# Patient Record
Sex: FEMALE | Race: WHITE | NOT HISPANIC OR LATINO
[De-identification: names, ages, dates, MRNs, and addresses within clinical notes are randomized per-mention and may not be internally consistent; named-entity substitution may affect disease eponyms.]

---

## 2017-05-22 ENCOUNTER — MEDICATION RENEWAL (OUTPATIENT)
Age: 72
End: 2017-05-22

## 2017-05-22 RX ORDER — DENOSUMAB 60 MG/ML
60 INJECTION SUBCUTANEOUS
Qty: 1 | Refills: 1 | Status: DISCONTINUED | COMMUNITY
Start: 2017-05-22 | End: 2017-05-22

## 2017-05-30 ENCOUNTER — MEDICATION RENEWAL (OUTPATIENT)
Age: 72
End: 2017-05-30

## 2017-05-30 RX ORDER — DENOSUMAB 60 MG/ML
60 INJECTION SUBCUTANEOUS
Qty: 1 | Refills: 1 | Status: ACTIVE | COMMUNITY
Start: 2017-05-30

## 2017-06-09 ENCOUNTER — APPOINTMENT (OUTPATIENT)
Dept: ENDOCRINOLOGY | Facility: CLINIC | Age: 72
End: 2017-06-09

## 2017-09-14 ENCOUNTER — OUTPATIENT (OUTPATIENT)
Dept: OUTPATIENT SERVICES | Facility: HOSPITAL | Age: 72
LOS: 1 days | Discharge: HOME | End: 2017-09-14

## 2017-09-14 ENCOUNTER — APPOINTMENT (OUTPATIENT)
Dept: HEMATOLOGY ONCOLOGY | Facility: CLINIC | Age: 72
End: 2017-09-14

## 2017-09-14 ENCOUNTER — RESULT REVIEW (OUTPATIENT)
Age: 72
End: 2017-09-14

## 2017-09-14 DIAGNOSIS — E83.119 HEMOCHROMATOSIS, UNSPECIFIED: ICD-10-CM

## 2017-09-14 DIAGNOSIS — Z96.652 PRESENCE OF LEFT ARTIFICIAL KNEE JOINT: Chronic | ICD-10-CM

## 2017-09-14 LAB
HCT VFR BLD CALC: 41.7 % — SIGNIFICANT CHANGE UP (ref 34.5–45)
HGB BLD-MCNC: 14.6 G/DL — SIGNIFICANT CHANGE UP (ref 11.5–15.5)
MCHC RBC-ENTMCNC: 33.8 PG — SIGNIFICANT CHANGE UP (ref 27–34)
MCHC RBC-ENTMCNC: 35.1 G/DL — SIGNIFICANT CHANGE UP (ref 32–36)
MCV RBC AUTO: 96.3 FL — SIGNIFICANT CHANGE UP (ref 80–100)
PLATELET # BLD AUTO: 267 K/UL — SIGNIFICANT CHANGE UP (ref 150–400)
RBC # BLD: 4.33 M/UL — SIGNIFICANT CHANGE UP (ref 3.8–5.2)
RBC # FLD: 11.7 % — SIGNIFICANT CHANGE UP (ref 10.3–14.5)
WBC # BLD: 7.4 K/UL — SIGNIFICANT CHANGE UP (ref 3.8–10.5)
WBC # FLD AUTO: 7.4 K/UL — SIGNIFICANT CHANGE UP (ref 3.8–10.5)

## 2017-09-21 LAB
ALBUMIN SERPL ELPH-MCNC: 4.5 G/DL
ALP BLD-CCNC: 69 U/L
ALT SERPL-CCNC: 12 U/L
ANION GAP SERPL CALC-SCNC: 16 MMOL/L
AST SERPL-CCNC: 19 U/L
BILIRUB SERPL-MCNC: 0.3 MG/DL
BUN SERPL-MCNC: 16 MG/DL
CALCIUM SERPL-MCNC: 10.2 MG/DL
CHLORIDE SERPL-SCNC: 100 MMOL/L
CO2 SERPL-SCNC: 27 MMOL/L
CREAT SERPL-MCNC: 0.72 MG/DL
FERRITIN SERPL-MCNC: 74 NG/ML
GLUCOSE SERPL-MCNC: 103 MG/DL
IRON SATN MFR SERPL: 55 %
IRON SERPL-MCNC: 150 UG/DL
POTASSIUM SERPL-SCNC: 4.4 MMOL/L
PROT SERPL-MCNC: 7.1 G/DL
SODIUM SERPL-SCNC: 143 MMOL/L
TIBC SERPL-MCNC: 271 UG/DL
UIBC SERPL-MCNC: 121 UG/DL

## 2018-01-11 ENCOUNTER — APPOINTMENT (OUTPATIENT)
Dept: WOUND CARE | Facility: CLINIC | Age: 73
End: 2018-01-11
Payer: MEDICARE

## 2018-01-11 VITALS
WEIGHT: 158 LBS | TEMPERATURE: 98.4 F | DIASTOLIC BLOOD PRESSURE: 82 MMHG | BODY MASS INDEX: 34.56 KG/M2 | HEART RATE: 86 BPM | RESPIRATION RATE: 16 BRPM | HEIGHT: 56.5 IN | SYSTOLIC BLOOD PRESSURE: 187 MMHG

## 2018-01-11 PROCEDURE — 99204 OFFICE O/P NEW MOD 45 MIN: CPT

## 2018-01-11 RX ORDER — CEPHALEXIN 500 MG/1
500 CAPSULE ORAL
Qty: 30 | Refills: 0 | Status: COMPLETED | COMMUNITY
Start: 2017-12-18

## 2018-01-11 RX ORDER — OXYCODONE AND ACETAMINOPHEN 10; 325 MG/1; MG/1
10-325 TABLET ORAL
Qty: 20 | Refills: 0 | Status: COMPLETED | COMMUNITY
Start: 2017-03-04

## 2018-01-11 RX ORDER — METHYLPREDNISOLONE 4 MG/1
4 TABLET ORAL
Qty: 21 | Refills: 0 | Status: COMPLETED | COMMUNITY
Start: 2016-12-28

## 2018-01-11 RX ORDER — OXYCODONE AND ACETAMINOPHEN 5; 325 MG/1; MG/1
5-325 TABLET ORAL
Qty: 60 | Refills: 0 | Status: COMPLETED | COMMUNITY
Start: 2017-05-19

## 2018-01-19 ENCOUNTER — OTHER (OUTPATIENT)
Age: 73
End: 2018-01-19

## 2018-01-19 LAB — BACTERIA TISS CULT: ABNORMAL

## 2018-01-25 ENCOUNTER — APPOINTMENT (OUTPATIENT)
Dept: WOUND CARE | Facility: HOSPITAL | Age: 73
End: 2018-01-25
Payer: MEDICARE

## 2018-01-25 DIAGNOSIS — M79.1 MYALGIA: ICD-10-CM

## 2018-01-25 PROCEDURE — XXXXX: CPT

## 2018-02-08 ENCOUNTER — APPOINTMENT (OUTPATIENT)
Dept: WOUND CARE | Facility: CLINIC | Age: 73
End: 2018-02-08
Payer: MEDICARE

## 2018-02-08 DIAGNOSIS — M54.5 LOW BACK PAIN: ICD-10-CM

## 2018-02-08 PROCEDURE — 11042 DBRDMT SUBQ TIS 1ST 20SQCM/<: CPT | Mod: 58

## 2018-02-08 PROCEDURE — 99214 OFFICE O/P EST MOD 30 MIN: CPT | Mod: 25

## 2018-02-22 ENCOUNTER — APPOINTMENT (OUTPATIENT)
Dept: WOUND CARE | Facility: CLINIC | Age: 73
End: 2018-02-22
Payer: MEDICARE

## 2018-02-22 VITALS
HEART RATE: 67 BPM | TEMPERATURE: 97.6 F | BODY MASS INDEX: 34.56 KG/M2 | RESPIRATION RATE: 16 BRPM | WEIGHT: 158 LBS | HEIGHT: 56.5 IN | SYSTOLIC BLOOD PRESSURE: 148 MMHG | DIASTOLIC BLOOD PRESSURE: 75 MMHG

## 2018-02-22 PROCEDURE — 99213 OFFICE O/P EST LOW 20 MIN: CPT

## 2018-02-22 RX ORDER — IBUPROFEN 800 MG/1
800 TABLET, FILM COATED ORAL
Qty: 20 | Refills: 0 | Status: ACTIVE | COMMUNITY
Start: 2018-02-13

## 2018-03-15 ENCOUNTER — APPOINTMENT (OUTPATIENT)
Dept: WOUND CARE | Facility: CLINIC | Age: 73
End: 2018-03-15
Payer: MEDICARE

## 2018-03-15 VITALS
SYSTOLIC BLOOD PRESSURE: 159 MMHG | DIASTOLIC BLOOD PRESSURE: 80 MMHG | TEMPERATURE: 98.4 F | WEIGHT: 158 LBS | HEART RATE: 79 BPM | BODY MASS INDEX: 35.54 KG/M2 | RESPIRATION RATE: 16 BRPM | HEIGHT: 56 IN

## 2018-03-15 DIAGNOSIS — M54.12 RADICULOPATHY, CERVICAL REGION: ICD-10-CM

## 2018-03-15 DIAGNOSIS — Z87.09 PERSONAL HISTORY OF OTHER DISEASES OF THE RESPIRATORY SYSTEM: ICD-10-CM

## 2018-03-15 PROCEDURE — 99213 OFFICE O/P EST LOW 20 MIN: CPT

## 2018-04-13 ENCOUNTER — APPOINTMENT (OUTPATIENT)
Dept: RADIOLOGY | Facility: IMAGING CENTER | Age: 73
End: 2018-04-13
Payer: MEDICARE

## 2018-04-13 ENCOUNTER — OUTPATIENT (OUTPATIENT)
Dept: OUTPATIENT SERVICES | Facility: HOSPITAL | Age: 73
LOS: 1 days | End: 2018-04-13
Payer: MEDICARE

## 2018-04-13 DIAGNOSIS — Z00.8 ENCOUNTER FOR OTHER GENERAL EXAMINATION: ICD-10-CM

## 2018-04-13 DIAGNOSIS — Z96.652 PRESENCE OF LEFT ARTIFICIAL KNEE JOINT: Chronic | ICD-10-CM

## 2018-04-13 PROCEDURE — 71046 X-RAY EXAM CHEST 2 VIEWS: CPT

## 2018-04-13 PROCEDURE — 71046 X-RAY EXAM CHEST 2 VIEWS: CPT | Mod: 26

## 2018-07-24 ENCOUNTER — APPOINTMENT (OUTPATIENT)
Dept: MAMMOGRAPHY | Facility: IMAGING CENTER | Age: 73
End: 2018-07-24
Payer: MEDICARE

## 2018-07-24 ENCOUNTER — OUTPATIENT (OUTPATIENT)
Dept: OUTPATIENT SERVICES | Facility: HOSPITAL | Age: 73
LOS: 1 days | End: 2018-07-24
Payer: MEDICARE

## 2018-07-24 DIAGNOSIS — Z12.31 ENCOUNTER FOR SCREENING MAMMOGRAM FOR MALIGNANT NEOPLASM OF BREAST: ICD-10-CM

## 2018-07-24 DIAGNOSIS — Z96.652 PRESENCE OF LEFT ARTIFICIAL KNEE JOINT: Chronic | ICD-10-CM

## 2018-07-24 PROCEDURE — 77067 SCR MAMMO BI INCL CAD: CPT

## 2018-07-24 PROCEDURE — 77063 BREAST TOMOSYNTHESIS BI: CPT

## 2018-07-24 PROCEDURE — 77067 SCR MAMMO BI INCL CAD: CPT | Mod: 26

## 2018-07-24 PROCEDURE — 77063 BREAST TOMOSYNTHESIS BI: CPT | Mod: 26

## 2018-09-28 ENCOUNTER — RESULT REVIEW (OUTPATIENT)
Age: 73
End: 2018-09-28

## 2018-09-28 ENCOUNTER — OTHER (OUTPATIENT)
Age: 73
End: 2018-09-28

## 2018-09-28 ENCOUNTER — APPOINTMENT (OUTPATIENT)
Dept: HEMATOLOGY ONCOLOGY | Facility: CLINIC | Age: 73
End: 2018-09-28

## 2018-09-28 ENCOUNTER — OUTPATIENT (OUTPATIENT)
Dept: OUTPATIENT SERVICES | Facility: HOSPITAL | Age: 73
LOS: 1 days | Discharge: HOME | End: 2018-09-28

## 2018-09-28 DIAGNOSIS — Z96.652 PRESENCE OF LEFT ARTIFICIAL KNEE JOINT: Chronic | ICD-10-CM

## 2018-09-28 DIAGNOSIS — E83.119 HEMOCHROMATOSIS, UNSPECIFIED: ICD-10-CM

## 2018-09-28 LAB
BASOPHILS # BLD AUTO: 0.1 K/UL — SIGNIFICANT CHANGE UP (ref 0–0.2)
BASOPHILS NFR BLD AUTO: 0.9 % — SIGNIFICANT CHANGE UP (ref 0–2)
EOSINOPHIL # BLD AUTO: 0.1 K/UL — SIGNIFICANT CHANGE UP (ref 0–0.5)
EOSINOPHIL NFR BLD AUTO: 1.6 % — SIGNIFICANT CHANGE UP (ref 0–6)
HCT VFR BLD CALC: 42.4 % — SIGNIFICANT CHANGE UP (ref 34.5–45)
HGB BLD-MCNC: 14.6 G/DL — SIGNIFICANT CHANGE UP (ref 11.5–15.5)
LYMPHOCYTES # BLD AUTO: 3 K/UL — SIGNIFICANT CHANGE UP (ref 1–3.3)
LYMPHOCYTES # BLD AUTO: 37.5 % — SIGNIFICANT CHANGE UP (ref 13–44)
MCHC RBC-ENTMCNC: 31.8 PG — SIGNIFICANT CHANGE UP (ref 27–34)
MCHC RBC-ENTMCNC: 34.4 G/DL — SIGNIFICANT CHANGE UP (ref 32–36)
MCV RBC AUTO: 92.3 FL — SIGNIFICANT CHANGE UP (ref 80–100)
MONOCYTES # BLD AUTO: 0.6 K/UL — SIGNIFICANT CHANGE UP (ref 0–0.9)
MONOCYTES NFR BLD AUTO: 6.8 % — SIGNIFICANT CHANGE UP (ref 2–14)
NEUTROPHILS # BLD AUTO: 4.3 K/UL — SIGNIFICANT CHANGE UP (ref 1.8–7.4)
NEUTROPHILS NFR BLD AUTO: 53.2 % — SIGNIFICANT CHANGE UP (ref 43–77)
PLATELET # BLD AUTO: 325 K/UL — SIGNIFICANT CHANGE UP (ref 150–400)
RBC # BLD: 4.59 M/UL — SIGNIFICANT CHANGE UP (ref 3.8–5.2)
RBC # FLD: 11.5 % — SIGNIFICANT CHANGE UP (ref 10.3–14.5)
WBC # BLD: 8.1 K/UL — SIGNIFICANT CHANGE UP (ref 3.8–10.5)
WBC # FLD AUTO: 8.1 K/UL — SIGNIFICANT CHANGE UP (ref 3.8–10.5)

## 2018-10-05 LAB
ALBUMIN SERPL ELPH-MCNC: 4.3 G/DL
ALP BLD-CCNC: 95 U/L
ALT SERPL-CCNC: 15 U/L
ANION GAP SERPL CALC-SCNC: 15 MMOL/L
AST SERPL-CCNC: 21 U/L
BILIRUB SERPL-MCNC: 0.2 MG/DL
BUN SERPL-MCNC: 11 MG/DL
CALCIUM SERPL-MCNC: 9.8 MG/DL
CHLORIDE SERPL-SCNC: 96 MMOL/L
CO2 SERPL-SCNC: 31 MMOL/L
CREAT SERPL-MCNC: 0.52 MG/DL
FERRITIN SERPL-MCNC: 85 NG/ML
GLUCOSE SERPL-MCNC: 107 MG/DL
IRON SATN MFR SERPL: 29 %
IRON SERPL-MCNC: 67 UG/DL
POTASSIUM SERPL-SCNC: 3.8 MMOL/L
PROT SERPL-MCNC: 6.9 G/DL
SODIUM SERPL-SCNC: 142 MMOL/L
TIBC SERPL-MCNC: 235 UG/DL
UIBC SERPL-MCNC: 168 UG/DL

## 2018-10-10 ENCOUNTER — OUTPATIENT (OUTPATIENT)
Dept: OUTPATIENT SERVICES | Facility: HOSPITAL | Age: 73
LOS: 1 days | End: 2018-10-10
Payer: MEDICARE

## 2018-10-10 ENCOUNTER — APPOINTMENT (OUTPATIENT)
Dept: RADIOLOGY | Facility: IMAGING CENTER | Age: 73
End: 2018-10-10
Payer: MEDICARE

## 2018-10-10 DIAGNOSIS — Z96.652 PRESENCE OF LEFT ARTIFICIAL KNEE JOINT: Chronic | ICD-10-CM

## 2018-10-10 DIAGNOSIS — Z00.8 ENCOUNTER FOR OTHER GENERAL EXAMINATION: ICD-10-CM

## 2018-10-10 PROCEDURE — 71046 X-RAY EXAM CHEST 2 VIEWS: CPT | Mod: 26

## 2018-10-10 PROCEDURE — 71046 X-RAY EXAM CHEST 2 VIEWS: CPT

## 2019-04-02 ENCOUNTER — APPOINTMENT (OUTPATIENT)
Dept: CT IMAGING | Facility: IMAGING CENTER | Age: 74
End: 2019-04-02
Payer: MEDICARE

## 2019-04-02 ENCOUNTER — OUTPATIENT (OUTPATIENT)
Dept: OUTPATIENT SERVICES | Facility: HOSPITAL | Age: 74
LOS: 1 days | End: 2019-04-02
Payer: MEDICARE

## 2019-04-02 DIAGNOSIS — Z00.8 ENCOUNTER FOR OTHER GENERAL EXAMINATION: ICD-10-CM

## 2019-04-02 DIAGNOSIS — Z96.652 PRESENCE OF LEFT ARTIFICIAL KNEE JOINT: Chronic | ICD-10-CM

## 2019-04-02 PROCEDURE — 72131 CT LUMBAR SPINE W/O DYE: CPT

## 2019-04-02 PROCEDURE — 72131 CT LUMBAR SPINE W/O DYE: CPT | Mod: 26

## 2019-04-02 PROCEDURE — 72128 CT CHEST SPINE W/O DYE: CPT | Mod: 26

## 2019-04-02 PROCEDURE — 72128 CT CHEST SPINE W/O DYE: CPT

## 2019-05-14 ENCOUNTER — OUTPATIENT (OUTPATIENT)
Dept: OUTPATIENT SERVICES | Facility: HOSPITAL | Age: 74
LOS: 1 days | Discharge: HOME | End: 2019-05-14

## 2019-05-14 DIAGNOSIS — Z96.652 PRESENCE OF LEFT ARTIFICIAL KNEE JOINT: Chronic | ICD-10-CM

## 2019-05-14 DIAGNOSIS — E83.119 HEMOCHROMATOSIS, UNSPECIFIED: ICD-10-CM

## 2019-05-16 ENCOUNTER — APPOINTMENT (OUTPATIENT)
Dept: HEMATOLOGY ONCOLOGY | Facility: CLINIC | Age: 74
End: 2019-05-16

## 2019-05-16 ENCOUNTER — OUTPATIENT (OUTPATIENT)
Dept: OUTPATIENT SERVICES | Facility: HOSPITAL | Age: 74
LOS: 1 days | End: 2019-05-16
Payer: MEDICARE

## 2019-05-16 ENCOUNTER — APPOINTMENT (OUTPATIENT)
Dept: RADIOLOGY | Facility: IMAGING CENTER | Age: 74
End: 2019-05-16
Payer: MEDICARE

## 2019-05-16 ENCOUNTER — RESULT REVIEW (OUTPATIENT)
Age: 74
End: 2019-05-16

## 2019-05-16 DIAGNOSIS — Z00.8 ENCOUNTER FOR OTHER GENERAL EXAMINATION: ICD-10-CM

## 2019-05-16 DIAGNOSIS — Z96.652 PRESENCE OF LEFT ARTIFICIAL KNEE JOINT: Chronic | ICD-10-CM

## 2019-05-16 LAB
HCT VFR BLD CALC: 40.1 % — SIGNIFICANT CHANGE UP (ref 34.5–45)
HGB BLD-MCNC: 13.9 G/DL — SIGNIFICANT CHANGE UP (ref 11.5–15.5)
MCHC RBC-ENTMCNC: 32 PG — SIGNIFICANT CHANGE UP (ref 27–34)
MCHC RBC-ENTMCNC: 34.7 G/DL — SIGNIFICANT CHANGE UP (ref 32–36)
MCV RBC AUTO: 92.1 FL — SIGNIFICANT CHANGE UP (ref 80–100)
PLATELET # BLD AUTO: 284 K/UL — SIGNIFICANT CHANGE UP (ref 150–400)
RBC # BLD: 4.35 M/UL — SIGNIFICANT CHANGE UP (ref 3.8–5.2)
RBC # FLD: 11.7 % — SIGNIFICANT CHANGE UP (ref 10.3–14.5)
WBC # BLD: 7.8 K/UL — SIGNIFICANT CHANGE UP (ref 3.8–10.5)
WBC # FLD AUTO: 7.8 K/UL — SIGNIFICANT CHANGE UP (ref 3.8–10.5)

## 2019-05-16 PROCEDURE — 72200 X-RAY EXAM SI JOINTS: CPT

## 2019-05-16 PROCEDURE — 72200 X-RAY EXAM SI JOINTS: CPT | Mod: 26

## 2019-05-23 LAB
ALBUMIN SERPL ELPH-MCNC: 4.2 G/DL
ALP BLD-CCNC: 79 U/L
ALT SERPL-CCNC: 17 U/L
ANION GAP SERPL CALC-SCNC: 10 MMOL/L
AST SERPL-CCNC: 22 U/L
BILIRUB SERPL-MCNC: 0.2 MG/DL
BUN SERPL-MCNC: 11 MG/DL
CALCIUM SERPL-MCNC: 10 MG/DL
CHLORIDE SERPL-SCNC: 99 MMOL/L
CO2 SERPL-SCNC: 32 MMOL/L
CREAT SERPL-MCNC: 0.54 MG/DL
FERRITIN SERPL-MCNC: 74 NG/ML
GLUCOSE SERPL-MCNC: 98 MG/DL
IRON SATN MFR SERPL: 22 %
IRON SERPL-MCNC: 47 UG/DL
POTASSIUM SERPL-SCNC: 4.4 MMOL/L
PROT SERPL-MCNC: 6.5 G/DL
SODIUM SERPL-SCNC: 141 MMOL/L
TIBC SERPL-MCNC: 217 UG/DL
UIBC SERPL-MCNC: 170 UG/DL

## 2019-11-05 ENCOUNTER — INPATIENT (INPATIENT)
Facility: HOSPITAL | Age: 74
LOS: 7 days | Discharge: HOME | End: 2019-11-13
Attending: HOSPITALIST | Admitting: HOSPITALIST
Payer: MEDICARE

## 2019-11-05 VITALS
DIASTOLIC BLOOD PRESSURE: 105 MMHG | SYSTOLIC BLOOD PRESSURE: 148 MMHG | TEMPERATURE: 98 F | HEART RATE: 96 BPM | OXYGEN SATURATION: 95 % | RESPIRATION RATE: 16 BRPM

## 2019-11-05 DIAGNOSIS — Z96.652 PRESENCE OF LEFT ARTIFICIAL KNEE JOINT: Chronic | ICD-10-CM

## 2019-11-05 LAB
ALBUMIN SERPL ELPH-MCNC: 3.7 G/DL — SIGNIFICANT CHANGE UP (ref 3.3–5)
ALP SERPL-CCNC: 90 U/L — SIGNIFICANT CHANGE UP (ref 40–120)
ALT FLD-CCNC: 14 U/L — SIGNIFICANT CHANGE UP (ref 4–33)
ANION GAP SERPL CALC-SCNC: 12 MMO/L — SIGNIFICANT CHANGE UP (ref 7–14)
AST SERPL-CCNC: 20 U/L — SIGNIFICANT CHANGE UP (ref 4–32)
BASE EXCESS BLDV CALC-SCNC: 5 MMOL/L — SIGNIFICANT CHANGE UP
BASOPHILS # BLD AUTO: 0.09 K/UL — SIGNIFICANT CHANGE UP (ref 0–0.2)
BASOPHILS NFR BLD AUTO: 0.6 % — SIGNIFICANT CHANGE UP (ref 0–2)
BILIRUB SERPL-MCNC: < 0.2 MG/DL — LOW (ref 0.2–1.2)
BUN SERPL-MCNC: 12 MG/DL — SIGNIFICANT CHANGE UP (ref 7–23)
CALCIUM SERPL-MCNC: 9.6 MG/DL — SIGNIFICANT CHANGE UP (ref 8.4–10.5)
CHLORIDE SERPL-SCNC: 97 MMOL/L — LOW (ref 98–107)
CO2 SERPL-SCNC: 26 MMOL/L — SIGNIFICANT CHANGE UP (ref 22–31)
CREAT SERPL-MCNC: 0.52 MG/DL — SIGNIFICANT CHANGE UP (ref 0.5–1.3)
EOSINOPHIL # BLD AUTO: 0.73 K/UL — HIGH (ref 0–0.5)
EOSINOPHIL NFR BLD AUTO: 4.6 % — SIGNIFICANT CHANGE UP (ref 0–6)
GAS PNL BLDV: 136 MMOL/L — SIGNIFICANT CHANGE UP (ref 136–146)
GLUCOSE BLDV-MCNC: 185 MG/DL — HIGH (ref 70–99)
GLUCOSE SERPL-MCNC: 183 MG/DL — HIGH (ref 70–99)
HCO3 BLDV-SCNC: 27 MMOL/L — SIGNIFICANT CHANGE UP (ref 20–27)
HCT VFR BLD CALC: 43.9 % — SIGNIFICANT CHANGE UP (ref 34.5–45)
HCT VFR BLDV CALC: 45.3 % — HIGH (ref 34.5–45)
HGB BLD-MCNC: 13.7 G/DL — SIGNIFICANT CHANGE UP (ref 11.5–15.5)
HGB BLDV-MCNC: 14.8 G/DL — SIGNIFICANT CHANGE UP (ref 11.5–15.5)
IMM GRANULOCYTES NFR BLD AUTO: 0.4 % — SIGNIFICANT CHANGE UP (ref 0–1.5)
LYMPHOCYTES # BLD AUTO: 1.65 K/UL — SIGNIFICANT CHANGE UP (ref 1–3.3)
LYMPHOCYTES # BLD AUTO: 10.3 % — LOW (ref 13–44)
MCHC RBC-ENTMCNC: 28.8 PG — SIGNIFICANT CHANGE UP (ref 27–34)
MCHC RBC-ENTMCNC: 31.2 % — LOW (ref 32–36)
MCV RBC AUTO: 92.2 FL — SIGNIFICANT CHANGE UP (ref 80–100)
MONOCYTES # BLD AUTO: 1.04 K/UL — HIGH (ref 0–0.9)
MONOCYTES NFR BLD AUTO: 6.5 % — SIGNIFICANT CHANGE UP (ref 2–14)
NEUTROPHILS # BLD AUTO: 12.4 K/UL — HIGH (ref 1.8–7.4)
NEUTROPHILS NFR BLD AUTO: 77.6 % — HIGH (ref 43–77)
NRBC # FLD: 0 K/UL — SIGNIFICANT CHANGE UP (ref 0–0)
PCO2 BLDV: 50 MMHG — SIGNIFICANT CHANGE UP (ref 41–51)
PH BLDV: 7.4 PH — SIGNIFICANT CHANGE UP (ref 7.32–7.43)
PLATELET # BLD AUTO: 451 K/UL — HIGH (ref 150–400)
PMV BLD: 9.3 FL — SIGNIFICANT CHANGE UP (ref 7–13)
PO2 BLDV: 30 MMHG — LOW (ref 35–40)
POTASSIUM BLDV-SCNC: 3.6 MMOL/L — SIGNIFICANT CHANGE UP (ref 3.4–4.5)
POTASSIUM SERPL-MCNC: 3.8 MMOL/L — SIGNIFICANT CHANGE UP (ref 3.5–5.3)
POTASSIUM SERPL-SCNC: 3.8 MMOL/L — SIGNIFICANT CHANGE UP (ref 3.5–5.3)
PROT SERPL-MCNC: 7.3 G/DL — SIGNIFICANT CHANGE UP (ref 6–8.3)
RBC # BLD: 4.76 M/UL — SIGNIFICANT CHANGE UP (ref 3.8–5.2)
RBC # FLD: 12.8 % — SIGNIFICANT CHANGE UP (ref 10.3–14.5)
SAO2 % BLDV: 51 % — LOW (ref 60–85)
SODIUM SERPL-SCNC: 135 MMOL/L — SIGNIFICANT CHANGE UP (ref 135–145)
WBC # BLD: 15.97 K/UL — HIGH (ref 3.8–10.5)
WBC # FLD AUTO: 15.97 K/UL — HIGH (ref 3.8–10.5)

## 2019-11-05 PROCEDURE — 71045 X-RAY EXAM CHEST 1 VIEW: CPT | Mod: 26

## 2019-11-05 RX ORDER — IPRATROPIUM/ALBUTEROL SULFATE 18-103MCG
3 AEROSOL WITH ADAPTER (GRAM) INHALATION ONCE
Refills: 0 | Status: COMPLETED | OUTPATIENT
Start: 2019-11-05 | End: 2019-11-05

## 2019-11-05 RX ORDER — SODIUM CHLORIDE 9 MG/ML
1000 INJECTION INTRAMUSCULAR; INTRAVENOUS; SUBCUTANEOUS ONCE
Refills: 0 | Status: COMPLETED | OUTPATIENT
Start: 2019-11-05 | End: 2019-11-05

## 2019-11-05 RX ORDER — AZITHROMYCIN 500 MG/1
500 TABLET, FILM COATED ORAL ONCE
Refills: 0 | Status: COMPLETED | OUTPATIENT
Start: 2019-11-05 | End: 2019-11-05

## 2019-11-05 RX ORDER — ONDANSETRON 8 MG/1
4 TABLET, FILM COATED ORAL ONCE
Refills: 0 | Status: COMPLETED | OUTPATIENT
Start: 2019-11-05 | End: 2019-11-05

## 2019-11-05 RX ORDER — METHADONE HYDROCHLORIDE 40 MG/1
10 TABLET ORAL ONCE
Refills: 0 | Status: DISCONTINUED | OUTPATIENT
Start: 2019-11-05 | End: 2019-11-05

## 2019-11-05 RX ORDER — CEFTRIAXONE 500 MG/1
1000 INJECTION, POWDER, FOR SOLUTION INTRAMUSCULAR; INTRAVENOUS ONCE
Refills: 0 | Status: COMPLETED | OUTPATIENT
Start: 2019-11-05 | End: 2019-11-05

## 2019-11-05 RX ORDER — GABAPENTIN 400 MG/1
300 CAPSULE ORAL ONCE
Refills: 0 | Status: COMPLETED | OUTPATIENT
Start: 2019-11-05 | End: 2019-11-05

## 2019-11-05 RX ADMIN — Medication 40 MILLIGRAM(S): at 23:33

## 2019-11-05 RX ADMIN — ONDANSETRON 4 MILLIGRAM(S): 8 TABLET, FILM COATED ORAL at 23:32

## 2019-11-05 RX ADMIN — Medication 3 MILLILITER(S): at 23:33

## 2019-11-05 RX ADMIN — METHADONE HYDROCHLORIDE 10 MILLIGRAM(S): 40 TABLET ORAL at 23:30

## 2019-11-05 RX ADMIN — GABAPENTIN 300 MILLIGRAM(S): 400 CAPSULE ORAL at 23:32

## 2019-11-05 NOTE — ED PROVIDER NOTE - CARE PLAN
Principal Discharge DX:	Pneumonia Principal Discharge DX:	Pneumonia  Secondary Diagnosis:	NSTEMI (non-ST elevated myocardial infarction)

## 2019-11-05 NOTE — ED PROVIDER NOTE - CLINICAL SUMMARY MEDICAL DECISION MAKING FREE TEXT BOX
74 Y F with hx of asthma and chronic back pain here with chest pain diffuse wheezing and SOB in setting of recent cough being treated with azithromycin without fever or infectious symptoms currently. Will give duonebs, steroids for diffuse wheezing. Will get EKG and troponin for chest pain. Feel that back pain component is chronic, pt agrees so I do not think this is a dissection especially given chest pain with cough and diffuse wheezing on exam. Will get CXR to R/O lobar pna but doubt without fever. Depending on how pt progresses may CDU vs DC.

## 2019-11-05 NOTE — ED PROVIDER NOTE - ATTENDING CONTRIBUTION TO CARE
74F with hx of chronic back pain and asthma c/o sob and cough, worsening over the past 10 days. patient recently on a [place] cruise, returned at the end of october with cough and greenish phlegm. saw her PMD and started on azithromycin which she completed on saturday. cough has been worsening with (+) wheeze.   ***GEN - NAD; well appearing; A+O x3 ***HEAD - NC/AT ***EYES/NOSE - PEERL, EOMI, mucous membranes moist, no discharge ***THROAT: Oral cavity and pharynx normal. No inflammation, swelling, exudate, or lesions.  ***NECK: Neck supple, non-tender without lymphadenopathy, no masses, no thyromegaly. ***PULMONARY - wheezes b/l with ronchi on right. ***CARDIAC -s1s2, tachycardiac, no M,G,R ***ABDOMEN - +BS, ND, NT, soft, no guarding, no rebound, no masses   ***BACK - no CVA tenderness, Normal  spine ***EXTREMITIES - symmetric pulses, 2+ dp, capillary refill < 2 seconds, no clubbing, no cyanosis, no edema ***SKIN - no rash or bruising   ***NEUROLOGIC - alert, CN 2-12 intact, gait nl, ***PSYCH - insight and judgment nl, memory nl, affect nl, thought nl  MDM: 74F with recent uri and worsening asthma sx. r/o pna. treat for asthma exacerbation, cxr, labs.

## 2019-11-05 NOTE — ED ADULT TRIAGE NOTE - CHIEF COMPLAINT QUOTE
Pt. presents to the ED with asthmatic attack. Has been taking advair, proair, and albuteral solution. c/o nausea. c/o chest pain radiating to back. Actively vomiting at triage. Brought to ambulance bay.

## 2019-11-05 NOTE — ED ADULT NURSE NOTE - OBJECTIVE STATEMENT
Pt arrives for new onset CP that started at 2045. Pt states She is an asthmatic, she tried to get up her stairs and "totally shut down" in reference to her asthma, Pt did not elaborate. She reports SOB; Pt has bilateral expiratory wheezing. Pt was dry heaving upon arrival w/o emesis.   Pt states she completed amoxacillin on Saturday for a cold she got while on a cruise in the [place].  Reports hx asthma and chronic back and neck pain.   Placed on monitor  Family at bedside. Will continue to monitor

## 2019-11-05 NOTE — ED PROVIDER NOTE - OBJECTIVE STATEMENT
74 Y F with hx of asthma on Advair and albuterol as well as chronic pain on methadone and gabapentin. She presents today stating that she was trying to walk up the stairs and got very short of breath with wheezing. She says that she is also having severe chest pain that started after she got upstairs and took several pumps of her albuterol. She denies hx of cardiac disease. She states that she is feeling nauseated as well. She does admit to back pain but she says that this is no different than her normal back pain that she is on chronic pain medications for. She notes that she recently was on a [place] to the [place] and when she got back was having a cough so her  gave her a Z-iain. She denies every having a fever and she finished the medications on Saturday.

## 2019-11-05 NOTE — ED ADULT NURSE NOTE - INTERVENTIONS DEFINITIONS
Instruct patient to call for assistance/[place] to call system/Non-slip footwear when patient is off stretcher/Call bell, personal items and telephone within reach/Monitor gait and stability/Physically safe environment: no spills, clutter or unnecessary equipment

## 2019-11-06 DIAGNOSIS — Z02.9 ENCOUNTER FOR ADMINISTRATIVE EXAMINATIONS, UNSPECIFIED: ICD-10-CM

## 2019-11-06 DIAGNOSIS — I21.4 NON-ST ELEVATION (NSTEMI) MYOCARDIAL INFARCTION: ICD-10-CM

## 2019-11-06 DIAGNOSIS — Z98.890 OTHER SPECIFIED POSTPROCEDURAL STATES: Chronic | ICD-10-CM

## 2019-11-06 DIAGNOSIS — Z87.81 PERSONAL HISTORY OF (HEALED) TRAUMATIC FRACTURE: Chronic | ICD-10-CM

## 2019-11-06 DIAGNOSIS — E87.79 OTHER FLUID OVERLOAD: ICD-10-CM

## 2019-11-06 DIAGNOSIS — J45.901 UNSPECIFIED ASTHMA WITH (ACUTE) EXACERBATION: ICD-10-CM

## 2019-11-06 DIAGNOSIS — Z96.641 PRESENCE OF RIGHT ARTIFICIAL HIP JOINT: Chronic | ICD-10-CM

## 2019-11-06 DIAGNOSIS — M15.0 PRIMARY GENERALIZED (OSTEO)ARTHRITIS: ICD-10-CM

## 2019-11-06 DIAGNOSIS — Z29.9 ENCOUNTER FOR PROPHYLACTIC MEASURES, UNSPECIFIED: ICD-10-CM

## 2019-11-06 DIAGNOSIS — J90 PLEURAL EFFUSION, NOT ELSEWHERE CLASSIFIED: ICD-10-CM

## 2019-11-06 DIAGNOSIS — J18.9 PNEUMONIA, UNSPECIFIED ORGANISM: ICD-10-CM

## 2019-11-06 LAB
APPEARANCE UR: CLEAR — SIGNIFICANT CHANGE UP
APTT BLD: 27.3 SEC — LOW (ref 27.5–36.3)
APTT BLD: 30 SEC — SIGNIFICANT CHANGE UP (ref 27.5–36.3)
APTT BLD: 30.2 SEC — SIGNIFICANT CHANGE UP (ref 27.5–36.3)
APTT BLD: 30.7 SEC — SIGNIFICANT CHANGE UP (ref 27.5–36.3)
BILIRUB UR-MCNC: NEGATIVE — SIGNIFICANT CHANGE UP
BLOOD UR QL VISUAL: NEGATIVE — SIGNIFICANT CHANGE UP
CHOLEST SERPL-MCNC: 161 MG/DL — SIGNIFICANT CHANGE UP (ref 120–199)
CK MB BLD-MCNC: 16.93 NG/ML — HIGH (ref 1–4.7)
CK MB BLD-MCNC: 9.61 NG/ML — HIGH (ref 1–4.7)
CK MB BLD-MCNC: SIGNIFICANT CHANGE UP (ref 0–2.5)
CK SERPL-CCNC: 125 U/L — SIGNIFICANT CHANGE UP (ref 25–170)
CK SERPL-CCNC: 97 U/L — SIGNIFICANT CHANGE UP (ref 25–170)
COLOR SPEC: SIGNIFICANT CHANGE UP
GLUCOSE UR-MCNC: 300 — HIGH
HBA1C BLD-MCNC: 5.2 % — SIGNIFICANT CHANGE UP (ref 4–5.6)
HDLC SERPL-MCNC: 51 MG/DL — SIGNIFICANT CHANGE UP (ref 45–65)
INR BLD: 1.05 — SIGNIFICANT CHANGE UP (ref 0.88–1.17)
INR BLD: 1.15 — SIGNIFICANT CHANGE UP (ref 0.88–1.17)
KETONES UR-MCNC: NEGATIVE — SIGNIFICANT CHANGE UP
L PNEUMO AG UR QL: NEGATIVE — SIGNIFICANT CHANGE UP
LACTATE SERPL-SCNC: 1.5 MMOL/L — SIGNIFICANT CHANGE UP (ref 0.5–2)
LEUKOCYTE ESTERASE UR-ACNC: NEGATIVE — SIGNIFICANT CHANGE UP
LIPID PNL WITH DIRECT LDL SERPL: 110 MG/DL — SIGNIFICANT CHANGE UP
NITRITE UR-MCNC: NEGATIVE — SIGNIFICANT CHANGE UP
NT-PROBNP SERPL-SCNC: 2545 PG/ML — SIGNIFICANT CHANGE UP
PH UR: 6.5 — SIGNIFICANT CHANGE UP (ref 5–8)
PROT UR-MCNC: NEGATIVE — SIGNIFICANT CHANGE UP
PROTHROM AB SERPL-ACNC: 12 SEC — SIGNIFICANT CHANGE UP (ref 9.8–13.1)
PROTHROM AB SERPL-ACNC: 12.8 SEC — SIGNIFICANT CHANGE UP (ref 9.8–13.1)
SP GR SPEC: 1.02 — SIGNIFICANT CHANGE UP (ref 1–1.04)
TRIGL SERPL-MCNC: 51 MG/DL — SIGNIFICANT CHANGE UP (ref 10–149)
TROPONIN T, HIGH SENSITIVITY: 538 NG/L — CRITICAL HIGH (ref ?–14)
TROPONIN T, HIGH SENSITIVITY: 606 NG/L — CRITICAL HIGH (ref ?–14)
TROPONIN T, HIGH SENSITIVITY: 762 NG/L — CRITICAL HIGH (ref ?–14)
TSH SERPL-MCNC: 1.45 UIU/ML — SIGNIFICANT CHANGE UP (ref 0.27–4.2)
UROBILINOGEN FLD QL: NORMAL — SIGNIFICANT CHANGE UP

## 2019-11-06 PROCEDURE — 71275 CT ANGIOGRAPHY CHEST: CPT | Mod: 26

## 2019-11-06 PROCEDURE — 12345: CPT | Mod: NC

## 2019-11-06 PROCEDURE — 99223 1ST HOSP IP/OBS HIGH 75: CPT

## 2019-11-06 PROCEDURE — 99223 1ST HOSP IP/OBS HIGH 75: CPT | Mod: GC

## 2019-11-06 PROCEDURE — 99221 1ST HOSP IP/OBS SF/LOW 40: CPT

## 2019-11-06 RX ORDER — NITROGLYCERIN 6.5 MG
0.4 CAPSULE, EXTENDED RELEASE ORAL
Refills: 0 | Status: COMPLETED | OUTPATIENT
Start: 2019-11-06 | End: 2019-11-06

## 2019-11-06 RX ORDER — MAGNESIUM SULFATE 500 MG/ML
2 VIAL (ML) INJECTION ONCE
Refills: 0 | Status: COMPLETED | OUTPATIENT
Start: 2019-11-06 | End: 2019-11-06

## 2019-11-06 RX ORDER — GABAPENTIN 400 MG/1
300 CAPSULE ORAL ONCE
Refills: 0 | Status: COMPLETED | OUTPATIENT
Start: 2019-11-06 | End: 2019-11-06

## 2019-11-06 RX ORDER — IPRATROPIUM/ALBUTEROL SULFATE 18-103MCG
3 AEROSOL WITH ADAPTER (GRAM) INHALATION EVERY 4 HOURS
Refills: 0 | Status: DISCONTINUED | OUTPATIENT
Start: 2019-11-06 | End: 2019-11-06

## 2019-11-06 RX ORDER — CLOPIDOGREL BISULFATE 75 MG/1
75 TABLET, FILM COATED ORAL DAILY
Refills: 0 | Status: DISCONTINUED | OUTPATIENT
Start: 2019-11-07 | End: 2019-11-13

## 2019-11-06 RX ORDER — ASPIRIN/CALCIUM CARB/MAGNESIUM 324 MG
81 TABLET ORAL DAILY
Refills: 0 | Status: DISCONTINUED | OUTPATIENT
Start: 2019-11-07 | End: 2019-11-13

## 2019-11-06 RX ORDER — ATORVASTATIN CALCIUM 80 MG/1
80 TABLET, FILM COATED ORAL AT BEDTIME
Refills: 0 | Status: DISCONTINUED | OUTPATIENT
Start: 2019-11-06 | End: 2019-11-13

## 2019-11-06 RX ORDER — MONTELUKAST 4 MG/1
10 TABLET, CHEWABLE ORAL DAILY
Refills: 0 | Status: DISCONTINUED | OUTPATIENT
Start: 2019-11-06 | End: 2019-11-13

## 2019-11-06 RX ORDER — FLUTICASONE PROPIONATE AND SALMETEROL 50; 250 UG/1; UG/1
10 POWDER ORAL; RESPIRATORY (INHALATION)
Qty: 0 | Refills: 0 | DISCHARGE

## 2019-11-06 RX ORDER — CEFTRIAXONE 500 MG/1
1000 INJECTION, POWDER, FOR SOLUTION INTRAMUSCULAR; INTRAVENOUS EVERY 24 HOURS
Refills: 0 | Status: DISCONTINUED | OUTPATIENT
Start: 2019-11-06 | End: 2019-11-07

## 2019-11-06 RX ORDER — HEPARIN SODIUM 5000 [USP'U]/ML
3200 INJECTION INTRAVENOUS; SUBCUTANEOUS EVERY 6 HOURS
Refills: 0 | Status: DISCONTINUED | OUTPATIENT
Start: 2019-11-06 | End: 2019-11-08

## 2019-11-06 RX ORDER — NITROGLYCERIN 6.5 MG
0.4 CAPSULE, EXTENDED RELEASE ORAL
Refills: 0 | Status: DISCONTINUED | OUTPATIENT
Start: 2019-11-06 | End: 2019-11-13

## 2019-11-06 RX ORDER — GABAPENTIN 400 MG/1
300 CAPSULE ORAL THREE TIMES A DAY
Refills: 0 | Status: DISCONTINUED | OUTPATIENT
Start: 2019-11-06 | End: 2019-11-13

## 2019-11-06 RX ORDER — IPRATROPIUM/ALBUTEROL SULFATE 18-103MCG
3 AEROSOL WITH ADAPTER (GRAM) INHALATION EVERY 6 HOURS
Refills: 0 | Status: DISCONTINUED | OUTPATIENT
Start: 2019-11-06 | End: 2019-11-06

## 2019-11-06 RX ORDER — NITROGLYCERIN 6.5 MG
1 CAPSULE, EXTENDED RELEASE ORAL ONCE
Refills: 0 | Status: COMPLETED | OUTPATIENT
Start: 2019-11-06 | End: 2019-11-06

## 2019-11-06 RX ORDER — CLOPIDOGREL BISULFATE 75 MG/1
600 TABLET, FILM COATED ORAL ONCE
Refills: 0 | Status: COMPLETED | OUTPATIENT
Start: 2019-11-06 | End: 2019-11-06

## 2019-11-06 RX ORDER — NITROGLYCERIN 6.5 MG
0.4 CAPSULE, EXTENDED RELEASE ORAL ONCE
Refills: 0 | Status: COMPLETED | OUTPATIENT
Start: 2019-11-06 | End: 2019-11-06

## 2019-11-06 RX ORDER — ASPIRIN/CALCIUM CARB/MAGNESIUM 324 MG
324 TABLET ORAL ONCE
Refills: 0 | Status: COMPLETED | OUTPATIENT
Start: 2019-11-06 | End: 2019-11-06

## 2019-11-06 RX ORDER — METHADONE HYDROCHLORIDE 40 MG/1
10 TABLET ORAL
Refills: 0 | Status: COMPLETED | OUTPATIENT
Start: 2019-11-06 | End: 2019-11-13

## 2019-11-06 RX ORDER — AZITHROMYCIN 500 MG/1
500 TABLET, FILM COATED ORAL EVERY 24 HOURS
Refills: 0 | Status: DISCONTINUED | OUTPATIENT
Start: 2019-11-06 | End: 2019-11-08

## 2019-11-06 RX ORDER — BUDESONIDE AND FORMOTEROL FUMARATE DIHYDRATE 160; 4.5 UG/1; UG/1
2 AEROSOL RESPIRATORY (INHALATION)
Refills: 0 | Status: DISCONTINUED | OUTPATIENT
Start: 2019-11-06 | End: 2019-11-13

## 2019-11-06 RX ORDER — HEPARIN SODIUM 5000 [USP'U]/ML
INJECTION INTRAVENOUS; SUBCUTANEOUS
Qty: 25000 | Refills: 0 | Status: DISCONTINUED | OUTPATIENT
Start: 2019-11-06 | End: 2019-11-08

## 2019-11-06 RX ORDER — METHADONE HYDROCHLORIDE 40 MG/1
10 TABLET ORAL
Refills: 0 | Status: DISCONTINUED | OUTPATIENT
Start: 2019-11-06 | End: 2019-11-06

## 2019-11-06 RX ORDER — LEVALBUTEROL 1.25 MG/.5ML
0.63 SOLUTION, CONCENTRATE RESPIRATORY (INHALATION) EVERY 4 HOURS
Refills: 0 | Status: COMPLETED | OUTPATIENT
Start: 2019-11-06 | End: 2019-11-09

## 2019-11-06 RX ADMIN — Medication 324 MILLIGRAM(S): at 00:42

## 2019-11-06 RX ADMIN — GABAPENTIN 300 MILLIGRAM(S): 400 CAPSULE ORAL at 11:25

## 2019-11-06 RX ADMIN — CLOPIDOGREL BISULFATE 600 MILLIGRAM(S): 75 TABLET, FILM COATED ORAL at 03:14

## 2019-11-06 RX ADMIN — CEFTRIAXONE 100 MILLIGRAM(S): 500 INJECTION, POWDER, FOR SOLUTION INTRAMUSCULAR; INTRAVENOUS at 00:11

## 2019-11-06 RX ADMIN — HEPARIN SODIUM 650 UNIT(S)/HR: 5000 INJECTION INTRAVENOUS; SUBCUTANEOUS at 03:11

## 2019-11-06 RX ADMIN — HEPARIN SODIUM 3200 UNIT(S): 5000 INJECTION INTRAVENOUS; SUBCUTANEOUS at 16:45

## 2019-11-06 RX ADMIN — MONTELUKAST 10 MILLIGRAM(S): 4 TABLET, CHEWABLE ORAL at 11:25

## 2019-11-06 RX ADMIN — BUDESONIDE AND FORMOTEROL FUMARATE DIHYDRATE 2 PUFF(S): 160; 4.5 AEROSOL RESPIRATORY (INHALATION) at 10:47

## 2019-11-06 RX ADMIN — METHADONE HYDROCHLORIDE 10 MILLIGRAM(S): 40 TABLET ORAL at 21:18

## 2019-11-06 RX ADMIN — CEFTRIAXONE 100 MILLIGRAM(S): 500 INJECTION, POWDER, FOR SOLUTION INTRAMUSCULAR; INTRAVENOUS at 23:48

## 2019-11-06 RX ADMIN — AZITHROMYCIN 255 MILLIGRAM(S): 500 TABLET, FILM COATED ORAL at 03:10

## 2019-11-06 RX ADMIN — Medication 3 MILLILITER(S): at 00:11

## 2019-11-06 RX ADMIN — Medication 50 GRAM(S): at 04:08

## 2019-11-06 RX ADMIN — Medication 3 MILLILITER(S): at 23:15

## 2019-11-06 RX ADMIN — Medication 1 INCH(S): at 05:48

## 2019-11-06 RX ADMIN — Medication 0.4 MILLIGRAM(S): at 00:51

## 2019-11-06 RX ADMIN — METHADONE HYDROCHLORIDE 10 MILLIGRAM(S): 40 TABLET ORAL at 11:25

## 2019-11-06 RX ADMIN — AZITHROMYCIN 500 MILLIGRAM(S): 500 TABLET, FILM COATED ORAL at 04:08

## 2019-11-06 RX ADMIN — Medication 3 MILLILITER(S): at 01:03

## 2019-11-06 RX ADMIN — Medication 0.4 MILLIGRAM(S): at 00:42

## 2019-11-06 RX ADMIN — CEFTRIAXONE 1000 MILLIGRAM(S): 500 INJECTION, POWDER, FOR SOLUTION INTRAMUSCULAR; INTRAVENOUS at 00:43

## 2019-11-06 RX ADMIN — GABAPENTIN 300 MILLIGRAM(S): 400 CAPSULE ORAL at 16:54

## 2019-11-06 RX ADMIN — Medication 0.4 MILLIGRAM(S): at 01:03

## 2019-11-06 RX ADMIN — Medication 3 MILLILITER(S): at 12:25

## 2019-11-06 RX ADMIN — HEPARIN SODIUM 3200 UNIT(S): 5000 INJECTION INTRAVENOUS; SUBCUTANEOUS at 23:55

## 2019-11-06 RX ADMIN — Medication 0.4 MILLIGRAM(S): at 02:20

## 2019-11-06 RX ADMIN — GABAPENTIN 300 MILLIGRAM(S): 400 CAPSULE ORAL at 21:31

## 2019-11-06 RX ADMIN — Medication 1 INCH(S): at 19:53

## 2019-11-06 RX ADMIN — SODIUM CHLORIDE 2000 MILLILITER(S): 9 INJECTION INTRAMUSCULAR; INTRAVENOUS; SUBCUTANEOUS at 00:11

## 2019-11-06 RX ADMIN — HEPARIN SODIUM 950 UNIT(S)/HR: 5000 INJECTION INTRAVENOUS; SUBCUTANEOUS at 23:54

## 2019-11-06 RX ADMIN — SODIUM CHLORIDE 1000 MILLILITER(S): 9 INJECTION INTRAMUSCULAR; INTRAVENOUS; SUBCUTANEOUS at 04:09

## 2019-11-06 RX ADMIN — HEPARIN SODIUM 800 UNIT(S)/HR: 5000 INJECTION INTRAVENOUS; SUBCUTANEOUS at 16:38

## 2019-11-06 RX ADMIN — Medication 3 MILLILITER(S): at 16:34

## 2019-11-06 RX ADMIN — BUDESONIDE AND FORMOTEROL FUMARATE DIHYDRATE 2 PUFF(S): 160; 4.5 AEROSOL RESPIRATORY (INHALATION) at 21:22

## 2019-11-06 NOTE — CONSULT NOTE ADULT - ASSESSMENT
73 yo woman with history of asthma and chronic back pain presenting to the emergency department complaining of shortness of breath, productive cough, wheezing for the past week after returning from a cruise as well as retrosternal burning sensation associated with nausea yesterday evening. Found to have an NSTEMI and started on aspirin, plavix and heparin drip with plans for possible cath. CT chest angiogram revealed patchy opacities mainly in the LLB concerning for pneumonia and a small to moderated loculated right pleural effusion. Pulmonary service consulted to evaluate prior to planned cath.     1. Community acquired pneumonia   - CT chest with patchy opacities mainly in LLL  - Check RVP  - Follow blood cultures and urine Legionella antigen  - Obtain sputum culture  - Continue ceftriaxone and azithromycin     2. Asthma exacerbation  - Significant wheezing on exam  - Prednisone 40 mg daily  - Bronchodilators    3. Right sided pleural effusion  - Bedside ultrasound showed small to moderate amount of right pleural effusion which appeared simple with no signs of loculations  - Will observe for now and reassess tomorrow     4. NTSEMI - patient with troponin elevation and plan for possible cath. No contraindication from pulmonary perspective to proceed with cath.     --------------------------------------------------------  Donnie Reece, PGY-4  Pulmonary/Critical Care Fellow  Pager: [phone] (LIZBETH), [phone] (NS) 75 yo woman with history of asthma and chronic back pain presenting to the emergency department complaining of shortness of breath, productive cough, wheezing for the past week after returning from a cruise as well as retrosternal burning sensation associated with nausea yesterday evening. Found to have an NSTEMI and started on aspirin, plavix and heparin drip with plans for possible cath. CT chest angiogram revealed patchy opacities mainly in the LLB concerning for pneumonia and a small to moderated loculated right pleural effusion. Pulmonary service consulted to evaluate prior to planned cath.     1. Community acquired pneumonia   - CT chest with patchy opacities mainly in LLL  - Check RVP  - Follow blood cultures and urine Legionella antigen  - Obtain sputum culture  - Continue ceftriaxone and azithromycin     2. Asthma exacerbation  - Significant wheezing on exam  - Prednisone 40 mg daily  - Bronchodilators    3. Right sided pleural effusion  - Bedside ultrasound showed small to moderate amount of right pleural effusion which appeared simple with no signs of loculations  - Will observe for now as she is on heparin drip for NSTEMI and reassess tomorrow     4. NTSEMI - patient with troponin elevation and plan for possible cath. No contraindication from pulmonary perspective to proceed with cath.     --------------------------------------------------------  Donnie Reece, PGY-4  Pulmonary/Critical Care Fellow  Pager: [phone] (LIZBETH), [phone] (NS)

## 2019-11-06 NOTE — CONSULT NOTE ADULT - ATTENDING COMMENTS
The patient is a 74-year-old woman with a history of chronic asthma and hypertension who presented with progressive dyspnea and an episode of severe chest pain.    The patient was in her usual state of health until recently. She had gone on a cruise and believed she was experiencing exacerbations of her asthma for which she was in contact with her primary provider. She was given antibiotics, but steroids were deferred. Her breathing did not significantly improve, and on the day of presentation, the patient experienced an episode of severe chest pain after climbing a flight of stairs. She has never experienced similar symptoms. No orthopnea or PND are reported. No palpitations or syncope are reported. Her chest pain resolved after the administration of 4 NTG.    The patient is comfortable-appearing an in no acute distress. She is afebrile but hypoxic on room air. She has scattered end-expiratory wheezing on examination throughout. Her heart sounds are normal and her JVP did not appear severely elevated. There is no peripheral edema and her extremities are warm and perfused. Her radial pulses are normal.     The patient was noted to have a hs-troponin of 600. Her pro-BNP is elevated to 2500. ECG was reviewed and did not demonstrated active current of injury or evidence of ongoing infarction. This was reviewed with the interventional team previously and emergency angiography was deferred. The patient's CXR and chest CT demonstrate areas concerning for pneumonia. There is a right-sided pleural effusion and evidence of pulmonary congestion as well.    The patient has a ANN MARIE-Risk Score of at least 2, and significantly elevated troponin. An early invasive strategy for the management of non-CONG ACS is recommended. We will review the case with the interventional team, as there is concern for pneumonia. Blood cultures have been sent to assess for the presence of bacteremia. Medical therapy for ACS has already been initiated as above, including dual antiplatelet therapy, unfractionated heparin, and high-potency statin. Echocardiography should be obtained.    Marv Donnelly MD  Cardiology The patient is a 74-year-old woman with a history of chronic asthma and hypertension who presented with progressive dyspnea and an episode of severe chest pain.    The patient was in her usual state of health until recently. She had gone on a cruise and believed she was experiencing exacerbations of her asthma for which she was in contact with her primary provider. She was given antibiotics, but steroids were deferred. Her breathing did not significantly improve, and on the day of presentation, the patient experienced an episode of severe chest pain after climbing a flight of stairs. She has never experienced similar symptoms. No orthopnea or PND are reported. No palpitations or syncope are reported. Her chest pain resolved after the administration of 4 NTG.    The patient is comfortable-appearing an in no acute distress. She is afebrile but hypoxic on room air. She has scattered end-expiratory wheezing on examination throughout. Her heart sounds are normal and her JVP did not appear severely elevated. There is no peripheral edema and her extremities are warm and perfused. Her radial pulses are normal.     The patient was noted to have a hs-troponin of 600. Her pro-BNP is elevated to 2500. ECG was reviewed and did not demonstrated active current of injury or evidence of ongoing infarction. This was reviewed with the interventional team previously and emergency angiography was deferred. The patient's CXR and chest CT demonstrate areas concerning for pneumonia. There is a right-sided pleural effusion and evidence of pulmonary congestion as well.    The patient has a ANN MARIE-Risk Score of at least 2, and significantly elevated troponin. An early invasive strategy for the management of non-CONG ACS is recommended. We will review the case with the interventional team, as there is concern for pneumonia. Blood cultures have been sent to assess for the presence of bacteremia. Medical therapy for ACS has already been initiated as above, including dual antiplatelet therapy, unfractionated heparin, and high-potency statin. Echocardiography should be obtained.    Marv Donnelly MD  Cardiology      Addendum:  The case was discussed with interventional cardiology team. We will defer cardiac catheterization today and wait for further optimization of her asthma and treatment of pneumonia prior to proceeding in order to maximize patient safety. If the patient has recurrent chest pain, please re-consult cardiology, as more urgent angiography would be indicated.    Marv Donnelly MD  Cardiology

## 2019-11-06 NOTE — H&P ADULT - HISTORY OF PRESENT ILLNESS
This is a 74F with history of Asthma (with a chronic cough productive of green sputum) c/b ?CINDY (pt states she has a bacteria that causes tuberculosis but DOES NOT have tuberculosis), OA with chronic pain (on methadone and gabapentin), and HTN (not on meds) who presents to the hospital for chest pain and SOB. Said that she has a chronic cough which does not interfere with her asthma but she went on a cruise recently and since returning from the cruise has noted that her cough and asthma both worsened. Went to her PCP was evaluation and was prescribe amoxicillin (said they were trying to hold off on prednisone as she had been on chronic prednisone for ~20 years previously) but she had not noticed any improvement in her asthma or cough. Said that last night she was climbing stairs in her home when she started to feel severe SOB and chest pain (burning in quality, radiation to b/l arms) with nausea. Took several doses of her albuterol inhaler without any significant improvement and therefore was brought to the hospital by her family for evaluation.     Vitals in the ED were T 97.8 - 98.4, P 90s - 110s, -155/, RR 16 - 24, O2 sat 88% (on RA) - 98% (on 5L NC). Her lab work was significant for leukocytosis with neutrophilia and elevated hsTrops with positive trend up. Her imaging was significant for LLL patchy opacity concerning for PNA, moderate loculated pleural effusion on R, and mild pulm edema. She was given CTX 1g/AZT 500mg IVPB x1, methadone 10mg/gabapentin 300mg PO x1, solumedrol 40mg IVP x1, Duonebs x3, NS 1L, zofran 4mg IVP x1, nitro state x4, nitro ointment x1 (with resolution of chest pain after this), and was loaded with ASA 325mg/Plavix 600mg PO x1 and started on a heparin gtt. She was evaluated by cardiology who recommended admission for NSTEMI management and potential cardiac catheterization this AM. She was then admitted to medicine on telemetry. This is a 74F with history of Asthma (with a chronic cough productive of green sputum) c/b ?CINDY (pt states she has a bacteria that causes tuberculosis but DOES NOT have tuberculosis), OA with chronic pain (on methadone and gabapentin), and HTN (not on meds) who presents to the hospital for chest pain and SOB. Said that she has a chronic cough which does not interfere with her asthma but she went on a cruise recently and since returning from the cruise has noted that her cough and asthma both worsened. Went to her PCP was evaluation and was prescribe amoxicillin (said they were trying to hold off on prednisone as she had been on chronic prednisone for ~20 years previously) but she had not noticed any improvement in her asthma or cough. Said that last night she was climbing stairs in her home when she started to feel severe SOB and chest pain (burning in quality, radiation to b/l arms) with nausea/vomiting (NBNB). Took several doses of her albuterol inhaler without any significant improvement and therefore was brought to the hospital by her family for evaluation.     Vitals in the ED were T 97.8 - 98.4, P 90s - 110s, -155/, RR 16 - 24, O2 sat 88% (on RA) - 98% (on 5L NC). Her lab work was significant for leukocytosis with neutrophilia and elevated hsTrops with positive trend up. Her imaging was significant for LLL patchy opacity concerning for PNA, moderate loculated pleural effusion on R, and mild pulm edema. She was given CTX 1g/AZT 500mg IVPB x1, methadone 10mg/gabapentin 300mg PO x1, solumedrol 40mg IVP x1, Duonebs x3, NS 1L, zofran 4mg IVP x1, nitro state x4, nitro ointment x1 (with resolution of chest pain after this), and was loaded with ASA 325mg/Plavix 600mg PO x1 and started on a heparin gtt. She was evaluated by cardiology who recommended admission for NSTEMI management and potential cardiac catheterization this AM. She was then admitted to medicine on telemetry.     Currently patient said that her chest pain has resolved, she is still having some SOB and wheezing but that has improved since arrival to the ED. States that she is urinating a lot and feels thirsty. Denies any other complaints. Currently satting in the high 90s on 3L NC.

## 2019-11-06 NOTE — H&P ADULT - NSHPPHYSICALEXAM_GEN_ALL_CORE
Vital Signs Last 24 Hrs  T(C): 36.6 (06 Nov 2019 06:05), Max: 36.9 (05 Nov 2019 21:41)  T(F): 97.8 (06 Nov 2019 06:05), Max: 98.4 (05 Nov 2019 21:41)  HR: 96 (06 Nov 2019 06:05) (96 - 120)  BP: 118/74 (06 Nov 2019 06:05) (117/75 - 155/83)  BP(mean): --  RR: 18 (06 Nov 2019 06:05) (16 - 24)  SpO2: 99% (06 Nov 2019 06:05) (88% - 99%)    GENERAL: No acute distress, well-developed  ENT: EOMI, PERRL, conjunctiva and sclera clear, Neck supple, No JVD, moist mucosa  CHEST/LUNG: Scattered wheezing throughout  BACK: No spinal tenderness, no CVA TTP  HEART: Regular rate and rhythm; No murmurs, rubs, or gallops  ABDOMEN: Soft, Nontender, Nondistended; Bowel sounds present  EXTREMITIES: 2+ DP/PT pulses, No clubbing, cyanosis, or edema  PSYCH: Nl behavior, nl affect  NEUROLOGY: AAOx3, non-focal  SKIN: Normal color, No rashes or lesions

## 2019-11-06 NOTE — CHART NOTE - NSCHARTNOTEFT_GEN_A_CORE
PTT came back at 30, contacted RN to given 32oo unit heparin bolus as ordered prn for PTT <40 and to adjust dose based off nomogram. will monitor PTT 6 hours from now.

## 2019-11-06 NOTE — CONSULT NOTE ADULT - SUBJECTIVE AND OBJECTIVE BOX
HISTORY OF PRESENT ILLNESS:    Outpatient Cardiologist: None     Patient is a 74y old  Female who presents with a chief complaint of chest pain   HPI: 74 Y F with hx of asthma on Advair and albuterol as well as chronic pain on methadone and gabapentin presents with SOB and wheezing while she was trying to walk up the stairs. Patient also c/o having severe chest pain that started after she got upstairs and took several pumps of her albuterol. She denies hx of cardiac disease. She states that she is feeling nauseated as well and vomited x 1 in ER. She does admit to back pain but she says that this is no different than her normal back pain that she is on chronic pain medications for. She notes that she recently was on a cruise to the [facility] and when she got back was having a cough and took Z-iain which she finished on Saturday. Denies fever. In ER, patient found to have leucocytosis to 15.97 and Trop 606. Delta trop pending. Patient was given aspirin 324 mg PO x 1. CXR showed  New right lower lobe opacity likely a combination of atelectasis and/or effusion. Received azithromycin abd ceftriaxone x 1 for possible pneumonia and NS 1L bolus. In ER, patient has wheezing and was given solumedrol 40 mg IVP x 1 and Duoneb x 3. Patient HR went up to the 130s and had a 5 beat run of Vtach. Cardiology consulted for elevated Troponin     Allergies  No Known Allergies  	  MEDICATIONS  azithromycin  IVPB 500 milliGRAM(s) IV Intermittent Once    PAST MEDICAL & SURGICAL HISTORY:  Asthma  S/P knee replacement, left    FAMILY HISTORY:    SOCIAL HISTORY  Marital Status:   Occupation:   Lives with:     SUBSTANCE USE  Tobacco Usage:  ( ) never smoked   ( ) former smoker  ( ) current smoker; Packs per day:   Alcohol Usage: ( ) none  ( ) occasional ( ) 2-3 times a week ( ) daily; Last drink:   Recreational drugs ( ) None    REVIEW OF SYSTEMS:  CONSTITUTIONAL: No fevers, No chills, No fatigue, No weight gain  EYES: No vision changes   ENT: No congestion, No ear pain, No sore throat.  NECK: No pain, No stiffness  RESPIRATORY: + shortness of breath, No cough, + wheezing, No hemoptysis  CARDIOVASCULAR: + chest pain. No palpitations, No TUCKER, No orthopnea, No paroxysmal nocturnal dyspnea, No pleuritic pain  GASTROINTESTINAL: No abdominal pain, + nausea, + vomiting, No hematemesis, No diarrhea No constipation. No melena  GENITOURINARY: No dysuria, No frequency, No incontinence, No hematuria  NEUROLOGICAL: No dizziness, No lightheadedness, No syncope, No LOC, No headache, No numbness or weakness  EXTREMITIES: No Edema, No joint pain, No joint swelling.  PSYCHIATRIC: No anxiety, No depression  SKIN: No diaphoresis. No itching, No rashes, No pressure ulcers  HEME/LYMPH: No easy bruising, or bleeding gums  ALLERY AND IMMUNOLOGIC: No hives or eczema    All other review of systems is negative unless indicated above.  VITAL SIGNS  T(C): 36.7 (11-05-19 @ 22:26), Max: 36.9 (11-05-19 @ 21:41)  HR: 113 (11-06-19 @ 01:25) (96 - 113)  BP: 127/76 (11-06-19 @ 01:25) (117/75 - 155/83)  RR: 22 (11-06-19 @ 01:25) (16 - 24)  SpO2: 89% (11-06-19 @ 01:25) (88% - 97%)  Wt(kg): --    PHYSICAL EXAM:  Appearance: NAD, no distress  HEENT:   Normal oral mucosa, PERRL, EOMI  Cardiovascular: Regular rate and rhythm, Normal S1 S2, No JVD, No murmurs  Respiratory: Lungs clear to auscultation. No rales, No rhonchi, No wheezing. No tenderness to palpation  Gastrointestinal:  Soft, Non-tender, + BS  Neurologic: Non-focal, A&Ox3  Skin: Warm and dry, No rashes, No ecchymosis, No cyanosis  Extremities: No clubbing, No cyanosis, No edema  Vascular: Peripheral pulses palpable 2+ bilaterally  Psychiatry: Mood & affect appropriate    LABORATORY VALUES	 	                13.7   15.97 )-----------( 451      ( 05 Nov 2019 22:50 )             43.9   11-05    135  |  97<L>  |  12  ----------------------------<  183<H>  3.8   |  26  |  0.52    Ca    9.6      05 Nov 2019 22:50  TPro  7.3  /  Alb  3.7  /  TBili  < 0.2<L>  /  DBili  x   /  AST  20  /  ALT  14  /  AlkPhos  90  11-05    LIVER FUNCTIONS - ( 05 Nov 2019 22:50 )  Alb: 3.7 g/dL / Pro: 7.3 g/dL / ALK PHOS: 90 u/L / ALT: 14 u/L / AST: 20 u/L / GGT: x         Prothrombin Time, Plasma: 12.8 SEC (11-06 @ 00:50)    CARDIAC MARKERS:  Troponin T, High Sensitivity: 606 ng/L (11-05-19 @ 22:50)    TELEMETRY: 	    ECG:  	  RADIOLOGY:  OTHER: 	    PREVIOUS DIAGNOSTIC TESTING:    [ ] Echocardiogram:  [ ] Catheterization:  [ ] Stress Test:  	    ASSESSMENT AND PLAN   74 Y F with hx of asthma on Advair and albuterol as well as chronic pain on methadone and gabapentin presents with SOB and wheezing while she was trying to walk up the stairs and chest pain In ER, patient found to have leucocytosis to 15.97 and Trop 606. Delta trop pending. Patient was given aspirin 324 mg PO x 1. CXR showed  New right lower lobe opacity likely a combination of atelectasis and/or effusion. Received azithromycin abd ceftriaxone x 1 for possible pneumonia and NS 1L bolus. In ER, patient has wheezing and was given solumedrol 40 mg IVP x 1 and Duoneb x 3. Patient HR went up to the 130s and had a 5 beat run of Vtach. Cardiology consulted for elevated Troponin     PLAN  	      # [id number] HISTORY OF PRESENT ILLNESS:    Outpatient Cardiologist: None     Patient is a 74y old  Female who presents with a chief complaint of chest pain   HPI: 74 Y F with hx of asthma on Advair and albuterol as well as chronic pain on methadone and gabapentin presents with SOB and wheezing while she was trying to walk up the stairs. Patient also c/o having severe chest pain that started after she got upstairs and took several pumps of her albuterol. Patient had chest tightness during asthma attacks but never this severe. Pain was radiating to right arm and back. Pain resolved after patient came to ER and received nitro SL x 3. She denies hx of cardiac disease. She states that she is feeling nauseated as well and vomited x 1 in ER. She does admit to back pain but she says that this is no different than her normal back pain that she is on chronic pain medications for. She notes that she recently was on a cruise to the [facility] and when she got back was having a cough and took Amoxicillin 250 mg PO TID x 7 days which she finished on Saturday. Denies fever. Symptoms got better after medication. In ER, patient found to have leucocytosis to 15.97 and Trop 606. Delta trop pending. Patient was given aspirin 324 mg PO x 1. CXR showed  New right lower lobe opacity likely a combination of atelectasis and/or effusion. Had wheezing bilaterally and received azithromycin abd ceftriaxone x 1 for possible pneumonia and NS 1L bolus. In ER, patient has wheezing and was given solumedrol 40 mg IVP x 1 and Duoneb x 3. Patient HR went up to the 130s and had a 5 beat run of Vtach. Cardiology consulted for elevated Troponin. Patient was seen and examined. Patient back from CT and started c/o chest pain. Pain relieved with SL nitro SL x 1. Patient completely chest pain free after nitro SL.     Allergies  No Known Allergies  	  MEDICATIONS  azithromycin  IVPB 500 milliGRAM(s) IV Intermittent Once    PAST MEDICAL & SURGICAL HISTORY:  Asthma  S/P knee replacement, left x 2  Rt hip replacement     REVIEW OF SYSTEMS:  CONSTITUTIONAL: No fevers, No chills, No fatigue, No weight gain  EYES: No vision changes   ENT: No congestion, No ear pain, No sore throat.  NECK: No pain, No stiffness  RESPIRATORY: + shortness of breath, No cough, + wheezing, No hemoptysis  CARDIOVASCULAR: + chest pain. No palpitations, No TUCKER, No orthopnea, No paroxysmal nocturnal dyspnea, No pleuritic pain  GASTROINTESTINAL: No abdominal pain, + nausea, + vomiting, No hematemesis, No diarrhea No constipation. No melena  GENITOURINARY: No dysuria, No frequency, No incontinence, No hematuria  NEUROLOGICAL: No dizziness, No lightheadedness, No syncope, No LOC, No headache, No numbness or weakness  EXTREMITIES: No Edema, No joint pain, No joint swelling.  PSYCHIATRIC: No anxiety, No depression  SKIN: No diaphoresis. No itching, No rashes, No pressure ulcers  HEME/LYMPH: No easy bruising, or bleeding gums  ALLERY AND IMMUNOLOGIC: No hives or eczema    All other review of systems is negative unless indicated above.  VITAL SIGNS  T(C): 36.7 (11-05-19 @ 22:26), Max: 36.9 (11-05-19 @ 21:41)  HR: 113 (11-06-19 @ 01:25) (96 - 113)  BP: 127/76 (11-06-19 @ 01:25) (117/75 - 155/83)  RR: 22 (11-06-19 @ 01:25) (16 - 24)  SpO2: 89% (11-06-19 @ 01:25) (88% - 97%)  Wt(kg): --    PHYSICAL EXAM:  Appearance: NAD, no distress  HEENT:   Normal oral mucosa, PERRL, EOMI  Cardiovascular: Regular rate and rhythm, Normal S1 S2, No JVD, No murmurs  Respiratory: Lungs clear to auscultation. No rales, No rhonchi, No wheezing. No tenderness to palpation  Gastrointestinal:  Soft, Non-tender, + BS  Neurologic: Non-focal, A&Ox3  Skin: Warm and dry, No rashes, No ecchymosis, No cyanosis  Extremities: No clubbing, No cyanosis, No edema  Vascular: Peripheral pulses palpable 2+ bilaterally  Psychiatry: Mood & affect appropriate    LABORATORY VALUES	 	                13.7   15.97 )-----------( 451      ( 05 Nov 2019 22:50 )             43.9   11-05    135  |  97<L>  |  12  ----------------------------<  183<H>  3.8   |  26  |  0.52    Ca    9.6      05 Nov 2019 22:50  TPro  7.3  /  Alb  3.7  /  TBili  < 0.2<L>  /  DBili  x   /  AST  20  /  ALT  14  /  AlkPhos  90  11-05    LIVER FUNCTIONS - ( 05 Nov 2019 22:50 )  Alb: 3.7 g/dL / Pro: 7.3 g/dL / ALK PHOS: 90 u/L / ALT: 14 u/L / AST: 20 u/L / GGT: x         Prothrombin Time, Plasma: 12.8 SEC (11-06 @ 00:50)    CARDIAC MARKERS:  Troponin T, High Sensitivity: 606 ng/L (11-05-19 @ 22:50)    TELEMETRY: ST 110s	    ECG:  NSR. No acute ischemic changes  RADIOLOGY:  OTHER: 	    PREVIOUS DIAGNOSTIC TESTING:    [ ] Echocardiogram:  [ ] Catheterization:  [ ] Stress Test:  	    ASSESSMENT AND PLAN  74 Y F with hx of asthma on Advair and albuterol as well as chronic pain on methadone and gabapentin presents with SOB and wheezing while she was trying to walk up the stairs and chest pain In ER, patient found to have leucocytosis to 15.97 and Trop 606. Delta trop pending. Patient was given aspirin 324 mg PO x 1. CXR showed new right lower lobe opacity likely a combination of atelectasis and/or effusion. Received azithromycin abd ceftriaxone x 1 for possible pneumonia and NS 1L bolus. In ER, patient has wheezing and was given solumedrol 40 mg IVP x 1 and Duoneb x 3. Patient HR went up to the 130s and had a 5 beat run of Vtach. Cardiology consulted for elevated Troponin. Patient was seen and examined. Patient back from CT and started c/o chest pain. Pain relieved with SL nitro SL x 1. Patient completely chest pain free after nitro SL. Called interventionalist Dr Roman. No emergent cath at this time.     PLAN  Patient with elevated CE with chest pain. Chest pain free at present. S/p nitro SL x 4  Would initiate nitro paste 1 inch now.   ANN MARIE score: 4  Admit to telemetry for NSTEMI  Loaded with  mg PO x 1, Give Plavix 600 mg PO x 1 and start heparin gtt as per ACS protocol  Assess for resolution of chest pain and recurrence in chest pain.   Serial EKG PRN chest pain to assess for ST changes  Continuous cardiac monitoring to monitor for arrhythmias  CBC, CMP, coags, HbA1C, TSH, lipids for comorbidities, Trend cardiac enzymes  Aspirin 81 PO daily, Clopidogrel 75 PO daily, Lipitor 80 mg PO daily  NPO MN for cardiac cath in am. If persistent chest pain with EKG changes, will plan for emergent cath   [place] cardiology at [phone] if any change in patient condition.   Continue home medications   Low fat DASH diet  ECHO: 2D ECHO to evaluate LV function and wall motion abnormalities	      # [id number] HISTORY OF PRESENT ILLNESS:    Outpatient Cardiologist: None     Patient is a 74y old  Female who presents with a chief complaint of chest pain   HPI: 74 Y F with hx of HTN (no meds) Osteoporosis, asthma on Advair and albuterol as well as chronic pain on methadone and gabapentin presents with SOB and wheezing while she was trying to walk up the stairs. Patient also c/o having severe chest pain that started after she got upstairs and took several pumps of her albuterol. Patient had chest tightness during asthma attacks but never this severe. Pain was radiating to right arm and back. Pain resolved after patient came to ER and received nitro SL x 3. She denies hx of cardiac disease. She states that she is feeling nauseated as well and vomited x 1 in ER. She does admit to back pain but she says that this is no different than her normal back pain that she is on chronic pain medications for. She notes that she recently was on a cruise to the [place] and when she got back was having a cough and took Amoxicillin 250 mg PO TID x 7 days which she finished on Saturday. Denies fever. Symptoms got better after medication. In ER, patient found to have leucocytosis to 15.97 and Trop 606. Delta trop pending. Patient was given aspirin 324 mg PO x 1. CXR showed  New right lower lobe opacity likely a combination of atelectasis and/or effusion. Had wheezing bilaterally and received azithromycin abd ceftriaxone x 1 for possible pneumonia and NS 1L bolus. In ER, patient has wheezing and was given solumedrol 40 mg IVP x 1 and Duoneb x 3. Patient HR went up to the 130s and had a 5 beat run of Vtach. Cardiology consulted for elevated Troponin. Patient was seen and examined. Patient back from CT and started c/o chest pain. Pain relieved with SL nitro SL x 1. Patient completely chest pain free after nitro SL.     Allergies  No Known Allergies  	  MEDICATIONS  azithromycin  IVPB 500 milliGRAM(s) IV Intermittent Once    HOME MEDICATIONS  Albuterol 90 mcg Q6H prn SOB/Wheezing  Methadone 10 mg PO BID  Gabapentin 300 mg PO TID  Singulair 10 mg PO daily  Advair Diskus 250/50 1 puff daily    PAST MEDICAL & SURGICAL HISTORY:  Asthma  S/P knee replacement, left x 2  Rt hip replacement   Femur fracture    REVIEW OF SYSTEMS:  CONSTITUTIONAL: No fevers, No chills, No fatigue, No weight gain  EYES: No vision changes   ENT: No congestion, No ear pain, No sore throat.  NECK: No pain, No stiffness  RESPIRATORY: + shortness of breath, No cough, + wheezing, No hemoptysis  CARDIOVASCULAR: + chest pain. No palpitations, No TUCKER, No orthopnea, No paroxysmal nocturnal dyspnea, No pleuritic pain  GASTROINTESTINAL: No abdominal pain, + nausea, + vomiting, No hematemesis, No diarrhea No constipation. No melena  GENITOURINARY: No dysuria, No frequency, No incontinence, No hematuria  NEUROLOGICAL: No dizziness, No lightheadedness, No syncope, No LOC, No headache, No numbness or weakness  EXTREMITIES: No Edema, No joint pain, No joint swelling.  PSYCHIATRIC: No anxiety, No depression  SKIN: No diaphoresis. No itching, No rashes, No pressure ulcers  HEME/LYMPH: No easy bruising, or bleeding gums  ALLERY AND IMMUNOLOGIC: No hives or eczema    All other review of systems is negative unless indicated above.  VITAL SIGNS  T(C): 36.7 (11-05-19 @ 22:26), Max: 36.9 (11-05-19 @ 21:41)  HR: 113 (11-06-19 @ 01:25) (96 - 113)  BP: 127/76 (11-06-19 @ 01:25) (117/75 - 155/83)  RR: 22 (11-06-19 @ 01:25) (16 - 24)  SpO2: 89% (11-06-19 @ 01:25) (88% - 97%)  Wt(kg): --    PHYSICAL EXAM:  Appearance: NAD, no distress  HEENT:   Normal oral mucosa, PERRL, EOMI  Cardiovascular: Regular rate and rhythm, Normal S1 S2, No JVD, No murmurs  Respiratory: Lungs clear to auscultation. No rales, No rhonchi, No wheezing. No tenderness to palpation  Gastrointestinal:  Soft, Non-tender, + BS  Neurologic: Non-focal, A&Ox3  Skin: Warm and dry, No rashes, No ecchymosis, No cyanosis  Extremities: No clubbing, No cyanosis, No edema  Vascular: Peripheral pulses palpable 2+ bilaterally  Psychiatry: Mood & affect appropriate    LABORATORY VALUES	 	                13.7   15.97 )-----------( 451      ( 05 Nov 2019 22:50 )             43.9   11-05    135  |  97<L>  |  12  ----------------------------<  183<H>  3.8   |  26  |  0.52    Ca    9.6      05 Nov 2019 22:50  TPro  7.3  /  Alb  3.7  /  TBili  < 0.2<L>  /  DBili  x   /  AST  20  /  ALT  14  /  AlkPhos  90  11-05    LIVER FUNCTIONS - ( 05 Nov 2019 22:50 )  Alb: 3.7 g/dL / Pro: 7.3 g/dL / ALK PHOS: 90 u/L / ALT: 14 u/L / AST: 20 u/L / GGT: x         Prothrombin Time, Plasma: 12.8 SEC (11-06 @ 00:50)    CARDIAC MARKERS:  Troponin T, High Sensitivity: 606 ng/L (11-05-19 @ 22:50)    TELEMETRY: ST 110s	    ECG:  NSR. No acute ischemic changes  RADIOLOGY:  OTHER: 	    PREVIOUS DIAGNOSTIC TESTING:    [ ] Echocardiogram:  [ ] Catheterization:  [ ] Stress Test:  	    ASSESSMENT AND PLAN  74 Y F with hx of asthma on Advair and albuterol as well as chronic pain on methadone and gabapentin presents with SOB and wheezing while she was trying to walk up the stairs and chest pain In ER, patient found to have leucocytosis to 15.97 and Trop 606. Delta trop pending. Patient was given aspirin 324 mg PO x 1. CXR showed new right lower lobe opacity likely a combination of atelectasis and/or effusion. Received azithromycin abd ceftriaxone x 1 for possible pneumonia and NS 1L bolus. In ER, patient has wheezing and was given solumedrol 40 mg IVP x 1 and Duoneb x 3. Patient HR went up to the 130s and had a 5 beat run of Vtach. Cardiology consulted for elevated Troponin. Patient was seen and examined. Patient back from CT and started c/o chest pain. Pain relieved with SL nitro SL x 1. Patient completely chest pain free after nitro SL. Called interventionalist Dr Roman. No emergent cath at this time.     PLAN  Patient with elevated CE with chest pain. Chest pain free at present. S/p nitro SL x 4  Would initiate nitro paste 1 inch now.   ANN MARIE score: 4  Admit to telemetry for NSTEMI  Loaded with  mg PO x 1, Give Plavix 600 mg PO x 1 and start heparin gtt as per ACS protocol  Assess for resolution of chest pain and recurrence in chest pain.   Serial EKG PRN chest pain to assess for ST changes  Continuous cardiac monitoring to monitor for arrhythmias  CBC, CMP, coags, HbA1C, TSH, lipids for comorbidities, Trend cardiac enzymes  Aspirin 81 PO daily, Clopidogrel 75 PO daily, Lipitor 80 mg PO daily  NPO MN for cardiac cath in am. If persistent chest pain with EKG changes, will plan for emergent cath   Please [place] cardiology at [phone] if any change in patient condition.   Continue home medications   Low fat DASH diet  ECHO: 2D ECHO to evaluate LV function and wall motion abnormalities	      # [id number]

## 2019-11-06 NOTE — PROGRESS NOTE ADULT - PROBLEM SELECTOR PLAN 5
- Noted with mild pulm edema, small to moderate loculated pleural effusion on CT.   - Possibly 2/2 ADHF from NSTEMI vs reactive to PNA vs chronic  - Patient's current examination not consistent with HF (no JVD, no crackles on auscultation, no LE edema) and her respiratory status has improved with management of her NSTEMI, Asthma exacerbation, and PNA.   - c/w supplemental O2, will check a pro-BNP, and follow up Echocardiogram.   - Monitor her respiratory status and dose Lasix PRN. Noted with mild pulm edema, small to moderate loculated pleural effusion on CT.   Patient's current examination not consistent with HF (no JVD, no crackles on auscultation, no LE edema) and her respiratory status has improved with management of her NSTEMI, Asthma exacerbation, and PNA.   - Possibly 2/2 ??ADHF from NSTEMI vs reactive to PNA vs chronic  - c/w supplemental O2, will check a pro-BNP, and follow up Echocardiogram.   - Monitor her respiratory status and dose Lasix PRN. Noted with mild pulm edema, small to moderate loculated pleural effusion on CT. Patient's current examination not consistent with HF (no JVD, no crackles on auscultation, no LE edema) and her respiratory status has improved with management of her NSTEMI, Asthma exacerbation, and PNA.   - Possibly 2/2 ??ADHF from NSTEMI vs reactive to PNA vs chronic  - c/w supplemental O2, will check a pro-BNP, and follow up Echocardiogram.   - Monitor her respiratory status and dose Lasix PRN.

## 2019-11-06 NOTE — PROGRESS NOTE ADULT - PROBLEM SELECTOR PLAN 2
CT imaging shows loculated pleural effusion.   - Patient follows with Dr. Byrne office [facility] Pulmonologist.   - Reports, that she was planned for repeat CT Chest in December to evaluate for an underlying lung issues she has. She was unable to provide further information.  - Will reach out to Dr. Byrne's office to obtain collateral information.

## 2019-11-06 NOTE — CHART NOTE - NSCHARTNOTEFT_GEN_A_CORE
CT chest- Mucoid impacted airways in the left greater than right lower lobes. Patchy left lower lobe airspace opacities concerning for pneumonia. Clinical correlation is recommended. Small to moderate loculated right pleural effusion and trace left pleural effusion with mild pulmonary edema.    [facility] pulmonary called for consult- f/u recs

## 2019-11-06 NOTE — H&P ADULT - PROBLEM SELECTOR PLAN 5
- c/w home methadone ([employer] Reference #: [id number], last prescribed on 9/20 but Dr. Pastora Cantu) and gabapentin

## 2019-11-06 NOTE — H&P ADULT - PROBLEM SELECTOR PLAN 1
- Patient with leukocytosis, tachycardia, tachypnea, hypoxia, and CT findings of patchy infiltrates all concerning for sepsis 2/2 PNA  - s/p CTX/AZT in ED, will c/w for now  - Will check BCx x2, UA negative  - Will check lactate

## 2019-11-06 NOTE — PROGRESS NOTE ADULT - PROBLEM SELECTOR PLAN 7
- On Heparin gtt for NSTEMI, will c/w for now for 48 hours.    - NPO for now pending eval for possible cardiac catheterization.   - Follow up with Cardiology to determine when procedure will be.

## 2019-11-06 NOTE — H&P ADULT - PROBLEM SELECTOR PLAN 4
- Noted to have mild pulm edema and a small to moderate loculated pleural effusion on CT  - Possibly 2/2 ADHF from NSTEMI vs reactive to PNA vs chronic  - Patient's current examination not consistent with HF (no JVD, no crackles on auscultation, no LE edema) and her respiratory status has improved with management of her NSTEMI, asthma exacerbation, and PNA  - For now will c/w supplemental O2, will check a pro-BNP, and will check a TTE, monitor her respiratory status and dose lasix as needed but for now will hold off on lasix as her respiratory status has improved without it

## 2019-11-06 NOTE — PROGRESS NOTE ADULT - PROBLEM SELECTOR PLAN 3
- Patient with worsening cough/SOB, with sputum production (sputum chronic).    - Currently, no SOB, but has bilateral wheezing + expiratory, with rhonchi +   - Likely with Asthma exacerbation, s/p IV Solumedrol in ED and Duonebs  - Will c/w Duonebs q4 prn, will place on Prednisone 40mg daily for now x5 days.   - c/w NS 3L for now, downtitrate as patient tolerates to room air.   - Pulmonary consultation PRN.

## 2019-11-06 NOTE — H&P ADULT - NSICDXPASTSURGICALHX_GEN_ALL_CORE_FT
PAST SURGICAL HISTORY:  H/O fracture of femur Left    H/O laminectomy thoracolumbar    S/P hip replacement, right     S/P knee replacement, left     S/P rotator cuff repair R shoulder

## 2019-11-06 NOTE — CONSULT NOTE ADULT - SUBJECTIVE AND OBJECTIVE BOX
OBJECTIVE    Vital Signs Last 24 Hrs  T(C): 36.6 (06 Nov 2019 14:12), Max: 36.9 (05 Nov 2019 21:41)  T(F): 97.9 (06 Nov 2019 14:12), Max: 98.4 (05 Nov 2019 21:41)  HR: 99 (06 Nov 2019 14:12) (89 - 120)  BP: 120/85 (06 Nov 2019 14:12) (110/65 - 155/83)  BP(mean): --  RR: 18 (06 Nov 2019 14:12) (16 - 24)  SpO2: 99% (06 Nov 2019 14:12) (88% - 99%)      LABORATORY                        13.7   15.97 )-----------( 451      ( 05 Nov 2019 22:50 )             43.9     11-05    135  |  97<L>  |  12  ----------------------------<  183<H>  3.8   |  26  |  0.52    Ca    9.6      05 Nov 2019 22:50    TPro  7.3  /  Alb  3.7  /  TBili  < 0.2<L>  /  DBili  x   /  AST  20  /  ALT  14  /  AlkPhos  90  11-05      RADIOLOGY    CT Angio Chest w/ IV Cont (11.06.19 @ 03:01) >  Mildly motion degraded. No gross pulmonary embolism.  Mucoid impacted airways in the left greater than right lower lobes. Patchy left lower lobe airspace opacities concerning for pneumonia.   Clinical correlation is recommended.  Small to moderate loculated right pleural effusion and trace left pleural effusion with mild pulmonary edema.      < end of copied text > 73 yo woman with history of asthma and chronic back pain presenting to the emergency department complaining of shortness of breath and retrosternal burning sensation. Reports she recently came back from a 10 day cruise and since then has been feeling sick. She noticed some runny nose, cough productive of greenish sputum, wheezing and difficulty breathing requiring her to use albuterol inhaler and nebulizer more often. She saw her primary care provider who prescribed her a 7 day course of antibiotic (macrolide) which she completed on Saturday without significant improvement. Yesterday she was climbing the stairs at home and felt more short of breath as well as a burning sensation in her chest associated with nausea. In the ED she was found to have an NSTEMI and started on aspirin, plavix and heparin drip. CT chest angiogram revealed patchy opacities mainly in the LLB and a small to moderated loculated right pleural effusion. Pulmonary service consulted to evaluate prior to planned cath.       PAST MEDICAL & SURGICAL HISTORY:  HTN (hypertension)  Chronic back pain  Osteoarthritis  Asthma  S/P rotator cuff repair: R shoulder  H/O laminectomy: thoracolumbar  H/O fracture of femur: Left  S/P hip replacement, right  S/P knee replacement, left    FAMILY HISTORY:  Family history of asthma: in Father and Mother    SOCIAL HISTORY  Tobacco use: used to smoke about 1 pack per day from age 16 to 30s  Alcohol use: denies  Drug use: denies    MEDICATIONS  Advair  Proair as needed    ALLERGIES  No known medication allergies      OBJECTIVE    Vital Signs Last 24 Hrs  T(C): 36.6 (06 Nov 2019 14:12), Max: 36.9 (05 Nov 2019 21:41)  T(F): 97.9 (06 Nov 2019 14:12), Max: 98.4 (05 Nov 2019 21:41)  HR: 99 (06 Nov 2019 14:12) (89 - 120)  BP: 120/85 (06 Nov 2019 14:12) (110/65 - 155/83)  BP(mean): --  RR: 18 (06 Nov 2019 14:12) (16 - 24)  SpO2: 99% (06 Nov 2019 14:12) (88% - 99%)    PHYSICAL EXAM:  General: no acute distress  HEENT: normocephalic  Eyes: extraocular movements intact, pupils equal and reactive to light  Neck: supple  Respiratory: non labored breathing, diffuse wheezing, decreased breath sound in right base  Cardiovascular: normal rate, regular rhythm, no murmur  Gastrointestinal: abdomen soft, non tender, non distended  Extremities: no lower extremity edema  Neurological: alert, oriented, no focal deficits  Psychiatric: cooperative    LABORATORY                        13.7   15.97 )-----------( 451      ( 05 Nov 2019 22:50 )             43.9     11-05    135  |  97<L>  |  12  ----------------------------<  183<H>  3.8   |  26  |  0.52    Ca    9.6      05 Nov 2019 22:50    TPro  7.3  /  Alb  3.7  /  TBili  < 0.2<L>  /  DBili  x   /  AST  20  /  ALT  14  /  AlkPhos  90  11-05      RADIOLOGY    CT Angio Chest w/ IV Cont (11.06.19 @ 03:01) >  Mildly motion degraded. No gross pulmonary embolism.  Mucoid impacted airways in the left greater than right lower lobes. Patchy left lower lobe airspace opacities concerning for pneumonia.   Clinical correlation is recommended.  Small to moderate loculated right pleural effusion and trace left pleural effusion with mild pulmonary edema.      < end of copied text >

## 2019-11-06 NOTE — H&P ADULT - PROBLEM SELECTOR PLAN 3
- Patient with worsening cough at home with worsening SOB though denies any worsening of her sputum, currently still with wheezing  - Likely with Asthma exacerbation, s/p solumedrol in ED and duonebs  - Will c/w duonebs prn, will place on prednisone 40mg daily for now  - c/w NS 3L for now, downtitrate as patient tolerates

## 2019-11-06 NOTE — H&P ADULT - PROBLEM SELECTOR PLAN 7
1.  Name of PCP: Dr. Kristen Hermosillo [phone]  2.  PCP Contacted on Admission: [ ] Y    [ ] N    3.  PCP contacted at Discharge: [ ] Y    [ ] N    [ ] N/A  4.  Post-Discharge Appointment Date and Location:  5.  Summary of Handoff given to PCP:

## 2019-11-06 NOTE — PROGRESS NOTE ADULT - PROBLEM SELECTOR PLAN 4
- Elevated hsTrops with positive uptrend in setting of c/o chest pain.   - Chest Pain- relieved with nitroglycerin, worse with exertion, Aypical CP.  - EKG with TWI in leads V1-V2  - Cardiology consulted started NSTEMI protocol w/ ASA/Plavix load, Heparin drip.  - Follow up PTT per Heparin Drip protocol - PTT normal 6am, need repeat NOW.   - NPO from midnight for nonurgent Cardiac Cath tomorrow (Thursday).   - Restarted on DASH diet.   - Telemetry monitoring -> had an episode of NSVT 5 beats around 130 AM.  - Repeat CK, Troponin, EKG PRN Acute Chest pain.   - Optimize Cardiac medications: ASA 81, Plavix 75, Lipitor 80.   - Currently not on an ACE, or Beta Blocker- may benefit from starting low dose.    - Nitro prn chest pain, or start the Nitroglycerin 1 inch patch.   - Follow up Echocardiogram.    ** PTT subtherapeutic from this AM- requesting RN to redraw STAT and adjust Heparin drip per protocol, including administering bolus as needed.

## 2019-11-06 NOTE — H&P ADULT - NSHPREVIEWOFSYSTEMS_GEN_ALL_CORE
REVIEW OF SYSTEMS:    CONSTITUTIONAL: No weakness, fevers or chills  EYES: No visual changes or eye discharge  ENT: No rhinorrhea or sore throat  NECK: No pain or stiffness  RESPIRATORY: +cough with green sputum production (worsened from chronic productive cough), +wheezing, +SOB (improving)  CARDIOVASCULAR: +chest pain with radiation to b/l arms (burning quality of pain), no palpitations, no LE edema  GASTROINTESTINAL: No abdominal or epigastric pain. +nausea/emesis (NBNB); No diarrhea or constipation. No melena or hematochezia.  BACK: No back pain, no flank pain  GENITOURINARY: No dysuria, frequency or hematuria  NEUROLOGICAL: No numbness or weakness  SKIN: No itching, burning, rashes, or lesions

## 2019-11-06 NOTE — ED ADULT NURSE REASSESSMENT NOTE - NS ED NURSE REASSESS COMMENT FT1
Pt's HR went up to the 130s and had a 5 beat run of Vtach. MD made aware. Pace pads being placed on the Patient

## 2019-11-06 NOTE — PROGRESS NOTE ADULT - PROBLEM SELECTOR PLAN 8
1.  Name of PCP: Dr. Kristen Hermosillo [phone]  2.  PCP Contacted on Admission: [ ] Y    [ ] N    3.  PCP contacted at Discharge: [ ] Y    [ ] N    [ ] N/A  4.  Post-Discharge Appointment Date and Location:  5.  Summary of Handoff given to PCP:    Pulmonologist Dr. Byrne [facility]. 1.  Name of PCP: Dr. Kristen Hermosillo [phone]  2.  PCP Contacted on Admission: [ ] Y    [ ] N    3.  PCP contacted at Discharge: [ ] Y    [ ] N    [ ] N/A  4.  Post-Discharge Appointment Date and Location:  5.  Summary of Handoff given to PCP: Attempted to contact PCP today, not reached    Pulmonologist Dr. Byrne [facility].

## 2019-11-06 NOTE — PROGRESS NOTE ADULT - SUBJECTIVE AND OBJECTIVE BOX
PROGRESS NOTE:     Patient is a 74y old  Female who presents with a chief complaint of Chest pain, SOB (2019 07:52)    SUBJECTIVE / OVERNIGHT EVENTS:    ADDITIONAL REVIEW OF SYSTEMS:    MEDICATIONS  (STANDING):  atorvastatin 80 milliGRAM(s) Oral at bedtime  azithromycin  IVPB 500 milliGRAM(s) IV Intermittent every 24 hours  budesonide 160 MICROgram(s)/formoterol 4.5 MICROgram(s) Inhaler 2 Puff(s) Inhalation two times a day  cefTRIAXone   IVPB 1000 milliGRAM(s) IV Intermittent every 24 hours  gabapentin 300 milliGRAM(s) Oral three times a day  heparin  Infusion.  Unit(s)/Hr (6.5 mL/Hr) IV Continuous <Continuous>  methadone    Tablet 10 milliGRAM(s) Oral two times a day  montelukast 10 milliGRAM(s) Oral daily    MEDICATIONS  (PRN):  albuterol/ipratropium for Nebulization 3 milliLiter(s) Nebulizer every 6 hours PRN Shortness of Breath and/or Wheezing  heparin  Injectable 3200 Unit(s) IV Push every 6 hours PRN For aPTT less than 40  nitroglycerin     SubLingual 0.4 milliGRAM(s) SubLingual every 5 minutes PRN Chest Pain    CAPILLARY BLOOD GLUCOSE    I&O's Summary    PHYSICAL EXAM:  Vital Signs Last 24 Hrs  T(C): 36.6 (2019 14:12), Max: 36.9 (2019 21:41)  T(F): 97.9 (2019 14:12), Max: 98.4 (2019 21:41)  HR: 99 (2019 14:12) (89 - 120)  BP: 120/85 (2019 14:12) (110/65 - 155/83)  BP(mean): --  RR: 18 (2019 14:12) (16 - 24)  SpO2: 99% (2019 14:12) (88% - 99%)    CONSTITUTIONAL: NAD, well-developed  RESPIRATORY: Normal respiratory effort; lungs are clear to auscultation bilaterally  CARDIOVASCULAR: Regular rate and rhythm, normal S1 and S2, no murmur/rub/gallop; No lower extremity edema; Peripheral pulses are 2+ bilaterally  ABDOMEN: Nontender to palpation, normoactive bowel sounds, no rebound/guarding; No hepatosplenomegaly  MUSCLOSKELETAL: no clubbing or cyanosis of digits; no joint swelling or tenderness to palpation  PSYCH: A+O to person, place, and time; affect appropriate    LABS: reviewed                         13.7   15.97 )-----------( 451      ( 2019 22:50 )             43.9     11    135  |  97<L>  |  12  ----------------------------<  183<H>  3.8   |  26  |  0.52    Ca    9.6      2019 22:50    TPro  7.3  /  Alb  3.7  /  TBili  < 0.2<L>  /  DBili  x   /  AST  20  /  ALT  14  /  AlkPhos  90  11    PT/INR - ( 2019 08:50 )   PT: 12.0 SEC;   INR: 1.05       PTT - ( 2019 08:50 )  PTT:30.2 SEC  CARDIAC MARKERS ( 2019 08:40 )  x     / x     / 125 u/L / 16.93 ng/mL / x      CARDIAC MARKERS ( 2019 22:50 )  x     / x     / 97 u/L / 9.61 ng/mL / x        Urinalysis Basic - ( 2019 06:30 )    Color: LIGHT YELLOW / Appearance: CLEAR / S.023 / pH: 6.5  Gluc: 300 / Ketone: NEGATIVE  / Bili: NEGATIVE / Urobili: NORMAL   Blood: NEGATIVE / Protein: NEGATIVE / Nitrite: NEGATIVE   Leuk Esterase: NEGATIVE / RBC: x / WBC x   Sq Epi: x / Non Sq Epi: x / Bacteria: x    RADIOLOGY & ADDITIONAL TESTS:  Results Reviewed:   Imaging Personally Reviewed:  Electrocardiogram Personally Reviewed:    COORDINATION OF CARE:  Care Discussed with Consultants/Other Providers [Y/N]:  Prior or Outpatient Records Reviewed [Y/N]:

## 2019-11-06 NOTE — ED ADULT NURSE REASSESSMENT NOTE - NS ED NURSE REASSESS COMMENT FT1
Pt reporting urinary frequency, denies burning or discomfort. But pt requiring diaper at this time. Diaper changed and alyson care performed

## 2019-11-06 NOTE — H&P ADULT - PROBLEM SELECTOR PLAN 2
- Elevated hsTrops with positive uptrend and c/o chest pain only relieved with nitro  - EKG with TWI in leads V1-V2  - Seen by cards, started on NSTEMI protocol with ASA/Plavix load and hep gtt  - Will keep NPO for now, f/u cards attending in AM to see if patient will go for cath today or later on in her hospitalization  - Will monitor on telemetry -> had an episode of NSVT 5 beats around 130 AM, will continue to telemetry monitoring   - Will recheck CK/CKMB/hsTrop in AM with PTT blood draw  - Will start ASA 81, plavix 75, lipitor 80  - TTE ordered  - nitro prn chest pain

## 2019-11-06 NOTE — PROGRESS NOTE ADULT - PROBLEM SELECTOR PLAN 1
- Leukocytosis, tachycardia, tachypnea, hypoxia, meets SIRS sepsis criteria.   - CT findings of patchy infiltrates all concerning for sepsis 2/2 PNA.   - Currently- reports clinical improvement, hemodynamically stable.   - s/p IV Ceftriaxone, Azithromycin in ED, will c/w for now for 5 days.   - Follow up blood cultures x2 sets. UA negative. Obtain Viral Panel.   - Lactate 1.5.   - Monitor respiratory and clinical status closely.   - Oxygen supplementation via NC as needed.

## 2019-11-06 NOTE — H&P ADULT - NSHPLABSRESULTS_GEN_ALL_CORE
LABS and ADDITIONAL STUDIES:                        13.7   15.97 )-----------( 451      ( 2019 22:50 )             43.9   Complete Blood Count + Automated Diff (19 @ 22:50)    Auto Neutrophil #: 12.40 K/uL    Auto Neutrophil %: 77.6 %        135  |  97<L>  |  12  ----------------------------<  183<H>  3.8   |  26  |  0.52    Ca    9.6      2019 22:50    TPro  7.3  /  Alb  3.7  /  TBili  < 0.2<L>  /  DBili  x   /  AST  20  /  ALT  14  /  AlkPhos  90      CARDIAC MARKERS ( 2019 22:50 )  x     / x     / 97 u/L / 9.61 ng/mL / x        Troponin T, High Sensitivity Result (19 @ 22:50)    Troponin T, High Sensitivity: 606  Troponin T, High Sensitivity (19 @ 02:59)    Troponin T, High Sensitivity: 762: Delta: 606 on 19-    LIVER FUNCTIONS - ( 2019 22:50 )  Alb: 3.7 g/dL / Pro: 7.3 g/dL / ALK PHOS: 90 u/L / ALT: 14 u/L / AST: 20 u/L / GGT: x           PT/INR - ( 2019 00:50 )   PT: 12.8 SEC;   INR: 1.15     PTT - ( 2019 00:50 )  PTT:30.0 SEC    Urinalysis Basic - ( 2019 06:30 )  Color: LIGHT YELLOW / Appearance: CLEAR / S.023 / pH: 6.5  Gluc: 300 / Ketone: NEGATIVE  / Bili: NEGATIVE / Urobili: NORMAL   Blood: NEGATIVE / Protein: NEGATIVE / Nitrite: NEGATIVE   Leuk Esterase: NEGATIVE / RBC: x / WBC x   Sq Epi: x / Non Sq Epi: x / Bacteria: x    < from: CT Angio Chest w/ IV Cont (19 @ 03:01) >    FINDINGS:   There is good opacification of pulmonary arterial tree, however, there is   respiratory motion which limits evaluation of the peripheral vasculature.   No central, segmental or proximal subsegmental filling defect is present   to suggest acute pulmonary embolus.    The thyroid gland is unremarkable.     There is a small to moderate loculated right pleural effusion with   associated partial compressive atelectasis in the right middle and lower   lobes. Trace left pleural effusion. There are mucoid impacted airways in   the left greater than right lower lobes with patchy opacities in the left   lower lobe. There is mild interlobular septal thickening and scattered   groundglassattenuation suggesting pulmonary edema.    There is no significant axillary, mediastinal or hilar adenopathy.    The heart is mildly enlarged. There is no significant pericardial   effusion.    Images through the upper abdomen demonstrate no acute abnormality.    There are degenerative changes in the thoracic spine. There are no acute   osseous abnormalities.    IMPRESSION:  Mildly motion degraded. No gross pulmonary embolism.    Mucoid impacted airways in the left greater than right lower lobes.  Patchy left lower lobe airspace opacities concerning for pneumonia.   Clinical correlation is recommended.    Small to moderate loculated right pleural effusion and trace left pleural   effusion with mild pulmonary edema.    < end of copied text >    EKG #1 - NSR at 95, nl axis, QRS 82, QTc 442, TWI V1-V2, no other significant ST-T wave changes  EKG #2 - tachycardic but otherwise no other significant changes

## 2019-11-06 NOTE — H&P ADULT - ASSESSMENT
This is a 74F with history as above who presents to the hospital with chest pain and SOB found to have sepsis 2/2 PNA, NSTEMI, Asthma exacerbation, and findings suggestive of fluid overload.

## 2019-11-07 LAB
ANION GAP SERPL CALC-SCNC: 14 MMO/L — SIGNIFICANT CHANGE UP (ref 7–14)
APTT BLD: 34.9 SEC — SIGNIFICANT CHANGE UP (ref 27.5–36.3)
APTT BLD: 50.1 SEC — HIGH (ref 27.5–36.3)
APTT BLD: 63.6 SEC — HIGH (ref 27.5–36.3)
B PERT DNA SPEC QL NAA+PROBE: NOT DETECTED — SIGNIFICANT CHANGE UP
BUN SERPL-MCNC: 9 MG/DL — SIGNIFICANT CHANGE UP (ref 7–23)
C PNEUM DNA SPEC QL NAA+PROBE: NOT DETECTED — SIGNIFICANT CHANGE UP
CALCIUM SERPL-MCNC: 9.1 MG/DL — SIGNIFICANT CHANGE UP (ref 8.4–10.5)
CHLORIDE SERPL-SCNC: 97 MMOL/L — LOW (ref 98–107)
CK SERPL-CCNC: 74 U/L — SIGNIFICANT CHANGE UP (ref 25–170)
CO2 SERPL-SCNC: 26 MMOL/L — SIGNIFICANT CHANGE UP (ref 22–31)
CREAT SERPL-MCNC: 0.52 MG/DL — SIGNIFICANT CHANGE UP (ref 0.5–1.3)
FLUAV H1 2009 PAND RNA SPEC QL NAA+PROBE: NOT DETECTED — SIGNIFICANT CHANGE UP
FLUAV H1 RNA SPEC QL NAA+PROBE: NOT DETECTED — SIGNIFICANT CHANGE UP
FLUAV H3 RNA SPEC QL NAA+PROBE: NOT DETECTED — SIGNIFICANT CHANGE UP
FLUAV SUBTYP SPEC NAA+PROBE: NOT DETECTED — SIGNIFICANT CHANGE UP
FLUBV RNA SPEC QL NAA+PROBE: NOT DETECTED — SIGNIFICANT CHANGE UP
GLUCOSE SERPL-MCNC: 90 MG/DL — SIGNIFICANT CHANGE UP (ref 70–99)
GRAM STN SPT: SIGNIFICANT CHANGE UP
HADV DNA SPEC QL NAA+PROBE: NOT DETECTED — SIGNIFICANT CHANGE UP
HCOV PNL SPEC NAA+PROBE: SIGNIFICANT CHANGE UP
HCT VFR BLD CALC: 37 % — SIGNIFICANT CHANGE UP (ref 34.5–45)
HGB BLD-MCNC: 11.6 G/DL — SIGNIFICANT CHANGE UP (ref 11.5–15.5)
HMPV RNA SPEC QL NAA+PROBE: NOT DETECTED — SIGNIFICANT CHANGE UP
HPIV1 RNA SPEC QL NAA+PROBE: NOT DETECTED — SIGNIFICANT CHANGE UP
HPIV2 RNA SPEC QL NAA+PROBE: NOT DETECTED — SIGNIFICANT CHANGE UP
HPIV3 RNA SPEC QL NAA+PROBE: NOT DETECTED — SIGNIFICANT CHANGE UP
HPIV4 RNA SPEC QL NAA+PROBE: NOT DETECTED — SIGNIFICANT CHANGE UP
MCHC RBC-ENTMCNC: 28.9 PG — SIGNIFICANT CHANGE UP (ref 27–34)
MCHC RBC-ENTMCNC: 31.4 % — LOW (ref 32–36)
MCV RBC AUTO: 92.3 FL — SIGNIFICANT CHANGE UP (ref 80–100)
METHOD TYPE: SIGNIFICANT CHANGE UP
NRBC # FLD: 0 K/UL — SIGNIFICANT CHANGE UP (ref 0–0)
ORGANISM # SPEC MICROSCOPIC CNT: SIGNIFICANT CHANGE UP
ORGANISM # SPEC MICROSCOPIC CNT: SIGNIFICANT CHANGE UP
PLATELET # BLD AUTO: 363 K/UL — SIGNIFICANT CHANGE UP (ref 150–400)
PMV BLD: 9.2 FL — SIGNIFICANT CHANGE UP (ref 7–13)
POTASSIUM SERPL-MCNC: 4.6 MMOL/L — SIGNIFICANT CHANGE UP (ref 3.5–5.3)
POTASSIUM SERPL-SCNC: 4.6 MMOL/L — SIGNIFICANT CHANGE UP (ref 3.5–5.3)
RBC # BLD: 4.01 M/UL — SIGNIFICANT CHANGE UP (ref 3.8–5.2)
RBC # FLD: 13.1 % — SIGNIFICANT CHANGE UP (ref 10.3–14.5)
RSV RNA SPEC QL NAA+PROBE: NOT DETECTED — SIGNIFICANT CHANGE UP
RV+EV RNA SPEC QL NAA+PROBE: NOT DETECTED — SIGNIFICANT CHANGE UP
SODIUM SERPL-SCNC: 137 MMOL/L — SIGNIFICANT CHANGE UP (ref 135–145)
SPECIMEN SOURCE: SIGNIFICANT CHANGE UP
TROPONIN T, HIGH SENSITIVITY: 527 NG/L — CRITICAL HIGH (ref ?–14)
WBC # BLD: 12.8 K/UL — HIGH (ref 3.8–10.5)
WBC # FLD AUTO: 12.8 K/UL — HIGH (ref 3.8–10.5)

## 2019-11-07 PROCEDURE — 93306 TTE W/DOPPLER COMPLETE: CPT | Mod: 26

## 2019-11-07 PROCEDURE — 99233 SBSQ HOSP IP/OBS HIGH 50: CPT

## 2019-11-07 PROCEDURE — 99231 SBSQ HOSP IP/OBS SF/LOW 25: CPT | Mod: GC

## 2019-11-07 PROCEDURE — 99223 1ST HOSP IP/OBS HIGH 75: CPT | Mod: GC

## 2019-11-07 RX ORDER — IPRATROPIUM BROMIDE 0.2 MG/ML
500 SOLUTION, NON-ORAL INHALATION EVERY 4 HOURS
Refills: 0 | Status: DISCONTINUED | OUTPATIENT
Start: 2019-11-07 | End: 2019-11-10

## 2019-11-07 RX ORDER — POLYETHYLENE GLYCOL 3350 17 G/17G
17 POWDER, FOR SOLUTION ORAL DAILY
Refills: 0 | Status: DISCONTINUED | OUTPATIENT
Start: 2019-11-07 | End: 2019-11-13

## 2019-11-07 RX ORDER — TIOTROPIUM BROMIDE 18 UG/1
1 CAPSULE ORAL; RESPIRATORY (INHALATION) DAILY
Refills: 0 | Status: DISCONTINUED | OUTPATIENT
Start: 2019-11-07 | End: 2019-11-07

## 2019-11-07 RX ORDER — PANTOPRAZOLE SODIUM 20 MG/1
40 TABLET, DELAYED RELEASE ORAL
Refills: 0 | Status: DISCONTINUED | OUTPATIENT
Start: 2019-11-07 | End: 2019-11-13

## 2019-11-07 RX ORDER — VANCOMYCIN HCL 1 G
VIAL (EA) INTRAVENOUS
Refills: 0 | Status: DISCONTINUED | OUTPATIENT
Start: 2019-11-07 | End: 2019-11-07

## 2019-11-07 RX ORDER — PIPERACILLIN AND TAZOBACTAM 4; .5 G/20ML; G/20ML
3.38 INJECTION, POWDER, LYOPHILIZED, FOR SOLUTION INTRAVENOUS ONCE
Refills: 0 | Status: DISCONTINUED | OUTPATIENT
Start: 2019-11-07 | End: 2019-11-07

## 2019-11-07 RX ORDER — PIPERACILLIN AND TAZOBACTAM 4; .5 G/20ML; G/20ML
3.38 INJECTION, POWDER, LYOPHILIZED, FOR SOLUTION INTRAVENOUS EVERY 8 HOURS
Refills: 0 | Status: DISCONTINUED | OUTPATIENT
Start: 2019-11-07 | End: 2019-11-07

## 2019-11-07 RX ORDER — SENNA PLUS 8.6 MG/1
2 TABLET ORAL AT BEDTIME
Refills: 0 | Status: DISCONTINUED | OUTPATIENT
Start: 2019-11-07 | End: 2019-11-13

## 2019-11-07 RX ORDER — IPRATROPIUM BROMIDE 0.2 MG/ML
500 SOLUTION, NON-ORAL INHALATION ONCE
Refills: 0 | Status: COMPLETED | OUTPATIENT
Start: 2019-11-07 | End: 2019-11-07

## 2019-11-07 RX ORDER — CEFTRIAXONE 500 MG/1
1000 INJECTION, POWDER, FOR SOLUTION INTRAMUSCULAR; INTRAVENOUS EVERY 24 HOURS
Refills: 0 | Status: DISCONTINUED | OUTPATIENT
Start: 2019-11-07 | End: 2019-11-08

## 2019-11-07 RX ORDER — IPRATROPIUM/ALBUTEROL SULFATE 18-103MCG
3 AEROSOL WITH ADAPTER (GRAM) INHALATION EVERY 6 HOURS
Refills: 0 | Status: DISCONTINUED | OUTPATIENT
Start: 2019-11-07 | End: 2019-11-07

## 2019-11-07 RX ORDER — ALBUTEROL 90 UG/1
1 AEROSOL, METERED ORAL EVERY 4 HOURS
Refills: 0 | Status: DISCONTINUED | OUTPATIENT
Start: 2019-11-07 | End: 2019-11-07

## 2019-11-07 RX ADMIN — CLOPIDOGREL BISULFATE 75 MILLIGRAM(S): 75 TABLET, FILM COATED ORAL at 14:30

## 2019-11-07 RX ADMIN — CEFTRIAXONE 100 MILLIGRAM(S): 500 INJECTION, POWDER, FOR SOLUTION INTRAMUSCULAR; INTRAVENOUS at 23:09

## 2019-11-07 RX ADMIN — HEPARIN SODIUM 3200 UNIT(S): 5000 INJECTION INTRAVENOUS; SUBCUTANEOUS at 06:55

## 2019-11-07 RX ADMIN — ATORVASTATIN CALCIUM 80 MILLIGRAM(S): 80 TABLET, FILM COATED ORAL at 21:10

## 2019-11-07 RX ADMIN — LEVALBUTEROL 0.63 MILLIGRAM(S): 1.25 SOLUTION, CONCENTRATE RESPIRATORY (INHALATION) at 04:31

## 2019-11-07 RX ADMIN — GABAPENTIN 300 MILLIGRAM(S): 400 CAPSULE ORAL at 21:10

## 2019-11-07 RX ADMIN — GABAPENTIN 300 MILLIGRAM(S): 400 CAPSULE ORAL at 14:30

## 2019-11-07 RX ADMIN — LEVALBUTEROL 0.63 MILLIGRAM(S): 1.25 SOLUTION, CONCENTRATE RESPIRATORY (INHALATION) at 01:15

## 2019-11-07 RX ADMIN — POLYETHYLENE GLYCOL 3350 17 GRAM(S): 17 POWDER, FOR SOLUTION ORAL at 14:51

## 2019-11-07 RX ADMIN — LEVALBUTEROL 0.63 MILLIGRAM(S): 1.25 SOLUTION, CONCENTRATE RESPIRATORY (INHALATION) at 17:30

## 2019-11-07 RX ADMIN — GABAPENTIN 300 MILLIGRAM(S): 400 CAPSULE ORAL at 05:43

## 2019-11-07 RX ADMIN — Medication 40 MILLIGRAM(S): at 05:43

## 2019-11-07 RX ADMIN — LEVALBUTEROL 0.63 MILLIGRAM(S): 1.25 SOLUTION, CONCENTRATE RESPIRATORY (INHALATION) at 13:30

## 2019-11-07 RX ADMIN — LEVALBUTEROL 0.63 MILLIGRAM(S): 1.25 SOLUTION, CONCENTRATE RESPIRATORY (INHALATION) at 21:57

## 2019-11-07 RX ADMIN — METHADONE HYDROCHLORIDE 10 MILLIGRAM(S): 40 TABLET ORAL at 05:43

## 2019-11-07 RX ADMIN — LEVALBUTEROL 0.63 MILLIGRAM(S): 1.25 SOLUTION, CONCENTRATE RESPIRATORY (INHALATION) at 09:27

## 2019-11-07 RX ADMIN — PANTOPRAZOLE SODIUM 40 MILLIGRAM(S): 20 TABLET, DELAYED RELEASE ORAL at 14:30

## 2019-11-07 RX ADMIN — HEPARIN SODIUM 1100 UNIT(S)/HR: 5000 INJECTION INTRAVENOUS; SUBCUTANEOUS at 06:55

## 2019-11-07 RX ADMIN — METHADONE HYDROCHLORIDE 10 MILLIGRAM(S): 40 TABLET ORAL at 21:11

## 2019-11-07 RX ADMIN — AZITHROMYCIN 255 MILLIGRAM(S): 500 TABLET, FILM COATED ORAL at 02:40

## 2019-11-07 RX ADMIN — MONTELUKAST 10 MILLIGRAM(S): 4 TABLET, CHEWABLE ORAL at 14:31

## 2019-11-07 RX ADMIN — HEPARIN SODIUM 1200 UNIT(S)/HR: 5000 INJECTION INTRAVENOUS; SUBCUTANEOUS at 20:25

## 2019-11-07 RX ADMIN — BUDESONIDE AND FORMOTEROL FUMARATE DIHYDRATE 2 PUFF(S): 160; 4.5 AEROSOL RESPIRATORY (INHALATION) at 10:26

## 2019-11-07 RX ADMIN — HEPARIN SODIUM 1100 UNIT(S)/HR: 5000 INJECTION INTRAVENOUS; SUBCUTANEOUS at 12:55

## 2019-11-07 RX ADMIN — Medication 81 MILLIGRAM(S): at 14:30

## 2019-11-07 RX ADMIN — Medication 500 MICROGRAM(S): at 21:57

## 2019-11-07 RX ADMIN — BUDESONIDE AND FORMOTEROL FUMARATE DIHYDRATE 2 PUFF(S): 160; 4.5 AEROSOL RESPIRATORY (INHALATION) at 21:11

## 2019-11-07 RX ADMIN — SENNA PLUS 2 TABLET(S): 8.6 TABLET ORAL at 21:10

## 2019-11-07 NOTE — PROGRESS NOTE ADULT - PROBLEM SELECTOR PLAN 6
- Home Methadone ([facility] Reference #: [id number], last prescribed on 9/20 but Dr. Pastora Cantu) and Gabapentin 300mg TID.

## 2019-11-07 NOTE — PROGRESS NOTE ADULT - SUBJECTIVE AND OBJECTIVE BOX
INTERVAL EVENTS  No acute events overnight  No more episodes of chest pain  Reports breathing if better but still wheezing      OBJECTIVE    Vital Signs Last 24 Hrs  T(C): 37.2 (07 Nov 2019 05:37), Max: 37.3 (06 Nov 2019 22:36)  T(F): 99 (07 Nov 2019 05:37), Max: 99.1 (06 Nov 2019 22:36)  HR: 95 (07 Nov 2019 05:37) (91 - 102)  BP: 103/70 (07 Nov 2019 05:37) (103/70 - 120/85)  BP(mean): --  RR: 16 (07 Nov 2019 05:37) (16 - 18)  SpO2: 97% (07 Nov 2019 05:37) (96% - 99%)    PHYSICAL EXAM:  General: no acute distress  HEENT: normocephalic  Eyes: extraocular movements intact, pupils equal and reactive to light  Neck: supple  Respiratory: non labored breathing, diffuse wheezing, decreased breath sound in right base  Cardiovascular: normal rate, regular rhythm, no murmur  Gastrointestinal: abdomen soft, non tender, non distended  Extremities: no lower extremity edema  Neurological: alert, oriented, no focal deficits  Psychiatric: cooperative    MEDICATIONS  (STANDING):  aspirin enteric coated 81 milliGRAM(s) Oral daily  atorvastatin 80 milliGRAM(s) Oral at bedtime  azithromycin  IVPB 500 milliGRAM(s) IV Intermittent every 24 hours  budesonide 160 MICROgram(s)/formoterol 4.5 MICROgram(s) Inhaler 2 Puff(s) Inhalation two times a day  cefTRIAXone   IVPB 1000 milliGRAM(s) IV Intermittent every 24 hours  clopidogrel Tablet 75 milliGRAM(s) Oral daily  gabapentin 300 milliGRAM(s) Oral three times a day  heparin  Infusion.  Unit(s)/Hr (6.5 mL/Hr) IV Continuous <Continuous>  levalbuterol Inhalation 0.63 milliGRAM(s) Inhalation every 4 hours  methadone    Tablet 10 milliGRAM(s) Oral two times a day  montelukast 10 milliGRAM(s) Oral daily  predniSONE   Tablet 40 milliGRAM(s) Oral daily    MEDICATIONS  (PRN):  heparin  Injectable 3200 Unit(s) IV Push every 6 hours PRN For aPTT less than 40  nitroglycerin     SubLingual 0.4 milliGRAM(s) SubLingual every 5 minutes PRN Chest Pain      LABORATORY                          11.6   12.80 )-----------( 363      ( 07 Nov 2019 05:53 )             37.0     11-07    137  |  97<L>  |  9   ----------------------------<  90  4.6   |  26  |  0.52    Ca    9.1      07 Nov 2019 05:53    TPro  7.3  /  Alb  3.7  /  TBili  < 0.2<L>  /  DBili  x   /  AST  20  /  ALT  14  /  AlkPhos  90  11-05      RADIOLOGY    CT Angio Chest w/ IV Cont (11.06.19 @ 03:01) >  Mildly motion degraded. No gross pulmonary embolism.  Mucoid impacted airways in the left greater than right lower lobes. Patchy left lower lobe airspace opacities concerning for pneumonia.   Clinical correlation is recommended.  Small to moderate loculated right pleural effusion and trace left pleural effusion with mild pulmonary edema.      < end of copied text >

## 2019-11-07 NOTE — PROGRESS NOTE ADULT - ASSESSMENT
75 yo woman with history of asthma and chronic back pain presenting to the emergency department complaining of shortness of breath, productive cough, wheezing for the past week after returning from a cruise as well as retrosternal burning sensation associated with nausea yesterday evening. Found to have an NSTEMI and started on aspirin, plavix and heparin drip with plans for possible cath. CT chest angiogram revealed patchy opacities mainly in the LLB concerning for pneumonia and a small to moderated loculated right pleural effusion. Pulmonary service consulted to evaluate prior to planned cath.     1. Community acquired pneumonia   - CT chest with patchy opacities mainly in LLL  - RVP negative  - Legionella urine antigen negative  - Follow blood cultures   - Obtain sputum culture  - May discontinue azithromycin given Legionella is negative  - Continue ceftriaxone    2. Asthma exacerbation  - Still wheezing on exam  - Prednisone 40 mg daily for 5 days  - Continue levalbuterol nebulizations  - Add ipratropium nebulizations Q6h  - Continue Symbicort    3. Right sided pleural effusion  - Bedside ultrasound showed small to moderate amount of right pleural effusion which appeared simple with no signs of loculations  - Will observe for now as she is on heparin drip for NSTEMI   - Will reassess later today    4. NTSEMI - patient with troponin elevation and plan for possible cath.     --------------------------------------------------------  Donnie Reece, PGY-4  Pulmonary/Critical Care Fellow  Pager: [phone] (LIZBETH), [phone] (NS)  Pulmonary spectra: [id number]

## 2019-11-07 NOTE — PROGRESS NOTE ADULT - PROBLEM SELECTOR PLAN 3
- Patient with worsening cough/SOB, with sputum production (sputum chronic).    - Currently, no SOB, but has bilateral wheezing + expiratory, with rhonchi +   - Likely with Asthma exacerbation, s/p IV Solumedrol in ED and Duonebs  - Will c/w Duonebs q4 ATC will place on Prednisone 40mg daily for now x5 days.  - Ipratropium Lealbuterol q4h for next 24 hours.    - c/w NS 3L for now, downtitrate as patient tolerates to room air.   - Pulmonary consulted, recommendations reviewed.

## 2019-11-07 NOTE — PROGRESS NOTE ADULT - PROBLEM SELECTOR PLAN 2
CT imaging shows loculated pleural effusion.   - Patient follows with Dr. Byrne office [facility] Pulmonologist.   - Reports, that she was planned for repeat CT Chest in December to evaluate for an underlying lung issues she has. She was unable to provide further information.  - Will reach out to Dr. Byrne's office to obtain collateral information.

## 2019-11-07 NOTE — PROGRESS NOTE ADULT - PROBLEM SELECTOR PLAN 5
Noted with mild pulm edema, small to moderate loculated pleural effusion on CT. Patient's current examination not consistent with HF (no JVD, no crackles on auscultation, no LE edema) and her respiratory status has improved with management of her NSTEMI, Asthma exacerbation, and PNA.   - Possibly 2/2 ??ADHF from NSTEMI vs reactive to PNA vs chronic  - c/w supplemental O2, will check a pro-BNP, and follow up Echocardiogram.   - Monitor her respiratory status and dose Lasix PRN.

## 2019-11-07 NOTE — PROGRESS NOTE ADULT - PROBLEM SELECTOR PLAN 1
- Leukocytosis, tachycardia, tachypnea, hypoxia, meets SIRS sepsis criteria.   - CT findings of patchy infiltrates all concerning for sepsis 2/2 PNA.   - Currently- reports clinical improvement, hemodynamically stable.   - s/p IV Ceftriaxone, Azithromycin in ED, will c/w for now for 5 days.   - Follow up blood cultures x2 sets. UA negative. Obtain Viral Panel.   - Lactate 1.5.   - Blood cultures initially + gram negative rods x1 set. Repeat blood cultures.   - ID consulted, recommended 1 dose Vancomycin for now, cont Cef/Azithro.   - Monitor respiratory and clinical status closely.   - Oxygen supplementation via NC as needed.

## 2019-11-07 NOTE — PROGRESS NOTE ADULT - SUBJECTIVE AND OBJECTIVE BOX
Carolyn Morales MD  Cardiology Fellow  All Cardiology service information can be found 24/7 on [email or web address], password: [email or web address]    Patient seen and examined at bedside.    Subjective:        Current Meds:  aspirin enteric coated 81 milliGRAM(s) Oral daily  atorvastatin 80 milliGRAM(s) Oral at bedtime  azithromycin  IVPB 500 milliGRAM(s) IV Intermittent every 24 hours  budesonide 160 MICROgram(s)/formoterol 4.5 MICROgram(s) Inhaler 2 Puff(s) Inhalation two times a day  cefTRIAXone   IVPB 1000 milliGRAM(s) IV Intermittent every 24 hours  clopidogrel Tablet 75 milliGRAM(s) Oral daily  gabapentin 300 milliGRAM(s) Oral three times a day  heparin  Infusion.  Unit(s)/Hr IV Continuous <Continuous>  heparin  Injectable 3200 Unit(s) IV Push every 6 hours PRN  levalbuterol Inhalation 0.63 milliGRAM(s) Inhalation every 4 hours  methadone    Tablet 10 milliGRAM(s) Oral two times a day  montelukast 10 milliGRAM(s) Oral daily  nitroglycerin     SubLingual 0.4 milliGRAM(s) SubLingual every 5 minutes PRN  predniSONE   Tablet 40 milliGRAM(s) Oral daily      Vitals:  T(F): 99 (11-07), Max: 99.1 (11-06)  HR: 95 (11-07) (89 - 102)  BP: 103/70 (11-07) (103/70 - 120/85)  RR: 16 (11-07)  SpO2: 97% (11-07)  I&O's Summary      Physical Exam:  Appearance: No acute distress; well appearing  Eyes: PERRL, EOMI, pink conjunctiva  HEENT: Normal oral mucosa  Cardiovascular: RRR, S1, S2, no murmurs, rubs, or gallops; no edema; no JVD  Respiratory: Clear to auscultation bilaterally  Gastrointestinal: soft, non-tender, non-distended with normal bowel sounds  Musculoskeletal: No clubbing; no joint deformity   Neurologic: Non-focal  Lymphatic: No lymphadenopathy  Psychiatry: AAOx3, mood & affect appropriate  Skin: No rashes, ecchymoses, or cyanosis                          11.6   12.80 )-----------( 363      ( 07 Nov 2019 05:53 )             37.0     11-07    137  |  97<L>  |  9   ----------------------------<  90  4.6   |  26  |  0.52    Ca    9.1      07 Nov 2019 05:53    TPro  7.3  /  Alb  3.7  /  TBili  < 0.2<L>  /  DBili  x   /  AST  20  /  ALT  14  /  AlkPhos  90  11-05    PT/INR - ( 06 Nov 2019 08:50 )   PT: 12.0 SEC;   INR: 1.05          PTT - ( 07 Nov 2019 05:53 )  PTT:34.9 SEC  CARDIAC MARKERS ( 07 Nov 2019 05:53 )  x     / x     / x     / 74 u/L / x     / x      CARDIAC MARKERS ( 06 Nov 2019 08:40 )  x     / x     / x     / 125 u/L / 16.93 ng/mL / x      CARDIAC MARKERS ( 05 Nov 2019 22:50 )  x     / x     / x     / 97 u/L / 9.61 ng/mL / x          Serum Pro-Brain Natriuretic Peptide: 2545 pg/mL (11-06 @ 08:40) Carolyn Morales MD  Cardiology Fellow  All Cardiology service information can be found 24/7 on [email or web address], password: [email or web address]    Patient seen and examined at bedside.    Subjective:      -Denies SOB, CP, or palpitations.  -Reports ongoing cough, dry and nonproductive.  -Able to tolerate lying flat without SOB.    Current Meds:  aspirin enteric coated 81 milliGRAM(s) Oral daily  atorvastatin 80 milliGRAM(s) Oral at bedtime  azithromycin  IVPB 500 milliGRAM(s) IV Intermittent every 24 hours  budesonide 160 MICROgram(s)/formoterol 4.5 MICROgram(s) Inhaler 2 Puff(s) Inhalation two times a day  cefTRIAXone   IVPB 1000 milliGRAM(s) IV Intermittent every 24 hours  clopidogrel Tablet 75 milliGRAM(s) Oral daily  gabapentin 300 milliGRAM(s) Oral three times a day  heparin  Infusion.  Unit(s)/Hr IV Continuous <Continuous>  heparin  Injectable 3200 Unit(s) IV Push every 6 hours PRN  levalbuterol Inhalation 0.63 milliGRAM(s) Inhalation every 4 hours  methadone    Tablet 10 milliGRAM(s) Oral two times a day  montelukast 10 milliGRAM(s) Oral daily  nitroglycerin     SubLingual 0.4 milliGRAM(s) SubLingual every 5 minutes PRN  predniSONE   Tablet 40 milliGRAM(s) Oral daily      Vitals:  T(F): 99 (11-07), Max: 99.1 (11-06)  HR: 95 (11-07) (89 - 102)  BP: 103/70 (11-07) (103/70 - 120/85)  RR: 16 (11-07)  SpO2: 97% (11-07)  I&O's Summary    Physical Exam:  Appearance: No acute distress; well appearing  Eyes: PERRL, EOMI, pink conjunctiva  HEENT: Normal oral mucosa  Cardiovascular: RRR, S1, S2, no murmurs, rubs, or gallops; no edema; no JVD  Respiratory: Sharp inspiratory wheezes in all lung fields b/l  Gastrointestinal: soft, non-tender, non-distended with normal bowel sounds  Musculoskeletal: No clubbing; no joint deformity   Neurologic: Non-focal  Lymphatic: No lymphadenopathy  Psychiatry: AAOx3, mood & affect appropriate  Skin: No rashes, ecchymoses, or cyanosis                          11.6   12.80 )-----------( 363      ( 07 Nov 2019 05:53 )             37.0     11-07    137  |  97<L>  |  9   ----------------------------<  90  4.6   |  26  |  0.52    Ca    9.1      07 Nov 2019 05:53    TPro  7.3  /  Alb  3.7  /  TBili  < 0.2<L>  /  DBili  x   /  AST  20  /  ALT  14  /  AlkPhos  90  11-05    PT/INR - ( 06 Nov 2019 08:50 )   PT: 12.0 SEC;   INR: 1.05          PTT - ( 07 Nov 2019 05:53 )  PTT:34.9 SEC  CARDIAC MARKERS ( 07 Nov 2019 05:53 )  x     / x     / x     / 74 u/L / x     / x      CARDIAC MARKERS ( 06 Nov 2019 08:40 )  x     / x     / x     / 125 u/L / 16.93 ng/mL / x      CARDIAC MARKERS ( 05 Nov 2019 22:50 )  x     / x     / x     / 97 u/L / 9.61 ng/mL / x          Serum Pro-Brain Natriuretic Peptide: 2545 pg/mL (11-06 @ 08:40)

## 2019-11-07 NOTE — PROGRESS NOTE ADULT - SUBJECTIVE AND OBJECTIVE BOX
PROGRESS NOTE:     Patient is a 74y old  Female who presents with a chief complaint of Chest pain, SOB (2019 07:52)    SUBJECTIVE / OVERNIGHT EVENTS:  Patient seen and evaluated at bedside.   Remains alert, awake, communicative.   No overnight events occurred. No further episodes of chest pain, no SOB, appears comfortable.   PTT remained subtherapeutic until PM labs obtained. Heparin drip adjusted accordingly per normogram.  Updated patient,  and son at bedside on current condition and treatment plan.     ADDITIONAL REVIEW OF SYSTEMS:    MEDICATIONS  (STANDING):  aspirin enteric coated 81 milliGRAM(s) Oral daily  atorvastatin 80 milliGRAM(s) Oral at bedtime  azithromycin  IVPB 500 milliGRAM(s) IV Intermittent every 24 hours  budesonide 160 MICROgram(s)/formoterol 4.5 MICROgram(s) Inhaler 2 Puff(s) Inhalation two times a day  cefTRIAXone   IVPB 1000 milliGRAM(s) IV Intermittent every 24 hours  clopidogrel Tablet 75 milliGRAM(s) Oral daily  gabapentin 300 milliGRAM(s) Oral three times a day  heparin  Infusion.  Unit(s)/Hr (6.5 mL/Hr) IV Continuous <Continuous>  ipratropium    for Nebulization 500 MICROGram(s) Nebulizer every 4 hours  ipratropium  for Nebulization. 500 MICROGram(s) Nebulizer once  levalbuterol Inhalation 0.63 milliGRAM(s) Inhalation every 4 hours  methadone    Tablet 10 milliGRAM(s) Oral two times a day  montelukast 10 milliGRAM(s) Oral daily  pantoprazole    Tablet 40 milliGRAM(s) Oral before breakfast  polyethylene glycol 3350 17 Gram(s) Oral daily  predniSONE   Tablet 40 milliGRAM(s) Oral daily  senna 2 Tablet(s) Oral at bedtime    MEDICATIONS  (PRN):  heparin  Injectable 3200 Unit(s) IV Push every 6 hours PRN For aPTT less than 40  nitroglycerin     SubLingual 0.4 milliGRAM(s) SubLingual every 5 minutes PRN Chest Pain    CAPILLARY BLOOD GLUCOSE    I&O's Summary    PHYSICAL EXAM:  Vital Signs Last 24 Hrs  T(C): 37.4 (2019 14:06), Max: 37.4 (2019 14:06)  T(F): 99.4 (2019 14:06), Max: 99.4 (2019 14:06)  HR: 102 (2019 17:30) (94 - 104)  BP: 105/68 (2019 14:06) (103/70 - 112/73)  BP(mean): --  RR: 18 (2019 14:06) (16 - 18)  SpO2: 98% (2019 17:30) (96% - 98%)    CONSTITUTIONAL: NAD, well-developed, elderly female lying comfortably in the bed.   RESPIRATORY: Normal respiratory effort; bilateral wheezing evident, with rhonchi, no labored breathing evident.   CARDIOVASCULAR: Regular rate and rhythm, normal S1 and S2, no murmur/rub/gallop; No lower extremity edema; Peripheral pulses are 2+ bilaterally  ABDOMEN: Nontender to palpation, normoactive bowel sounds, no rebound/guarding; No hepatosplenomegaly  MUSCLOSKELETAL: no clubbing or cyanosis of digits; no joint swelling or tenderness to palpation  PSYCH: A+O to person, place, and time; affect appropriate    LABS: reviewed                         11.6   12.80 )-----------( 363      ( 2019 05:53 )             37.0     11-07    137  |  97<L>  |  9   ----------------------------<  90  4.6   |  26  |  0.52    Ca    9.1      2019 05:53    TPro  7.3  /  Alb  3.7  /  TBili  < 0.2<L>  /  DBili  x   /  AST  20  /  ALT  14  /  AlkPhos  90  11-05    PT/INR - ( 2019 08:50 )   PT: 12.0 SEC;   INR: 1.05        PTT - ( 2019 12:10 )  PTT:63.6 SEC  CARDIAC MARKERS ( 2019 05:53 )  x     / x     / 74 u/L / x     / x      CARDIAC MARKERS ( 2019 08:40 )  x     / x     / 125 u/L / 16.93 ng/mL / x      CARDIAC MARKERS ( 2019 22:50 )  x     / x     / 97 u/L / 9.61 ng/mL / x          Urinalysis Basic - ( 2019 06:30 )    Color: LIGHT YELLOW / Appearance: CLEAR / S.023 / pH: 6.5  Gluc: 300 / Ketone: NEGATIVE  / Bili: NEGATIVE / Urobili: NORMAL   Blood: NEGATIVE / Protein: NEGATIVE / Nitrite: NEGATIVE   Leuk Esterase: NEGATIVE / RBC: x / WBC x   Sq Epi: x / Non Sq Epi: x / Bacteria: x    Culture - Respiratory with Gram Stain (collected 2019 10:02)  Source: SPUTUM    Culture - Blood (collected 2019 09:43)  Source: BLOOD VENOUS  Preliminary Report (2019 09:44):    NO ORGANISMS ISOLATED    NO ORGANISMS ISOLATED AT 24 HOURS    Culture - Blood (collected 2019 09:43)  Source: BLOOD PERIPHERAL  Preliminary Report (2019 16:36):    ***Blood Panel PCR results on this specimen are available    approximately 3 hours after the Gram stain result***    Gram stain, PCR, and/or culture results may not always    correspond due to difference in methodologies    ------------------------------------------------------------    This PCR assay was performed using [employer].  The    following targets are tested for:  Enterococcus, vancomycin    resistant enterococci, Listeria monocytogenes,  coagulase    negative staphylococci, S. aureus, methicillin resistant S.    aureus, Streptococcus agalactiae (Group B), S. pneumoniae,    S. pyogenes (Group A), Acinetobacter baumannii, Enterobacter    cloacae, E. coli, Klebsiella oxytoca, K. pneumoniae, Proteus    sp., Serratia marcescens, Haemophilus influenzae, Neisseria    meningitidis, Pseudomonas aeruginosa, Candida albicans, C.    glabrata, C. krusei, C. parapsilosis, C. tropicalis and the    KPC resistance gene.    **NOTE: Due to technical problems, Proteus sp. will NOT be    reported as part of the BCID paneluntil further notice.  Preliminary Report (2019 09:44):    NO ORGANISMS ISOLATED    NO ORGANISMS ISOLATED AT 24 HOURS  Organism: BLOOD CULTURE PCR (2019 16:36)  Organism: BLOOD CULTURE PCR (2019 16:36)     RADIOLOGY & ADDITIONAL TESTS:  Results Reviewed:   Imaging Personally Reviewed:  Electrocardiogram Personally Reviewed:    COORDINATION OF CARE:  Care Discussed with Consultants/Other Providers [Y/N]: Yes Cardio, Pulm, ID, ADS teams   Prior or Outpatient Records Reviewed [Y/N]:

## 2019-11-07 NOTE — PROGRESS NOTE ADULT - PROBLEM SELECTOR PLAN 8
1.  Name of PCP: Dr. Kristen Hermosillo [phone]  2.  PCP Contacted on Admission: [ ] Y    [ ] N    3.  PCP contacted at Discharge: [ ] Y    [ ] N    [ ] N/A  4.  Post-Discharge Appointment Date and Location:  5.  Summary of Handoff given to PCP: Attempted to contact PCP today, not reached    Pulmonologist Dr. Byrne [facility].  Dispo pending Cardiac catheterization, treatment acute sepsis PNA.

## 2019-11-07 NOTE — CHART NOTE - NSCHARTNOTEFT_GEN_A_CORE
11/6 + BC 1 bottle gram positive cocci in clusters will change ceftriaxone to zosyn.  will repeat BC x 2 and call ID consult Dr Xavier aware 11/6 + BC 1 bottle gram positive cocci in clusters ID will complete a full consult tomorrow for tonight keep ceftriaxone and azithromycin add vanco.  repeat cultures   if cultures grow mrsa keep vanco and d/c ceftriaxone  if mssa change ceftriaxone to cefazolin 11/6 + BC 1 bottle gram positive cocci in clusters ID will complete a full consult tomorrow for tonight keep ceftriaxone and azithromycin add vanco.  repeat cultures   if cultures grow mrsa keep vanco and d/c ceftriaxone  if mssa change ceftriaxone to cefazolin    11/7/19 5pm addendum to above pcr coag negative staph d/c vanco per id - did not receive dose

## 2019-11-07 NOTE — PROGRESS NOTE ADULT - PROBLEM SELECTOR PLAN 4
- Elevated hsTrops with positive uptrend in setting of c/o chest pain.   - Chest Pain- relieved with nitroglycerin, worse with exertion, Aypical CP.  - EKG with TWI in leads V1-V2  - Cardiology consulted started NSTEMI protocol w/ ASA/Plavix load, Heparin drip.  - Follow up PTT per Heparin Drip protocol - PTT therapeutic in PM, need x2.   - Planned for Cardiac cath, possible tomorrow? f/u Cardio.   - Restarted on DASH diet.   - Telemetry monitoring -> had an episode of NSVT 5 beats around 130 AM.  - Repeat CK, Troponin, EKG PRN Acute Chest pain.   - Optimize Cardiac medications: ASA 81, Plavix 75, Lipitor 80.   - Currently not on an ACE, or Beta Blocker- may benefit from starting low dose.    - Nitro prn chest pain, or start the Nitroglycerin 1 inch patch.   - Follow up Echocardiogram.    ** PTT subtherapeutic from this AM- requesting RN to redraw STAT and adjust Heparin drip per protocol, including administering bolus as needed.

## 2019-11-07 NOTE — PROGRESS NOTE ADULT - ASSESSMENT
73yo woman with PMHx chronic asthma and hypertension who presented with progressive dyspnea and an episode of severe chest pain. Noted to have hsT of 600,  pro-BNP 2500, ECG did not demonstrated active current of injury or evidence of ongoing infarction. This was reviewed with the interventional team previously and emergency angiography was deferred. The patient's CXR and chest CT demonstrate areas concerning for pneumonia. There is a right-sided pleural effusion and evidence of pulmonary congestion as well.    NSTEMI, type II  -ANN MARIE-Risk Score of at least 2, and significantly elevated troponin  -Continue medical therapy for ACS: dual antiplatelet therapy, unfractionated heparin, and high-potency statin  -Echocardiography should be obtained  -Case was discussed interventional cardiology; will defer cardiac catheterization and wait for further optimization of her asthma and treatment of pneumonia prior to proceeding in order to maximize patient safety  -If the patient has recurrent chest pain, please re-consult cardiology, as more urgent angiography would be indicated    Cardiology will continue to follow.    Case discussed with  ****.    Carolyn Morales MD PGY-4  Cardiology Fellow  Note is preliminary until signed by the attending. 73yo woman with PMHx chronic asthma and hypertension who presented with progressive dyspnea and an episode of severe chest pain. Noted to have hsT of 600,  pro-BNP 2500, ECG did not demonstrated active current of injury or evidence of ongoing infarction. This was reviewed with the interventional team previously and emergency angiography was deferred. The patient's CXR and chest CT demonstrate areas concerning for pneumonia. There is a right-sided pleural effusion and evidence of pulmonary congestion as well.    NSTEMI, type II  -ANN MARIE-Risk Score of at least 2, and significantly elevated troponin  -Has been more than 48 hours with heparin gtt- okay to discontinue at this time  -Continue medical therapy for ACS: dual antiplatelet therapy and high-potency statin  -Echocardiography should be obtained  -Case was discussed interventional cardiology; will defer cardiac catheterization and wait for further optimization of her asthma and treatment of pneumonia prior to proceeding in order to maximize patient safety  -If the patient has recurrent chest pain, please re-consult cardiology, as more urgent angiography would be indicated    Cardiology will continue to follow.    Case discussed with Dr. Andrzej Morales MD PGY-4  Cardiology Fellow  Note is preliminary until signed by the attending.

## 2019-11-07 NOTE — PROVIDER CONTACT NOTE (OTHER) - BACKGROUND
Patient presented with chest pain and sob, found to have PNA, NSTEMI, asthma exacerbation; pt currently on heparin gtt running at 8 ml/hr

## 2019-11-08 LAB
ANION GAP SERPL CALC-SCNC: 14 MMO/L — SIGNIFICANT CHANGE UP (ref 7–14)
APTT BLD: 57.1 SEC — HIGH (ref 27.5–36.3)
BUN SERPL-MCNC: 11 MG/DL — SIGNIFICANT CHANGE UP (ref 7–23)
CALCIUM SERPL-MCNC: 8.8 MG/DL — SIGNIFICANT CHANGE UP (ref 8.4–10.5)
CHLORIDE SERPL-SCNC: 98 MMOL/L — SIGNIFICANT CHANGE UP (ref 98–107)
CO2 SERPL-SCNC: 25 MMOL/L — SIGNIFICANT CHANGE UP (ref 22–31)
CREAT SERPL-MCNC: 0.45 MG/DL — LOW (ref 0.5–1.3)
GLUCOSE SERPL-MCNC: 186 MG/DL — HIGH (ref 70–99)
HCT VFR BLD CALC: 35.2 % — SIGNIFICANT CHANGE UP (ref 34.5–45)
HGB BLD-MCNC: 11.1 G/DL — LOW (ref 11.5–15.5)
MAGNESIUM SERPL-MCNC: 2.1 MG/DL — SIGNIFICANT CHANGE UP (ref 1.6–2.6)
MCHC RBC-ENTMCNC: 29.4 PG — SIGNIFICANT CHANGE UP (ref 27–34)
MCHC RBC-ENTMCNC: 31.5 % — LOW (ref 32–36)
MCV RBC AUTO: 93.4 FL — SIGNIFICANT CHANGE UP (ref 80–100)
NRBC # FLD: 0 K/UL — SIGNIFICANT CHANGE UP (ref 0–0)
ORGANISM # SPEC MICROSCOPIC CNT: SIGNIFICANT CHANGE UP
PHOSPHATE SERPL-MCNC: 2.9 MG/DL — SIGNIFICANT CHANGE UP (ref 2.5–4.5)
PLATELET # BLD AUTO: 320 K/UL — SIGNIFICANT CHANGE UP (ref 150–400)
PMV BLD: 9.3 FL — SIGNIFICANT CHANGE UP (ref 7–13)
POTASSIUM SERPL-MCNC: 3.8 MMOL/L — SIGNIFICANT CHANGE UP (ref 3.5–5.3)
POTASSIUM SERPL-SCNC: 3.8 MMOL/L — SIGNIFICANT CHANGE UP (ref 3.5–5.3)
RBC # BLD: 3.77 M/UL — LOW (ref 3.8–5.2)
RBC # FLD: 13.3 % — SIGNIFICANT CHANGE UP (ref 10.3–14.5)
SODIUM SERPL-SCNC: 137 MMOL/L — SIGNIFICANT CHANGE UP (ref 135–145)
SPECIMEN SOURCE: SIGNIFICANT CHANGE UP
WBC # BLD: 10.54 K/UL — HIGH (ref 3.8–10.5)
WBC # FLD AUTO: 10.54 K/UL — HIGH (ref 3.8–10.5)

## 2019-11-08 PROCEDURE — 99231 SBSQ HOSP IP/OBS SF/LOW 25: CPT | Mod: GC

## 2019-11-08 PROCEDURE — 99233 SBSQ HOSP IP/OBS HIGH 50: CPT

## 2019-11-08 PROCEDURE — 99222 1ST HOSP IP/OBS MODERATE 55: CPT | Mod: GC

## 2019-11-08 PROCEDURE — 99233 SBSQ HOSP IP/OBS HIGH 50: CPT | Mod: GC

## 2019-11-08 RX ORDER — CEFEPIME 1 G/1
2000 INJECTION, POWDER, FOR SOLUTION INTRAMUSCULAR; INTRAVENOUS EVERY 8 HOURS
Refills: 0 | Status: DISCONTINUED | OUTPATIENT
Start: 2019-11-08 | End: 2019-11-11

## 2019-11-08 RX ORDER — ONDANSETRON 8 MG/1
4 TABLET, FILM COATED ORAL ONCE
Refills: 0 | Status: COMPLETED | OUTPATIENT
Start: 2019-11-08 | End: 2019-11-08

## 2019-11-08 RX ORDER — CEFEPIME 1 G/1
INJECTION, POWDER, FOR SOLUTION INTRAMUSCULAR; INTRAVENOUS
Refills: 0 | Status: DISCONTINUED | OUTPATIENT
Start: 2019-11-08 | End: 2019-11-11

## 2019-11-08 RX ORDER — LACTULOSE 10 G/15ML
10 SOLUTION ORAL ONCE
Refills: 0 | Status: COMPLETED | OUTPATIENT
Start: 2019-11-08 | End: 2019-11-08

## 2019-11-08 RX ORDER — CEFEPIME 1 G/1
2000 INJECTION, POWDER, FOR SOLUTION INTRAMUSCULAR; INTRAVENOUS ONCE
Refills: 0 | Status: COMPLETED | OUTPATIENT
Start: 2019-11-08 | End: 2019-11-08

## 2019-11-08 RX ADMIN — METHADONE HYDROCHLORIDE 10 MILLIGRAM(S): 40 TABLET ORAL at 21:14

## 2019-11-08 RX ADMIN — LEVALBUTEROL 0.63 MILLIGRAM(S): 1.25 SOLUTION, CONCENTRATE RESPIRATORY (INHALATION) at 10:26

## 2019-11-08 RX ADMIN — CEFEPIME 100 MILLIGRAM(S): 1 INJECTION, POWDER, FOR SOLUTION INTRAMUSCULAR; INTRAVENOUS at 16:50

## 2019-11-08 RX ADMIN — LACTULOSE 10 GRAM(S): 10 SOLUTION ORAL at 18:41

## 2019-11-08 RX ADMIN — LEVALBUTEROL 0.63 MILLIGRAM(S): 1.25 SOLUTION, CONCENTRATE RESPIRATORY (INHALATION) at 21:20

## 2019-11-08 RX ADMIN — METHADONE HYDROCHLORIDE 10 MILLIGRAM(S): 40 TABLET ORAL at 10:01

## 2019-11-08 RX ADMIN — GABAPENTIN 300 MILLIGRAM(S): 400 CAPSULE ORAL at 21:13

## 2019-11-08 RX ADMIN — Medication 40 MILLIGRAM(S): at 05:51

## 2019-11-08 RX ADMIN — Medication 500 MICROGRAM(S): at 16:19

## 2019-11-08 RX ADMIN — LEVALBUTEROL 0.63 MILLIGRAM(S): 1.25 SOLUTION, CONCENTRATE RESPIRATORY (INHALATION) at 16:20

## 2019-11-08 RX ADMIN — ONDANSETRON 4 MILLIGRAM(S): 8 TABLET, FILM COATED ORAL at 03:36

## 2019-11-08 RX ADMIN — MONTELUKAST 10 MILLIGRAM(S): 4 TABLET, CHEWABLE ORAL at 13:46

## 2019-11-08 RX ADMIN — Medication 500 MICROGRAM(S): at 21:27

## 2019-11-08 RX ADMIN — LEVALBUTEROL 0.63 MILLIGRAM(S): 1.25 SOLUTION, CONCENTRATE RESPIRATORY (INHALATION) at 12:53

## 2019-11-08 RX ADMIN — SENNA PLUS 2 TABLET(S): 8.6 TABLET ORAL at 21:13

## 2019-11-08 RX ADMIN — CLOPIDOGREL BISULFATE 75 MILLIGRAM(S): 75 TABLET, FILM COATED ORAL at 13:46

## 2019-11-08 RX ADMIN — BUDESONIDE AND FORMOTEROL FUMARATE DIHYDRATE 2 PUFF(S): 160; 4.5 AEROSOL RESPIRATORY (INHALATION) at 10:02

## 2019-11-08 RX ADMIN — GABAPENTIN 300 MILLIGRAM(S): 400 CAPSULE ORAL at 05:51

## 2019-11-08 RX ADMIN — HEPARIN SODIUM 1200 UNIT(S)/HR: 5000 INJECTION INTRAVENOUS; SUBCUTANEOUS at 03:27

## 2019-11-08 RX ADMIN — PANTOPRAZOLE SODIUM 40 MILLIGRAM(S): 20 TABLET, DELAYED RELEASE ORAL at 05:51

## 2019-11-08 RX ADMIN — AZITHROMYCIN 255 MILLIGRAM(S): 500 TABLET, FILM COATED ORAL at 02:07

## 2019-11-08 RX ADMIN — GABAPENTIN 300 MILLIGRAM(S): 400 CAPSULE ORAL at 13:46

## 2019-11-08 RX ADMIN — BUDESONIDE AND FORMOTEROL FUMARATE DIHYDRATE 2 PUFF(S): 160; 4.5 AEROSOL RESPIRATORY (INHALATION) at 21:13

## 2019-11-08 RX ADMIN — Medication 500 MICROGRAM(S): at 12:54

## 2019-11-08 RX ADMIN — Medication 81 MILLIGRAM(S): at 13:46

## 2019-11-08 RX ADMIN — Medication 500 MICROGRAM(S): at 10:27

## 2019-11-08 RX ADMIN — CEFEPIME 100 MILLIGRAM(S): 1 INJECTION, POWDER, FOR SOLUTION INTRAMUSCULAR; INTRAVENOUS at 21:14

## 2019-11-08 RX ADMIN — POLYETHYLENE GLYCOL 3350 17 GRAM(S): 17 POWDER, FOR SOLUTION ORAL at 05:51

## 2019-11-08 RX ADMIN — Medication 500 MICROGRAM(S): at 05:00

## 2019-11-08 RX ADMIN — LEVALBUTEROL 0.63 MILLIGRAM(S): 1.25 SOLUTION, CONCENTRATE RESPIRATORY (INHALATION) at 05:00

## 2019-11-08 RX ADMIN — ATORVASTATIN CALCIUM 80 MILLIGRAM(S): 80 TABLET, FILM COATED ORAL at 21:13

## 2019-11-08 NOTE — PROGRESS NOTE ADULT - SUBJECTIVE AND OBJECTIVE BOX
INTERVAL EVENTS  No acute events overnight      OBJECTIVE    Vital Signs Last 24 Hrs  T(C): 36.8 (08 Nov 2019 05:49), Max: 37.4 (07 Nov 2019 14:06)  T(F): 98.3 (08 Nov 2019 05:49), Max: 99.4 (07 Nov 2019 14:06)  HR: 92 (08 Nov 2019 05:49) (82 - 104)  BP: 100/54 (08 Nov 2019 05:49) (100/54 - 117/59)  BP(mean): --  RR: 16 (08 Nov 2019 05:49) (16 - 18)  SpO2: 96% (08 Nov 2019 05:49) (95% - 98%)    PHYSICAL EXAM:  General: no acute distress  HEENT: normocephalic  MEDICATIONS  (STANDING):  aspirin enteric coated 81 milliGRAM(s) Oral daily  atorvastatin 80 milliGRAM(s) Oral at bedtime  azithromycin  IVPB 500 milliGRAM(s) IV Intermittent every 24 hours  budesonide 160 MICROgram(s)/formoterol 4.5 MICROgram(s) Inhaler 2 Puff(s) Inhalation two times a day  cefTRIAXone   IVPB 1000 milliGRAM(s) IV Intermittent every 24 hours  clopidogrel Tablet 75 milliGRAM(s) Oral daily  gabapentin 300 milliGRAM(s) Oral three times a day  heparin  Infusion.  Unit(s)/Hr (6.5 mL/Hr) IV Continuous <Continuous>  ipratropium    for Nebulization 500 MICROGram(s) Nebulizer every 4 hours  ipratropium  for Nebulization. 500 MICROGram(s) Nebulizer once  levalbuterol Inhalation 0.63 milliGRAM(s) Inhalation every 4 hours  methadone    Tablet 10 milliGRAM(s) Oral two times a day  montelukast 10 milliGRAM(s) Oral daily  pantoprazole    Tablet 40 milliGRAM(s) Oral before breakfast  polyethylene glycol 3350 17 Gram(s) Oral daily  predniSONE   Tablet 40 milliGRAM(s) Oral daily  senna 2 Tablet(s) Oral at bedtime    MEDICATIONS  (PRN):  heparin  Injectable 3200 Unit(s) IV Push every 6 hours PRN For aPTT less than 40  nitroglycerin     SubLingual 0.4 milliGRAM(s) SubLingual every 5 minutes PRN Chest Pain  Eyes: extraocular movements intact, pupils equal and reactive to light  Neck: supple  Respiratory: non labored breathing, diffuse wheezing, decreased breath sound in right base  Cardiovascular: normal rate, regular rhythm, no murmur  Gastrointestinal: abdomen soft, non tender, non distended  Extremities: no lower extremity edema  Neurological: alert, oriented, no focal deficits  Psychiatric: cooperative      LABORATORY                                      11.1   10.54 )-----------( 320      ( 08 Nov 2019 02:00 )             35.2     11-08    137  |  98  |  11  ----------------------------<  186<H>  3.8   |  25  |  0.45<L>    Ca    8.8      08 Nov 2019 02:30  Phos  2.9     11-08  Mg     2.1     11-08        RADIOLOGY    CT Angio Chest w/ IV Cont (11.06.19 @ 03:01) >  Mildly motion degraded. No gross pulmonary embolism.  Mucoid impacted airways in the left greater than right lower lobes. Patchy left lower lobe airspace opacities concerning for pneumonia.   Clinical correlation is recommended.  Small to moderate loculated right pleural effusion and trace left pleural effusion with mild pulmonary edema.      < end of copied text > INTERVAL EVENTS  No acute events overnight  Reports improvement of symptoms but still coughing      OBJECTIVE    Vital Signs Last 24 Hrs  T(C): 36.8 (08 Nov 2019 05:49), Max: 37.4 (07 Nov 2019 14:06)  T(F): 98.3 (08 Nov 2019 05:49), Max: 99.4 (07 Nov 2019 14:06)  HR: 92 (08 Nov 2019 05:49) (82 - 104)  BP: 100/54 (08 Nov 2019 05:49) (100/54 - 117/59)  BP(mean): --  RR: 16 (08 Nov 2019 05:49) (16 - 18)  SpO2: 96% (08 Nov 2019 05:49) (95% - 98%)    PHYSICAL EXAM:  General: no acute distress  HEENT: normocephalic  MEDICATIONS  (STANDING):  aspirin enteric coated 81 milliGRAM(s) Oral daily  atorvastatin 80 milliGRAM(s) Oral at bedtime  azithromycin  IVPB 500 milliGRAM(s) IV Intermittent every 24 hours  budesonide 160 MICROgram(s)/formoterol 4.5 MICROgram(s) Inhaler 2 Puff(s) Inhalation two times a day  cefTRIAXone   IVPB 1000 milliGRAM(s) IV Intermittent every 24 hours  clopidogrel Tablet 75 milliGRAM(s) Oral daily  gabapentin 300 milliGRAM(s) Oral three times a day  heparin  Infusion.  Unit(s)/Hr (6.5 mL/Hr) IV Continuous <Continuous>  ipratropium    for Nebulization 500 MICROGram(s) Nebulizer every 4 hours  ipratropium  for Nebulization. 500 MICROGram(s) Nebulizer once  levalbuterol Inhalation 0.63 milliGRAM(s) Inhalation every 4 hours  methadone    Tablet 10 milliGRAM(s) Oral two times a day  montelukast 10 milliGRAM(s) Oral daily  pantoprazole    Tablet 40 milliGRAM(s) Oral before breakfast  polyethylene glycol 3350 17 Gram(s) Oral daily  predniSONE   Tablet 40 milliGRAM(s) Oral daily  senna 2 Tablet(s) Oral at bedtime    MEDICATIONS  (PRN):  heparin  Injectable 3200 Unit(s) IV Push every 6 hours PRN For aPTT less than 40  nitroglycerin     SubLingual 0.4 milliGRAM(s) SubLingual every 5 minutes PRN Chest Pain  Eyes: extraocular movements intact, pupils equal and reactive to light  Neck: supple  Respiratory: non labored breathing, diffuse wheezing, decreased breath sound in right base  Cardiovascular: normal rate, regular rhythm, no murmur  Gastrointestinal: abdomen soft, non tender, non distended  Extremities: no lower extremity edema  Neurological: alert, oriented, no focal deficits  Psychiatric: cooperative      LABORATORY                                      11.1   10.54 )-----------( 320      ( 08 Nov 2019 02:00 )             35.2     11-08    137  |  98  |  11  ----------------------------<  186<H>  3.8   |  25  |  0.45<L>    Ca    8.8      08 Nov 2019 02:30  Phos  2.9     11-08  Mg     2.1     11-08        RADIOLOGY    CT Angio Chest w/ IV Cont (11.06.19 @ 03:01) >  Mildly motion degraded. No gross pulmonary embolism.  Mucoid impacted airways in the left greater than right lower lobes. Patchy left lower lobe airspace opacities concerning for pneumonia.   Clinical correlation is recommended.  Small to moderate loculated right pleural effusion and trace left pleural effusion with mild pulmonary edema.      < end of copied text >

## 2019-11-08 NOTE — PROGRESS NOTE ADULT - PROBLEM SELECTOR PLAN 8
1.  Name of PCP: Dr. Kristen Hermosillo [phone]  2.  PCP Contacted on Admission: [ ] Y    [ ] N    3.  PCP contacted at Discharge: [ ] Y    [ ] N    [ ] N/A  4.  Post-Discharge Appointment Date and Location:  5.  Summary of Handoff given to PCP: Attempted to contact PCP today, not reached will attempt again monday.     Pulmonologist Dr. Byrne [facility].  Dispo pending Cardiac catheterization, treatment acute sepsis PNA.

## 2019-11-08 NOTE — CONSULT NOTE ADULT - SUBJECTIVE AND OBJECTIVE BOX
INFECTIOUS DISEASE SERVICE INITIAL CONSULTATION NOTE    HPI:  This is a 74F with history of Asthma (with a chronic cough productive of green sputum) c/b ?CINDY (pt states she has a bacteria that causes tuberculosis but DOES NOT have tuberculosis), OA with chronic pain (on methadone and gabapentin), and HTN (not on meds) who presents to the hospital for chest pain and SOB. Said that she has a chronic cough which does not interfere with her asthma but she went on a cruise recently and since returning from the cruise has noted that her cough and asthma both worsened. Went to her PCP was evaluation and was prescribe amoxicillin (said they were trying to hold off on prednisone as she had been on chronic prednisone for ~20 years previously) but she had not noticed any improvement in her asthma or cough. Said that last night she was climbing stairs in her home when she started to feel severe SOB and chest pain (burning in quality, radiation to b/l arms) with nausea/vomiting (NBNB). Took several doses of her albuterol inhaler without any significant improvement and therefore was brought to the hospital by her family for evaluation.     Vitals in the ED were T 97.8 - 98.4, P 90s - 110s, -155/, RR 16 - 24, O2 sat 88% (on RA) - 98% (on 5L NC). Her lab work was significant for leukocytosis with neutrophilia and elevated hsTrops with positive trend up. Her imaging was significant for LLL patchy opacity concerning for PNA, moderate loculated pleural effusion on R, and mild pulm edema. She was given CTX 1g/AZT 500mg IVPB x1, methadone 10mg/gabapentin 300mg PO x1, solumedrol 40mg IVP x1, Duonebs x3, NS 1L, zofran 4mg IVP x1, nitro state x4, nitro ointment x1 (with resolution of chest pain after this), and was loaded with ASA 325mg/Plavix 600mg PO x1 and started on a heparin gtt. She was evaluated by cardiology who recommended admission for NSTEMI management and potential cardiac catheterization this AM. She was then admitted to medicine on telemetry.     Currently patient said that her chest pain has resolved, she is still having some SOB and wheezing but that has improved since arrival to the ED. States that she is urinating a lot and feels thirsty. Denies any other complaints. Currently satting in the high 90s on 3L NC. (06 Nov 2019 07:52)      PAST MEDICAL & SURGICAL HISTORY:  HTN (hypertension)  Chronic back pain  Osteoarthritis  Asthma  S/P rotator cuff repair: R shoulder  H/O laminectomy: thoracolumbar  H/O fracture of femur: Left  S/P hip replacement, right  S/P knee replacement, left      REVIEW OF SYSTEMS:  Constitutional: no weakness, no fevers, no chills  Dermatologic: no rash  Respiratory: no SOB, no cough  Cardiovascular: no chest pain, no palpitations  Gastrointestinal: no nausea, no vomiting, no diarrhea  Genitourinary: no dysuria, no urinary frequency, no hematuria, no urinary retention  Musculoskeletal:	no weakness, no joint swelling/pain  Neurological: no focal weakness or numbness  Endocrine: no polyuria, no polydipsia    ACTIVE ANTIMICROBIAL/ANTIBIOTIC MEDICATIONS:  azithromycin  IVPB 500 milliGRAM(s) IV Intermittent every 24 hours  cefTRIAXone   IVPB 1000 milliGRAM(s) IV Intermittent every 24 hours      OTHER MEDICATIONS:  aspirin enteric coated 81 milliGRAM(s) Oral daily  atorvastatin 80 milliGRAM(s) Oral at bedtime  budesonide 160 MICROgram(s)/formoterol 4.5 MICROgram(s) Inhaler 2 Puff(s) Inhalation two times a day  clopidogrel Tablet 75 milliGRAM(s) Oral daily  gabapentin 300 milliGRAM(s) Oral three times a day  ipratropium    for Nebulization 500 MICROGram(s) Nebulizer every 4 hours  ipratropium  for Nebulization. 500 MICROGram(s) Nebulizer once  levalbuterol Inhalation 0.63 milliGRAM(s) Inhalation every 4 hours  methadone    Tablet 10 milliGRAM(s) Oral two times a day  montelukast 10 milliGRAM(s) Oral daily  nitroglycerin     SubLingual 0.4 milliGRAM(s) SubLingual every 5 minutes PRN  pantoprazole    Tablet 40 milliGRAM(s) Oral before breakfast  polyethylene glycol 3350 17 Gram(s) Oral daily  predniSONE   Tablet 40 milliGRAM(s) Oral daily  senna 2 Tablet(s) Oral at bedtime      ALLERGIES:  Allergies    No Known Allergies    Intolerances        SOCIAL HISTORY:      FAMILY HISTORY:  FAMILY HISTORY:  Family history of asthma: in Father and Mother      VITAL SIGNS:  ICU Vital Signs Last 24 Hrs  T(C): 36.8 (08 Nov 2019 05:49), Max: 37.4 (07 Nov 2019 14:06)  T(F): 98.3 (08 Nov 2019 05:49), Max: 99.4 (07 Nov 2019 14:06)  HR: 92 (08 Nov 2019 10:27) (82 - 104)  BP: 100/54 (08 Nov 2019 05:49) (100/54 - 117/59)  BP(mean): --  ABP: --  ABP(mean): --  RR: 16 (08 Nov 2019 05:49) (16 - 18)  SpO2: 96% (08 Nov 2019 05:49) (95% - 98%)      PHYSICAL EXAM:  Constitutional: WDWN  Head: NC/AT  Eyes: PERRLA, EOMI; anicteric slcera  ENMT: no rhinorrhea; no sinus tenderness on palpation; no oropharyngeal lesions, erythema, or exudates	  Neck: supple; no JVD or LAD  Respiratory: CTA B/L  Cardiovascular: +S1/S2, RRR; no appreciable murmurs  Gastrointestinal: soft, NT/ND; +BSx4, no HSM  Extremities: WWP; no clubbing, cyanosis, or edema  Vascular: 2+ radial, DP/PT pulses B/L  Dermatologic: skin warm and dry; no visible rashes or lesions  Neurologic: AAOx3; no focal deficits    LABS:                        11.1   10.54 )-----------( 320      ( 08 Nov 2019 02:00 )             35.2     11-08    137  |  98  |  11  ----------------------------<  186<H>  3.8   |  25  |  0.45<L>    Ca    8.8      08 Nov 2019 02:30  Phos  2.9     11-08  Mg     2.1     11-08      PTT - ( 08 Nov 2019 02:00 )  PTT:57.1 SEC      MICROBIOLOGY:    Culture - Respiratory with Gram Stain (collected 11-07-19 @ 10:02)  Source: SPUTUM  Preliminary Report (11-08-19 @ 08:18):    Normal Respiratory Joan Also Present    PSA^Pseudomonas aeruginosa    QUANTITY OF GROWTH: MODERATE    Culture - Blood (collected 11-06-19 @ 09:43)  Source: BLOOD VENOUS  Preliminary Report (11-08-19 @ 09:43):    NO ORGANISMS ISOLATED    NO ORGANISMS ISOLATED AT 48 HRS.    Culture - Blood (collected 11-06-19 @ 09:43)  Source: BLOOD PERIPHERAL  Preliminary Report (11-07-19 @ 16:36):    ***Blood Panel PCR results on this specimen are available    approximately 3 hours after the Gram stain result***    Gram stain, PCR, and/or culture results may not always    correspond due to difference in methodologies    ------------------------------------------------------------    This PCR assay was performed using [employer].  The    following targets are tested for:  Enterococcus, vancomycin    resistant enterococci, Listeria monocytogenes,  coagulase    negative staphylococci, S. aureus, methicillin resistant S.    aureus, Streptococcus agalactiae (Group B), S. pneumoniae,    S. pyogenes (Group A), Acinetobacter baumannii, Enterobacter    cloacae, E. coli, Klebsiella oxytoca, K. pneumoniae, Proteus    sp., Serratia marcescens, Haemophilus influenzae, Neisseria    meningitidis, Pseudomonas aeruginosa, Candida albicans, C.    glabrata, C. krusei, C. parapsilosis, C. tropicalis and the    KPC resistance gene.    **NOTE: Due to technical problems, Proteus sp. will NOT be    reported as part of the BCID paneluntil further notice.  Preliminary Report (11-07-19 @ 09:44):    NO ORGANISMS ISOLATED    NO ORGANISMS ISOLATED AT 24 HOURS  Organism: BLOOD CULTURE PCR  Staphylococcus sp.,coag neg (11-08-19 @ 08:07)  Organism: Staphylococcus sp.,coag neg (11-08-19 @ 08:07)  Organism: BLOOD CULTURE PCR (11-07-19 @ 16:36)      -  Coagulase negative Staphylococcus: + DETECT ANDREA      Method Type: PCR        RADIOLOGY & ADDITIONAL STUDIES: INFECTIOUS DISEASE SERVICE INITIAL CONSULTATION NOTE    HPI:  This is a 74F with history of Asthma (with a chronic cough productive of green sputum) c/b ?CINDY (pt states she has a bacteria that causes tuberculosis but DOES NOT have tuberculosis), OA with chronic pain (on methadone and gabapentin), and HTN (not on meds) who presents to the hospital for chest pain and SOB. Said that she has a chronic cough which does not interfere with her asthma but she went on a cruise recently and since returning from the cruise has noted that her cough and asthma both worsened. Went to her PCP was evaluation and was prescribe amoxicillin (said they were trying to hold off on prednisone as she had been on chronic prednisone for ~20 years previously) but she had not noticed any improvement in her asthma or cough. Said that last night she was climbing stairs in her home when she started to feel severe SOB and chest pain (burning in quality, radiation to b/l arms) with nausea/vomiting (NBNB). Took several doses of her albuterol inhaler without any significant improvement and therefore was brought to the hospital by her family for evaluation.     Vitals in the ED were T 97.8 - 98.4, P 90s - 110s, -155/, RR 16 - 24, O2 sat 88% (on RA) - 98% (on 5L NC). Her lab work was significant for leukocytosis with neutrophilia and elevated hsTrops with positive trend up. Her imaging was significant for LLL patchy opacity concerning for PNA, moderate loculated pleural effusion on R, and mild pulm edema. She was given CTX 1g/AZT 500mg IVPB x1, methadone 10mg/gabapentin 300mg PO x1, solumedrol 40mg IVP x1, Duonebs x3, NS 1L, zofran 4mg IVP x1, nitro state x4, nitro ointment x1 (with resolution of chest pain after this), and was loaded with ASA 325mg/Plavix 600mg PO x1 and started on a heparin gtt. She was evaluated by cardiology who recommended admission for NSTEMI management and potential cardiac catheterization this AM. She was then admitted to medicine on telemetry.     Currently patient said that her chest pain has resolved, she is still having some SOB and wheezing but that has improved since arrival to the ED. States that she is urinating a lot and feels thirsty. Denies any other complaints. Currently satting in the high 90s on 3L NC. (06 Nov 2019 07:52)    Patient was admitted for CAP, asthma exacerbation, and NSTEMI.     PAST MEDICAL & SURGICAL HISTORY:  HTN (hypertension)  Chronic back pain  Osteoarthritis  Asthma  S/P rotator cuff repair: R shoulder  H/O laminectomy: thoracolumbar  H/O fracture of femur: Left  S/P hip replacement, right  S/P knee replacement, left      REVIEW OF SYSTEMS:  Constitutional: no weakness, no fevers, no chills  Dermatologic: no rash  Respiratory: +SOB, +cough  Cardiovascular: +chest pain, no palpitations  Gastrointestinal: +nausea, no vomiting, no diarrhea  Genitourinary: no dysuria, no urinary frequency, no hematuria, no urinary retention  Musculoskeletal:	no weakness, no joint swelling/pain  Neurological: no focal weakness or numbness  Endocrine: no polyuria, no polydipsia    ACTIVE ANTIMICROBIAL/ANTIBIOTIC MEDICATIONS:  azithromycin  IVPB 500 milliGRAM(s) IV Intermittent every 24 hours  cefTRIAXone   IVPB 1000 milliGRAM(s) IV Intermittent every 24 hours      OTHER MEDICATIONS:  aspirin enteric coated 81 milliGRAM(s) Oral daily  atorvastatin 80 milliGRAM(s) Oral at bedtime  budesonide 160 MICROgram(s)/formoterol 4.5 MICROgram(s) Inhaler 2 Puff(s) Inhalation two times a day  clopidogrel Tablet 75 milliGRAM(s) Oral daily  gabapentin 300 milliGRAM(s) Oral three times a day  ipratropium    for Nebulization 500 MICROGram(s) Nebulizer every 4 hours  ipratropium  for Nebulization. 500 MICROGram(s) Nebulizer once  levalbuterol Inhalation 0.63 milliGRAM(s) Inhalation every 4 hours  methadone    Tablet 10 milliGRAM(s) Oral two times a day  montelukast 10 milliGRAM(s) Oral daily  nitroglycerin     SubLingual 0.4 milliGRAM(s) SubLingual every 5 minutes PRN  pantoprazole    Tablet 40 milliGRAM(s) Oral before breakfast  polyethylene glycol 3350 17 Gram(s) Oral daily  predniSONE   Tablet 40 milliGRAM(s) Oral daily  senna 2 Tablet(s) Oral at bedtime      ALLERGIES:  Allergies    No Known Allergies    Intolerances        SOCIAL HISTORY: Recent travel on [place], returned 10/23. Fomer smoker, social alcohol, no drugs. Lives in [place] with her , recently babysat Guinea Pigs, she changed water and fed them.     FAMILY HISTORY:  Family history of asthma: in Father and Mother      VITAL SIGNS:  ICU Vital Signs Last 24 Hrs  T(C): 36.8 (08 Nov 2019 05:49), Max: 37.4 (07 Nov 2019 14:06)  T(F): 98.3 (08 Nov 2019 05:49), Max: 99.4 (07 Nov 2019 14:06)  HR: 92 (08 Nov 2019 10:27) (82 - 104)  BP: 100/54 (08 Nov 2019 05:49) (100/54 - 117/59)  BP(mean): --  ABP: --  ABP(mean): --  RR: 16 (08 Nov 2019 05:49) (16 - 18)  SpO2: 96% (08 Nov 2019 05:49) (95% - 98%)      PHYSICAL EXAM:  Constitutional: WDWN elderly female sitting in bedside chair, speaking in full sentences   Head: NC/AT  Eyes: anicteric sclera  ENMT: no rhinorrhea; no sinus tenderness on palpation; no oropharyngeal lesions, erythema, or exudates	  Neck: supple; no JVD or LAD  Respiratory: Wheezing throughout, coughing  Cardiovascular: +S1/S2, RRR; no appreciable murmurs  Gastrointestinal: soft, NT/ND; +BSx4, no HSM  Extremities: WWP; no clubbing, cyanosis, or edema  Dermatologic: skin warm and dry; no visible rashes or lesions  Neurologic: AAOx3; no focal deficits    LABS:                        11.1   10.54 )-----------( 320      ( 08 Nov 2019 02:00 )             35.2     11-08    137  |  98  |  11  ----------------------------<  186<H>  3.8   |  25  |  0.45<L>    Ca    8.8      08 Nov 2019 02:30  Phos  2.9     11-08  Mg     2.1     11-08      PTT - ( 08 Nov 2019 02:00 )  PTT:57.1 SEC      MICROBIOLOGY:    Culture - Respiratory with Gram Stain (collected 11-07-19 @ 10:02)  Source: SPUTUM  Preliminary Report (11-08-19 @ 08:18):    Normal Respiratory Joan Also Present    PSA^Pseudomonas aeruginosa    QUANTITY OF GROWTH: MODERATE    Culture - Blood (collected 11-06-19 @ 09:43)  Source: BLOOD VENOUS  Preliminary Report (11-08-19 @ 09:43):    NO ORGANISMS ISOLATED    NO ORGANISMS ISOLATED AT 48 HRS.    Culture - Blood (collected 11-06-19 @ 09:43)  Source: BLOOD PERIPHERAL  Preliminary Report (11-07-19 @ 16:36):    ***Blood Panel PCR results on this specimen are available    approximately 3 hours after the Gram stain result***    Gram stain, PCR, and/or culture results may not always    correspond due to difference in methodologies    ------------------------------------------------------------    This PCR assay was performed using [employer].  The    following targets are tested for:  Enterococcus, vancomycin    resistant enterococci, Listeria monocytogenes,  coagulase    negative staphylococci, S. aureus, methicillin resistant S.    aureus, Streptococcus agalactiae (Group B), S. pneumoniae,    S. pyogenes (Group A), Acinetobacter baumannii, Enterobacter    cloacae, E. coli, Klebsiella oxytoca, K. pneumoniae, Proteus    sp., Serratia marcescens, Haemophilus influenzae, Neisseria    meningitidis, Pseudomonas aeruginosa, Candida albicans, C.    glabrata, C. krusei, C. parapsilosis, C. tropicalis and the    KPC resistance gene.    **NOTE: Due to technical problems, Proteus sp. will NOT be    reported as part of the BCID paneluntil further notice.  Preliminary Report (11-07-19 @ 09:44):    NO ORGANISMS ISOLATED    NO ORGANISMS ISOLATED AT 24 HOURS  Organism: BLOOD CULTURE PCR  Staphylococcus sp.,coag neg (11-08-19 @ 08:07)  Organism: Staphylococcus sp.,coag neg (11-08-19 @ 08:07)  Organism: BLOOD CULTURE PCR (11-07-19 @ 16:36)      -  Coagulase negative Staphylococcus: + DETECT ANDREA      Method Type: PCR        RADIOLOGY & ADDITIONAL STUDIES:    < from: CT Angio Chest w/ IV Cont (11.06.19 @ 03:01) >  EXAM:  CT ANGIO CHEST (W)AW IC        PROCEDURE DATE:  Nov 6 2019         INTERPRETATION:  HISTORY: Chest pain and difficulty breathing. Evaluate   for pulmonary embolism.    TECHNIQUE:  CT pulmonary angiography was performed of the chest according   to standard institutional protocol. Coronal, sagittal and transaxial 3-D   MIP reformatted images are provided from the transaxial source data.     90mL of Omnipaque 350 was administered without complication and 10 mL was   discarded.      COMPARISON: No similar prior study available for comparison.    FINDINGS:   There is good opacification of pulmonary arterial tree, however, there is   respiratory motion which limits evaluation of the peripheral vasculature.   No central, segmental or proximal subsegmental filling defect is present   to suggest acute pulmonary embolus.    The thyroid gland is unremarkable.     There is a small to moderate loculated right pleural effusion with   associated partial compressive atelectasis in the right middle and lower   lobes. Trace left pleural effusion. There are mucoid impacted airways in   the left greater than right lower lobes with patchy opacities in the left   lower lobe. There is mild interlobular septal thickening and scattered   groundglassattenuation suggesting pulmonary edema.    There is no significant axillary, mediastinal or hilar adenopathy.    The heart is mildly enlarged. There is no significant pericardial   effusion.    Images through the upper abdomen demonstrate no acute abnormality.    There are degenerative changes in the thoracic spine. There are no acute   osseous abnormalities.    IMPRESSION:  Mildly motion degraded. No gross pulmonary embolism.    Mucoid impacted airways in the left greater than right lower lobes.  Patchy left lower lobe airspace opacities concerning for pneumonia.   Clinical correlation is recommended.    Small to moderate loculated right pleural effusion and trace left pleural   effusion with mild pulmonary edema.                  ALYCIA CARLSON M.D., RADIOLOGIST  This document has been electronically signed. Nov 6 2019  3:34AM    < end of copied text >

## 2019-11-08 NOTE — CONSULT NOTE ADULT - ATTENDING COMMENTS
Kinjal Luis MD  Pager: [phone]  After 5 PM or weekends please call fellow on call or office [phone]

## 2019-11-08 NOTE — PROGRESS NOTE ADULT - PROBLEM SELECTOR PLAN 2
CT imaging shows loculated pleural effusion.   - Pulmonary following, no indication for thoracentesis at this time.   - Patient follows with Dr. Byrne office [facility] Pulmonologist.   - Reports, that she was planned for repeat CT Chest in December to evaluate for an underlying lung issues she has. She was unable to provide further information.

## 2019-11-08 NOTE — PROGRESS NOTE ADULT - SUBJECTIVE AND OBJECTIVE BOX
PROGRESS NOTE:     Patient is a 74y old  Female who presents with a chief complaint of Chest pain, SOB (06 Nov 2019 07:52)    SUBJECTIVE / OVERNIGHT EVENTS:  Patient seen and evaluated at bedside.   Remains alert, awake, communicative.   No overnight events occurred. No further episodes of chest pain, no SOB, appears comfortable.   Blood cultures came back Positive.     ADDITIONAL REVIEW OF SYSTEMS:    MEDICATIONS  (STANDING):  aspirin enteric coated 81 milliGRAM(s) Oral daily  atorvastatin 80 milliGRAM(s) Oral at bedtime  budesonide 160 MICROgram(s)/formoterol 4.5 MICROgram(s) Inhaler 2 Puff(s) Inhalation two times a day  cefepime   IVPB      cefepime   IVPB 2000 milliGRAM(s) IV Intermittent every 8 hours  clopidogrel Tablet 75 milliGRAM(s) Oral daily  gabapentin 300 milliGRAM(s) Oral three times a day  ipratropium    for Nebulization 500 MICROGram(s) Nebulizer every 4 hours  ipratropium  for Nebulization. 500 MICROGram(s) Nebulizer once  levalbuterol Inhalation 0.63 milliGRAM(s) Inhalation every 4 hours  methadone    Tablet 10 milliGRAM(s) Oral two times a day  montelukast 10 milliGRAM(s) Oral daily  pantoprazole    Tablet 40 milliGRAM(s) Oral before breakfast  polyethylene glycol 3350 17 Gram(s) Oral daily  predniSONE   Tablet 40 milliGRAM(s) Oral daily  senna 2 Tablet(s) Oral at bedtime    MEDICATIONS  (PRN):  nitroglycerin     SubLingual 0.4 milliGRAM(s) SubLingual every 5 minutes PRN Chest Pain    CAPILLARY BLOOD GLUCOSE  I&O's Summary    PHYSICAL EXAM:  Vital Signs Last 24 Hrs  T(C): 36.8 (08 Nov 2019 13:44), Max: 36.8 (07 Nov 2019 21:17)  T(F): 98.3 (08 Nov 2019 13:44), Max: 98.3 (08 Nov 2019 05:49)  HR: 90 (08 Nov 2019 16:20) (82 - 99)  BP: 105/57 (08 Nov 2019 13:44) (100/54 - 117/59)  BP(mean): --  RR: 18 (08 Nov 2019 13:44) (16 - 18)  SpO2: 99% (08 Nov 2019 13:44) (95% - 99%)    CONSTITUTIONAL: NAD, well-developed, elderly female lying comfortably in the bed.   RESPIRATORY: Normal respiratory effort; bilateral wheezing evident, with rhonchi, no labored breathing evident.   CARDIOVASCULAR: Regular rate and rhythm, normal S1 and S2, no murmur/rub/gallop; No lower extremity edema; Peripheral pulses are 2+ bilaterally  ABDOMEN: Nontender to palpation, normoactive bowel sounds, no rebound/guarding; No hepatosplenomegaly  MUSCLOSKELETAL: no clubbing or cyanosis of digits; no joint swelling or tenderness to palpation  PSYCH: A+O to person, place, and time; affect appropriate    LABS: reviewed              11.1   10.54 )-----------( 320      ( 08 Nov 2019 02:00 )             35.2     11-08    137  |  98  |  11  ----------------------------<  186<H>  3.8   |  25  |  0.45<L>    Ca    8.8      08 Nov 2019 02:30  Phos  2.9     11-08  Mg     2.1     11-08    PTT - ( 08 Nov 2019 02:00 )  PTT:57.1 SEC  CARDIAC MARKERS ( 07 Nov 2019 05:53 )  x     / x     / 74 u/L / x     / x        Culture - Respiratory with Gram Stain (collected 07 Nov 2019 10:02)  Source: SPUTUM  Preliminary Report (08 Nov 2019 08:18):    Normal Respiratory Joan Also Present    PSA^Pseudomonas aeruginosa    QUANTITY OF GROWTH: MODERATE    Culture - Blood (collected 06 Nov 2019 09:43)  Source: BLOOD VENOUS  Preliminary Report (08 Nov 2019 09:43):    NO ORGANISMS ISOLATED    NO ORGANISMS ISOLATED AT 48 HRS.    Culture - Blood (collected 06 Nov 2019 09:43)  Source: BLOOD PERIPHERAL  Preliminary Report (07 Nov 2019 16:36):    ***Blood Panel PCR results on this specimen are available    approximately 3 hours after the Gram stain result***    Gram stain, PCR, and/or culture results may not always    correspond due to difference in methodologies    ------------------------------------------------------------    This PCR assay was performed using [employer].  The    following targets are tested for:  Enterococcus, vancomycin    resistant enterococci, Listeria monocytogenes,  coagulase    negative staphylococci, S. aureus, methicillin resistant S.    aureus, Streptococcus agalactiae (Group B), S. pneumoniae,    S. pyogenes (Group A), Acinetobacter baumannii, Enterobacter    cloacae, E. coli, Klebsiella oxytoca, K. pneumoniae, Proteus    sp., Serratia marcescens, Haemophilus influenzae, Neisseria    meningitidis, Pseudomonas aeruginosa, Candida albicans, C.    glabrata, C. krusei, C. parapsilosis, C. tropicalis and the    KPC resistance gene.    **NOTE: Due to technical problems, Proteus sp. will NOT be    reported as part of the BCID paneluntil further notice.  Preliminary Report (07 Nov 2019 09:44):    NO ORGANISMS ISOLATED    NO ORGANISMS ISOLATED AT 24 HOURS  Organism: BLOOD CULTURE PCR  Staphylococcus warneri (08 Nov 2019 12:06)  Organism: Staphylococcus warneri (08 Nov 2019 12:06)  Organism: BLOOD CULTURE PCR (07 Nov 2019 16:36)    RADIOLOGY & ADDITIONAL TESTS:  Results Reviewed:   Imaging Personally Reviewed:  Electrocardiogram Personally Reviewed:    COORDINATION OF CARE:  Care Discussed with Consultants/Other Providers [Y/N]: Yes Cardio, Pulm, ID, ADS teams   Prior or Outpatient Records Reviewed [Y/N]: PROGRESS NOTE:     Patient is a 74y old  Female who presents with a chief complaint of Chest pain, SOB (06 Nov 2019 07:52)    SUBJECTIVE / OVERNIGHT EVENTS:  Patient seen and evaluated at bedside.   Remains alert, awake, communicative. Reports feeling much better today.   No overnight events occurred. No further episodes of chest pain, no SOB, appears comfortable.     ADDITIONAL REVIEW OF SYSTEMS:    MEDICATIONS  (STANDING):  aspirin enteric coated 81 milliGRAM(s) Oral daily  atorvastatin 80 milliGRAM(s) Oral at bedtime  budesonide 160 MICROgram(s)/formoterol 4.5 MICROgram(s) Inhaler 2 Puff(s) Inhalation two times a day  cefepime   IVPB      cefepime   IVPB 2000 milliGRAM(s) IV Intermittent every 8 hours  clopidogrel Tablet 75 milliGRAM(s) Oral daily  gabapentin 300 milliGRAM(s) Oral three times a day  ipratropium    for Nebulization 500 MICROGram(s) Nebulizer every 4 hours  ipratropium  for Nebulization. 500 MICROGram(s) Nebulizer once  levalbuterol Inhalation 0.63 milliGRAM(s) Inhalation every 4 hours  methadone    Tablet 10 milliGRAM(s) Oral two times a day  montelukast 10 milliGRAM(s) Oral daily  pantoprazole    Tablet 40 milliGRAM(s) Oral before breakfast  polyethylene glycol 3350 17 Gram(s) Oral daily  predniSONE   Tablet 40 milliGRAM(s) Oral daily  senna 2 Tablet(s) Oral at bedtime    MEDICATIONS  (PRN):  nitroglycerin     SubLingual 0.4 milliGRAM(s) SubLingual every 5 minutes PRN Chest Pain    CAPILLARY BLOOD GLUCOSE  I&O's Summary    PHYSICAL EXAM:  Vital Signs Last 24 Hrs  T(C): 36.8 (08 Nov 2019 13:44), Max: 36.8 (07 Nov 2019 21:17)  T(F): 98.3 (08 Nov 2019 13:44), Max: 98.3 (08 Nov 2019 05:49)  HR: 90 (08 Nov 2019 16:20) (82 - 99)  BP: 105/57 (08 Nov 2019 13:44) (100/54 - 117/59)  BP(mean): --  RR: 18 (08 Nov 2019 13:44) (16 - 18)  SpO2: 99% (08 Nov 2019 13:44) (95% - 99%)    CONSTITUTIONAL: NAD, well-developed, elderly female lying comfortably in the bed.   RESPIRATORY: Normal respiratory effort; bilateral wheezing evident, with rhonchi, no labored breathing evident.   CARDIOVASCULAR: Regular rate and rhythm, normal S1 and S2, no murmur/rub/gallop; No lower extremity edema; Peripheral pulses are 2+ bilaterally  ABDOMEN: Nontender to palpation, normoactive bowel sounds, no rebound/guarding; No hepatosplenomegaly  MUSCLOSKELETAL: no clubbing or cyanosis of digits; no joint swelling or tenderness to palpation  PSYCH: A+O to person, place, and time; affect appropriate    LABS: reviewed              11.1   10.54 )-----------( 320      ( 08 Nov 2019 02:00 )             35.2     11-08    137  |  98  |  11  ----------------------------<  186<H>  3.8   |  25  |  0.45<L>    Ca    8.8      08 Nov 2019 02:30  Phos  2.9     11-08  Mg     2.1     11-08    PTT - ( 08 Nov 2019 02:00 )  PTT:57.1 SEC  CARDIAC MARKERS ( 07 Nov 2019 05:53 )  x     / x     / 74 u/L / x     / x        Culture - Respiratory with Gram Stain (collected 07 Nov 2019 10:02)  Source: SPUTUM  Preliminary Report (08 Nov 2019 08:18):    Normal Respiratory Joan Also Present    PSA^Pseudomonas aeruginosa    QUANTITY OF GROWTH: MODERATE    Culture - Blood (collected 06 Nov 2019 09:43)  Source: BLOOD VENOUS  Preliminary Report (08 Nov 2019 09:43):    NO ORGANISMS ISOLATED    NO ORGANISMS ISOLATED AT 48 HRS.    Culture - Blood (collected 06 Nov 2019 09:43)  Source: BLOOD PERIPHERAL  Preliminary Report (07 Nov 2019 16:36):    ***Blood Panel PCR results on this specimen are available    approximately 3 hours after the Gram stain result***    Gram stain, PCR, and/or culture results may not always    correspond due to difference in methodologies    ------------------------------------------------------------    This PCR assay was performed using [employer].  The    following targets are tested for:  Enterococcus, vancomycin    resistant enterococci, Listeria monocytogenes,  coagulase    negative staphylococci, S. aureus, methicillin resistant S.    aureus, Streptococcus agalactiae (Group B), S. pneumoniae,    S. pyogenes (Group A), Acinetobacter baumannii, Enterobacter    cloacae, E. coli, Klebsiella oxytoca, K. pneumoniae, Proteus    sp., Serratia marcescens, Haemophilus influenzae, Neisseria    meningitidis, Pseudomonas aeruginosa, Candida albicans, C.    glabrata, C. krusei, C. parapsilosis, C. tropicalis and the    KPC resistance gene.    **NOTE: Due to technical problems, Proteus sp. will NOT be    reported as part of the BCID paneluntil further notice.  Preliminary Report (07 Nov 2019 09:44):    NO ORGANISMS ISOLATED    NO ORGANISMS ISOLATED AT 24 HOURS  Organism: BLOOD CULTURE PCR  Staphylococcus warneri (08 Nov 2019 12:06)  Organism: Staphylococcus warneri (08 Nov 2019 12:06)  Organism: BLOOD CULTURE PCR (07 Nov 2019 16:36)    RADIOLOGY & ADDITIONAL TESTS:  Results Reviewed:   Imaging Personally Reviewed:  Electrocardiogram Personally Reviewed:    COORDINATION OF CARE:  Care Discussed with Consultants/Other Providers [Y/N]: Yes Cardio, Pulm, ID, ADS teams   Prior or Outpatient Records Reviewed [Y/N]:

## 2019-11-08 NOTE — CONSULT NOTE ADULT - ASSESSMENT
74F PMHx of asthma, ?CINDY, and chronic productive cough who is currently admitted for CAP and asthma exacerbation. Her illness began when she was on a [place] last month, and did not improve with amoxicillin prescribed by her PCP. She has been afebrile, and her white blood cell count is downtrending.   ID consulted for GPC in clusters on blood cultures.   Sputum culture now shows pseudomonas, susceptibilities pending.     1. Pseudomonas on sputum culture  - Stop ceftriaxone 74F PMHx of asthma, ?CINDY, and chronic productive cough who is currently admitted for CAP and asthma exacerbation. Her illness began when she was on a [place] last month, and did not improve with amoxicillin prescribed by her PCP. She has been afebrile, and her white blood cell count is downtrending.   ID consulted for GPC in clusters on blood cultures.   Sputum culture now shows pseudomonas, susceptibilities pending.     1. Pseudomonas on sputum culture  - Stop ceftriaxone  - Start cefepime 2g q 8 hours   - Follow up susceptibilities  - Monitor for fevers  - Trend WBCs    2. Coagulase negative staph in 1/4 blood culture bottle, likely a contaminant  - Repeat blood cultures already sent 74F PMHx of asthma, ?CINDY, and chronic productive cough who is currently admitted for CAP and asthma exacerbation. Her illness began when she was on a [place] cruise last month, and did not improve with amoxicillin prescribed by her PCP. She has been afebrile, and her white blood cell count is downtrending.   ID consulted for GPC in clusters on blood cultures.   Sputum culture now shows pseudomonas, susceptibilities pending.     1. Pseudomonas on sputum culture  - Stop ceftriaxone  - Start cefepime 2g q 8 hours   - Follow up susceptibilities  - Monitor for fevers  - Trend WBCs    2. Coagulase negative staph in 1/4 blood culture bottle, likely a contaminant  - Repeat blood cultures already sent    Nina Mckenzie, PGY-4  Infectious Disease Fellow   Pager: [phone]  After 5pm/weekends: [phone]

## 2019-11-08 NOTE — PROGRESS NOTE ADULT - PROBLEM SELECTOR PLAN 6
- Home Methadone ([facility] Reference #: [id number], last prescribed on 9/20 but Dr. Pastora Cantu) and Gabapentin 300mg TID.

## 2019-11-08 NOTE — PROGRESS NOTE ADULT - ASSESSMENT
75 yo woman with history of asthma and chronic back pain presenting to the emergency department complaining of shortness of breath, productive cough, wheezing for the past week after returning from a cruise as well as retrosternal burning sensation associated with nausea yesterday evening. Found to have an NSTEMI and started on aspirin, plavix and heparin drip with plans for possible cath. CT chest angiogram revealed patchy opacities mainly in the LLB concerning for pneumonia and a small to moderated loculated right pleural effusion. Pulmonary service consulted to evaluate prior to planned cath.     1. Community acquired pneumonia   - CT chest with patchy opacities mainly in LLL  - RVP negative  - Legionella urine antigen negative  - Blood cultures: coagulase negative Staph - likely contaminant  - Follow sputum culture  - May discontinue azithromycin given Legionella is negative  - Continue ceftriaxone    2. Asthma exacerbation  - Prednisone 40 mg daily for 5 days  - Continue levalbuterol and ipratropium nebulizations standing  - Continue Symbicort    3. Right sided pleural effusion  - Bedside ultrasound showed small to moderate amount of right pleural effusion which appeared simple with no signs of loculations  - Will observe for now as she is on heparin drip for NSTEMI - will complete 48 h today so will reassess later today    4. NTSEMI - patient with troponin elevation and plan for possible cath.     --------------------------------------------------------  Donnie Reece, PGY-4  Pulmonary/Critical Care Fellow  Pager: [phone] (LIZBETH), [phone] (NS)  Pulmonary spectra: [id number] 75 yo woman with history of asthma and chronic back pain presenting to the emergency department complaining of shortness of breath, productive cough, wheezing for the past week after returning from a cruise as well as retrosternal burning sensation associated with nausea yesterday evening. Found to have an NSTEMI and started on aspirin, plavix and heparin drip with plans for possible cath. CT chest angiogram revealed patchy opacities mainly in the LLB concerning for pneumonia and a small to moderated loculated right pleural effusion. Pulmonary service consulted to evaluate prior to planned cath.     1. Community acquired pneumonia   - CT chest with patchy opacities mainly in LLL  - RVP negative  - Legionella urine antigen negative  - Blood cultures: coagulase negative Staph - likely contaminant  - Sputum culture positive for Pseudomonas aeruginosa  - May discontinue azithromycin given Legionella is negative  - Agree with switching antibiotics to cefepime    2. Asthma exacerbation  - Prednisone 40 mg daily for 5 days  - Continue levalbuterol and ipratropium nebulizations standing  - Continue Symbicort    3. Right sided pleural effusion likely parapneumonic  - Bedside ultrasound showed small to moderate amount of right pleural effusion which appeared simple with no signs of loculations  - No indication for thoracentesis at the time as she is on plavix  - Continue antibiotics    4. NTSEMI - patient with troponin elevation and plan for possible cath.     --------------------------------------------------------  Donnie Reece, PGY-4  Pulmonary/Critical Care Fellow  Pager: [phone] (LIZBETH), [phone] (NS)  Pulmonary spectra: [id number] 75 yo woman with history of asthma and chronic back pain presenting to the emergency department complaining of shortness of breath, productive cough, wheezing for the past week after returning from a cruise as well as retrosternal burning sensation associated with nausea yesterday evening. Found to have an NSTEMI and started on aspirin, plavix and heparin drip with plans for possible cath. CT chest angiogram revealed patchy opacities mainly in the LLB concerning for pneumonia and a small to moderated loculated right pleural effusion. Pulmonary service consulted to evaluate prior to planned cath.     1. Community acquired pneumonia   - CT chest with patchy opacities mainly in LLL  - RVP negative  - Legionella urine antigen negative  - Blood cultures: coagulase negative Staph - likely contaminant  - Sputum culture positive for Pseudomonas aeruginosa  - May discontinue azithromycin given Legionella is negative  - Agree with switching antibiotics to cefepime  - Acapella to facilitate mobilization of secretions  - Repeat CT chest in 6-8 weeks - patient will follow with outside pulmonologist, please assist her with copy of CT done here    2. Asthma exacerbation  - Prednisone 40 mg daily for 5 days  - Continue levalbuterol and ipratropium nebulizations standing  - Continue Symbicort    3. Right sided pleural effusion likely parapneumonic  - Bedside ultrasound showed small to moderate amount of right pleural effusion which appeared simple with no signs of loculations  - No indication for thoracentesis at the time as she is on plavix  - Continue antibiotics    4. NTSEMI - patient with troponin elevation and plan for possible cath.     --------------------------------------------------------  Donnie Reece, PGY-4  Pulmonary/Critical Care Fellow  Pager: [phone] (LIZBETH), [phone] (NS)  Pulmonary spectra: [id number]

## 2019-11-08 NOTE — PROGRESS NOTE ADULT - PROBLEM SELECTOR PLAN 1
- Leukocytosis, tachycardia, tachypnea, hypoxia, meets SIRS sepsis criteria.   - CT findings of patchy infiltrates all concerning for sepsis 2/2 PNA.   - Currently- reports clinical improvement, hemodynamically stable.   - s/p IV Ceftriaxone, Azithromycin in ED, will c/w for now for 5 days.   - UA negative. Viral Panel negative.  Lactate 1.5. Legionella Ag negative.   - Sputum cultures + Pseudomonas.  - Blood cultures initially + gram cocci staph wernari- likely contaminant.  - Follow up Repeat blood cultures.   - ID consulted, recommendations to Start IV Cefepime today 11/8.   - Monitor respiratory and clinical status closely.   - Oxygen supplementation via NC as needed.

## 2019-11-08 NOTE — PROGRESS NOTE ADULT - SUBJECTIVE AND OBJECTIVE BOX
Carolyn Morales MD  Cardiology Fellow  All Cardiology service information can be found 24/7 on [email or web address], password: [email or web address]    Patient seen and examined at bedside.    Subjective:        Current Meds:  aspirin enteric coated 81 milliGRAM(s) Oral daily  atorvastatin 80 milliGRAM(s) Oral at bedtime  azithromycin  IVPB 500 milliGRAM(s) IV Intermittent every 24 hours  budesonide 160 MICROgram(s)/formoterol 4.5 MICROgram(s) Inhaler 2 Puff(s) Inhalation two times a day  cefTRIAXone   IVPB 1000 milliGRAM(s) IV Intermittent every 24 hours  clopidogrel Tablet 75 milliGRAM(s) Oral daily  gabapentin 300 milliGRAM(s) Oral three times a day  ipratropium    for Nebulization 500 MICROGram(s) Nebulizer every 4 hours  ipratropium  for Nebulization. 500 MICROGram(s) Nebulizer once  levalbuterol Inhalation 0.63 milliGRAM(s) Inhalation every 4 hours  methadone    Tablet 10 milliGRAM(s) Oral two times a day  montelukast 10 milliGRAM(s) Oral daily  nitroglycerin     SubLingual 0.4 milliGRAM(s) SubLingual every 5 minutes PRN  pantoprazole    Tablet 40 milliGRAM(s) Oral before breakfast  polyethylene glycol 3350 17 Gram(s) Oral daily  predniSONE   Tablet 40 milliGRAM(s) Oral daily  senna 2 Tablet(s) Oral at bedtime      Vitals:  T(F): 98.3 (11-08), Max: 99.4 (11-07)  HR: 92 (11-08) (82 - 104)  BP: 100/54 (11-08) (100/54 - 117/59)  RR: 16 (11-08)  SpO2: 96% (11-08)  I&O's Summary      Physical Exam:  Appearance: No acute distress; well appearing  Eyes: PERRL, EOMI, pink conjunctiva  HEENT: Normal oral mucosa  Cardiovascular: RRR, S1, S2, no murmurs, rubs, or gallops; no edema; no JVD  Respiratory: Clear to auscultation bilaterally  Gastrointestinal: soft, non-tender, non-distended with normal bowel sounds  Musculoskeletal: No clubbing; no joint deformity   Neurologic: Non-focal  Lymphatic: No lymphadenopathy  Psychiatry: AAOx3, mood & affect appropriate  Skin: No rashes, ecchymoses, or cyanosis                          11.1   10.54 )-----------( 320      ( 08 Nov 2019 02:00 )             35.2     11-08    137  |  98  |  11  ----------------------------<  186<H>  3.8   |  25  |  0.45<L>    Ca    8.8      08 Nov 2019 02:30  Phos  2.9     11-08  Mg     2.1     11-08      PTT - ( 08 Nov 2019 02:00 )  PTT:57.1 SEC  CARDIAC MARKERS ( 07 Nov 2019 05:53 )  x     / x     / x     / 74 u/L / x     / x      CARDIAC MARKERS ( 06 Nov 2019 08:40 )  x     / x     / x     / 125 u/L / 16.93 ng/mL / x      CARDIAC MARKERS ( 05 Nov 2019 22:50 )  x     / x     / x     / 97 u/L / 9.61 ng/mL / x          Serum Pro-Brain Natriuretic Peptide: 2545 pg/mL (11-06 @ 08:40)          New ECG(s): Personally reviewed    Interpretation of Telemetry: Carolyn Morales MD  Cardiology Fellow  All Cardiology service information can be found 24/7 on [email or web address], password: [email or web address]    Patient seen and examined at bedside.    Subjective:      -Feeling better in comparison to before but still slightly having SOB.  -Still with dry cough.    Current Meds:  aspirin enteric coated 81 milliGRAM(s) Oral daily  atorvastatin 80 milliGRAM(s) Oral at bedtime  azithromycin  IVPB 500 milliGRAM(s) IV Intermittent every 24 hours  budesonide 160 MICROgram(s)/formoterol 4.5 MICROgram(s) Inhaler 2 Puff(s) Inhalation two times a day  cefTRIAXone   IVPB 1000 milliGRAM(s) IV Intermittent every 24 hours  clopidogrel Tablet 75 milliGRAM(s) Oral daily  gabapentin 300 milliGRAM(s) Oral three times a day  ipratropium    for Nebulization 500 MICROGram(s) Nebulizer every 4 hours  ipratropium  for Nebulization. 500 MICROGram(s) Nebulizer once  levalbuterol Inhalation 0.63 milliGRAM(s) Inhalation every 4 hours  methadone    Tablet 10 milliGRAM(s) Oral two times a day  montelukast 10 milliGRAM(s) Oral daily  nitroglycerin     SubLingual 0.4 milliGRAM(s) SubLingual every 5 minutes PRN  pantoprazole    Tablet 40 milliGRAM(s) Oral before breakfast  polyethylene glycol 3350 17 Gram(s) Oral daily  predniSONE   Tablet 40 milliGRAM(s) Oral daily  senna 2 Tablet(s) Oral at bedtime      Vitals:  T(F): 98.3 (11-08), Max: 99.4 (11-07)  HR: 92 (11-08) (82 - 104)  BP: 100/54 (11-08) (100/54 - 117/59)  RR: 16 (11-08)  SpO2: 96% (11-08)  I&O's Summary      Physical Exam:  Appearance: No acute distress; ill appearing  HEENT: Normal oral mucosa  Cardiovascular: RRR, S1, S2, no murmurs, rubs, or gallops; no edema; no JVD  Respiratory: Sharp expiratory wheezes in all lung fields b/l  Gastrointestinal: soft, non-tender, non-distended with normal bowel sounds  Musculoskeletal: No clubbing; no joint deformity   Neurologic: Non-focal  Lymphatic: No lymphadenopathy  Psychiatry: AAOx3, mood & affect appropriate  Skin: No rashes, ecchymoses, or cyanosis                          11.1   10.54 )-----------( 320      ( 08 Nov 2019 02:00 )             35.2     11-08    137  |  98  |  11  ----------------------------<  186<H>  3.8   |  25  |  0.45<L>    Ca    8.8      08 Nov 2019 02:30  Phos  2.9     11-08  Mg     2.1     11-08      PTT - ( 08 Nov 2019 02:00 )  PTT:57.1 SEC  CARDIAC MARKERS ( 07 Nov 2019 05:53 )  x     / x     / x     / 74 u/L / x     / x      CARDIAC MARKERS ( 06 Nov 2019 08:40 )  x     / x     / x     / 125 u/L / 16.93 ng/mL / x      CARDIAC MARKERS ( 05 Nov 2019 22:50 )  x     / x     / x     / 97 u/L / 9.61 ng/mL / x          Serum Pro-Brain Natriuretic Peptide: 2545 pg/mL (11-06 @ 08:40)          New ECG(s): Personally reviewed    Interpretation of Telemetry:

## 2019-11-08 NOTE — PROGRESS NOTE ADULT - PROBLEM SELECTOR PLAN 3
- Patient with worsening cough/SOB, with sputum production (sputum chronic).    - Currently, improved, no SOB, but bilateral wheezing + expiratory, with rhonchi +   - Likely with Asthma exacerbation, s/p IV Solumedrol in ED and Duonebs  - Will c/w Duonebs q4 ATC will place on Prednisone 40mg daily for now x5 days.  - Ipratropium Lealbuterol q4h for next 24 hours.    - c/w NS 3L for now, downtitrate as patient tolerates to room air.   - Pulmonary consulted, recommendations reviewed.

## 2019-11-08 NOTE — PROGRESS NOTE ADULT - ASSESSMENT
73yo woman with PMHx chronic asthma and hypertension who presented with progressive dyspnea and an episode of severe chest pain. Noted to have hsT of 600,  pro-BNP 2500, ECG did not demonstrated active current of injury or evidence of ongoing infarction. This was reviewed with the interventional team previously and emergency angiography was deferred. The patient's CXR and chest CT demonstrate areas concerning for pneumonia. There is a right-sided pleural effusion and evidence of pulmonary congestion as well.    NSTEMI, type II  -ANN MARIE-Risk Score of at least 2, and significantly elevated troponin  -TTE 11/7: EF 45-50% with mild segmental LV systolic dysfunction, apical inferior wall, mid anterior wall and mid inferoseptum are hypokinetic. Apical anterior wall is akinetic.  -Continue medical therapy for ACS: dual antiplatelet therapy and high-potency statin  -Case to be revisited with interventional cardiology today re: LHC  -If the patient has recurrent chest pain, please re-consult cardiology, as more urgent angiography would be indicated    Cardiology will continue to follow.    Case discussed with Dr. Andrzej Morales MD PGY-4  Cardiology Fellow  Note is preliminary until signed by the attending. 75yo woman with PMHx chronic asthma and hypertension who presented with progressive dyspnea and an episode of severe chest pain. Noted to have hsT of 600,  pro-BNP 2500, ECG did not demonstrated active current of injury or evidence of ongoing infarction. This was reviewed with the interventional team previously and emergency angiography was deferred. The patient's CXR and chest CT demonstrate areas concerning for pneumonia. There is a right-sided pleural effusion and evidence of pulmonary congestion as well.    NSTEMI, type II  -ANN MARIE-Risk Score of at least 2, and significantly elevated troponin  -TTE 11/7: EF 45-50% with mild segmental LV systolic dysfunction, apical inferior wall, mid anterior wall and mid inferoseptum are hypokinetic. Apical anterior wall is akinetic.  -Continue medical therapy for ACS: dual antiplatelet therapy and high-potency statin  -Case to be revisited with interventional cardiology on Monday re: [facility]  -If the patient has recurrent chest pain, please re-consult cardiology, as more urgent angiography would be indicated    Cardiology will continue to follow.    Case discussed with Dr. Andrzej Morales MD PGY-4  Cardiology Fellow  Note is preliminary until signed by the attending.

## 2019-11-08 NOTE — PROGRESS NOTE ADULT - PROBLEM SELECTOR PLAN 4
- Elevated hsTrops with positive uptrend in setting of c/o chest pain.   - Chest Pain- relieved with nitroglycerin, worse with exertion, Aypical CP.  - EKG with TWI in leads V1-V2  - Cardiology consulted started NSTEMI protocol w/ ASA/Plavix load, Heparin drip.  - Now off heparin drip per Cardiology.   -Follow up for Cardiology regarding plan for ?[facility] Cardiac cath, possible Monday?  - Restarted on DASH diet.   - Telemetry monitoring.  - Repeat CK, Troponin, EKG PRN Acute Chest pain, Cardio stat for urgent cath.   - Optimize Cardiac medications: ASA 81, Plavix 75, Lipitor 80.   - Currently not on an ACE, or Beta Blocker- may benefit from starting low dose BB.    - Nitro prn chest pain, or start the Nitroglycerin 1 inch patch.   - Follow up Echocardiogram.

## 2019-11-09 LAB
-  AMIKACIN: SIGNIFICANT CHANGE UP
-  AZTREONAM: SIGNIFICANT CHANGE UP
-  CEFAZOLIN: SIGNIFICANT CHANGE UP
-  CEFEPIME: SIGNIFICANT CHANGE UP
-  CEFTAZIDIME: SIGNIFICANT CHANGE UP
-  CIPROFLOXACIN: SIGNIFICANT CHANGE UP
-  CLINDAMYCIN: SIGNIFICANT CHANGE UP
-  COAGULASE NEGATIVE STAPHYLOCOCCUS: SIGNIFICANT CHANGE UP
-  ERYTHROMYCIN: SIGNIFICANT CHANGE UP
-  GENTAMICIN: SIGNIFICANT CHANGE UP
-  GENTAMICIN: SIGNIFICANT CHANGE UP
-  IMIPENEM: SIGNIFICANT CHANGE UP
-  LEVOFLOXACIN: SIGNIFICANT CHANGE UP
-  LEVOFLOXACIN: SIGNIFICANT CHANGE UP
-  MEROPENEM: SIGNIFICANT CHANGE UP
-  MOXIFLOXACIN(AEROBIC): SIGNIFICANT CHANGE UP
-  OXACILLIN: SIGNIFICANT CHANGE UP
-  PIPERACILLIN/TAZOBACTAM: SIGNIFICANT CHANGE UP
-  RIFAMPIN.: SIGNIFICANT CHANGE UP
-  TETRACYCLINE: SIGNIFICANT CHANGE UP
-  TOBRAMYCIN: SIGNIFICANT CHANGE UP
-  TRIMETHOPRIM/SULFAMETHOXAZOLE: SIGNIFICANT CHANGE UP
-  VANCOMYCIN: SIGNIFICANT CHANGE UP
ANION GAP SERPL CALC-SCNC: 10 MMO/L — SIGNIFICANT CHANGE UP (ref 7–14)
BACTERIA BLD CULT: SIGNIFICANT CHANGE UP
BACTERIA SPT RESP CULT: SIGNIFICANT CHANGE UP
BUN SERPL-MCNC: 10 MG/DL — SIGNIFICANT CHANGE UP (ref 7–23)
CALCIUM SERPL-MCNC: 9.2 MG/DL — SIGNIFICANT CHANGE UP (ref 8.4–10.5)
CHLORIDE SERPL-SCNC: 102 MMOL/L — SIGNIFICANT CHANGE UP (ref 98–107)
CO2 SERPL-SCNC: 27 MMOL/L — SIGNIFICANT CHANGE UP (ref 22–31)
CREAT SERPL-MCNC: 0.5 MG/DL — SIGNIFICANT CHANGE UP (ref 0.5–1.3)
GLUCOSE SERPL-MCNC: 84 MG/DL — SIGNIFICANT CHANGE UP (ref 70–99)
GRAM STN SPT: SIGNIFICANT CHANGE UP
HCT VFR BLD CALC: 35.6 % — SIGNIFICANT CHANGE UP (ref 34.5–45)
HGB BLD-MCNC: 11.1 G/DL — LOW (ref 11.5–15.5)
MAGNESIUM SERPL-MCNC: 2.3 MG/DL — SIGNIFICANT CHANGE UP (ref 1.6–2.6)
MCHC RBC-ENTMCNC: 28.6 PG — SIGNIFICANT CHANGE UP (ref 27–34)
MCHC RBC-ENTMCNC: 31.2 % — LOW (ref 32–36)
MCV RBC AUTO: 91.8 FL — SIGNIFICANT CHANGE UP (ref 80–100)
METHOD TYPE: SIGNIFICANT CHANGE UP
METHOD TYPE: SIGNIFICANT CHANGE UP
NRBC # FLD: 0 K/UL — SIGNIFICANT CHANGE UP (ref 0–0)
ORGANISM # SPEC MICROSCOPIC CNT: SIGNIFICANT CHANGE UP
ORGANISM # SPEC MICROSCOPIC CNT: SIGNIFICANT CHANGE UP
PHOSPHATE SERPL-MCNC: 3.2 MG/DL — SIGNIFICANT CHANGE UP (ref 2.5–4.5)
PLATELET # BLD AUTO: 373 K/UL — SIGNIFICANT CHANGE UP (ref 150–400)
PMV BLD: 9.2 FL — SIGNIFICANT CHANGE UP (ref 7–13)
POTASSIUM SERPL-MCNC: 4.3 MMOL/L — SIGNIFICANT CHANGE UP (ref 3.5–5.3)
POTASSIUM SERPL-SCNC: 4.3 MMOL/L — SIGNIFICANT CHANGE UP (ref 3.5–5.3)
RBC # BLD: 3.88 M/UL — SIGNIFICANT CHANGE UP (ref 3.8–5.2)
RBC # FLD: 13.3 % — SIGNIFICANT CHANGE UP (ref 10.3–14.5)
SODIUM SERPL-SCNC: 139 MMOL/L — SIGNIFICANT CHANGE UP (ref 135–145)
WBC # BLD: 7.67 K/UL — SIGNIFICANT CHANGE UP (ref 3.8–10.5)
WBC # FLD AUTO: 7.67 K/UL — SIGNIFICANT CHANGE UP (ref 3.8–10.5)

## 2019-11-09 PROCEDURE — 99233 SBSQ HOSP IP/OBS HIGH 50: CPT

## 2019-11-09 RX ORDER — HEPARIN SODIUM 5000 [USP'U]/ML
5000 INJECTION INTRAVENOUS; SUBCUTANEOUS EVERY 8 HOURS
Refills: 0 | Status: DISCONTINUED | OUTPATIENT
Start: 2019-11-09 | End: 2019-11-13

## 2019-11-09 RX ADMIN — MONTELUKAST 10 MILLIGRAM(S): 4 TABLET, CHEWABLE ORAL at 12:20

## 2019-11-09 RX ADMIN — LEVALBUTEROL 0.63 MILLIGRAM(S): 1.25 SOLUTION, CONCENTRATE RESPIRATORY (INHALATION) at 05:28

## 2019-11-09 RX ADMIN — GABAPENTIN 300 MILLIGRAM(S): 400 CAPSULE ORAL at 21:29

## 2019-11-09 RX ADMIN — GABAPENTIN 300 MILLIGRAM(S): 400 CAPSULE ORAL at 12:21

## 2019-11-09 RX ADMIN — Medication 81 MILLIGRAM(S): at 12:20

## 2019-11-09 RX ADMIN — Medication 500 MICROGRAM(S): at 13:02

## 2019-11-09 RX ADMIN — CEFEPIME 100 MILLIGRAM(S): 1 INJECTION, POWDER, FOR SOLUTION INTRAMUSCULAR; INTRAVENOUS at 12:21

## 2019-11-09 RX ADMIN — LEVALBUTEROL 0.63 MILLIGRAM(S): 1.25 SOLUTION, CONCENTRATE RESPIRATORY (INHALATION) at 17:04

## 2019-11-09 RX ADMIN — BUDESONIDE AND FORMOTEROL FUMARATE DIHYDRATE 2 PUFF(S): 160; 4.5 AEROSOL RESPIRATORY (INHALATION) at 09:53

## 2019-11-09 RX ADMIN — Medication 500 MICROGRAM(S): at 17:04

## 2019-11-09 RX ADMIN — BUDESONIDE AND FORMOTEROL FUMARATE DIHYDRATE 2 PUFF(S): 160; 4.5 AEROSOL RESPIRATORY (INHALATION) at 21:29

## 2019-11-09 RX ADMIN — METHADONE HYDROCHLORIDE 10 MILLIGRAM(S): 40 TABLET ORAL at 09:53

## 2019-11-09 RX ADMIN — METHADONE HYDROCHLORIDE 10 MILLIGRAM(S): 40 TABLET ORAL at 21:29

## 2019-11-09 RX ADMIN — Medication 5 MILLIGRAM(S): at 12:20

## 2019-11-09 RX ADMIN — Medication 500 MICROGRAM(S): at 05:33

## 2019-11-09 RX ADMIN — LEVALBUTEROL 0.63 MILLIGRAM(S): 1.25 SOLUTION, CONCENTRATE RESPIRATORY (INHALATION) at 20:36

## 2019-11-09 RX ADMIN — HEPARIN SODIUM 5000 UNIT(S): 5000 INJECTION INTRAVENOUS; SUBCUTANEOUS at 16:51

## 2019-11-09 RX ADMIN — GABAPENTIN 300 MILLIGRAM(S): 400 CAPSULE ORAL at 05:19

## 2019-11-09 RX ADMIN — Medication 10 MILLIGRAM(S): at 16:57

## 2019-11-09 RX ADMIN — CEFEPIME 100 MILLIGRAM(S): 1 INJECTION, POWDER, FOR SOLUTION INTRAMUSCULAR; INTRAVENOUS at 05:19

## 2019-11-09 RX ADMIN — LEVALBUTEROL 0.63 MILLIGRAM(S): 1.25 SOLUTION, CONCENTRATE RESPIRATORY (INHALATION) at 13:02

## 2019-11-09 RX ADMIN — CLOPIDOGREL BISULFATE 75 MILLIGRAM(S): 75 TABLET, FILM COATED ORAL at 12:20

## 2019-11-09 RX ADMIN — Medication 500 MICROGRAM(S): at 20:35

## 2019-11-09 RX ADMIN — Medication 40 MILLIGRAM(S): at 05:19

## 2019-11-09 RX ADMIN — LEVALBUTEROL 0.63 MILLIGRAM(S): 1.25 SOLUTION, CONCENTRATE RESPIRATORY (INHALATION) at 09:18

## 2019-11-09 RX ADMIN — Medication 500 MICROGRAM(S): at 09:19

## 2019-11-09 RX ADMIN — SENNA PLUS 2 TABLET(S): 8.6 TABLET ORAL at 21:29

## 2019-11-09 RX ADMIN — POLYETHYLENE GLYCOL 3350 17 GRAM(S): 17 POWDER, FOR SOLUTION ORAL at 05:20

## 2019-11-09 RX ADMIN — CEFEPIME 100 MILLIGRAM(S): 1 INJECTION, POWDER, FOR SOLUTION INTRAMUSCULAR; INTRAVENOUS at 21:29

## 2019-11-09 RX ADMIN — PANTOPRAZOLE SODIUM 40 MILLIGRAM(S): 20 TABLET, DELAYED RELEASE ORAL at 05:19

## 2019-11-09 NOTE — PROGRESS NOTE ADULT - PROBLEM SELECTOR PLAN 3
- Patient with worsening cough/SOB, with sputum production (sputum chronic).    - Currently, improved, no SOB, but with bilateral wheezing  - Will c/w Duonebs q4 ATC will place on Prednisone 40mg daily x5 days.  - C/w supplemental oxygen as needed  - Pulmonary consulted, recommendations reviewed.

## 2019-11-09 NOTE — PROGRESS NOTE ADULT - ASSESSMENT
74F with history as above who presents to the hospital with chest pain and SOB found to have sepsis 2/2 PNA, NSTEMI, Asthma exacerbation, and findings suggestive of fluid overload.

## 2019-11-09 NOTE — PROGRESS NOTE ADULT - PROBLEM SELECTOR PLAN 8
1.  Name of PCP: Dr. Kristen Hermosillo [phone]  2.  PCP Contacted on Admission: [ ] Y    [X] N    3.  PCP contacted at Discharge: [ ] Y    [ ] N    [ ] N/A  4.  Post-Discharge Appointment Date and Location:  5.  Summary of Handoff given to PCP: Attempted to contact PCP today, not reached will attempt again monday.     Pulmonologist Dr. Byrne [facility] Pulmonary.  Dispo pending Cardiac catheterization, treatment acute sepsis PNA.

## 2019-11-09 NOTE — PROGRESS NOTE ADULT - PROBLEM SELECTOR PLAN 4
- Troponin peaked  - EKG with TWI in leads V1-V2  - Cardiology consulted started NSTEMI protocol w/ ASA/Plavix load, Heparin drip.  - Now off heparin drip per Cardiology.   - Follow up for Cardiology regarding plan for [facility] Cardiac cath, possible Monday  - Telemetry monitoring.  - C/w ASA, Plavix, Lipitor 80  - Currently not on an ACE, or Beta Blocker- may benefit from starting low dose BB once asthma exacerbation improved  - TTE with findings of: Mild segmental left ventricular systolic dysfunction. The apical inferior wall, the mid anterior wall, and the mid inferoseptum are hypokinetic. The apical anterior wall is akinetic.

## 2019-11-09 NOTE — PROGRESS NOTE ADULT - PROBLEM SELECTOR PLAN 6
- Home Methadone (IStop Reference #: [id number], last prescribed on 9/20 by Dr. Pastora Cantu) and Gabapentin 300mg TID.

## 2019-11-09 NOTE — PROGRESS NOTE ADULT - PROBLEM SELECTOR PLAN 2
CT imaging shows loculated pleural effusion.   - Pulmonary following, no indication for thoracentesis at this time.   - Patient follows with Dr. Byrne office [facility] Pulmonologist.   - Reports, that she was planned for repeat CT Chest in December to evaluate for an underlying lung issues she has. She was unable to provide further information.

## 2019-11-09 NOTE — PROGRESS NOTE ADULT - PROBLEM SELECTOR PLAN 1
- Leukocytosis, tachycardia, tachypnea, hypoxia, meets sepsis criteria.   - CT findings of patchy infiltrates all concerning for sepsis 2/2 PNA.   - UA negative. Viral Panel negative.  Lactate 1.5. Legionella Ag negative.   - Sputum cultures + Pseudomonas- C/w Cefepime. Follow up sensitivities  - Blood cultures initially + gram cocci staph warnari- likely contaminant.  - Repeat blood cultures with NGTD  - ID consulted, C/w IV Cefepime (11/8- )  - C/w supplemental oxygen as needed

## 2019-11-09 NOTE — PROGRESS NOTE ADULT - SUBJECTIVE AND OBJECTIVE BOX
Ashley Dunham  [facility] Medicine  Pager #[phone]    Patient is a 74y old  Female who presents with a chief complaint of Chest pain, SOB (08 Nov 2019 16:50)      SUBJECTIVE / OVERNIGHT EVENTS: Patient reports feeling better. Still coughing but without sputum production. Reports having constipation- last bowel movement was on Monday (5 days ago). Abdomen is distended but patient denies nausea, vomiting and abdominal pain.    ADDITIONAL REVIEW OF SYSTEMS:    MEDICATIONS  (STANDING):  aspirin enteric coated 81 milliGRAM(s) Oral daily  atorvastatin 80 milliGRAM(s) Oral at bedtime  budesonide 160 MICROgram(s)/formoterol 4.5 MICROgram(s) Inhaler 2 Puff(s) Inhalation two times a day  cefepime   IVPB      cefepime   IVPB 2000 milliGRAM(s) IV Intermittent every 8 hours  clopidogrel Tablet 75 milliGRAM(s) Oral daily  gabapentin 300 milliGRAM(s) Oral three times a day  ipratropium    for Nebulization 500 MICROGram(s) Nebulizer every 4 hours  ipratropium  for Nebulization. 500 MICROGram(s) Nebulizer once  levalbuterol Inhalation 0.63 milliGRAM(s) Inhalation every 4 hours  methadone    Tablet 10 milliGRAM(s) Oral two times a day  montelukast 10 milliGRAM(s) Oral daily  pantoprazole    Tablet 40 milliGRAM(s) Oral before breakfast  polyethylene glycol 3350 17 Gram(s) Oral daily  predniSONE   Tablet 40 milliGRAM(s) Oral daily  senna 2 Tablet(s) Oral at bedtime    MEDICATIONS  (PRN):  bisacodyl Suppository 10 milliGRAM(s) Rectal daily PRN Constipation  nitroglycerin     SubLingual 0.4 milliGRAM(s) SubLingual every 5 minutes PRN Chest Pain      CAPILLARY BLOOD GLUCOSE        I&O's Summary      PHYSICAL EXAM:    Vital Signs Last 24 Hrs  T(C): 36.9 (09 Nov 2019 12:19), Max: 37.4 (08 Nov 2019 21:12)  T(F): 98.4 (09 Nov 2019 12:19), Max: 99.3 (08 Nov 2019 21:12)  HR: 84 (09 Nov 2019 13:03) (82 - 98)  BP: 129/82 (09 Nov 2019 12:19) (105/57 - 129/82)  BP(mean): --  RR: 18 (09 Nov 2019 12:19) (18 - 18)  SpO2: 98% (09 Nov 2019 12:19) (96% - 99%)    CONSTITUTIONAL: NAD, well-developed, well-groomed  EYES: PERRLA; conjunctiva and sclera clear  ENMT: Moist oral mucosa, no pharyngeal injection or exudates; normal dentition  NECK: Supple, no palpable masses; no thyromegaly  RESPIRATORY: Normal respiratory effort; diffuse wheezing b/l  CARDIOVASCULAR: Regular rate and rhythm, normal S1 and S2, no murmur/rub/gallop; No lower extremity edema; Peripheral pulses are 2+ bilaterally  ABDOMEN: Nontender to palpation, distended; normoactive bowel sounds, no rebound/guarding; No hepatosplenomegaly  MUSCULOSKELETAL:  Normal gait; no clubbing or cyanosis of digits; no joint swelling or tenderness to palpation  PSYCH: A+O to person, place, and time; affect appropriate  NEUROLOGY: CN 2-12 are intact and symmetric; no gross sensory deficits   SKIN: No rashes; no palpable lesions    LABS:                        11.1   7.67  )-----------( 373      ( 09 Nov 2019 04:51 )             35.6     11-09    139  |  102  |  10  ----------------------------<  84  4.3   |  27  |  0.50    Ca    9.2      09 Nov 2019 04:51  Phos  3.2     11-09  Mg     2.3     11-09      PTT - ( 08 Nov 2019 02:00 )  PTT:57.1 SEC      Culture - Blood (11.06.19 @ 09:43)    -  Ciprofloxacin: S <=1 ANRDEA    -  Clindamycin: S <=0.5 ANDREA    -  Erythromycin: S <=0.25 ANDREA    -  Cefazolin: S <=4 ANDREA    -  Moxifloxacin(Aerobic): S <=0.5 ANDREA    -  Oxacillin: S <=0.25 ANDREA    -  Levofloxacin: S <=0.5 ANDREA    -  Gentamicin: S <=1 ANDREA    -  Trimethoprim/Sulfamethoxazole: S <=0.5/9.5 ANDREA    -  Vancomycin: S 2 ANDREA    -  Rifampin: S <=1 ANDREA    -  Tetra/Doxy: S <=1 ANDREA    Culture - Blood:   NO ORGANISMS ISOLATED  NO ORGANISMS ISOLATED AT 24 HOURS    Culture - Blood:   ***Blood Panel PCR results on this specimen are available  approximately 3 hours after the Gram stain result***  Gram stain, PCR, and/or culture results may not always  correspond due to difference in methodologies  ------------------------------------------------------------  This PCR assay was performed using [employer].  The  following targets are tested for:  Enterococcus, vancomycin  resistant enterococci, Listeria monocytogenes,  coagulase  negative staphylococci, S. aureus, methicillin resistant S.  aureus, Streptococcus agalactiae (Group B), S. pneumoniae,  S. pyogenes (Group A), Acinetobacter baumannii, Enterobacter  cloacae, E. coli, Klebsiella oxytoca, K. pneumoniae, Proteus  sp., Serratia marcescens, Haemophilus influenzae, Neisseria  meningitidis, Pseudomonas aeruginosa, Candida albicans, C.  glabrata, C. krusei, C. parapsilosis, C. tropicalis and the  KPC resistance gene.  **NOTE: Due to technical problems, Proteus sp. will NOT be  reported as part of the BCID paneluntil further notice.    -  Coagulase negative Staphylococcus: + DETECT ANDREA    Specimen Source: BLOOD PERIPHERAL    Organism: BLOOD CULTURE PCR    Organism: Staphylococcus warneri    Gram Stain Blood:   ***** CRITICAL RESULT *****  PERSON CALLED / READ-BACK: MINDA ERAZO RN / Y  DATE / TIME CALLED: 11/07/19 1541  CALLED BY: DARLING BUSTILLO  GPCCL^Gram Pos Cocci In Clusters  AFTER: 28 HOURS INCUBATION  BOTTLE: AEROBIC BOTTLE    Organism Identification: BLOOD CULTURE PCR  Staphylococcus warneri    Method Type: PCR    Method Type: POSITIVE ANDREA 29    Culture - Respiratory with Gram Stain (collected 07 Nov 2019 10:02)  Source: SPUTUM  Final Report (09 Nov 2019 11:59):    Normal Respiratory Joan Also Present  Organism: Pseudomonas aeruginosa (09 Nov 2019 11:59)  Organism: Pseudomonas aeruginosa  QUANTITY OF GROWTH: MODERATE (09 Nov 2019 11:59)      RADIOLOGY & ADDITIONAL TESTS: No new imaging  Results Reviewed:   Imaging Personally Reviewed:  Electrocardiogram Personally Reviewed:    COORDINATION OF CARE:  Care Discussed with Consultants/Other Providers [Y/N]:  Prior or Outpatient Records Reviewed [Y/N]:

## 2019-11-10 DIAGNOSIS — K59.00 CONSTIPATION, UNSPECIFIED: ICD-10-CM

## 2019-11-10 LAB
ANION GAP SERPL CALC-SCNC: 12 MMO/L — SIGNIFICANT CHANGE UP (ref 7–14)
BUN SERPL-MCNC: 11 MG/DL — SIGNIFICANT CHANGE UP (ref 7–23)
CALCIUM SERPL-MCNC: 9.1 MG/DL — SIGNIFICANT CHANGE UP (ref 8.4–10.5)
CHLORIDE SERPL-SCNC: 97 MMOL/L — LOW (ref 98–107)
CO2 SERPL-SCNC: 28 MMOL/L — SIGNIFICANT CHANGE UP (ref 22–31)
CREAT SERPL-MCNC: 0.58 MG/DL — SIGNIFICANT CHANGE UP (ref 0.5–1.3)
GLUCOSE SERPL-MCNC: 77 MG/DL — SIGNIFICANT CHANGE UP (ref 70–99)
HCT VFR BLD CALC: 37.2 % — SIGNIFICANT CHANGE UP (ref 34.5–45)
HGB BLD-MCNC: 11.9 G/DL — SIGNIFICANT CHANGE UP (ref 11.5–15.5)
MAGNESIUM SERPL-MCNC: 2.2 MG/DL — SIGNIFICANT CHANGE UP (ref 1.6–2.6)
MCHC RBC-ENTMCNC: 29 PG — SIGNIFICANT CHANGE UP (ref 27–34)
MCHC RBC-ENTMCNC: 32 % — SIGNIFICANT CHANGE UP (ref 32–36)
MCV RBC AUTO: 90.7 FL — SIGNIFICANT CHANGE UP (ref 80–100)
NRBC # FLD: 0 K/UL — SIGNIFICANT CHANGE UP (ref 0–0)
PHOSPHATE SERPL-MCNC: 3.2 MG/DL — SIGNIFICANT CHANGE UP (ref 2.5–4.5)
PLATELET # BLD AUTO: 385 K/UL — SIGNIFICANT CHANGE UP (ref 150–400)
PMV BLD: 8.9 FL — SIGNIFICANT CHANGE UP (ref 7–13)
POTASSIUM SERPL-MCNC: 4.5 MMOL/L — SIGNIFICANT CHANGE UP (ref 3.5–5.3)
POTASSIUM SERPL-SCNC: 4.5 MMOL/L — SIGNIFICANT CHANGE UP (ref 3.5–5.3)
RBC # BLD: 4.1 M/UL — SIGNIFICANT CHANGE UP (ref 3.8–5.2)
RBC # FLD: 13.3 % — SIGNIFICANT CHANGE UP (ref 10.3–14.5)
SODIUM SERPL-SCNC: 137 MMOL/L — SIGNIFICANT CHANGE UP (ref 135–145)
WBC # BLD: 8.48 K/UL — SIGNIFICANT CHANGE UP (ref 3.8–10.5)
WBC # FLD AUTO: 8.48 K/UL — SIGNIFICANT CHANGE UP (ref 3.8–10.5)

## 2019-11-10 PROCEDURE — 99233 SBSQ HOSP IP/OBS HIGH 50: CPT

## 2019-11-10 RX ORDER — IPRATROPIUM/ALBUTEROL SULFATE 18-103MCG
3 AEROSOL WITH ADAPTER (GRAM) INHALATION EVERY 4 HOURS
Refills: 0 | Status: DISCONTINUED | OUTPATIENT
Start: 2019-11-10 | End: 2019-11-12

## 2019-11-10 RX ADMIN — Medication 3 MILLILITER(S): at 21:45

## 2019-11-10 RX ADMIN — GABAPENTIN 300 MILLIGRAM(S): 400 CAPSULE ORAL at 13:27

## 2019-11-10 RX ADMIN — CLOPIDOGREL BISULFATE 75 MILLIGRAM(S): 75 TABLET, FILM COATED ORAL at 09:33

## 2019-11-10 RX ADMIN — MONTELUKAST 10 MILLIGRAM(S): 4 TABLET, CHEWABLE ORAL at 09:33

## 2019-11-10 RX ADMIN — HEPARIN SODIUM 5000 UNIT(S): 5000 INJECTION INTRAVENOUS; SUBCUTANEOUS at 13:27

## 2019-11-10 RX ADMIN — GABAPENTIN 300 MILLIGRAM(S): 400 CAPSULE ORAL at 21:36

## 2019-11-10 RX ADMIN — SENNA PLUS 2 TABLET(S): 8.6 TABLET ORAL at 21:36

## 2019-11-10 RX ADMIN — BUDESONIDE AND FORMOTEROL FUMARATE DIHYDRATE 2 PUFF(S): 160; 4.5 AEROSOL RESPIRATORY (INHALATION) at 09:33

## 2019-11-10 RX ADMIN — CEFEPIME 100 MILLIGRAM(S): 1 INJECTION, POWDER, FOR SOLUTION INTRAMUSCULAR; INTRAVENOUS at 05:11

## 2019-11-10 RX ADMIN — Medication 500 MICROGRAM(S): at 04:12

## 2019-11-10 RX ADMIN — Medication 500 MICROGRAM(S): at 00:53

## 2019-11-10 RX ADMIN — HEPARIN SODIUM 5000 UNIT(S): 5000 INJECTION INTRAVENOUS; SUBCUTANEOUS at 21:36

## 2019-11-10 RX ADMIN — Medication 1 ENEMA: at 10:37

## 2019-11-10 RX ADMIN — CEFEPIME 100 MILLIGRAM(S): 1 INJECTION, POWDER, FOR SOLUTION INTRAMUSCULAR; INTRAVENOUS at 13:30

## 2019-11-10 RX ADMIN — BUDESONIDE AND FORMOTEROL FUMARATE DIHYDRATE 2 PUFF(S): 160; 4.5 AEROSOL RESPIRATORY (INHALATION) at 21:37

## 2019-11-10 RX ADMIN — PANTOPRAZOLE SODIUM 40 MILLIGRAM(S): 20 TABLET, DELAYED RELEASE ORAL at 05:12

## 2019-11-10 RX ADMIN — Medication 3 MILLILITER(S): at 12:45

## 2019-11-10 RX ADMIN — HEPARIN SODIUM 5000 UNIT(S): 5000 INJECTION INTRAVENOUS; SUBCUTANEOUS at 05:12

## 2019-11-10 RX ADMIN — Medication 3 MILLILITER(S): at 16:04

## 2019-11-10 RX ADMIN — GABAPENTIN 300 MILLIGRAM(S): 400 CAPSULE ORAL at 05:12

## 2019-11-10 RX ADMIN — Medication 40 MILLIGRAM(S): at 05:12

## 2019-11-10 RX ADMIN — METHADONE HYDROCHLORIDE 10 MILLIGRAM(S): 40 TABLET ORAL at 21:36

## 2019-11-10 RX ADMIN — METHADONE HYDROCHLORIDE 10 MILLIGRAM(S): 40 TABLET ORAL at 09:33

## 2019-11-10 RX ADMIN — CEFEPIME 100 MILLIGRAM(S): 1 INJECTION, POWDER, FOR SOLUTION INTRAMUSCULAR; INTRAVENOUS at 21:37

## 2019-11-10 RX ADMIN — Medication 3 MILLILITER(S): at 09:37

## 2019-11-10 RX ADMIN — Medication 10 MILLIGRAM(S): at 01:43

## 2019-11-10 RX ADMIN — Medication 81 MILLIGRAM(S): at 09:33

## 2019-11-10 RX ADMIN — POLYETHYLENE GLYCOL 3350 17 GRAM(S): 17 POWDER, FOR SOLUTION ORAL at 13:27

## 2019-11-10 NOTE — PROGRESS NOTE ADULT - PROBLEM SELECTOR PLAN 2
CT imaging shows loculated pleural effusion.   - Pulmonary following, no indication for thoracentesis at this time.   - Patient follows with Dr. Byrne at [facility] Pulmonologist.   - Reports, that she was planned for repeat CT Chest in December to evaluate for an underlying lung issue she has. She was unable to provide further information.

## 2019-11-10 NOTE — PROGRESS NOTE ADULT - SUBJECTIVE AND OBJECTIVE BOX
Ashley Lee  [facility] Medicine  Pager #[phone]    Patient is a 74y old  Female who presents with a chief complaint of Chest pain, SOB (09 Nov 2019 13:09)      SUBJECTIVE / OVERNIGHT EVENTS: Patient received 2 Dulcolax suppositories yesterday with no bowel movement. Feels like her abdomen today is more distended and now causing her discomfort. Feels like her breathing has improved.    ADDITIONAL REVIEW OF SYSTEMS:    MEDICATIONS  (STANDING):  albuterol/ipratropium for Nebulization 3 milliLiter(s) Nebulizer every 4 hours  aspirin enteric coated 81 milliGRAM(s) Oral daily  atorvastatin 80 milliGRAM(s) Oral at bedtime  budesonide 160 MICROgram(s)/formoterol 4.5 MICROgram(s) Inhaler 2 Puff(s) Inhalation two times a day  cefepime   IVPB      cefepime   IVPB 2000 milliGRAM(s) IV Intermittent every 8 hours  clopidogrel Tablet 75 milliGRAM(s) Oral daily  gabapentin 300 milliGRAM(s) Oral three times a day  heparin  Injectable 5000 Unit(s) SubCutaneous every 8 hours  methadone    Tablet 10 milliGRAM(s) Oral two times a day  montelukast 10 milliGRAM(s) Oral daily  pantoprazole    Tablet 40 milliGRAM(s) Oral before breakfast  polyethylene glycol 3350 17 Gram(s) Oral daily  senna 2 Tablet(s) Oral at bedtime    MEDICATIONS  (PRN):  bisacodyl Suppository 10 milliGRAM(s) Rectal daily PRN Constipation  nitroglycerin     SubLingual 0.4 milliGRAM(s) SubLingual every 5 minutes PRN Chest Pain      CAPILLARY BLOOD GLUCOSE        I&O's Summary    10 Nov 2019 07:01  -  10 Nov 2019 13:02  --------------------------------------------------------  IN: 360 mL / OUT: 0 mL / NET: 360 mL        PHYSICAL EXAM:    Vital Signs Last 24 Hrs  T(C): 36.6 (10 Nov 2019 05:11), Max: 36.7 (09 Nov 2019 21:28)  T(F): 97.8 (10 Nov 2019 05:11), Max: 98 (09 Nov 2019 21:28)  HR: 92 (10 Nov 2019 12:45) (74 - 94)  BP: 107/73 (10 Nov 2019 05:11) (107/73 - 108/76)  BP(mean): --  RR: 18 (10 Nov 2019 05:11) (18 - 18)  SpO2: 98% (10 Nov 2019 09:37) (94% - 98%)    CONSTITUTIONAL: NAD, well-developed, well-groomed  EYES: PERRLA; conjunctiva and sclera clear  ENMT: Moist oral mucosa, no pharyngeal injection or exudates  NECK: Supple, no palpable masses  RESPIRATORY: Normal respiratory effort; diffuse wheezing b/l, improved from yesterday  CARDIOVASCULAR: Regular rate and rhythm, normal S1 and S2, no murmur/rub/gallop; No lower extremity edema; Peripheral pulses are 2+ bilaterally  ABDOMEN: Nontender to palpation, distended; normoactive bowel sounds, no rebound/guarding; No hepatosplenomegaly  MUSCULOSKELETAL:  Normal gait; no clubbing or cyanosis of digits; no joint swelling or tenderness to palpation  PSYCH: A+O to person, place, and time; affect appropriate  NEUROLOGY: CN 2-12 are intact and symmetric; no gross sensory deficits   SKIN: No rashes; no palpable lesions    LABS:                        11.9   8.48  )-----------( 385      ( 10 Nov 2019 05:00 )             37.2     11-10    137  |  97<L>  |  11  ----------------------------<  77  4.5   |  28  |  0.58    Ca    9.1      10 Nov 2019 05:05  Phos  3.2     11-10  Mg     2.2     11-10    Culture - Blood (collected 07 Nov 2019 19:56)  Source: BLOOD VENOUS  Preliminary Report (09 Nov 2019 19:57):    NO ORGANISMS ISOLATED    NO ORGANISMS ISOLATED AT 48 HRS.      RADIOLOGY & ADDITIONAL TESTS: No new imaging  Results Reviewed:   Imaging Personally Reviewed:  Electrocardiogram Personally Reviewed:    COORDINATION OF CARE:  Care Discussed with Consultants/Other Providers [Y/N]:  Prior or Outpatient Records Reviewed [Y/N]:

## 2019-11-10 NOTE — PROGRESS NOTE ADULT - PROBLEM SELECTOR PLAN 8
1.  Name of PCP: Dr. Kristen Hermosillo [phone]  2.  PCP Contacted on Admission: [ ] Y    [X] N    3.  PCP contacted at Discharge: [ ] Y    [ ] N    [ ] N/A  4.  Post-Discharge Appointment Date and Location:  5.  Summary of Handoff given to PCP:     Pulmonologist Dr. Byrne [facility] Pulmonary.  Dispo pending Cardiac catheterization, treatment of PNA.

## 2019-11-10 NOTE — PROGRESS NOTE ADULT - PROBLEM SELECTOR PLAN 4
- Troponin peaked  - EKG with TWI in leads V1-V2  - Cardiology consulted started NSTEMI protocol w/ ASA/Plavix load, Heparin drip.  - Now off heparin drip per Cardiology.   - Follow up for Cardiology regarding plan for [facility] Cardiac cath, possibly Monday  - C/w Telemetry monitoring- no events noted overnight  - C/w ASA, Plavix, Lipitor 80  - Currently not on an ACEi, or Beta Blocker- may benefit from starting low dose BB once asthma exacerbation improved  - TTE with findings of: Mild segmental left ventricular systolic dysfunction. The apical inferior wall, the mid anterior wall, and the mid inferoseptum are hypokinetic. The apical anterior wall is akinetic.

## 2019-11-10 NOTE — PROGRESS NOTE ADULT - PROBLEM SELECTOR PLAN 6
- C/w home Methadone (IStop Reference #: [id number], last prescribed on 9/20 by Dr. Pastora Cantu) and Gabapentin 300mg TID.

## 2019-11-10 NOTE — PROGRESS NOTE ADULT - PROBLEM SELECTOR PLAN 1
- Leukocytosis, tachycardia, tachypnea, hypoxia, meets sepsis criteria.   - CT findings of patchy infiltrates concerning for sepsis 2/2 PNA.   - UA negative. Viral Panel negative.  Lactate 1.5. Legionella Ag negative.   - Sputum cultures + Pseudomonas- C/w Cefepime. Sensitivities returned pan-sensitive- plan for 7 day course of abx. Can likely switch to Levaquin once asthma exacerbation improves.  - Blood cultures initially + gram cocci staph warnari- likely contaminant.  - Repeat blood cultures with NGTD  - ID consulted, C/w IV Cefepime (11/8- )  - C/w supplemental oxygen as needed

## 2019-11-10 NOTE — PROGRESS NOTE ADULT - PROBLEM SELECTOR PLAN 3
- Patient with worsening cough/SOB, with sputum production (sputum chronic).    - improved but with bilateral wheezing  - Will c/w Duonebs q4h    - C/w Prednisone 40mg daily x5 days.  - C/w supplemental oxygen as needed  - Pulmonary consulted, recommendations reviewed.

## 2019-11-10 NOTE — PROGRESS NOTE ADULT - PROBLEM SELECTOR PLAN 5
Patient states she has not had a bowel movement for 6 days. Abdomen is distended and causing patient discomfort. Not relieved with suppositories.  - Fleet enema ordered today

## 2019-11-11 LAB
ANION GAP SERPL CALC-SCNC: 12 MMO/L — SIGNIFICANT CHANGE UP (ref 7–14)
BACTERIA BLD CULT: SIGNIFICANT CHANGE UP
BUN SERPL-MCNC: 14 MG/DL — SIGNIFICANT CHANGE UP (ref 7–23)
CALCIUM SERPL-MCNC: 9.4 MG/DL — SIGNIFICANT CHANGE UP (ref 8.4–10.5)
CHLORIDE SERPL-SCNC: 99 MMOL/L — SIGNIFICANT CHANGE UP (ref 98–107)
CO2 SERPL-SCNC: 27 MMOL/L — SIGNIFICANT CHANGE UP (ref 22–31)
CREAT SERPL-MCNC: 0.55 MG/DL — SIGNIFICANT CHANGE UP (ref 0.5–1.3)
GLUCOSE SERPL-MCNC: 93 MG/DL — SIGNIFICANT CHANGE UP (ref 70–99)
HCT VFR BLD CALC: 39.5 % — SIGNIFICANT CHANGE UP (ref 34.5–45)
HGB BLD-MCNC: 12.6 G/DL — SIGNIFICANT CHANGE UP (ref 11.5–15.5)
MAGNESIUM SERPL-MCNC: 2.2 MG/DL — SIGNIFICANT CHANGE UP (ref 1.6–2.6)
MCHC RBC-ENTMCNC: 29.2 PG — SIGNIFICANT CHANGE UP (ref 27–34)
MCHC RBC-ENTMCNC: 31.9 % — LOW (ref 32–36)
MCV RBC AUTO: 91.6 FL — SIGNIFICANT CHANGE UP (ref 80–100)
NRBC # FLD: 0 K/UL — SIGNIFICANT CHANGE UP (ref 0–0)
PHOSPHATE SERPL-MCNC: 3.2 MG/DL — SIGNIFICANT CHANGE UP (ref 2.5–4.5)
PLATELET # BLD AUTO: 421 K/UL — HIGH (ref 150–400)
PMV BLD: 9.3 FL — SIGNIFICANT CHANGE UP (ref 7–13)
POTASSIUM SERPL-MCNC: 4 MMOL/L — SIGNIFICANT CHANGE UP (ref 3.5–5.3)
POTASSIUM SERPL-SCNC: 4 MMOL/L — SIGNIFICANT CHANGE UP (ref 3.5–5.3)
RBC # BLD: 4.31 M/UL — SIGNIFICANT CHANGE UP (ref 3.8–5.2)
RBC # FLD: 13.6 % — SIGNIFICANT CHANGE UP (ref 10.3–14.5)
SODIUM SERPL-SCNC: 138 MMOL/L — SIGNIFICANT CHANGE UP (ref 135–145)
WBC # BLD: 8.66 K/UL — SIGNIFICANT CHANGE UP (ref 3.8–10.5)
WBC # FLD AUTO: 8.66 K/UL — SIGNIFICANT CHANGE UP (ref 3.8–10.5)

## 2019-11-11 PROCEDURE — 99233 SBSQ HOSP IP/OBS HIGH 50: CPT

## 2019-11-11 PROCEDURE — 99233 SBSQ HOSP IP/OBS HIGH 50: CPT | Mod: GC

## 2019-11-11 PROCEDURE — 99232 SBSQ HOSP IP/OBS MODERATE 35: CPT

## 2019-11-11 RX ORDER — METHYLNALTREXONE BROMIDE 12 MG/.6ML
8 INJECTION, SOLUTION SUBCUTANEOUS ONCE
Refills: 0 | Status: COMPLETED | OUTPATIENT
Start: 2019-11-11 | End: 2019-11-11

## 2019-11-11 RX ADMIN — CEFEPIME 100 MILLIGRAM(S): 1 INJECTION, POWDER, FOR SOLUTION INTRAMUSCULAR; INTRAVENOUS at 21:28

## 2019-11-11 RX ADMIN — BUDESONIDE AND FORMOTEROL FUMARATE DIHYDRATE 2 PUFF(S): 160; 4.5 AEROSOL RESPIRATORY (INHALATION) at 21:27

## 2019-11-11 RX ADMIN — GABAPENTIN 300 MILLIGRAM(S): 400 CAPSULE ORAL at 21:27

## 2019-11-11 RX ADMIN — Medication 3 MILLILITER(S): at 13:32

## 2019-11-11 RX ADMIN — HEPARIN SODIUM 5000 UNIT(S): 5000 INJECTION INTRAVENOUS; SUBCUTANEOUS at 21:28

## 2019-11-11 RX ADMIN — HEPARIN SODIUM 5000 UNIT(S): 5000 INJECTION INTRAVENOUS; SUBCUTANEOUS at 05:47

## 2019-11-11 RX ADMIN — GABAPENTIN 300 MILLIGRAM(S): 400 CAPSULE ORAL at 11:14

## 2019-11-11 RX ADMIN — CEFEPIME 100 MILLIGRAM(S): 1 INJECTION, POWDER, FOR SOLUTION INTRAMUSCULAR; INTRAVENOUS at 05:46

## 2019-11-11 RX ADMIN — HEPARIN SODIUM 5000 UNIT(S): 5000 INJECTION INTRAVENOUS; SUBCUTANEOUS at 14:47

## 2019-11-11 RX ADMIN — Medication 3 MILLILITER(S): at 17:03

## 2019-11-11 RX ADMIN — BUDESONIDE AND FORMOTEROL FUMARATE DIHYDRATE 2 PUFF(S): 160; 4.5 AEROSOL RESPIRATORY (INHALATION) at 08:52

## 2019-11-11 RX ADMIN — POLYETHYLENE GLYCOL 3350 17 GRAM(S): 17 POWDER, FOR SOLUTION ORAL at 11:13

## 2019-11-11 RX ADMIN — Medication 3 MILLILITER(S): at 05:22

## 2019-11-11 RX ADMIN — CEFEPIME 100 MILLIGRAM(S): 1 INJECTION, POWDER, FOR SOLUTION INTRAMUSCULAR; INTRAVENOUS at 14:16

## 2019-11-11 RX ADMIN — MONTELUKAST 10 MILLIGRAM(S): 4 TABLET, CHEWABLE ORAL at 11:14

## 2019-11-11 RX ADMIN — METHADONE HYDROCHLORIDE 10 MILLIGRAM(S): 40 TABLET ORAL at 21:27

## 2019-11-11 RX ADMIN — Medication 3 MILLILITER(S): at 09:20

## 2019-11-11 RX ADMIN — CLOPIDOGREL BISULFATE 75 MILLIGRAM(S): 75 TABLET, FILM COATED ORAL at 11:14

## 2019-11-11 RX ADMIN — SENNA PLUS 2 TABLET(S): 8.6 TABLET ORAL at 21:27

## 2019-11-11 RX ADMIN — METHYLNALTREXONE BROMIDE 8 MILLIGRAM(S): 12 INJECTION, SOLUTION SUBCUTANEOUS at 11:13

## 2019-11-11 RX ADMIN — PANTOPRAZOLE SODIUM 40 MILLIGRAM(S): 20 TABLET, DELAYED RELEASE ORAL at 05:47

## 2019-11-11 RX ADMIN — Medication 3 MILLILITER(S): at 01:07

## 2019-11-11 RX ADMIN — METHADONE HYDROCHLORIDE 10 MILLIGRAM(S): 40 TABLET ORAL at 10:05

## 2019-11-11 RX ADMIN — Medication 3 MILLILITER(S): at 21:38

## 2019-11-11 RX ADMIN — GABAPENTIN 300 MILLIGRAM(S): 400 CAPSULE ORAL at 05:47

## 2019-11-11 RX ADMIN — Medication 81 MILLIGRAM(S): at 11:14

## 2019-11-11 NOTE — PROGRESS NOTE ADULT - ASSESSMENT
75yo woman with PMHx chronic asthma and hypertension who presented with progressive dyspnea and an episode of severe chest pain. Noted to have hsT of 600,  pro-BNP 2500, ECG did not demonstrated active current of injury or evidence of ongoing infarction. This was reviewed with the interventional team previously and emergency angiography was deferred. The patient's CXR and chest CT demonstrate areas concerning for pneumonia. There is a right-sided pleural effusion and evidence of pulmonary congestion as well.    NSTEMI, type II  -ANN MARIE-Risk Score of at least 2, and significantly elevated troponin  -TTE 11/7: EF 45-50% with mild segmental LV systolic dysfunction, apical inferior wall, mid anterior wall and mid inferoseptum are hypokinetic. Apical anterior wall is akinetic.  -Continue medical therapy for ACS: dual antiplatelet therapy and high-potency statin  -Case to be revisited with interventional cardiology on Monday re: [facility]  -If the patient has recurrent chest pain, please re-consult cardiology, as more urgent angiography would be indicated    Cardiology will continue to follow.    Case discussed with Dr. Han.    Carolyn Morales MD PGY-4  Cardiology Fellow  Note is preliminary until signed by the attending. 73yo woman with PMHx chronic asthma and hypertension who presented with progressive dyspnea and an episode of severe chest pain. Noted to have hsT of 600,  pro-BNP 2500, ECG did not demonstrated active current of injury or evidence of ongoing infarction. This was reviewed with the interventional team previously and emergency angiography was deferred. The patient's CXR and chest CT demonstrate areas concerning for pneumonia. There is a right-sided pleural effusion and evidence of pulmonary congestion as well.    NSTEMI, type II  -ANN MARIE-Risk Score of at least 2, and significantly elevated troponin  -TTE 11/7: EF 45-50% with mild segmental LV systolic dysfunction, apical inferior wall, mid anterior wall and mid inferoseptum are hypokinetic. Apical anterior wall is akinetic.  -Continue medical therapy for ACS: dual antiplatelet therapy and high-potency statin  -Patient is not optimized for LHC as of yet; can be pursued on elective basis at this time since EF is with only mildly reduced EF and cardiac enzymes downtrended with minimal CK-MB elevation  -Continue IV ABx and steroids, as per pulmonology recommendations    Cardiology will continue to follow.    Case discussed with Dr. Han.    Carolyn Moraels MD PGY-4  Cardiology Fellow  Note is preliminary until signed by the attending. 73yo woman with PMHx chronic asthma and hypertension who presented with progressive dyspnea and an episode of severe chest pain. Noted to have hsT of 600,  pro-BNP 2500, ECG did not demonstrated active current of injury or evidence of ongoing infarction. This was reviewed with the interventional team previously and emergency angiography was deferred. The patient's CXR and chest CT demonstrate areas concerning for pneumonia. There is a right-sided pleural effusion and evidence of pulmonary congestion as well.    NSTEMI, type II  -ANN MARIE-Risk Score of at least 2, and significantly elevated troponin  -TTE 11/7: EF 45-50% with mild segmental LV systolic dysfunction, apical inferior wall, mid anterior wall and mid inferoseptum are hypokinetic. Apical anterior wall is akinetic.  -Continue medical therapy for ACS: dual antiplatelet therapy and high-potency statin  -due to respiratory status, Patient is not optimized for LHC as of yet; can be pursued on elective basis at this time since EF is with only mildly reduced EF and cardiac enzymes downtrended with minimal CK-MB elevation, and without CP  -Continue IV ABx and steroids, as per pulmonology recommendations    Cardiology will continue to follow.    Case discussed with Dr. Han.    Carolyn Morales MD PGY-4  Cardiology Fellow  Note is preliminary until signed by the attending.

## 2019-11-11 NOTE — PROGRESS NOTE ADULT - SUBJECTIVE AND OBJECTIVE BOX
[facility] Division of Hospital Medicine  Ellyn Andre MD  Pager [phone]    Patient is a 74y old  Female who presents with a chief complaint of Chest pain, SOB       SUBJECTIVE / OVERNIGHT EVENTS: had an "asthma attack' this AM and needed to use her albuterol inhaler; now improved; poss element of anxiety as well; reports no BM for 1 week, + flatus      MEDICATIONS  (STANDING):  albuterol/ipratropium for Nebulization 3 milliLiter(s) Nebulizer every 4 hours  aspirin enteric coated 81 milliGRAM(s) Oral daily  atorvastatin 80 milliGRAM(s) Oral at bedtime  budesonide 160 MICROgram(s)/formoterol 4.5 MICROgram(s) Inhaler 2 Puff(s) Inhalation two times a day  cefepime   IVPB      cefepime   IVPB 2000 milliGRAM(s) IV Intermittent every 8 hours  clopidogrel Tablet 75 milliGRAM(s) Oral daily  gabapentin 300 milliGRAM(s) Oral three times a day  heparin  Injectable 5000 Unit(s) SubCutaneous every 8 hours  methadone    Tablet 10 milliGRAM(s) Oral two times a day  montelukast 10 milliGRAM(s) Oral daily  pantoprazole    Tablet 40 milliGRAM(s) Oral before breakfast  polyethylene glycol 3350 17 Gram(s) Oral daily  senna 2 Tablet(s) Oral at bedtime    MEDICATIONS  (PRN):  bisacodyl Suppository 10 milliGRAM(s) Rectal daily PRN Constipation  nitroglycerin     SubLingual 0.4 milliGRAM(s) SubLingual every 5 minutes PRN Chest Pain        PHYSICAL EXAM:  Vital Signs Last 24 Hrs  T(F): 97.6 (11 Nov 2019 05:46), Max: 99 (10 Nov 2019 21:27)  HR: 99 (11 Nov 2019 09:20) (81 - 99)  BP: 115/71 (11 Nov 2019 05:46) (95/70 - 121/69)  RR: 16 (11 Nov 2019 05:46) (15 - 16)  SpO2: 96% (11 Nov 2019 05:46) (96% - 98%)    CONSTITUTIONAL: NAD, well-developed, well-groomed  ENMT: Moist oral mucosa,  RESPIRATORY: Normal respiratory effort; lungs are clear to auscultation bilaterally; occ wheeze with forced exhale; when pt distracted, clear  CARDIOVASCULAR: Regular rate and rhythmNo lower extremity edema; Peripheral pulses are 2+ bilaterally  ABDOMEN: Nontender to palpation, normoactive bowel sounds, no rebound/guarding; No hepatosplenomegaly  MUSCULOSKELETAL:  no clubbing or cyanosis of digits; no joint swelling or tenderness to palpation  PSYCH: A+O to person, place, and time; affect appropriate      LABS:                        12.6   8.66  )-----------( 421      ( 11 Nov 2019 06:11 )             39.5     11-11    138  |  99  |  14  ----------------------------<  93  4.0   |  27  |  0.55    Ca    9.4      11 Nov 2019 06:11  Phos  3.2     11-11  Mg     2.2     11-11

## 2019-11-11 NOTE — PROGRESS NOTE ADULT - PROBLEM SELECTOR PLAN 2
CT imaging shows loculated pleural effusion.   - Pulmonary following, no indication for thoracentesis at this time.   - Patient follows with Dr. Byrne at [facility] Pulmonologist.   .

## 2019-11-11 NOTE — PROGRESS NOTE ADULT - SUBJECTIVE AND OBJECTIVE BOX
Follow Up:  pseudomonas pneumonia    Inverval History/ROS: starting to feel better this afternoon.    able to ambulate around room now.  no fever, chills.  cough chronic.    Allergies  No Known Allergies        ANTIMICROBIALS:  cefepime   IVPB    cefepime   IVPB 2000 every 8 hours      OTHER MEDS:  albuterol/ipratropium for Nebulization 3 milliLiter(s) Nebulizer every 4 hours  aspirin enteric coated 81 milliGRAM(s) Oral daily  atorvastatin 80 milliGRAM(s) Oral at bedtime  bisacodyl Suppository 10 milliGRAM(s) Rectal daily PRN  budesonide 160 MICROgram(s)/formoterol 4.5 MICROgram(s) Inhaler 2 Puff(s) Inhalation two times a day  clopidogrel Tablet 75 milliGRAM(s) Oral daily  gabapentin 300 milliGRAM(s) Oral three times a day  heparin  Injectable 5000 Unit(s) SubCutaneous every 8 hours  methadone    Tablet 10 milliGRAM(s) Oral two times a day  montelukast 10 milliGRAM(s) Oral daily  nitroglycerin     SubLingual 0.4 milliGRAM(s) SubLingual every 5 minutes PRN  pantoprazole    Tablet 40 milliGRAM(s) Oral before breakfast  polyethylene glycol 3350 17 Gram(s) Oral daily  senna 2 Tablet(s) Oral at bedtime      Vital Signs Last 24 Hrs  T(C): 36.7 (11 Nov 2019 14:14), Max: 37.2 (10 Nov 2019 21:27)  T(F): 98 (11 Nov 2019 14:14), Max: 99 (10 Nov 2019 21:27)  HR: 91 (11 Nov 2019 17:03) (80 - 99)  BP: 118/62 (11 Nov 2019 14:14) (115/71 - 121/69)  BP(mean): --  RR: 18 (11 Nov 2019 14:14) (16 - 18)  SpO2: 100% (11 Nov 2019 14:14) (96% - 100%)    PHYSICAL EXAM:  General:  non-toxic  HEAD/EYES: white sclera   ENT:   supple   Cardiovascular: S1S2  Respiratory: wheeze bilat  GI:   soft, non-tender, normal bowel sounds  :  no CVA tenderness   Musculoskeletal:   no synovitis  Neurologic:   non-focal exam   Skin:   no rash  Psychiatric:  [x ] appropriate affect [x ] alert & oriented  Lines:  [x ] no phlebitis                                 12.6   8.66  )-----------( 421      ( 11 Nov 2019 06:11 )             39.5       11-11    138  |  99  |  14  ----------------------------<  93  4.0   |  27  |  0.55    Ca    9.4      11 Nov 2019 06:11  Phos  3.2     11-11  Mg     2.2     11-11            MICROBIOLOGY:  v  BLOOD VENOUS  11-07-19 --  --  --      SPUTUM  11-07-19 --  --  Pseudomonas aeruginosa    Culture - Respiratory with Gram Stain (11.07.19 @ 10:02)    Gram Stain Sputum:   GPCPR Gram Pos Cocci in Pairs  QUANTITY OF BACTERIA SEEN: MANY (4+)  GPCCL^Gram Pos Cocci In Clusters  QUANTITY OF BACTERIA SEEN: MANY (4+)  WBC^White Blood Cells  QNTY CELLS IN GRAM STAIN: MANY (4+)    -  Amikacin: S <=8 ANDREA    -  Aztreonam: S <=4 ANDREA    Culture - Respiratory:   Normal Respiratory Joan Also Present    -  Tobramycin: S <=2 ANDREA    -  Piperacillin/Tazobactam: S 8 ANDREA    -  Levofloxacin: S <=1 ANDREA    -  Meropenem: S <=1 ANDREA    -  Gentamicin: S 4 ANDREA    -  Imipenem: S <=1 ANDREA    -  Cefepime: S <=2 ANDREA    -  Ceftazidime: S <=1 ANDREA    Specimen Source: SPUTUM    Organism Identification: Pseudomonas aeruginosa    Organism: Pseudomonas aeruginosa  QUANTITY OF GROWTH: MODERATE    Method Type: NEGATIVE ANDREA 43      BLOOD PERIPHERAL  11-06-19 --  --  BLOOD CULTURE PCR  Staphylococcus warneri      RADIOLOGY:  < from: CT Angio Chest w/ IV Cont (11.06.19 @ 03:01) >    IMPRESSION:  Mildly motion degraded. No gross pulmonary embolism.    Mucoid impacted airways in the left greater than right lower lobes.  Patchy left lower lobe airspace opacities concerning for pneumonia.   Clinical correlation is recommended.    Small to moderate loculated right pleural effusion and trace left pleural   effusion with mild pulmonary edema.    < end of copied text >

## 2019-11-11 NOTE — PROGRESS NOTE ADULT - PROBLEM SELECTOR PLAN 1
- Leukocytosis, tachycardia, tachypnea, hypoxia, meets sepsis criteria, now resolved  - CT findings of patchy infiltrates concerning for sepsis 2/2 PNA.   - UA negative. Viral Panel negative.  Lactate 1.5. Legionella Ag negative.   - Sputum cultures + Pseudomonas- C/w Cefepime. Sensitivities returned pan-sensitive- plan for 7 day course of abx.

## 2019-11-11 NOTE — PROGRESS NOTE ADULT - SUBJECTIVE AND OBJECTIVE BOX
INTERVAL EVENTS  No acute events overnight  Reports some improvement in symptoms    OBJECTIVE    Vital Signs Last 24 Hrs  T(C): 36.7 (11 Nov 2019 14:14), Max: 37.2 (10 Nov 2019 21:27)  T(F): 98 (11 Nov 2019 14:14), Max: 99 (10 Nov 2019 21:27)  HR: 91 (11 Nov 2019 17:03) (80 - 99)  BP: 118/62 (11 Nov 2019 14:14) (115/71 - 121/69)  BP(mean): --  RR: 18 (11 Nov 2019 14:14) (16 - 18)  SpO2: 100% (11 Nov 2019 14:14) (96% - 100%)    PHYSICAL EXAM:  General: no acute distress  HEENT: normocephalic  Eyes: extraocular movements intact, pupils equal and reactive to light  Neck: supple  Respiratory: non labored breathing, coarse breath sounds bilateral  Cardiovascular: normal rate, regular rhythm, no murmur  Gastrointestinal: abdomen soft, non tender, non distended  Extremities: no lower extremity edema  Neurological: alert, oriented, no focal deficits  Psychiatric: cooperative    MEDICATIONS  (STANDING):  albuterol/ipratropium for Nebulization 3 milliLiter(s) Nebulizer every 4 hours  aspirin enteric coated 81 milliGRAM(s) Oral daily  atorvastatin 80 milliGRAM(s) Oral at bedtime  budesonide 160 MICROgram(s)/formoterol 4.5 MICROgram(s) Inhaler 2 Puff(s) Inhalation two times a day  cefepime   IVPB      cefepime   IVPB 2000 milliGRAM(s) IV Intermittent every 8 hours  clopidogrel Tablet 75 milliGRAM(s) Oral daily  gabapentin 300 milliGRAM(s) Oral three times a day  heparin  Injectable 5000 Unit(s) SubCutaneous every 8 hours  methadone    Tablet 10 milliGRAM(s) Oral two times a day  montelukast 10 milliGRAM(s) Oral daily  pantoprazole    Tablet 40 milliGRAM(s) Oral before breakfast  polyethylene glycol 3350 17 Gram(s) Oral daily  senna 2 Tablet(s) Oral at bedtime    MEDICATIONS  (PRN):  bisacodyl Suppository 10 milliGRAM(s) Rectal daily PRN Constipation  nitroglycerin     SubLingual 0.4 milliGRAM(s) SubLingual every 5 minutes PRN Chest Pain      LABORATORY                                     12.6   8.66  )-----------( 421      ( 11 Nov 2019 06:11 )             39.5     11-11    138  |  99  |  14  ----------------------------<  93  4.0   |  27  |  0.55    Ca    9.4      11 Nov 2019 06:11  Phos  3.2     11-11  Mg     2.2     11-11      RADIOLOGY    CT Angio Chest w/ IV Cont (11.06.19 @ 03:01) >  Mildly motion degraded. No gross pulmonary embolism.  Mucoid impacted airways in the left greater than right lower lobes. Patchy left lower lobe airspace opacities concerning for pneumonia.   Clinical correlation is recommended.  Small to moderate loculated right pleural effusion and trace left pleural effusion with mild pulmonary edema.      < end of copied text >

## 2019-11-11 NOTE — PROGRESS NOTE ADULT - PROBLEM SELECTOR PLAN 5
still no BM for 1 week  likely related to chronic narcotic use, no help with multiple bowel regimens  will trial relistor today and monitor for BM

## 2019-11-11 NOTE — PROGRESS NOTE ADULT - SUBJECTIVE AND OBJECTIVE BOX
Carolyn Morales MD  Cardiology Fellow  All Cardiology service information can be found 24/7 on [email or web address], password: [email or web address]    Patient seen and examined at bedside.    Subjective:        Current Meds:  albuterol/ipratropium for Nebulization 3 milliLiter(s) Nebulizer every 4 hours  aspirin enteric coated 81 milliGRAM(s) Oral daily  atorvastatin 80 milliGRAM(s) Oral at bedtime  bisacodyl Suppository 10 milliGRAM(s) Rectal daily PRN  budesonide 160 MICROgram(s)/formoterol 4.5 MICROgram(s) Inhaler 2 Puff(s) Inhalation two times a day  cefepime   IVPB      cefepime   IVPB 2000 milliGRAM(s) IV Intermittent every 8 hours  clopidogrel Tablet 75 milliGRAM(s) Oral daily  gabapentin 300 milliGRAM(s) Oral three times a day  heparin  Injectable 5000 Unit(s) SubCutaneous every 8 hours  methadone    Tablet 10 milliGRAM(s) Oral two times a day  methylnaltrexone Injectable 8 milliGRAM(s) SubCutaneous once  montelukast 10 milliGRAM(s) Oral daily  nitroglycerin     SubLingual 0.4 milliGRAM(s) SubLingual every 5 minutes PRN  pantoprazole    Tablet 40 milliGRAM(s) Oral before breakfast  polyethylene glycol 3350 17 Gram(s) Oral daily  senna 2 Tablet(s) Oral at bedtime      Vitals:  T(F): 97.6 (11-11), Max: 99 (11-10)  HR: 99 (11-11) (81 - 99)  BP: 115/71 (11-11) (95/70 - 121/69)  RR: 16 (11-11)  SpO2: 96% (11-11)  I&O's Summary    10 Nov 2019 07:01  -  11 Nov 2019 07:00  --------------------------------------------------------  IN: 760 mL / OUT: 0 mL / NET: 760 mL        Physical Exam:  Appearance: No acute distress; well appearing  Eyes: PERRL, EOMI, pink conjunctiva  HEENT: Normal oral mucosa  Cardiovascular: RRR, S1, S2, no murmurs, rubs, or gallops; no edema; no JVD  Respiratory: Clear to auscultation bilaterally  Gastrointestinal: soft, non-tender, non-distended with normal bowel sounds  Musculoskeletal: No clubbing; no joint deformity   Neurologic: Non-focal  Lymphatic: No lymphadenopathy  Psychiatry: AAOx3, mood & affect appropriate  Skin: No rashes, ecchymoses, or cyanosis                          12.6   8.66  )-----------( 421      ( 11 Nov 2019 06:11 )             39.5     11-11    138  |  99  |  14  ----------------------------<  93  4.0   |  27  |  0.55    Ca    9.4      11 Nov 2019 06:11  Phos  3.2     11-11  Mg     2.2     11-11        CARDIAC MARKERS ( 07 Nov 2019 05:53 )  x     / x     / x     / 74 u/L / x     / x      CARDIAC MARKERS ( 06 Nov 2019 08:40 )  x     / x     / x     / 125 u/L / 16.93 ng/mL / x      CARDIAC MARKERS ( 05 Nov 2019 22:50 )  x     / x     / x     / 97 u/L / 9.61 ng/mL / x          Serum Pro-Brain Natriuretic Peptide: 2545 pg/mL (11-06 @ 08:40)          New ECG(s): Personally reviewed    Interpretation of Telemetry: Carolyn Morales MD  Cardiology Fellow  All Cardiology service information can be found 24/7 on [email or web address], password: [email or web address]    Patient seen and examined at bedside.    Subjective:      -SOB is improving; although required breathing treatment this morning for difficulty breathing.  -Felt better after breathing treatment.  -Denies any CP, palpitations or swelling.  -Reports that she still does not feel like she is back to her baseline.    Current Meds:  albuterol/ipratropium for Nebulization 3 milliLiter(s) Nebulizer every 4 hours  aspirin enteric coated 81 milliGRAM(s) Oral daily  atorvastatin 80 milliGRAM(s) Oral at bedtime  bisacodyl Suppository 10 milliGRAM(s) Rectal daily PRN  budesonide 160 MICROgram(s)/formoterol 4.5 MICROgram(s) Inhaler 2 Puff(s) Inhalation two times a day  cefepime   IVPB      cefepime   IVPB 2000 milliGRAM(s) IV Intermittent every 8 hours  clopidogrel Tablet 75 milliGRAM(s) Oral daily  gabapentin 300 milliGRAM(s) Oral three times a day  heparin  Injectable 5000 Unit(s) SubCutaneous every 8 hours  methadone    Tablet 10 milliGRAM(s) Oral two times a day  methylnaltrexone Injectable 8 milliGRAM(s) SubCutaneous once  montelukast 10 milliGRAM(s) Oral daily  nitroglycerin     SubLingual 0.4 milliGRAM(s) SubLingual every 5 minutes PRN  pantoprazole    Tablet 40 milliGRAM(s) Oral before breakfast  polyethylene glycol 3350 17 Gram(s) Oral daily  senna 2 Tablet(s) Oral at bedtime    Vitals:  T(F): 97.6 (11-11), Max: 99 (11-10)  HR: 99 (11-11) (81 - 99)  BP: 115/71 (11-11) (95/70 - 121/69)  RR: 16 (11-11)  SpO2: 96% (11-11)  I&O's Summary    10 Nov 2019 07:01  -  11 Nov 2019 07:00  --------------------------------------------------------  IN: 760 mL / OUT: 0 mL / NET: 760 mL    Physical Exam:  Appearance: No acute distress; well appearing  Eyes: PERRL, EOMI, pink conjunctiva  HEENT: Normal oral mucosa  Cardiovascular: RRR, S1, S2, no murmurs, rubs, or gallops; no edema; no JVD  Respiratory: Coarse crackles b/l in all lung fields; no more wheezes appreciated  Gastrointestinal: soft, non-tender, non-distended with normal bowel sounds  Musculoskeletal: No clubbing; no joint deformity   Neurologic: Non-focal  Lymphatic: No lymphadenopathy  Psychiatry: AAOx3, mood & affect appropriate  Skin: No rashes, ecchymoses, or cyanosis                        12.6   8.66  )-----------( 421      ( 11 Nov 2019 06:11 )             39.5     11-11    138  |  99  |  14  ----------------------------<  93  4.0   |  27  |  0.55    Ca    9.4      11 Nov 2019 06:11  Phos  3.2     11-11  Mg     2.2     11-11    CARDIAC MARKERS ( 07 Nov 2019 05:53 )  x     / x     / x     / 74 u/L / x     / x      CARDIAC MARKERS ( 06 Nov 2019 08:40 )  x     / x     / x     / 125 u/L / 16.93 ng/mL / x      CARDIAC MARKERS ( 05 Nov 2019 22:50 )  x     / x     / x     / 97 u/L / 9.61 ng/mL / x        Serum Pro-Brain Natriuretic Peptide: 2545 pg/mL (11-06 @ 08:40)

## 2019-11-11 NOTE — PROGRESS NOTE ADULT - PROBLEM SELECTOR PLAN 6
- C/w home Methadone ([facility] Reference #: [id number], last prescribed on 9/20 by Dr. Pastora Cantu) and Gabapentin 300mg TID.

## 2019-11-11 NOTE — PROGRESS NOTE ADULT - ASSESSMENT
73 yo woman with history of asthma admitted with asthma exacerbation, community acquired pneumonia, right sided pleural effusion and NSTEMI. Pulmonary service consulted to assist in management.     1. Community acquired pneumonia secondary to Pseudomonas aeruginosa  2. Asthma exacerbation  3. Right sided pleural effusion likely parapneumonic   4. NSTEMI    Recommendations:   - Continue cefepime   - Acapella to facilitate mobilization of secretions  - Change duonebs to Q6h   - May use albuterol inhaler PRN in between in case of wheezing   - Continue Symbicort  - No indication for thoracentesis at the time as she is on plavix and effusion was small and simple on bedside ultrasound  - Repeat CT chest in 6-8 weeks - patient will follow with outside pulmonologist, please assist her with copy of CT done here    --------------------------------------------------------  Donnie Reece, PGY-4  Pulmonary/Critical Care Fellow  Pager: [phone] (LIZBETH), [phone] (NS)  Pulmonary spectra: [phone]

## 2019-11-11 NOTE — PROGRESS NOTE ADULT - PROBLEM SELECTOR PLAN 8
1.  Name of PCP: Dr. Kristen Hermosillo [phone]  2.  PCP Contacted on Admission: [ ] Y    [X] N    3.  PCP contacted at Discharge: [ ] Y    [ ] N    [ ] N/A  4.  Post-Discharge Appointment Date and Location:  5.  Summary of Handoff given to PCP:     Pulmonologist Dr. Byrne [facility].  Dispo pending Cardiac catheterization, treatment of PNA.

## 2019-11-11 NOTE — PROGRESS NOTE ADULT - ASSESSMENT
74F PMHx of asthma, ?CINDY, and chronic productive cough who is currently admitted for CAP and asthma exacerbation. Her illness began when she was on a [place] last month, and did not improve with amoxicillin prescribed by her PCP.     Sputum culture now shows pseudomonas.    1. Pseudomonas PNA  - slowly improving  -  cefepime 2g q 8 hours --> 11/14 (total 7 days of cefepime)    2. Coagulase negative staph in 1/4 blood culture bottle, likely a contaminant  - Repeat blood cultures no growth    3. asthma  -per primary team    Kinjal Luis MD  Pager: [phone]  After 5 PM or weekends please call fellow on call or office [phone]

## 2019-11-12 LAB
ANION GAP SERPL CALC-SCNC: 10 MMO/L — SIGNIFICANT CHANGE UP (ref 7–14)
BACTERIA BLD CULT: SIGNIFICANT CHANGE UP
BUN SERPL-MCNC: 15 MG/DL — SIGNIFICANT CHANGE UP (ref 7–23)
CALCIUM SERPL-MCNC: 9 MG/DL — SIGNIFICANT CHANGE UP (ref 8.4–10.5)
CHLORIDE SERPL-SCNC: 98 MMOL/L — SIGNIFICANT CHANGE UP (ref 98–107)
CO2 SERPL-SCNC: 27 MMOL/L — SIGNIFICANT CHANGE UP (ref 22–31)
CREAT SERPL-MCNC: 0.62 MG/DL — SIGNIFICANT CHANGE UP (ref 0.5–1.3)
GLUCOSE SERPL-MCNC: 90 MG/DL — SIGNIFICANT CHANGE UP (ref 70–99)
HCT VFR BLD CALC: 38.2 % — SIGNIFICANT CHANGE UP (ref 34.5–45)
HGB BLD-MCNC: 12.3 G/DL — SIGNIFICANT CHANGE UP (ref 11.5–15.5)
MAGNESIUM SERPL-MCNC: 2.2 MG/DL — SIGNIFICANT CHANGE UP (ref 1.6–2.6)
MCHC RBC-ENTMCNC: 29.6 PG — SIGNIFICANT CHANGE UP (ref 27–34)
MCHC RBC-ENTMCNC: 32.2 % — SIGNIFICANT CHANGE UP (ref 32–36)
MCV RBC AUTO: 91.8 FL — SIGNIFICANT CHANGE UP (ref 80–100)
NRBC # FLD: 0 K/UL — SIGNIFICANT CHANGE UP (ref 0–0)
PHOSPHATE SERPL-MCNC: 4 MG/DL — SIGNIFICANT CHANGE UP (ref 2.5–4.5)
PLATELET # BLD AUTO: 388 K/UL — SIGNIFICANT CHANGE UP (ref 150–400)
PMV BLD: 9.7 FL — SIGNIFICANT CHANGE UP (ref 7–13)
POTASSIUM SERPL-MCNC: 4 MMOL/L — SIGNIFICANT CHANGE UP (ref 3.5–5.3)
POTASSIUM SERPL-SCNC: 4 MMOL/L — SIGNIFICANT CHANGE UP (ref 3.5–5.3)
RBC # BLD: 4.16 M/UL — SIGNIFICANT CHANGE UP (ref 3.8–5.2)
RBC # FLD: 14 % — SIGNIFICANT CHANGE UP (ref 10.3–14.5)
SODIUM SERPL-SCNC: 135 MMOL/L — SIGNIFICANT CHANGE UP (ref 135–145)
WBC # BLD: 8.73 K/UL — SIGNIFICANT CHANGE UP (ref 3.8–10.5)
WBC # FLD AUTO: 8.73 K/UL — SIGNIFICANT CHANGE UP (ref 3.8–10.5)

## 2019-11-12 PROCEDURE — 93454 CORONARY ARTERY ANGIO S&I: CPT | Mod: 26

## 2019-11-12 PROCEDURE — 99233 SBSQ HOSP IP/OBS HIGH 50: CPT

## 2019-11-12 PROCEDURE — 76937 US GUIDE VASCULAR ACCESS: CPT | Mod: 26

## 2019-11-12 RX ORDER — IPRATROPIUM/ALBUTEROL SULFATE 18-103MCG
3 AEROSOL WITH ADAPTER (GRAM) INHALATION EVERY 6 HOURS
Refills: 0 | Status: DISCONTINUED | OUTPATIENT
Start: 2019-11-12 | End: 2019-11-13

## 2019-11-12 RX ORDER — CEFEPIME 1 G/1
2000 INJECTION, POWDER, FOR SOLUTION INTRAMUSCULAR; INTRAVENOUS EVERY 8 HOURS
Refills: 0 | Status: DISCONTINUED | OUTPATIENT
Start: 2019-11-12 | End: 2019-11-13

## 2019-11-12 RX ORDER — METHYLNALTREXONE BROMIDE 12 MG/.6ML
8 INJECTION, SOLUTION SUBCUTANEOUS ONCE
Refills: 0 | Status: COMPLETED | OUTPATIENT
Start: 2019-11-12 | End: 2019-11-12

## 2019-11-12 RX ORDER — ONDANSETRON 8 MG/1
4 TABLET, FILM COATED ORAL ONCE
Refills: 0 | Status: COMPLETED | OUTPATIENT
Start: 2019-11-12 | End: 2019-11-12

## 2019-11-12 RX ADMIN — CLOPIDOGREL BISULFATE 75 MILLIGRAM(S): 75 TABLET, FILM COATED ORAL at 11:56

## 2019-11-12 RX ADMIN — Medication 3 MILLILITER(S): at 01:15

## 2019-11-12 RX ADMIN — METHADONE HYDROCHLORIDE 10 MILLIGRAM(S): 40 TABLET ORAL at 08:50

## 2019-11-12 RX ADMIN — Medication 3 MILLILITER(S): at 21:02

## 2019-11-12 RX ADMIN — POLYETHYLENE GLYCOL 3350 17 GRAM(S): 17 POWDER, FOR SOLUTION ORAL at 08:51

## 2019-11-12 RX ADMIN — CEFEPIME 100 MILLIGRAM(S): 1 INJECTION, POWDER, FOR SOLUTION INTRAMUSCULAR; INTRAVENOUS at 10:30

## 2019-11-12 RX ADMIN — HEPARIN SODIUM 5000 UNIT(S): 5000 INJECTION INTRAVENOUS; SUBCUTANEOUS at 22:02

## 2019-11-12 RX ADMIN — ONDANSETRON 4 MILLIGRAM(S): 8 TABLET, FILM COATED ORAL at 20:20

## 2019-11-12 RX ADMIN — ATORVASTATIN CALCIUM 80 MILLIGRAM(S): 80 TABLET, FILM COATED ORAL at 22:00

## 2019-11-12 RX ADMIN — GABAPENTIN 300 MILLIGRAM(S): 400 CAPSULE ORAL at 05:13

## 2019-11-12 RX ADMIN — Medication 3 MILLILITER(S): at 05:31

## 2019-11-12 RX ADMIN — METHADONE HYDROCHLORIDE 10 MILLIGRAM(S): 40 TABLET ORAL at 22:00

## 2019-11-12 RX ADMIN — PANTOPRAZOLE SODIUM 40 MILLIGRAM(S): 20 TABLET, DELAYED RELEASE ORAL at 05:13

## 2019-11-12 RX ADMIN — HEPARIN SODIUM 5000 UNIT(S): 5000 INJECTION INTRAVENOUS; SUBCUTANEOUS at 05:14

## 2019-11-12 RX ADMIN — CEFEPIME 100 MILLIGRAM(S): 1 INJECTION, POWDER, FOR SOLUTION INTRAMUSCULAR; INTRAVENOUS at 18:03

## 2019-11-12 RX ADMIN — GABAPENTIN 300 MILLIGRAM(S): 400 CAPSULE ORAL at 22:01

## 2019-11-12 RX ADMIN — BUDESONIDE AND FORMOTEROL FUMARATE DIHYDRATE 2 PUFF(S): 160; 4.5 AEROSOL RESPIRATORY (INHALATION) at 20:20

## 2019-11-12 RX ADMIN — Medication 3 MILLILITER(S): at 09:45

## 2019-11-12 RX ADMIN — CEFEPIME 100 MILLIGRAM(S): 1 INJECTION, POWDER, FOR SOLUTION INTRAMUSCULAR; INTRAVENOUS at 23:37

## 2019-11-12 RX ADMIN — Medication 81 MILLIGRAM(S): at 11:56

## 2019-11-12 RX ADMIN — MONTELUKAST 10 MILLIGRAM(S): 4 TABLET, CHEWABLE ORAL at 11:56

## 2019-11-12 RX ADMIN — Medication 3 MILLILITER(S): at 17:00

## 2019-11-12 RX ADMIN — SENNA PLUS 2 TABLET(S): 8.6 TABLET ORAL at 22:01

## 2019-11-12 RX ADMIN — BUDESONIDE AND FORMOTEROL FUMARATE DIHYDRATE 2 PUFF(S): 160; 4.5 AEROSOL RESPIRATORY (INHALATION) at 08:50

## 2019-11-12 RX ADMIN — METHYLNALTREXONE BROMIDE 8 MILLIGRAM(S): 12 INJECTION, SOLUTION SUBCUTANEOUS at 19:23

## 2019-11-12 NOTE — PROGRESS NOTE ADULT - PROBLEM SELECTOR PLAN 8
1.  Name of PCP: Dr. Kristen Hermosillo [phone]  2.  PCP Contacted on Admission: [ ] Y    [X] N    3.  PCP contacted at Discharge: [ ] Y    [ ] N    [ ] N/A  4.  Post-Discharge Appointment Date and Location:  5.  Summary of Handoff given to PCP:     Pulmonologist Dr. Byrne [facility].  Dispo pending Cardiac catheterization, treatment of PNA.

## 2019-11-12 NOTE — PROGRESS NOTE ADULT - SUBJECTIVE AND OBJECTIVE BOX
[facility] Division of Hospital Medicine  Ellyn Andre MD  Pager [phone]    Patient is a 74y old  Female who presents with a chief complaint of Chest pain, SOB       SUBJECTIVE / OVERNIGHT EVENTS: walking with son in hallway, at a good pace; able to speak full sentences; feels better; small BM; asking for another shot (relistor)      MEDICATIONS  (STANDING):  albuterol/ipratropium for Nebulization. 3 milliLiter(s) Nebulizer every 6 hours  aspirin enteric coated 81 milliGRAM(s) Oral daily  atorvastatin 80 milliGRAM(s) Oral at bedtime  budesonide 160 MICROgram(s)/formoterol 4.5 MICROgram(s) Inhaler 2 Puff(s) Inhalation two times a day  cefepime   IVPB 2000 milliGRAM(s) IV Intermittent every 8 hours  clopidogrel Tablet 75 milliGRAM(s) Oral daily  gabapentin 300 milliGRAM(s) Oral three times a day  heparin  Injectable 5000 Unit(s) SubCutaneous every 8 hours  methadone    Tablet 10 milliGRAM(s) Oral two times a day  methylnaltrexone Injectable 8 milliGRAM(s) SubCutaneous once  montelukast 10 milliGRAM(s) Oral daily  pantoprazole    Tablet 40 milliGRAM(s) Oral before breakfast  polyethylene glycol 3350 17 Gram(s) Oral daily  senna 2 Tablet(s) Oral at bedtime    MEDICATIONS  (PRN):  bisacodyl Suppository 10 milliGRAM(s) Rectal daily PRN Constipation  nitroglycerin     SubLingual 0.4 milliGRAM(s) SubLingual every 5 minutes PRN Chest Pain            PHYSICAL EXAM:  Vital Signs Last 24 Hrs  T(F): 97.1 (12 Nov 2019 11:53), Max: 98.3 (12 Nov 2019 05:11)  HR: 100 (12 Nov 2019 11:53) (81 - 100)  BP: 120/78 (12 Nov 2019 11:53) (109/51 - 120/78)  RR: 16 (12 Nov 2019 11:53) (16 - 17)  SpO2: 97% (12 Nov 2019 11:53) (96% - 98%)    CONSTITUTIONAL: NAD, well-developed, well-groomed  ENMT: Moist oral mucosa,   RESPIRATORY: Normal respiratory effort; rhonchi, improve with cough  MUSCULOSKELETAL:  Normal gait; no clubbing or cyanosis of digits; no joint swelling or tenderness to palpation  PSYCH: A+O to person, place, and time; affect appropriate  NEUROLOGY: no gross sensory deficits       LABS:                        12.3   8.73  )-----------( 388      ( 12 Nov 2019 06:20 )             38.2     11-12    135  |  98  |  15  ----------------------------<  90  4.0   |  27  |  0.62    Ca    9.0      12 Nov 2019 06:20  Phos  4.0     11-12  Mg     2.2     11-12

## 2019-11-12 NOTE — PROGRESS NOTE ADULT - ASSESSMENT
75 yo woman with history of asthma admitted with asthma exacerbation, community acquired pneumonia, right sided pleural effusion and NSTEMI. Pulmonary service consulted to assist in management.     1. Community acquired pneumonia secondary to Pseudomonas aeruginosa  2. Asthma exacerbation  3. Right sided pleural effusion likely parapneumonic   4. NSTEMI    Recommendations:   - Continue cefepime - may transition to levofloxacin upon discharge to complete at least 7 days of antibiotic therapy since cefepime was initiated  - Acapella to facilitate mobilization of secretions  - Duonebs to Q6h   - May use albuterol inhaler PRN in between in case of wheezing   - Continue Symbicort  - No indication for thoracentesis at the time as she is on plavix and effusion was small and simple on bedside ultrasound  - Repeat CT chest in 6-8 weeks   - Patient will follow with outside pulmonologist - has CD of chest imaging done here already    --------------------------------------------------------  Donnie Reece, PGY-4  Pulmonary/Critical Care Fellow  Pager: [phone] (LIZBETH), [phone] (NS)  Pulmonary spectra: [phone] 73 yo woman with history of asthma admitted with asthma exacerbation, community acquired pneumonia, right sided pleural effusion and NSTEMI. Pulmonary service consulted to assist in management.     1. Community acquired pneumonia secondary to Pseudomonas aeruginosa  2. Asthma exacerbation  3. Right sided pleural effusion likely parapneumonic   4. NSTEMI    Recommendations:   - Continue cefepime - may transition to levofloxacin upon discharge to complete at least 7 days of antibiotic therapy counting from day cefepime was initiated   - Acapella to facilitate mobilization of secretions  - Duonebs Q6h   - May use albuterol inhaler PRN in between in case of wheezing   - Continue Symbicort  - No indication for thoracentesis at the time as she is on plavix and effusion was small and simple on bedside ultrasound  - Repeat CT chest in 6-8 weeks   - Patient will follow with outside pulmonologist - has CD of chest imaging done here already    --------------------------------------------------------  Donnie Reece, PGY-4  Pulmonary/Critical Care Fellow  Pager: [phone] (LIDENIA), [phone] (NS)  Pulmonary spectra: [phone]

## 2019-11-12 NOTE — CHART NOTE - NSCHARTNOTEFT_GEN_A_CORE
SHEELA MENDEZ  [id number]    Patient status post cath via R femoral artery. Site clean, dry, intact. Pulses present, capillary refill appropriate. Without hematoma. Will continue to follow closely.    MICHELLE Shaffer PA-C  Pg. [phone]

## 2019-11-12 NOTE — PROGRESS NOTE ADULT - PROBLEM SELECTOR PLAN 4
- Troponin peaked  - EKG with TWI in leads V1-V2  - Cardiology consulted started NSTEMI protocol w/ ASA/Plavix load, Heparin drip.  - Now off heparin drip per Cardiology.   - C/w Telemetry monitoring  - C/w ASA, Plavix, Lipitor 80  spoke with Dr Han, for cath today

## 2019-11-12 NOTE — PROGRESS NOTE ADULT - ASSESSMENT
75yo woman with PMHx chronic asthma and hypertension who presented with progressive dyspnea and an episode of severe chest pain. Noted to have hsT of 600,  pro-BNP 2500, ECG did not demonstrated active current of injury or evidence of ongoing infarction. This was reviewed with the interventional team previously and emergency angiography was deferred. The patient's CXR and chest CT demonstrate areas concerning for pneumonia. There is a right-sided pleural effusion and evidence of pulmonary congestion as well.    NSTEMI, type II  -ANN MARIE-Risk Score of at least 2, and significantly elevated troponin  -TTE 11/7: EF 45-50% with mild segmental LV systolic dysfunction, apical inferior wall, mid anterior wall and mid inferoseptum are hypokinetic. Apical anterior wall is akinetic.  -Continue medical therapy for ACS: dual antiplatelet therapy and high-potency statin  -Due to respiratory status, Patient is not optimized for LHC as of yet  -Can be pursued on elective basis at this time since EF is with only mildly reduced EF and cardiac enzymes downtrended with minimal CK-MB elevation, and without CP  -Continue IV ABx and steroids, as per pulmonology recommendations    Case discussed with Dr. Han.    Carolyn Morales MD PGY-4  Cardiology Fellow  Note is preliminary until signed by the attending. 73yo woman with PMHx chronic asthma and hypertension who presented with progressive dyspnea and an episode of severe chest pain. Noted to have hsT of 600,  pro-BNP 2500, ECG did not demonstrated active current of injury or evidence of ongoing infarction. This was reviewed with the interventional team previously and emergency angiography was deferred. The patient's CXR and chest CT demonstrate areas concerning for pneumonia. There is a right-sided pleural effusion and evidence of pulmonary congestion as well.    NSTEMI, type II  -ANN MARIE-Risk Score of at least 2, and significantly elevated troponin  -TTE 11/7: EF 45-50% with mild segmental LV systolic dysfunction, apical inferior wall, mid anterior wall and mid inferoseptum are hypokinetic. Apical anterior wall is akinetic.  -Continue medical therapy for ACS: dual antiplatelet therapy and high-potency statin  -Patient is now optimized for LHC; she has been put on the schedule for this afternoon  -Continue IV ABx and steroids, as per pulmonology recommendations    Case discussed with Dr. Han.    Carolyn Morales MD PGY-4  Cardiology Fellow  Note is preliminary until signed by the attending.

## 2019-11-12 NOTE — PROGRESS NOTE ADULT - PROBLEM SELECTOR PLAN 3
- Patient with worsening cough/SOB, with sputum production (sputum chronic).    - improved but with bilateral wheezing  - Will c/w Duonebs q4h    - C/w Prednisone 40mg daily x5 days.  - C/w supplemental oxygen as needed  clinically improved

## 2019-11-12 NOTE — PROGRESS NOTE ADULT - SUBJECTIVE AND OBJECTIVE BOX
INTERVAL EVENTS  No acute events overnight  Reports some improvement in symptoms    OBJECTIVE    Vital Signs Last 24 Hrs  T(C): 36.2 (12 Nov 2019 11:53), Max: 36.8 (12 Nov 2019 05:11)  T(F): 97.1 (12 Nov 2019 11:53), Max: 98.3 (12 Nov 2019 05:11)  HR: 100 (12 Nov 2019 11:53) (80 - 100)  BP: 120/78 (12 Nov 2019 11:53) (109/51 - 120/78)  BP(mean): --  RR: 16 (12 Nov 2019 11:53) (16 - 18)  SpO2: 97% (12 Nov 2019 11:53) (96% - 100%)    PHYSICAL EXAM:  General: no acute distress  HEENT: normocephalic  Eyes: extraocular movements intact, pupils equal and reactive to light  Neck: supple  Respiratory: non labored breathing, coarse breath sounds bilateral  Cardiovascular: normal rate, regular rhythm, no murmur  Gastrointestinal: abdomen soft, non tender, non distended  Extremities: no lower extremity edema  Neurological: alert, oriented, no focal deficits  Psychiatric: cooperative    MEDICATIONS  (STANDING):  albuterol/ipratropium for Nebulization. 3 milliLiter(s) Nebulizer every 6 hours  aspirin enteric coated 81 milliGRAM(s) Oral daily  atorvastatin 80 milliGRAM(s) Oral at bedtime  budesonide 160 MICROgram(s)/formoterol 4.5 MICROgram(s) Inhaler 2 Puff(s) Inhalation two times a day  cefepime   IVPB 2000 milliGRAM(s) IV Intermittent every 8 hours  clopidogrel Tablet 75 milliGRAM(s) Oral daily  gabapentin 300 milliGRAM(s) Oral three times a day  heparin  Injectable 5000 Unit(s) SubCutaneous every 8 hours  methadone    Tablet 10 milliGRAM(s) Oral two times a day  methylnaltrexone Injectable 8 milliGRAM(s) SubCutaneous once  montelukast 10 milliGRAM(s) Oral daily  pantoprazole    Tablet 40 milliGRAM(s) Oral before breakfast  polyethylene glycol 3350 17 Gram(s) Oral daily  senna 2 Tablet(s) Oral at bedtime    MEDICATIONS  (PRN):  bisacodyl Suppository 10 milliGRAM(s) Rectal daily PRN Constipation  nitroglycerin     SubLingual 0.4 milliGRAM(s) SubLingual every 5 minutes PRN Chest Pain      LABORATORY                                                     12.3   8.73  )-----------( 388      ( 12 Nov 2019 06:20 )             38.2     11-12    135  |  98  |  15  ----------------------------<  90  4.0   |  27  |  0.62    Ca    9.0      12 Nov 2019 06:20  Phos  4.0     11-12  Mg     2.2     11-12      RADIOLOGY    CT Angio Chest w/ IV Cont (11.06.19 @ 03:01) >  Mildly motion degraded. No gross pulmonary embolism.  Mucoid impacted airways in the left greater than right lower lobes. Patchy left lower lobe airspace opacities concerning for pneumonia.   Clinical correlation is recommended.  Small to moderate loculated right pleural effusion and trace left pleural effusion with mild pulmonary edema.      < end of copied text >

## 2019-11-12 NOTE — PROGRESS NOTE ADULT - SUBJECTIVE AND OBJECTIVE BOX
Carolyn Morales MD  Cardiology Fellow  All Cardiology service information can be found 24/7 on [email or web address], password: [email or web address]    Patient seen and examined at bedside.    Subjective:        Current Meds:  albuterol/ipratropium for Nebulization 3 milliLiter(s) Nebulizer every 4 hours  aspirin enteric coated 81 milliGRAM(s) Oral daily  atorvastatin 80 milliGRAM(s) Oral at bedtime  bisacodyl Suppository 10 milliGRAM(s) Rectal daily PRN  budesonide 160 MICROgram(s)/formoterol 4.5 MICROgram(s) Inhaler 2 Puff(s) Inhalation two times a day  clopidogrel Tablet 75 milliGRAM(s) Oral daily  gabapentin 300 milliGRAM(s) Oral three times a day  heparin  Injectable 5000 Unit(s) SubCutaneous every 8 hours  levoFLOXacin  Tablet 750 milliGRAM(s) Oral every 24 hours  methadone    Tablet 10 milliGRAM(s) Oral two times a day  montelukast 10 milliGRAM(s) Oral daily  nitroglycerin     SubLingual 0.4 milliGRAM(s) SubLingual every 5 minutes PRN  pantoprazole    Tablet 40 milliGRAM(s) Oral before breakfast  polyethylene glycol 3350 17 Gram(s) Oral daily  senna 2 Tablet(s) Oral at bedtime      Vitals:  T(F): 98.3 (11-12), Max: 98.3 (11-12)  HR: 81 (11-12) (80 - 99)  BP: 112/63 (11-12) (109/51 - 118/62)  RR: 17 (11-12)  SpO2: 98% (11-12)  I&O's Summary    11 Nov 2019 07:01  -  12 Nov 2019 07:00  --------------------------------------------------------  IN: 400 mL / OUT: 0 mL / NET: 400 mL        Physical Exam:  Appearance: No acute distress; well appearing  Eyes: PERRL, EOMI, pink conjunctiva  HEENT: Normal oral mucosa  Cardiovascular: RRR, S1, S2, no murmurs, rubs, or gallops; no edema; no JVD  Respiratory: Clear to auscultation bilaterally  Gastrointestinal: soft, non-tender, non-distended with normal bowel sounds  Musculoskeletal: No clubbing; no joint deformity   Neurologic: Non-focal  Lymphatic: No lymphadenopathy  Psychiatry: AAOx3, mood & affect appropriate  Skin: No rashes, ecchymoses, or cyanosis                          12.6   8.66  )-----------( 421      ( 11 Nov 2019 06:11 )             39.5     11-11    138  |  99  |  14  ----------------------------<  93  4.0   |  27  |  0.55    Ca    9.4      11 Nov 2019 06:11  Phos  3.2     11-11  Mg     2.2     11-11        CARDIAC MARKERS ( 07 Nov 2019 05:53 )  x     / x     / x     / 74 u/L / x     / x      CARDIAC MARKERS ( 06 Nov 2019 08:40 )  x     / x     / x     / 125 u/L / 16.93 ng/mL / x      CARDIAC MARKERS ( 05 Nov 2019 22:50 )  x     / x     / x     / 97 u/L / 9.61 ng/mL / x          Serum Pro-Brain Natriuretic Peptide: 2545 pg/mL (11-06 @ 08:40)          New ECG(s): Personally reviewed    Interpretation of Telemetry: Carolyn Morales MD  Cardiology Fellow  All Cardiology service information can be found 24/7 on [email or web address], password: [email or web address]    Patient seen and examined at bedside.    Subjective:      -SOB improved dramatically this AM; reports that she feels like she is back to her baseline.  -No CP, palpitations or swelling.    Current Meds:  albuterol/ipratropium for Nebulization 3 milliLiter(s) Nebulizer every 4 hours  aspirin enteric coated 81 milliGRAM(s) Oral daily  atorvastatin 80 milliGRAM(s) Oral at bedtime  bisacodyl Suppository 10 milliGRAM(s) Rectal daily PRN  budesonide 160 MICROgram(s)/formoterol 4.5 MICROgram(s) Inhaler 2 Puff(s) Inhalation two times a day  clopidogrel Tablet 75 milliGRAM(s) Oral daily  gabapentin 300 milliGRAM(s) Oral three times a day  heparin  Injectable 5000 Unit(s) SubCutaneous every 8 hours  levoFLOXacin  Tablet 750 milliGRAM(s) Oral every 24 hours  methadone    Tablet 10 milliGRAM(s) Oral two times a day  montelukast 10 milliGRAM(s) Oral daily  nitroglycerin     SubLingual 0.4 milliGRAM(s) SubLingual every 5 minutes PRN  pantoprazole    Tablet 40 milliGRAM(s) Oral before breakfast  polyethylene glycol 3350 17 Gram(s) Oral daily  senna 2 Tablet(s) Oral at bedtime      Vitals:  T(F): 98.3 (11-12), Max: 98.3 (11-12)  HR: 81 (11-12) (80 - 99)  BP: 112/63 (11-12) (109/51 - 118/62)  RR: 17 (11-12)  SpO2: 98% (11-12)  I&O's Summary    11 Nov 2019 07:01  -  12 Nov 2019 07:00  --------------------------------------------------------  IN: 400 mL / OUT: 0 mL / NET: 400 mL        Physical Exam:  Appearance: No acute distress; well appearing  Eyes: PERRL, EOMI, pink conjunctiva  HEENT: Normal oral mucosa  Cardiovascular: RRR, S1, S2, no murmurs, rubs, or gallops; no edema; no JVD  Respiratory: No more inspiratory wheezes appreciated; soft bibasilar crackles  Gastrointestinal: soft, non-tender, non-distended with normal bowel sounds  Musculoskeletal: No clubbing; no joint deformity   Neurologic: Non-focal  Lymphatic: No lymphadenopathy  Psychiatry: AAOx3, mood & affect appropriate  Skin: No rashes, ecchymoses, or cyanosis                          12.6   8.66  )-----------( 421      ( 11 Nov 2019 06:11 )             39.5     11-11    138  |  99  |  14  ----------------------------<  93  4.0   |  27  |  0.55    Ca    9.4      11 Nov 2019 06:11  Phos  3.2     11-11  Mg     2.2     11-11        CARDIAC MARKERS ( 07 Nov 2019 05:53 )  x     / x     / x     / 74 u/L / x     / x      CARDIAC MARKERS ( 06 Nov 2019 08:40 )  x     / x     / x     / 125 u/L / 16.93 ng/mL / x      CARDIAC MARKERS ( 05 Nov 2019 22:50 )  x     / x     / x     / 97 u/L / 9.61 ng/mL / x          Serum Pro-Brain Natriuretic Peptide: 2545 pg/mL (11-06 @ 08:40)          New ECG(s): Personally reviewed    Interpretation of Telemetry:

## 2019-11-12 NOTE — PROGRESS NOTE ADULT - PROBLEM SELECTOR PLAN 6
- C/w home Methadone ([facility] Reference #: [id number], last prescribed on 9/20 by Dr. Pastora Cantu) and Gabapentin 300mg TID.

## 2019-11-12 NOTE — PROGRESS NOTE ADULT - PROBLEM SELECTOR PLAN 2
CT imaging shows loculated pleural effusion.   - Pulmonary following, no indication for thoracentesis at this time.   - Patient follows with Dr. Byrne at [facility] Pulmonologist.

## 2019-11-12 NOTE — PROGRESS NOTE ADULT - PROBLEM SELECTOR PLAN 1
- Leukocytosis, tachycardia, tachypnea, hypoxia, meets sepsis criteria, now resolved  - CT findings of patchy infiltrates concerning for sepsis 2/2 PNA.   - UA negative. Viral Panel negative.  Lactate 1.5. Legionella Ag negative.   - Sputum cultures + Pseudomonas- C/w Cefepime. Sensitivities returned pan-sensitive- plan for 7 day course of abx, can change to PO levaquin upon dc to complete total of 7 days

## 2019-11-13 ENCOUNTER — TRANSCRIPTION ENCOUNTER (OUTPATIENT)
Age: 74
End: 2019-11-13

## 2019-11-13 VITALS — OXYGEN SATURATION: 94 %

## 2019-11-13 LAB
ANION GAP SERPL CALC-SCNC: 10 MMO/L — SIGNIFICANT CHANGE UP (ref 7–14)
BUN SERPL-MCNC: 12 MG/DL — SIGNIFICANT CHANGE UP (ref 7–23)
CALCIUM SERPL-MCNC: 8.9 MG/DL — SIGNIFICANT CHANGE UP (ref 8.4–10.5)
CHLORIDE SERPL-SCNC: 99 MMOL/L — SIGNIFICANT CHANGE UP (ref 98–107)
CO2 SERPL-SCNC: 27 MMOL/L — SIGNIFICANT CHANGE UP (ref 22–31)
CREAT SERPL-MCNC: 0.57 MG/DL — SIGNIFICANT CHANGE UP (ref 0.5–1.3)
GLUCOSE SERPL-MCNC: 89 MG/DL — SIGNIFICANT CHANGE UP (ref 70–99)
HCT VFR BLD CALC: 37.2 % — SIGNIFICANT CHANGE UP (ref 34.5–45)
HGB BLD-MCNC: 11.8 G/DL — SIGNIFICANT CHANGE UP (ref 11.5–15.5)
MAGNESIUM SERPL-MCNC: 2.3 MG/DL — SIGNIFICANT CHANGE UP (ref 1.6–2.6)
MCHC RBC-ENTMCNC: 29 PG — SIGNIFICANT CHANGE UP (ref 27–34)
MCHC RBC-ENTMCNC: 31.7 % — LOW (ref 32–36)
MCV RBC AUTO: 91.4 FL — SIGNIFICANT CHANGE UP (ref 80–100)
NRBC # FLD: 0 K/UL — SIGNIFICANT CHANGE UP (ref 0–0)
PHOSPHATE SERPL-MCNC: 3.3 MG/DL — SIGNIFICANT CHANGE UP (ref 2.5–4.5)
PLATELET # BLD AUTO: 343 K/UL — SIGNIFICANT CHANGE UP (ref 150–400)
PMV BLD: 9.2 FL — SIGNIFICANT CHANGE UP (ref 7–13)
POTASSIUM SERPL-MCNC: 4.2 MMOL/L — SIGNIFICANT CHANGE UP (ref 3.5–5.3)
POTASSIUM SERPL-SCNC: 4.2 MMOL/L — SIGNIFICANT CHANGE UP (ref 3.5–5.3)
RBC # BLD: 4.07 M/UL — SIGNIFICANT CHANGE UP (ref 3.8–5.2)
RBC # FLD: 13.8 % — SIGNIFICANT CHANGE UP (ref 10.3–14.5)
SODIUM SERPL-SCNC: 136 MMOL/L — SIGNIFICANT CHANGE UP (ref 135–145)
WBC # BLD: 7.1 K/UL — SIGNIFICANT CHANGE UP (ref 3.8–10.5)
WBC # FLD AUTO: 7.1 K/UL — SIGNIFICANT CHANGE UP (ref 3.8–10.5)

## 2019-11-13 PROCEDURE — 99232 SBSQ HOSP IP/OBS MODERATE 35: CPT

## 2019-11-13 PROCEDURE — 99233 SBSQ HOSP IP/OBS HIGH 50: CPT | Mod: GC

## 2019-11-13 PROCEDURE — 99239 HOSP IP/OBS DSCHRG MGMT >30: CPT

## 2019-11-13 RX ORDER — METOPROLOL TARTRATE 50 MG
25 TABLET ORAL
Refills: 0 | Status: DISCONTINUED | OUTPATIENT
Start: 2019-11-13 | End: 2019-11-13

## 2019-11-13 RX ORDER — METOPROLOL TARTRATE 50 MG
0.5 TABLET ORAL
Qty: 30 | Refills: 0
Start: 2019-11-13 | End: 2019-12-12

## 2019-11-13 RX ORDER — ATORVASTATIN CALCIUM 80 MG/1
1 TABLET, FILM COATED ORAL
Qty: 30 | Refills: 0
Start: 2019-11-13 | End: 2019-12-12

## 2019-11-13 RX ORDER — ASPIRIN/CALCIUM CARB/MAGNESIUM 324 MG
1 TABLET ORAL
Qty: 30 | Refills: 0
Start: 2019-11-13 | End: 2019-12-12

## 2019-11-13 RX ORDER — METOPROLOL TARTRATE 50 MG
12.5 TABLET ORAL
Refills: 0 | Status: DISCONTINUED | OUTPATIENT
Start: 2019-11-13 | End: 2019-11-13

## 2019-11-13 RX ADMIN — MONTELUKAST 10 MILLIGRAM(S): 4 TABLET, CHEWABLE ORAL at 12:16

## 2019-11-13 RX ADMIN — Medication 81 MILLIGRAM(S): at 12:15

## 2019-11-13 RX ADMIN — CEFEPIME 100 MILLIGRAM(S): 1 INJECTION, POWDER, FOR SOLUTION INTRAMUSCULAR; INTRAVENOUS at 13:17

## 2019-11-13 RX ADMIN — Medication 3 MILLILITER(S): at 11:49

## 2019-11-13 RX ADMIN — METHADONE HYDROCHLORIDE 10 MILLIGRAM(S): 40 TABLET ORAL at 09:52

## 2019-11-13 RX ADMIN — Medication 12.5 MILLIGRAM(S): at 12:19

## 2019-11-13 RX ADMIN — HEPARIN SODIUM 5000 UNIT(S): 5000 INJECTION INTRAVENOUS; SUBCUTANEOUS at 13:17

## 2019-11-13 RX ADMIN — BUDESONIDE AND FORMOTEROL FUMARATE DIHYDRATE 2 PUFF(S): 160; 4.5 AEROSOL RESPIRATORY (INHALATION) at 09:49

## 2019-11-13 RX ADMIN — Medication 3 MILLILITER(S): at 03:10

## 2019-11-13 RX ADMIN — POLYETHYLENE GLYCOL 3350 17 GRAM(S): 17 POWDER, FOR SOLUTION ORAL at 12:16

## 2019-11-13 RX ADMIN — PANTOPRAZOLE SODIUM 40 MILLIGRAM(S): 20 TABLET, DELAYED RELEASE ORAL at 05:36

## 2019-11-13 RX ADMIN — GABAPENTIN 300 MILLIGRAM(S): 400 CAPSULE ORAL at 13:16

## 2019-11-13 RX ADMIN — GABAPENTIN 300 MILLIGRAM(S): 400 CAPSULE ORAL at 05:36

## 2019-11-13 RX ADMIN — CEFEPIME 100 MILLIGRAM(S): 1 INJECTION, POWDER, FOR SOLUTION INTRAMUSCULAR; INTRAVENOUS at 05:36

## 2019-11-13 NOTE — PROGRESS NOTE ADULT - ASSESSMENT
75yo woman with PMHx chronic asthma and hypertension who presented with progressive dyspnea and an episode of severe chest pain. Noted to have hsT of 600,  pro-BNP 2500, ECG did not demonstrated active current of injury or evidence of ongoing infarction. This was reviewed with the interventional team previously and emergency angiography was deferred. The patient's CXR and chest CT demonstrate areas concerning for pneumonia. There is a right-sided pleural effusion and evidence of pulmonary congestion as well.    NSTEMI, type II  -TTE 11/7: EF 45-50% with mild segmental LV systolic dysfunction, apical inferior wall, mid anterior wall and mid inferoseptum are hypokinetic. Apical anterior wall is akinetic.  -LHC performed 11/12/2019 was with clean coronaries  -Therefore cardiac enzyme elevation was likely 2/2 to supply/demand mismatch in setting of hypoxia from asthma exacerbation and pneumonia  -Continue IV ABx and steroids, as per pulmonology recommendations  -Patient's 10 year ASCVD score is ~10%, thus classifying her into a statin benefit group; continue Lipitor 80mg PO daily (high intensity- can be dropped to moderate intensity after age 75)  -Can continue ASA 81mg PO daily for primary prevention if patient desires  -Can discontinue Plavix    Case discussed with Dr. Han.    Carolyn Morales MD PGY-4  Cardiology Fellow  Note is preliminary until signed by the attending.

## 2019-11-13 NOTE — PROGRESS NOTE ADULT - REASON FOR ADMISSION
Chest pain, SOB

## 2019-11-13 NOTE — CHART NOTE - NSCHARTNOTEFT_GEN_A_CORE
pt seen and examined by me  planned for dc today  spoke with Dr Han who rec to start low dose BB  feeling well this AM, asking if she could go home  VSS  CHEST: some rhonchi, clear with cough  HEART: reg  EXT no cce  a/w sepsis due to PNA c/b NSTEMI  to complete 1 more day of abx, can change to PO levaquin for dc  low dose BB started, f/u with cards as outpt  medically stable for dc planning  34 min spent with dc planning

## 2019-11-13 NOTE — PROGRESS NOTE ADULT - SUBJECTIVE AND OBJECTIVE BOX
INTERVAL EVENTS  Coronary angiogram done yesterday  No acute events overnight  Reports improvement in symptoms and ready to go home    OBJECTIVE    Vital Signs Last 24 Hrs  T(C): 36.7 (13 Nov 2019 11:35), Max: 37.2 (12 Nov 2019 22:21)  T(F): 98.1 (13 Nov 2019 11:35), Max: 98.9 (12 Nov 2019 22:21)  HR: 91 (13 Nov 2019 11:56) (68 - 98)  BP: 120/86 (13 Nov 2019 11:35) (106/69 - 122/69)  BP(mean): --  RR: 16 (13 Nov 2019 11:35) (16 - 16)  SpO2: 94% (13 Nov 2019 11:56) (94% - 99%)    PHYSICAL EXAM:  General: no acute distress  HEENT: normocephalic  Eyes: extraocular movements intact, pupils equal and reactive to light  Neck: supple  Respiratory: non labored breathing, coarse breath sounds bilateral  Cardiovascular: normal rate, regular rhythm, no murmur  Gastrointestinal: abdomen soft, non tender, non distended  Extremities: no lower extremity edema  Neurological: alert, oriented, no focal deficits  Psychiatric: cooperative    MEDICATIONS  (STANDING):  albuterol/ipratropium for Nebulization. 3 milliLiter(s) Nebulizer every 6 hours  aspirin enteric coated 81 milliGRAM(s) Oral daily  atorvastatin 80 milliGRAM(s) Oral at bedtime  budesonide 160 MICROgram(s)/formoterol 4.5 MICROgram(s) Inhaler 2 Puff(s) Inhalation two times a day  cefepime   IVPB 2000 milliGRAM(s) IV Intermittent every 8 hours  gabapentin 300 milliGRAM(s) Oral three times a day  heparin  Injectable 5000 Unit(s) SubCutaneous every 8 hours  methadone    Tablet 10 milliGRAM(s) Oral two times a day  metoprolol tartrate 12.5 milliGRAM(s) Oral two times a day  montelukast 10 milliGRAM(s) Oral daily  pantoprazole    Tablet 40 milliGRAM(s) Oral before breakfast  polyethylene glycol 3350 17 Gram(s) Oral daily  senna 2 Tablet(s) Oral at bedtime    MEDICATIONS  (PRN):  bisacodyl Suppository 10 milliGRAM(s) Rectal daily PRN Constipation  nitroglycerin     SubLingual 0.4 milliGRAM(s) SubLingual every 5 minutes PRN Chest Pain    LABORATORY                                                              11.8   7.10  )-----------( 343      ( 13 Nov 2019 07:00 )             37.2     11-13    136  |  99  |  12  ----------------------------<  89  4.2   |  27  |  0.57    Ca    8.9      13 Nov 2019 07:00  Phos  3.3     11-13  Mg     2.3     11-13      RADIOLOGY    CT Angio Chest w/ IV Cont (11.06.19 @ 03:01) >  Mildly motion degraded. No gross pulmonary embolism.  Mucoid impacted airways in the left greater than right lower lobes. Patchy left lower lobe airspace opacities concerning for pneumonia.   Clinical correlation is recommended.  Small to moderate loculated right pleural effusion and trace left pleural effusion with mild pulmonary edema.      < end of copied text >

## 2019-11-13 NOTE — DISCHARGE NOTE PROVIDER - HOSPITAL COURSE
This is a 74F with history as above who presents to the hospital with chest pain and SOB found to have sepsis 2/2 PNA, NSTEMI, Asthma exacerbation, and findings suggestive of fluid overload.     Found to have an NSTEMI and started on aspirin, plavix and heparin drip with plans for possible cath. CT chest  revealed patchy opacities mainly in the LLB concerning for pneumonia and a small to moderated loculated right pleural effusion. Pt was started on ceftriaxone and azithromycin. Azithromycin discontinued when legionella negative. Pt was started on Prednisone 40 mg x 5 days for an asthma exacerbation . Blood cultures then grew  GPC in clusters on blood cultures and sputum culture with pseudomonas. Ceftriaxone stopped, cefepime started. After pulmonary optimization pt underwent LHC 11/12/2019 which showed  clean coronaries. Plavix discontinued. Pt completed a total of 6 days of abx and will get one dose of Levaquin tomorrow. Pt is now medically stable for discharge home. This is a 74F with history as above who presents to the hospital with chest pain and SOB found to have sepsis 2/2 PNA, NSTEMI, Asthma exacerbation, and findings suggestive of fluid overload.     Found to have an NSTEMI and started on aspirin, plavix and heparin drip with plans for possible cath. CT chest  revealed patchy opacities mainly in the LLB concerning for pneumonia and a small to moderated loculated right pleural effusion. Pt was started on ceftriaxone and azithromycin. Azithromycin discontinued when legionella negative. Pt was started on Prednisone 40 mg x 5 days for an asthma exacerbation . Blood cultures then grew  GPC in clusters on blood cultures (likely contam as 1 out of 2 and repeat neg) and sputum culture with pseudomonas. Ceftriaxone stopped, cefepime started. After pulmonary optimization pt underwent LHC 11/12/2019 which showed  clean coronaries. Plavix discontinued. Pt completed a total of 6 days of abx and will get one dose of Levaquin tomorrow. Pt is now medically stable for discharge home.

## 2019-11-13 NOTE — PROGRESS NOTE ADULT - ASSESSMENT
75 yo woman with history of asthma admitted with asthma exacerbation, community acquired pneumonia, right sided pleural effusion and NSTEMI. Pulmonary service consulted to assist in management.     1. Community acquired pneumonia secondary to Pseudomonas aeruginosa  2. Asthma exacerbation  3. Right sided pleural effusion likely parapneumonic   4. NSTEMI    Recommendations:   - May transition to levofloxacin upon discharge to complete at least 7 days of antibiotic therapy counting from day cefepime was initiated   - Resume Advair BID upon discharge and albuterol PRN  - Repeat CT chest in 6-8 weeks   - Patient will follow with outside pulmonologist - has CD of chest imaging done here     --------------------------------------------------------  Donnie Reece, PGY-4  Pulmonary/Critical Care Fellow  Pager: [phone] ([facility]), [phone] ([facility])  Pulmonary spectra: [id number]

## 2019-11-13 NOTE — DIETITIAN INITIAL EVALUATION ADULT. - PERTINENT MEDS FT
MEDICATIONS  (STANDING):  albuterol/ipratropium for Nebulization. 3 milliLiter(s) Nebulizer every 6 hours  aspirin enteric coated 81 milliGRAM(s) Oral daily  atorvastatin 80 milliGRAM(s) Oral at bedtime  budesonide 160 MICROgram(s)/formoterol 4.5 MICROgram(s) Inhaler 2 Puff(s) Inhalation two times a day  cefepime   IVPB 2000 milliGRAM(s) IV Intermittent every 8 hours  gabapentin 300 milliGRAM(s) Oral three times a day  heparin  Injectable 5000 Unit(s) SubCutaneous every 8 hours  methadone    Tablet 10 milliGRAM(s) Oral two times a day  metoprolol tartrate 12.5 milliGRAM(s) Oral two times a day  montelukast 10 milliGRAM(s) Oral daily  pantoprazole    Tablet 40 milliGRAM(s) Oral before breakfast  polyethylene glycol 3350 17 Gram(s) Oral daily  senna 2 Tablet(s) Oral at bedtime    MEDICATIONS  (PRN):  bisacodyl Suppository 10 milliGRAM(s) Rectal daily PRN Constipation  nitroglycerin     SubLingual 0.4 milliGRAM(s) SubLingual every 5 minutes PRN Chest Pain

## 2019-11-13 NOTE — DIETITIAN INITIAL EVALUATION ADULT. - PERTINENT LABORATORY DATA
11-13 Na136 mmol/L Glu 89 mg/dL K+ 4.2 mmol/L Cr  0.57 mg/dL BUN 12 mg/dL 11-13 Phos 3.3 mg/dL 11-06 [id number] 5.2 % 11-06 Chol 161 mg/dL  mg/dL HDL 51 mg/dL Trig 51 mg/dL

## 2019-11-13 NOTE — DISCHARGE NOTE PROVIDER - NSDCMRMEDTOKEN_GEN_ALL_CORE_FT
Advair Diskus 250 mcg-50 mcg inhalation powder: 10 inhaler(s) inhaled 2 times a day  albuterol 90 mcg/inh inhalation aerosol: 2 puff(s) inhaled 4 times a day, As Needed  aspirin 81 mg oral delayed release tablet: 1 tab(s) orally once a day  atorvastatin 80 mg oral tablet: 1 tab(s) orally once a day (at bedtime)  gabapentin 300 mg oral capsule: 1 cap(s) orally 3 times a day  Levaquin 750 mg oral tablet: 1 tab(s) orally every 24 hours   methadone 10 mg oral tablet: 1 tab(s) orally 2 times a day  metoprolol tartrate 25 mg oral tablet: 0.5 tab(s) orally 2 times a day   Singulair 10 mg oral tablet: 1 tab(s) orally once a day

## 2019-11-13 NOTE — DIETITIAN INITIAL EVALUATION ADULT. - OTHER INFO
73 yo F with PMH of Asthma (with a chronic cough productive of green sputum), OA with chronic pain (on methadone and gabapentin), and HTN who presents to the hospital with chest pain and SOB found to have sepsis 2/2 PNA, NSTEMI, Asthma exacerbation, and findings suggestive of fluid overload. CT imaging shows loculated pleural effusion.   Patient reports fair appetite/ PO intake during her admission and good appetite PTA, Consuming >/=50% of meals due to dislike of hospital food. NKFA or intolerances reported. Denies any nausea/vomiting/diarrhea or difficulty chewing and swallowing. + Constipation, with BM today, prior last BM x7 days on bowel regimen. Patient reports taking Benefiber 3x/day at home.   Patient endorses recent weight loss over 2 year time frame due to persistent nausea, however reports stable weights this past year. Current weight (11/11/19) 124.3 lb. (3/18/18) 160lb.   Food recall revealed patient eating ice cream, cookies, candy or other sweets every night after dinner. Provided both verbal and written education provided: Heart healthy (DASH/TLC) therapeutic nutrition therapy provided. Discussed types of fats, nutrition label reading, no added salt to foods, and limiting eating out. Also provided patient with fiber goal and list of high fiber foods.

## 2019-11-13 NOTE — DISCHARGE NOTE PROVIDER - NSDCCPCAREPLAN_GEN_ALL_CORE_FT
PRINCIPAL DISCHARGE DIAGNOSIS  Diagnosis: Pneumonia  Assessment and Plan of Treatment: You completed Cefepime for 6 days. You have one day of antibiotics left. Levaquin was prescribed. Please follow up with your pulmonologist within a week of discharge.      SECONDARY DISCHARGE DIAGNOSES  Diagnosis: Exacerbation of asthma, unspecified asthma severity, unspecified whether persistent  Assessment and Plan of Treatment: You received 5 days of Prednisone. Please continue taking your inhaler treatments.    Diagnosis: NSTEMI (non-ST elevated myocardial infarction)  Assessment and Plan of Treatment: You had elevated cardiac enzymes when you first came into the hospital, so you were taken for cardiac cath which shows mostly clean coronary arteries. You can continue taking aspirin and it is recommended that you take atorvastatin for primary prevention of heart disease. PRINCIPAL DISCHARGE DIAGNOSIS  Diagnosis: Sepsis  Assessment and Plan of Treatment: due to pneumonia;      SECONDARY DISCHARGE DIAGNOSES  Diagnosis: Exacerbation of asthma, unspecified asthma severity, unspecified whether persistent  Assessment and Plan of Treatment: You received 5 days of Prednisone. Please continue taking your inhaler treatments.    Diagnosis: NSTEMI (non-ST elevated myocardial infarction)  Assessment and Plan of Treatment: You had elevated cardiac enzymes when you first came into the hospital, so you were taken for cardiac cath which shows mostly clean coronary arteries. You can continue taking aspirin and it is recommended that you take atorvastatin for primary prevention of heart disease.

## 2019-11-13 NOTE — PROGRESS NOTE ADULT - ATTENDING COMMENTS
Agree with above.  Pseudomonas pneumonia on cefepime  NSTEMI awaiting ischemic evaluation  DuoNEBS Q6h + albuterol Q3h PRN. Patient with long history of asthma, wishes to use inhaler rather than NEBs.  No need for thoracentesis at this time.  F/U with O/P pulmonologist.
Agree with above.  s/p cath, which showed non-obstructive CAD, no interventions performed.  c/w inhalational therapy for long-standing asthma  can transition to PO Lovenox for Pseudomonas on D/C  F/U with O/P pulmonologist  Patient can be D/C home from a pulmonary standpoint
Agree with above. Seen and examined with fellow on rounds. Still has wheezing present. Continue steroids and bronchodilators. Improving slowly.
personally saw and examined patient  labs/vitals reviewed  agree with above assessment and plan  suspect microvascualr dysfunction  would treat with asa, statin.  bb therapy would be ideal, lopressor 12.5 BID started, however pt with asthma and may not tolerate BB therapy.   will need close outpt follow up. if sob worsens would d/c bb
The patient's care was discussed with the consulting cardiology fellow.  I independently evaluated the patient.    The patient remains asymptomatic.  No chest pain is noted.  Her breathing is improved and she feels back to her baseline.  She is comfortable-appearing and in no acute distress. She is able to tolerate a flat position.  Diffuse wheezing is still heard throughout her lung fields, although the patient reports that his is consistent with what she is always told.    The case will be reviewed the interventional team to determine the appropriate timing of coronary angiography.  Continue medical therapy for ACS. The patient has required escalating doses of heparin without movement of her PTT.  At this time, I would discontinue her heparin infusion, given her decreasing cardiac biomarkers and stable clinical picture and the risk of increasing doses of heparin without understanding her lack of response (at least on laboratory measurement).    Marv Donnelly MD  Cardiology
The patient's care was discussed with the consulting cardiology fellow.  I independently evaluated the patient.    The patient remains chest pain free.  She continues to have diffuse wheezing, but reports feeling well.   Her cough is productive, but the sputum is less purulent than previously.    Coronary angiography was deferred for improvement of her pulmonary status and additional treatment of her pulmonary infection.  Continue medical therapy. Angiography is likely to be performed early next week.    Marv Donnelly MD  Cardiology
personally saw and examined patient  labs/vitals reviewed  resp status improved today  plan for [facility]
Asthma exacerbation, NSTEMI, and pneumonia with pleural effusion.  Continuing to have mild wheezing. Continue prednisone.   Need chest PT - order Aerobika to help mobilize secretions.  Continue antibiotics - sputum culture growing Pseudomonas.  No plan for thoracentesis at this time. Likely parapneumonic effusion.   Review of CT chest from 7/2019 shows no infiltrate and effusion.   Repeat Chest CT in 6 weeks and follow up with Dr. Byrne as outpatient.  Please obtain copy of CT chest from this admission and provide to patient prior to discharge.
personally saw and examined patient. labs/vitals reviewed  agree with above assessment and plan  no acute cp however pt requiring daily respiratory treatments  clinically euvolemic  can do ischemic eval as outpatient

## 2019-11-13 NOTE — DISCHARGE NOTE PROVIDER - CARE PROVIDER_API CALL
Michael Byrne)  Critical Care Medicine; Internal Medicine; Pulmonary Disease  [address] [facility], [address]  [place] [address]  Phone: [phone]  Fax: [phone]  Follow Up Time:     Kristen Hermosillo)  [address] [address]  Phone: [phone]  Fax: [phone]  Follow Up Time:

## 2019-11-13 NOTE — PROGRESS NOTE ADULT - SUBJECTIVE AND OBJECTIVE BOX
Carolyn Morales MD  Cardiology Fellow  All Cardiology service information can be found 24/7 on [email or web address], password: [email or web address]    Patient seen and examined at bedside.    Subjective:        Current Meds:  albuterol/ipratropium for Nebulization. 3 milliLiter(s) Nebulizer every 6 hours  aspirin enteric coated 81 milliGRAM(s) Oral daily  atorvastatin 80 milliGRAM(s) Oral at bedtime  bisacodyl Suppository 10 milliGRAM(s) Rectal daily PRN  budesonide 160 MICROgram(s)/formoterol 4.5 MICROgram(s) Inhaler 2 Puff(s) Inhalation two times a day  cefepime   IVPB 2000 milliGRAM(s) IV Intermittent every 8 hours  clopidogrel Tablet 75 milliGRAM(s) Oral daily  gabapentin 300 milliGRAM(s) Oral three times a day  heparin  Injectable 5000 Unit(s) SubCutaneous every 8 hours  methadone    Tablet 10 milliGRAM(s) Oral two times a day  montelukast 10 milliGRAM(s) Oral daily  nitroglycerin     SubLingual 0.4 milliGRAM(s) SubLingual every 5 minutes PRN  pantoprazole    Tablet 40 milliGRAM(s) Oral before breakfast  polyethylene glycol 3350 17 Gram(s) Oral daily  senna 2 Tablet(s) Oral at bedtime      Vitals:  T(F): 97.9 (11-13), Max: 98.9 (11-12)  HR: 85 (11-13) (81 - 100)  BP: 106/69 (11-13) (104/66 - 122/69)  RR: 16 (11-13)  SpO2: 96% (11-13)  I&O's Summary    12 Nov 2019 07:01  -  13 Nov 2019 07:00  --------------------------------------------------------  IN: 800 mL / OUT: 0 mL / NET: 800 mL        Physical Exam:  Appearance: No acute distress; well appearing  Eyes: PERRL, EOMI, pink conjunctiva  HEENT: Normal oral mucosa  Cardiovascular: RRR, S1, S2, no murmurs, rubs, or gallops; no edema; no JVD  Respiratory: Clear to auscultation bilaterally  Gastrointestinal: soft, non-tender, non-distended with normal bowel sounds  Musculoskeletal: No clubbing; no joint deformity   Neurologic: Non-focal  Lymphatic: No lymphadenopathy  Psychiatry: AAOx3, mood & affect appropriate  Skin: No rashes, ecchymoses, or cyanosis                          12.3   8.73  )-----------( 388      ( 12 Nov 2019 06:20 )             38.2     11-12    135  |  98  |  15  ----------------------------<  90  4.0   |  27  |  0.62    Ca    9.0      12 Nov 2019 06:20  Phos  4.0     11-12  Mg     2.2     11-12        CARDIAC MARKERS ( 07 Nov 2019 05:53 )  x     / x     / x     / 74 u/L / x     / x      CARDIAC MARKERS ( 06 Nov 2019 08:40 )  x     / x     / x     / 125 u/L / 16.93 ng/mL / x          Serum Pro-Brain Natriuretic Peptide: 2545 pg/mL (11-06 @ 08:40)          New ECG(s): Personally reviewed    Interpretation of Telemetry: Carolyn Morales MD  Cardiology Fellow  All Cardiology service information can be found 24/7 on [email or web address], password: [email or web address]    Patient seen and examined at bedside.    Subjective:      -SOB has resolved and patient has only minor cough at this time.  -Denies CP, palpitations, or swelling.    Current Meds:  albuterol/ipratropium for Nebulization. 3 milliLiter(s) Nebulizer every 6 hours  aspirin enteric coated 81 milliGRAM(s) Oral daily  atorvastatin 80 milliGRAM(s) Oral at bedtime  bisacodyl Suppository 10 milliGRAM(s) Rectal daily PRN  budesonide 160 MICROgram(s)/formoterol 4.5 MICROgram(s) Inhaler 2 Puff(s) Inhalation two times a day  cefepime   IVPB 2000 milliGRAM(s) IV Intermittent every 8 hours  clopidogrel Tablet 75 milliGRAM(s) Oral daily  gabapentin 300 milliGRAM(s) Oral three times a day  heparin  Injectable 5000 Unit(s) SubCutaneous every 8 hours  methadone    Tablet 10 milliGRAM(s) Oral two times a day  montelukast 10 milliGRAM(s) Oral daily  nitroglycerin     SubLingual 0.4 milliGRAM(s) SubLingual every 5 minutes PRN  pantoprazole    Tablet 40 milliGRAM(s) Oral before breakfast  polyethylene glycol 3350 17 Gram(s) Oral daily  senna 2 Tablet(s) Oral at bedtime      Vitals:  T(F): 97.9 (11-13), Max: 98.9 (11-12)  HR: 85 (11-13) (81 - 100)  BP: 106/69 (11-13) (104/66 - 122/69)  RR: 16 (11-13)  SpO2: 96% (11-13)  I&O's Summary    12 Nov 2019 07:01  -  13 Nov 2019 07:00  --------------------------------------------------------  IN: 800 mL / OUT: 0 mL / NET: 800 mL    Physical Exam:  Appearance: No acute distress; well appearing  Eyes: PERRL, EOMI, pink conjunctiva  HEENT: Normal oral mucosa  Cardiovascular: RRR, S1, S2, no murmurs, rubs, or gallops; no edema; no JVD  Respiratory: No more inspiratory wheezes appreciated; soft bibasilar crackles  Gastrointestinal: soft, non-tender, non-distended with normal bowel sounds  Musculoskeletal: No clubbing; no joint deformity   Neurologic: Non-focal  Lymphatic: No lymphadenopathy  Psychiatry: AAOx3, mood & affect appropriate  Skin: No rashes, ecchymoses, or cyanosis                        12.3   8.73  )-----------( 388      ( 12 Nov 2019 06:20 )             38.2     11-12    135  |  98  |  15  ----------------------------<  90  4.0   |  27  |  0.62    Ca    9.0      12 Nov 2019 06:20  Phos  4.0     11-12  Mg     2.2     11-12        CARDIAC MARKERS ( 07 Nov 2019 05:53 )  x     / x     / x     / 74 u/L / x     / x      CARDIAC MARKERS ( 06 Nov 2019 08:40 )  x     / x     / x     / 125 u/L / 16.93 ng/mL / x          Serum Pro-Brain Natriuretic Peptide: 2545 pg/mL (11-06 @ 08:40)          New ECG(s): Personally reviewed    Interpretation of Telemetry:

## 2019-11-14 DIAGNOSIS — J18.9 PNEUMONIA, UNSPECIFIED ORGANISM: ICD-10-CM

## 2019-11-19 PROBLEM — I10 ESSENTIAL (PRIMARY) HYPERTENSION: Chronic | Status: ACTIVE | Noted: 2019-11-06

## 2019-11-19 PROBLEM — M19.90 UNSPECIFIED OSTEOARTHRITIS, UNSPECIFIED SITE: Chronic | Status: ACTIVE | Noted: 2019-11-06

## 2019-11-19 PROBLEM — M54.9 DORSALGIA, UNSPECIFIED: Chronic | Status: ACTIVE | Noted: 2019-11-06

## 2019-11-25 ENCOUNTER — NON-APPOINTMENT (OUTPATIENT)
Age: 74
End: 2019-11-25

## 2019-11-25 ENCOUNTER — APPOINTMENT (OUTPATIENT)
Dept: CARDIOLOGY | Facility: CLINIC | Age: 74
End: 2019-11-25
Payer: MEDICARE

## 2019-11-25 VITALS
WEIGHT: 116 LBS | HEIGHT: 56 IN | BODY MASS INDEX: 26.1 KG/M2 | SYSTOLIC BLOOD PRESSURE: 132 MMHG | HEART RATE: 73 BPM | DIASTOLIC BLOOD PRESSURE: 66 MMHG | OXYGEN SATURATION: 94 % | RESPIRATION RATE: 16 BRPM | TEMPERATURE: 99.2 F

## 2019-11-25 PROCEDURE — 99213 OFFICE O/P EST LOW 20 MIN: CPT

## 2019-11-25 PROCEDURE — 93000 ELECTROCARDIOGRAM COMPLETE: CPT

## 2019-11-25 NOTE — PHYSICAL EXAM
[General Appearance - Well Developed] : well developed [General Appearance - Well Nourished] : well nourished [Heart Rate And Rhythm] : heart rate and rhythm were normal [Heart Sounds] : normal S1 and S2 [] : no respiratory distress [Respiration, Rhythm And Depth] : normal respiratory rhythm and effort [Abdomen Soft] : soft [Bowel Sounds] : normal bowel sounds [Skin Turgor] : normal skin turgor [Skin Color & Pigmentation] : normal skin color and pigmentation [Oriented To Time, Place, And Person] : oriented to person, place, and time [Mood] : the mood was normal [Affect] : the affect was normal [FreeTextEntry1] : b/l dry rhonchi

## 2019-11-25 NOTE — DISCUSSION/SUMMARY
[FreeTextEntry1] : 74F with asthma, CAD/microvascular dysfunction presents for initial CV visit following hospital discharge \par \par 1. Microvascular dysfunction \par -feeling well, asymptomatic\par -euvolemic\par -cont asa, lipitor\par -cont BB, will change lopressor 12.5 bid to toprol 25 po qd. \par -f/u 4-5 months\par -will recheck lipid panel at next visit\par

## 2019-11-25 NOTE — HISTORY OF PRESENT ILLNESS
[FreeTextEntry1] : 74F with asthma, recent hospitalization at [facility] 11/19\par \par 75yo woman with PMHx chronic asthma and hypertension who presented with progressive dyspnea and an episode of severe chest pain. Noted to have hsT of 600. TTE showed mild segmental dys, however \par cath revealed non-obstructive disease, suspect microvascular dysfunction. \par \par \par pt feeling well\par asthma under control. \par pt states she is back to her baseline function without limitation\par denies cp, sob, palpitations, sob, diaphoresis. \par pt taking her meds with good compliance, without issues\par \par now on asa 81, Lopressor 12.5, lipitor 80 \par \par \par med hx: asthma, hemachromatosis, CAD/microvascualr dys\par sx hx: knee x2, femur frx, R shoulder, R hip replacement. \par fam hx: B- CAD/MI\par social hx: quit tob 30 yrs (20 pk years), no etoh, lives with  in [place]. retired  of [employer] ([place])\par meds: advair, singulair, methadone, gabpentin, asa, lopressorv 12.5 bid, lipior 80, albuterol.\par allergies: nkda\par \par \par

## 2019-11-25 NOTE — REVIEW OF SYSTEMS
[Nausea] : nausea [Joint Pain] : joint pain [Easy Bruising] : a tendency for easy bruising [Fever] : no fever [Recent Weight Gain (___ Lbs)] : no recent weight gain [Chills] : no chills [Recent Weight Loss (___ Lbs)] : no recent weight loss [Blurry Vision] : no blurred vision [Shortness Of Breath] : no shortness of breath [Dyspnea on exertion] : not dyspnea during exertion [Chest  Pressure] : no chest pressure [Chest Pain] : no chest pain [Palpitations] : no palpitations [Abdominal Pain] : no abdominal pain [Dizziness] : no dizziness [Confusion] : no confusion was observed [Easy Bleeding] : no tendency for easy bleeding

## 2020-06-22 ENCOUNTER — NON-APPOINTMENT (OUTPATIENT)
Age: 75
End: 2020-06-22

## 2020-06-22 ENCOUNTER — APPOINTMENT (OUTPATIENT)
Dept: CARDIOLOGY | Facility: CLINIC | Age: 75
End: 2020-06-22
Payer: MEDICARE

## 2020-06-22 VITALS
HEIGHT: 56 IN | HEART RATE: 80 BPM | RESPIRATION RATE: 14 BRPM | BODY MASS INDEX: 27.51 KG/M2 | DIASTOLIC BLOOD PRESSURE: 75 MMHG | WEIGHT: 122.31 LBS | SYSTOLIC BLOOD PRESSURE: 118 MMHG | TEMPERATURE: 98.3 F | OXYGEN SATURATION: 89 %

## 2020-06-22 PROCEDURE — 99215 OFFICE O/P EST HI 40 MIN: CPT

## 2020-06-22 PROCEDURE — 93000 ELECTROCARDIOGRAM COMPLETE: CPT

## 2020-06-22 NOTE — DISCUSSION/SUMMARY
[FreeTextEntry1] : 74F with asthma, CAD/microvascular dysfunction presents for f/u\par \par 1. Microvascular dysfunction \par -feeling well, asymptomatic\par -euvolemic\par -cont asa, lipitor, toprol\par -check lipid panel today, pt requesting to d/c or decrease dose of lipitor\par -given recent persistent pna, will check for covid ab test (pt also with travel to [place] at end of 2019)\par -f/u 4-5 months\par \par

## 2020-06-22 NOTE — REVIEW OF SYSTEMS
[Fever] : no fever [Recent Weight Gain (___ Lbs)] : no recent weight gain [Chills] : no chills [Recent Weight Loss (___ Lbs)] : no recent weight loss [Blurry Vision] : no blurred vision [Shortness Of Breath] : no shortness of breath [Dyspnea on exertion] : not dyspnea during exertion [Chest Pain] : no chest pain [Chest  Pressure] : no chest pressure [Palpitations] : no palpitations [Abdominal Pain] : no abdominal pain [Nausea] : nausea [Confusion] : no confusion was observed [Dizziness] : no dizziness [Joint Pain] : joint pain [Easy Bruising] : a tendency for easy bruising [Easy Bleeding] : no tendency for easy bleeding

## 2020-06-22 NOTE — PHYSICAL EXAM
[General Appearance - Well Developed] : well developed [General Appearance - Well Nourished] : well nourished [] : no respiratory distress [Heart Rate And Rhythm] : heart rate and rhythm were normal [Respiration, Rhythm And Depth] : normal respiratory rhythm and effort [FreeTextEntry1] : b/l dry rhonchi [Edema] : no peripheral edema present [Heart Sounds] : normal S1 and S2 [Murmurs] : no murmurs present [Abdomen Soft] : soft [Skin Color & Pigmentation] : normal skin color and pigmentation [Bowel Sounds] : normal bowel sounds [Skin Turgor] : normal skin turgor [Oriented To Time, Place, And Person] : oriented to person, place, and time [Affect] : the affect was normal [Mood] : the mood was normal

## 2020-06-22 NOTE — HISTORY OF PRESENT ILLNESS
[FreeTextEntry1] : 74F with asthma, microvascular CAD, presents for f/u \par \par pt last seen in cardiology for an initial evaluation in 11/2019 following a inpt course at [facility] for CP and [facility]UCK[facility]\par On that admission, pt with trop of 600. TTE with mild segmental dys, but cath revealing non-obstructive disease, suspect microvascular dysfunction. pt continued on toprol 25, asa 81 and lipitor. \par \par today, pt states overall she feels well, denies cp or sob at rest of on exertion. pt with baseline asthma, states, asthma symptoms are stable, unchanged over the past year. \par of note, pt states she had a pna in 12/19, and again in 3/2020. pt was on cefdinir and prednisone, was on sulfamethoxathole, then 5/20, again with green sputum, again on sulfamethoxazole, then found to have psuedomonas again in the sputum, put on cipro,  now again (and currently for a 10d course, on cipro)\par \par pt taking her meds with good compliance, without issues\par \par \par \par \par med hx: asthma, hemochromatosis, CAD/microvascular dz\par sx hx: knee x2, femur frx, R shoulder, R hip replacement. \par fam hx: B- CAD/MI\par social hx: quit tob 30 yrs (20 pk years), no etoh, lives with  in [place]. retired  of [employer] ([employer])\par meds: advair, Singulair, methadone, gabapentin, asa, Toprol 25, lipitor 80, albuterol.\par allergies: nkda\par

## 2020-06-23 LAB
ANION GAP SERPL CALC-SCNC: 11 MMOL/L
BUN SERPL-MCNC: 11 MG/DL
CALCIUM SERPL-MCNC: 9.7 MG/DL
CHLORIDE SERPL-SCNC: 100 MMOL/L
CHOLEST SERPL-MCNC: 142 MG/DL
CHOLEST/HDLC SERPL: 2.3 RATIO
CO2 SERPL-SCNC: 30 MMOL/L
CREAT SERPL-MCNC: 0.64 MG/DL
GLUCOSE SERPL-MCNC: 73 MG/DL
HDLC SERPL-MCNC: 63 MG/DL
LDLC SERPL CALC-MCNC: 66 MG/DL
POTASSIUM SERPL-SCNC: 4.5 MMOL/L
SARS-COV-2 IGG SERPL IA-ACNC: 0.01 INDEX
SARS-COV-2 IGG SERPL QL IA: NEGATIVE
SODIUM SERPL-SCNC: 141 MMOL/L
TRIGL SERPL-MCNC: 67 MG/DL

## 2020-06-23 RX ORDER — ATORVASTATIN CALCIUM 80 MG/1
80 TABLET, FILM COATED ORAL
Qty: 90 | Refills: 3 | Status: DISCONTINUED | COMMUNITY
Start: 2019-11-25 | End: 2020-06-23

## 2020-08-03 ENCOUNTER — OUTPATIENT (OUTPATIENT)
Dept: OUTPATIENT SERVICES | Facility: HOSPITAL | Age: 75
LOS: 1 days | Discharge: HOME | End: 2020-08-03

## 2020-08-03 DIAGNOSIS — Z96.641 PRESENCE OF RIGHT ARTIFICIAL HIP JOINT: Chronic | ICD-10-CM

## 2020-08-03 DIAGNOSIS — Z98.890 OTHER SPECIFIED POSTPROCEDURAL STATES: Chronic | ICD-10-CM

## 2020-08-03 DIAGNOSIS — Z87.81 PERSONAL HISTORY OF (HEALED) TRAUMATIC FRACTURE: Chronic | ICD-10-CM

## 2020-08-03 DIAGNOSIS — Z96.652 PRESENCE OF LEFT ARTIFICIAL KNEE JOINT: Chronic | ICD-10-CM

## 2020-08-03 DIAGNOSIS — E83.119 HEMOCHROMATOSIS, UNSPECIFIED: ICD-10-CM

## 2020-08-05 ENCOUNTER — APPOINTMENT (OUTPATIENT)
Dept: HEMATOLOGY ONCOLOGY | Facility: CLINIC | Age: 75
End: 2020-08-05

## 2020-08-05 ENCOUNTER — LABORATORY RESULT (OUTPATIENT)
Age: 75
End: 2020-08-05

## 2020-08-05 ENCOUNTER — RESULT REVIEW (OUTPATIENT)
Age: 75
End: 2020-08-05

## 2020-08-05 LAB
BASOPHILS # BLD AUTO: 0.03 K/UL — SIGNIFICANT CHANGE UP (ref 0–0.2)
BASOPHILS NFR BLD AUTO: 0.4 % — SIGNIFICANT CHANGE UP (ref 0–2)
EOSINOPHIL # BLD AUTO: 0.19 K/UL — SIGNIFICANT CHANGE UP (ref 0–0.5)
EOSINOPHIL NFR BLD AUTO: 2.8 % — SIGNIFICANT CHANGE UP (ref 0–6)
HCT VFR BLD CALC: 40.2 % — SIGNIFICANT CHANGE UP (ref 34.5–45)
HGB BLD-MCNC: 12.8 G/DL — SIGNIFICANT CHANGE UP (ref 11.5–15.5)
IMM GRANULOCYTES NFR BLD AUTO: 0.3 % — SIGNIFICANT CHANGE UP (ref 0–1.5)
LYMPHOCYTES # BLD AUTO: 2.33 K/UL — SIGNIFICANT CHANGE UP (ref 1–3.3)
LYMPHOCYTES # BLD AUTO: 34.6 % — SIGNIFICANT CHANGE UP (ref 13–44)
MCHC RBC-ENTMCNC: 31.1 PG — SIGNIFICANT CHANGE UP (ref 27–34)
MCHC RBC-ENTMCNC: 31.8 GM/DL — LOW (ref 32–36)
MCV RBC AUTO: 97.8 FL — SIGNIFICANT CHANGE UP (ref 80–100)
MONOCYTES # BLD AUTO: 0.53 K/UL — SIGNIFICANT CHANGE UP (ref 0–0.9)
MONOCYTES NFR BLD AUTO: 7.9 % — SIGNIFICANT CHANGE UP (ref 2–14)
NEUTROPHILS # BLD AUTO: 3.64 K/UL — SIGNIFICANT CHANGE UP (ref 1.8–7.4)
NEUTROPHILS NFR BLD AUTO: 54 % — SIGNIFICANT CHANGE UP (ref 43–77)
NRBC # BLD: 0 /100 WBCS — SIGNIFICANT CHANGE UP (ref 0–0)
PLATELET # BLD AUTO: 221 K/UL — SIGNIFICANT CHANGE UP (ref 150–400)
RBC # BLD: 4.11 M/UL — SIGNIFICANT CHANGE UP (ref 3.8–5.2)
RBC # FLD: 12.7 % — SIGNIFICANT CHANGE UP (ref 10.3–14.5)
WBC # BLD: 6.74 K/UL — SIGNIFICANT CHANGE UP (ref 3.8–10.5)
WBC # FLD AUTO: 6.74 K/UL — SIGNIFICANT CHANGE UP (ref 3.8–10.5)

## 2020-08-18 ENCOUNTER — LABORATORY RESULT (OUTPATIENT)
Age: 75
End: 2020-08-18

## 2020-08-18 ENCOUNTER — APPOINTMENT (OUTPATIENT)
Dept: INFECTIOUS DISEASE | Facility: CLINIC | Age: 75
End: 2020-08-18
Payer: MEDICARE

## 2020-08-18 VITALS
WEIGHT: 124 LBS | SYSTOLIC BLOOD PRESSURE: 133 MMHG | DIASTOLIC BLOOD PRESSURE: 71 MMHG | TEMPERATURE: 99.6 F | BODY MASS INDEX: 27.9 KG/M2 | HEIGHT: 56 IN | OXYGEN SATURATION: 91 % | HEART RATE: 79 BPM

## 2020-08-18 DIAGNOSIS — A49.8 OTHER BACTERIAL INFECTIONS OF UNSPECIFIED SITE: ICD-10-CM

## 2020-08-18 PROCEDURE — 99203 OFFICE O/P NEW LOW 30 MIN: CPT

## 2020-08-18 RX ORDER — OMEPRAZOLE 40 MG/1
40 CAPSULE, DELAYED RELEASE ORAL
Qty: 60 | Refills: 0 | Status: ACTIVE | COMMUNITY
Start: 2019-11-26

## 2020-08-18 RX ORDER — ONDANSETRON 4 MG/1
4 TABLET ORAL
Qty: 90 | Refills: 0 | Status: ACTIVE | COMMUNITY
Start: 2020-01-28

## 2020-08-18 RX ORDER — METHADONE HYDROCHLORIDE 5 MG/1
TABLET ORAL
Refills: 0 | Status: ACTIVE | COMMUNITY

## 2020-08-18 RX ORDER — METHADONE HYDROCHLORIDE 10 MG/1
10 TABLET ORAL
Qty: 120 | Refills: 0 | Status: ACTIVE | COMMUNITY
Start: 2020-03-19

## 2020-08-18 RX ORDER — GABAPENTIN 300 MG/1
300 CAPSULE ORAL
Qty: 180 | Refills: 0 | Status: ACTIVE | COMMUNITY
Start: 2020-04-15

## 2020-08-18 RX ORDER — SULFAMETHOXAZOLE AND TRIMETHOPRIM 800; 160 MG/1; MG/1
800-160 TABLET ORAL
Qty: 14 | Refills: 0 | Status: COMPLETED | COMMUNITY
Start: 2020-05-02

## 2020-08-18 RX ORDER — PREDNISONE 10 MG/1
10 TABLET ORAL
Qty: 90 | Refills: 0 | Status: COMPLETED | COMMUNITY
Start: 2020-03-03

## 2020-08-18 RX ORDER — CEFDINIR 300 MG/1
300 CAPSULE ORAL
Qty: 20 | Refills: 0 | Status: COMPLETED | COMMUNITY
Start: 2020-03-03

## 2020-08-18 RX ORDER — GABAPENTIN 100 MG/1
CAPSULE ORAL
Refills: 0 | Status: ACTIVE | COMMUNITY

## 2020-08-18 RX ORDER — TIOTROPIUM BROMIDE INHALATION SPRAY 3.12 UG/1
2.5 SPRAY, METERED RESPIRATORY (INHALATION)
Qty: 4 | Refills: 0 | Status: ACTIVE | COMMUNITY
Start: 2020-06-29

## 2020-08-18 RX ORDER — ASPIRIN 81 MG
81 TABLET, DELAYED RELEASE (ENTERIC COATED) ORAL
Refills: 0 | Status: ACTIVE | COMMUNITY

## 2020-08-19 LAB
ALBUMIN SERPL ELPH-MCNC: 4.3 G/DL
ALP BLD-CCNC: 97 U/L
ALT SERPL-CCNC: 17 U/L
ANION GAP SERPL CALC-SCNC: 12 MMOL/L
AST SERPL-CCNC: 22 U/L
BILIRUB SERPL-MCNC: 0.4 MG/DL
BUN SERPL-MCNC: 13 MG/DL
CALCIUM SERPL-MCNC: 9.8 MG/DL
CHLORIDE SERPL-SCNC: 100 MMOL/L
CO2 SERPL-SCNC: 31 MMOL/L
CREAT SERPL-MCNC: 0.64 MG/DL
GLUCOSE SERPL-MCNC: 78 MG/DL
INR PPP: 0.94 RATIO
POTASSIUM SERPL-SCNC: 5.3 MMOL/L
PROT SERPL-MCNC: 6.4 G/DL
PT BLD: 11.1 SEC
SODIUM SERPL-SCNC: 143 MMOL/L

## 2020-08-20 ENCOUNTER — APPOINTMENT (OUTPATIENT)
Dept: OTOLARYNGOLOGY | Facility: CLINIC | Age: 75
End: 2020-08-20
Payer: MEDICARE

## 2020-08-20 VITALS
BODY MASS INDEX: 27.9 KG/M2 | DIASTOLIC BLOOD PRESSURE: 73 MMHG | HEIGHT: 56 IN | SYSTOLIC BLOOD PRESSURE: 143 MMHG | TEMPERATURE: 98.1 F | WEIGHT: 124 LBS | HEART RATE: 87 BPM

## 2020-08-20 DIAGNOSIS — H61.21 IMPACTED CERUMEN, RIGHT EAR: ICD-10-CM

## 2020-08-20 PROCEDURE — 92567 TYMPANOMETRY: CPT

## 2020-08-20 PROCEDURE — G0268 REMOVAL OF IMPACTED WAX MD: CPT

## 2020-08-20 PROCEDURE — 92557 COMPREHENSIVE HEARING TEST: CPT

## 2020-08-20 PROCEDURE — 99204 OFFICE O/P NEW MOD 45 MIN: CPT | Mod: 25

## 2020-08-20 NOTE — HISTORY OF PRESENT ILLNESS
[de-identified] : Patient was supposed to take tobramycin for live bacteria in her lung but they wanted her to have a hearing test before starting it. She also admits that she does have issues with her hearing bilaterally that hs been progressive over the years. She does not have any pain in the ears and does nto have any drianage or pressure.

## 2020-08-20 NOTE — ASSESSMENT
[FreeTextEntry1] : Referred to be started by some antibiotics by infectious disease for pulmonary issue they want to get a baseline hearing test prior to treatment with potentially ototoxic side effects medications.  Audiogram was performed we do have a baseline and a copy was given to the patient.

## 2020-08-21 NOTE — REVIEW OF SYSTEMS
[Sputum] : coughing up ~M sputum [Cough] : cough [Joint Pain] : joint pain [Negative] : Heme/Lymph [FreeTextEntry8] : constipated [Pleuritic Chest Pain] : no pleuritic chest pain

## 2020-08-21 NOTE — ASSESSMENT
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 She has a chronic cough but it has not worsened. \par \par She was not able to obtain inhaled tobramycin due to expense.\par \par I have discussed with her pulmonologist, Dr. Byrne, that I agree she is colonized with pseudomonas. I don't think a course of IV antibiotics at this time is indicated. If she worsens or symptoms change I can try a course of cefepime. Unable to use fluoroquinolones at this time b/c her isoalte is resistant.\par \par I will repeat sputum 3 for bacteria, fungus, and afb. \par Will check labs as well.\par IgG noted to be mildly low at the time of this note and pt prefers to d/w pulm prior to seeing immunology.  \par \par She can return any time to see me.

## 2020-08-21 NOTE — CONSULT LETTER
[Dear  ___] : Dear  [unfilled], [Please see my note below.] : Please see my note below. [Consult Letter:] : I had the pleasure of evaluating your patient, [unfilled]. [Consult Closing:] : Thank you very much for allowing me to participate in the care of this patient.  If you have any questions, please do not hesitate to contact me. [Sincerely,] : Sincerely, [FreeTextEntry2] : Dr. Michael Byrne [FreeTextEntry3] : \par Siomara Massey MD\par  of Medicine\par Division of Infectious Diseases\par [facility]\par [address] [place] [address]\[place] [address]\par Tel: [phone]\par Fax: [phone]\par Email: [email or web address] \par \par [facility]\par Visit us at [facility] [email or web address]

## 2020-08-21 NOTE — PHYSICAL EXAM
[General Appearance - Alert] : alert [General Appearance - In No Acute Distress] : in no acute distress [General Appearance - Well Nourished] : well nourished [Sclera] : the sclera and conjunctiva were normal [General Appearance - Well-Appearing] : healthy appearing [PERRL With Normal Accommodation] : pupils were equal in size, round, reactive to light [Outer Ear] : the ears and nose were normal in appearance [Oropharynx] : the oropharynx was normal with no thrush [Extraocular Movements] : extraocular movements were intact [Neck Appearance] : the appearance of the neck was normal [Neck Cervical Mass (___cm)] : no neck mass was observed [Jugular Venous Distention Increased] : there was no jugular-venous distention [] : no respiratory distress [Heart Rate And Rhythm] : heart rate was normal and rhythm regular [Heart Sounds Gallop] : no gallops [Murmurs] : no murmurs [Heart Sounds] : normal S1 and S2 [Edema] : there was no peripheral edema [Heart Sounds Pericardial Friction Rub] : no pericardial rub [Bowel Sounds] : normal bowel sounds [Abdomen Soft] : soft [Cervical Lymph Nodes Enlarged Posterior Bilaterally] : posterior cervical [Supraclavicular Lymph Nodes Enlarged Bilaterally] : supraclavicular [Cervical Lymph Nodes Enlarged Anterior Bilaterally] : anterior cervical [Skin Lesions] : no skin lesions [No Focal Deficits] : no focal deficits [Oriented To Time, Place, And Person] : oriented to person, place, and time [Affect] : the affect was normal [FreeTextEntry1] : Diffuse wheezing and rhonchi

## 2020-08-21 NOTE — HISTORY OF PRESENT ILLNESS
[FreeTextEntry1] : This is a 73 yo F with h/o chronic bronchitis, chronic cough, asthma, pulmonary pseudomonal colonization, asthma, arthritis, HLD, HTN, osteoporosis, hemachromatosis, chronic pain who presents today for chronic cough and pseudomonas in sputum.\par \par She feels at her baseline and doesn't feel worse.\par Denies fevers or chills. \par Has chronic cough.\par Was supposed to have labs as per Dr. Byrne and i can send them today.\par She uses Acapella device and nebulizers.\par \par She is not taking prednisone now.\par \par She continues to grow pseudomonas in sputum.\par Had pneumonia twice since last 11/2019.\par Was prescribed inhaled tobramycin but it was very expensive (>$1,000 for 60 day supply) and she was not able to obtain it.\par \par Asked to evaluate today for possible need for IV antibiotics.\par \par She also has grown CINDY in her sputum before.  Not treated for this.\par She was referred for audiology testing prior to inhaled nora which she has done.  \par

## 2020-09-01 LAB — BACTERIA SPT CULT: ABNORMAL

## 2020-09-16 LAB — FUNGUS SPT CULT: ABNORMAL

## 2020-10-02 LAB — ACID FAST STN SPT: ABNORMAL

## 2020-10-21 ENCOUNTER — RX RENEWAL (OUTPATIENT)
Age: 75
End: 2020-10-21

## 2020-12-15 ENCOUNTER — APPOINTMENT (OUTPATIENT)
Dept: CARDIOLOGY | Facility: CLINIC | Age: 75
End: 2020-12-15
Payer: MEDICARE

## 2020-12-15 ENCOUNTER — NON-APPOINTMENT (OUTPATIENT)
Age: 75
End: 2020-12-15

## 2020-12-15 VITALS
TEMPERATURE: 98.6 F | DIASTOLIC BLOOD PRESSURE: 68 MMHG | OXYGEN SATURATION: 95 % | BODY MASS INDEX: 29.02 KG/M2 | HEIGHT: 56 IN | SYSTOLIC BLOOD PRESSURE: 124 MMHG | RESPIRATION RATE: 14 BRPM | HEART RATE: 78 BPM | WEIGHT: 129 LBS

## 2020-12-15 PROCEDURE — 99215 OFFICE O/P EST HI 40 MIN: CPT

## 2020-12-15 PROCEDURE — 93000 ELECTROCARDIOGRAM COMPLETE: CPT

## 2020-12-15 PROCEDURE — 99072 ADDL SUPL MATRL&STAF TM PHE: CPT

## 2020-12-15 NOTE — HISTORY OF PRESENT ILLNESS
[FreeTextEntry1] : 75F with asthma, microvascular CAD, presents for f/u \par \par pt last seen in cardiology 6/22/2020 for follow up. at that time pt felt well overall without cp, sob at rest or on exertion. \par prev, pt was hospitalized at [facility] for PC, trop 600,  TTE with mild segmental dys, but cath revealing non-obstructive disease, suspect microvascular dysfunction. pt continued  asa 81 and lipitor. pt states she is no longer on toprol 25 (unsure why)\par \par today, pt states overall she feels well, states she had a asthma attack last month and experienced some chest burning that improved with advair. denies cp, sob, more than usual from asthma, denies SOB on exertion.  denies orthopnea, does endorse L edema  L > R which started a few week ago.\par \par \par Of note, pt with mulitple reent PNA's though to be colonized with psuedomonas, sees ID (Dr Massey), and Pulm (Dr Byrne)\par \par pt taking her meds with good compliance, without issues\par \par \par \par \par med hx: asthma, hemochromatosis, CAD/microvascular dz\par sx hx: knee x2, femur frx, R shoulder, R hip replacement. \par fam hx: B- CAD/MI\par social hx: quit tob 30 yrs (20 pk years), no etoh, lives with  in [place]. 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

## 2020-12-15 NOTE — REVIEW OF SYSTEMS
[Fever] : no fever [Recent Weight Gain (___ Lbs)] : no recent weight gain [Chills] : no chills [Recent Weight Loss (___ Lbs)] : no recent weight loss [Blurry Vision] : no blurred vision [Sore Throat] : no sore throat [see HPI] : see HPI [Shortness Of Breath] : no shortness of breath [Dyspnea on exertion] : not dyspnea during exertion [Chest  Pressure] : no chest pressure [Chest Pain] : no chest pain [Lower Ext Edema] : lower extremity edema [Palpitations] : no palpitations [Cough] : cough [Wheezing] : wheezing [Abdominal Pain] : no abdominal pain [Nausea] : no nausea [Joint Pain] : joint pain [Dizziness] : no dizziness [Confusion] : no confusion was observed [Easy Bleeding] : no tendency for easy bleeding [Easy Bruising] : a tendency for easy bruising

## 2020-12-15 NOTE — PHYSICAL EXAM
[General Appearance - Well Developed] : well developed [General Appearance - Well Nourished] : well nourished [] : no respiratory distress [Respiration, Rhythm And Depth] : normal respiratory rhythm and effort [Heart Rate And Rhythm] : heart rate and rhythm were normal [Heart Sounds] : normal S1 and S2 [Murmurs] : no murmurs present [Bowel Sounds] : normal bowel sounds [Abdomen Soft] : soft [FreeTextEntry1] : uses cane [Skin Color & Pigmentation] : normal skin color and pigmentation [Skin Turgor] : normal skin turgor [Oriented To Time, Place, And Person] : oriented to person, place, and time [Affect] : the affect was normal [Mood] : the mood was normal

## 2020-12-15 NOTE — DISCUSSION/SUMMARY
[FreeTextEntry1] : 74F with asthma, CAD/microvascular dysfunction presents for f/u\par \par 1. Microvascular dysfunction \par -feeling well, asymptomatic\par -euvolemic\par -cont asa, lipitor\par -no loner on toprol, unclear why, pt does not wish to restart it at this time. \par -f/u 4-5 months\par \par 2. LE edema\par -will check LE duplex\par

## 2020-12-22 ENCOUNTER — APPOINTMENT (OUTPATIENT)
Dept: CARDIOLOGY | Facility: CLINIC | Age: 75
End: 2020-12-22

## 2020-12-28 ENCOUNTER — OUTPATIENT (OUTPATIENT)
Dept: OUTPATIENT SERVICES | Facility: HOSPITAL | Age: 75
LOS: 1 days | End: 2020-12-28
Payer: MEDICARE

## 2020-12-28 ENCOUNTER — APPOINTMENT (OUTPATIENT)
Dept: ULTRASOUND IMAGING | Facility: IMAGING CENTER | Age: 75
End: 2020-12-28
Payer: MEDICARE

## 2020-12-28 ENCOUNTER — RESULT REVIEW (OUTPATIENT)
Age: 75
End: 2020-12-28

## 2020-12-28 DIAGNOSIS — Z98.890 OTHER SPECIFIED POSTPROCEDURAL STATES: Chronic | ICD-10-CM

## 2020-12-28 DIAGNOSIS — Z87.81 PERSONAL HISTORY OF (HEALED) TRAUMATIC FRACTURE: Chronic | ICD-10-CM

## 2020-12-28 DIAGNOSIS — R60.0 LOCALIZED EDEMA: ICD-10-CM

## 2020-12-28 DIAGNOSIS — Z96.641 PRESENCE OF RIGHT ARTIFICIAL HIP JOINT: Chronic | ICD-10-CM

## 2020-12-28 DIAGNOSIS — Z96.652 PRESENCE OF LEFT ARTIFICIAL KNEE JOINT: Chronic | ICD-10-CM

## 2020-12-28 PROCEDURE — 93970 EXTREMITY STUDY: CPT

## 2020-12-28 PROCEDURE — 93970 EXTREMITY STUDY: CPT | Mod: 26

## 2021-02-16 ENCOUNTER — APPOINTMENT (OUTPATIENT)
Dept: WOUND CARE | Facility: CLINIC | Age: 76
End: 2021-02-16
Payer: MEDICARE

## 2021-02-16 VITALS
SYSTOLIC BLOOD PRESSURE: 155 MMHG | TEMPERATURE: 96.6 F | RESPIRATION RATE: 18 BRPM | BODY MASS INDEX: 29.15 KG/M2 | HEART RATE: 73 BPM | WEIGHT: 130 LBS | DIASTOLIC BLOOD PRESSURE: 85 MMHG

## 2021-02-16 DIAGNOSIS — Z86.39 PERSONAL HISTORY OF OTHER ENDOCRINE, NUTRITIONAL AND METABOLIC DISEASE: ICD-10-CM

## 2021-02-16 DIAGNOSIS — Z80.3 FAMILY HISTORY OF MALIGNANT NEOPLASM OF BREAST: ICD-10-CM

## 2021-02-16 DIAGNOSIS — L97.919 NON-PRESSURE CHRONIC ULCER OF UNSPECIFIED PART OF RIGHT LOWER LEG WITH UNSPECIFIED SEVERITY: ICD-10-CM

## 2021-02-16 DIAGNOSIS — Z86.79 PERSONAL HISTORY OF OTHER DISEASES OF THE CIRCULATORY SYSTEM: ICD-10-CM

## 2021-02-16 DIAGNOSIS — Z87.891 PERSONAL HISTORY OF NICOTINE DEPENDENCE: ICD-10-CM

## 2021-02-16 DIAGNOSIS — M54.16 RADICULOPATHY, LUMBAR REGION: ICD-10-CM

## 2021-02-16 PROCEDURE — 11042 DBRDMT SUBQ TIS 1ST 20SQCM/<: CPT

## 2021-02-16 PROCEDURE — 99214 OFFICE O/P EST MOD 30 MIN: CPT | Mod: 25

## 2021-02-16 PROCEDURE — 99072 ADDL SUPL MATRL&STAF TM PHE: CPT

## 2021-02-16 RX ORDER — ALBUTEROL SULFATE 2.5 MG/3ML
(2.5 MG/3ML) SOLUTION RESPIRATORY (INHALATION)
Qty: 300 | Refills: 0 | Status: ACTIVE | COMMUNITY
Start: 2020-12-02

## 2021-02-16 RX ORDER — FLUOXETINE HYDROCHLORIDE 20 MG/1
20 CAPSULE ORAL
Qty: 90 | Refills: 0 | Status: ACTIVE | COMMUNITY
Start: 2020-11-11

## 2021-02-16 RX ORDER — CIPROFLOXACIN HYDROCHLORIDE 500 MG/1
500 TABLET, FILM COATED ORAL TWICE DAILY
Qty: 14 | Refills: 2 | Status: COMPLETED | COMMUNITY
Start: 2018-01-19 | End: 2021-02-16

## 2021-02-16 RX ORDER — FUROSEMIDE 20 MG/1
20 TABLET ORAL
Qty: 180 | Refills: 0 | Status: COMPLETED | COMMUNITY
Start: 2017-02-06 | End: 2021-02-16

## 2021-02-16 RX ORDER — HYDROCHLOROTHIAZIDE 25 MG/1
25 TABLET ORAL
Qty: 30 | Refills: 0 | Status: COMPLETED | COMMUNITY
Start: 2017-01-20 | End: 2021-02-16

## 2021-02-16 RX ORDER — METOCLOPRAMIDE 10 MG/1
10 TABLET ORAL
Qty: 60 | Refills: 0 | Status: ACTIVE | COMMUNITY
Start: 2020-10-13

## 2021-02-16 RX ORDER — PREDNISOLONE ACETATE 10 MG/ML
1 SUSPENSION/ DROPS OPHTHALMIC
Qty: 5 | Refills: 0 | Status: COMPLETED | COMMUNITY
Start: 2020-11-25 | End: 2021-02-16

## 2021-02-16 RX ORDER — DILTIAZEM HYDROCHLORIDE 180 MG/1
180 CAPSULE, EXTENDED RELEASE ORAL
Qty: 90 | Refills: 0 | Status: COMPLETED | COMMUNITY
Start: 2016-12-19 | End: 2021-02-16

## 2021-02-16 RX ORDER — ONDANSETRON 8 MG/1
8 TABLET ORAL
Qty: 90 | Refills: 0 | Status: ACTIVE | COMMUNITY
Start: 2020-10-30

## 2021-02-16 RX ORDER — NEPAFENAC 3 MG/ML
0.3 SUSPENSION/ DROPS OPHTHALMIC
Qty: 2 | Refills: 0 | Status: ACTIVE | COMMUNITY
Start: 2020-11-25

## 2021-02-16 RX ORDER — AMOXICILLIN 500 MG/1
500 CAPSULE ORAL
Qty: 30 | Refills: 0 | Status: COMPLETED | COMMUNITY
Start: 2018-02-13 | End: 2021-02-16

## 2021-02-16 RX ORDER — GATIFLOXACIN 5 MG/ML
0.5 SOLUTION/ DROPS OPHTHALMIC
Qty: 2 | Refills: 0 | Status: COMPLETED | COMMUNITY
Start: 2020-11-25 | End: 2021-02-16

## 2021-02-16 NOTE — HISTORY OF PRESENT ILLNESS
[de-identified] : hx lt ankle break 2014\par ASTHEMA\par HEART ATTACK 2019\par  [de-identified] : Trauma wd to rt le .  pt accidently kicked herself with lt heel\par  causing a laceration to anterior RUGHT  medial lower calf. 1//30/2021.\par \par severity- NO FEVER, DID BLEED ALOT, NOW BETTER\par timing/duration 3 WEEKS\par quality PAIN, RED, USING MEDIHONEY\par

## 2021-02-16 NOTE — REASON FOR VISIT
[Initial Evaluation] : an initial evaluation [Spouse] : spouse [Family Member] : family member [FreeTextEntry1] : RIGHT LEG ULCER

## 2021-02-16 NOTE — ASSESSMENT
[FreeTextEntry1] : \par 75 YR OLD WOMAN WITH NEW RIGHT LEG WOUND H/O PREVIOUS TRAUMA, RADICULOPATHY HTN\par  data complex- mod lab,xr -reviewed, no films needed, consider venous dopplers if not healing\par culture obtained today\par risk- surgery- tolerated sharp debridement- tender\par time 20\par

## 2021-02-16 NOTE — PHYSICAL EXAM
[JVD] : no jugular venous distention  [Normal Breath Sounds] : Normal breath sounds [Normal Rate and Rhythm] : normal rate and rhythm [Abdomen Tenderness] : ~T ~M No abdominal tenderness [1+] : left 1+ [Skin Ulcer] : ulcer [Alert] : alert [Oriented to Person] : oriented to person [Oriented to Place] : oriented to place [Oriented to Time] : oriented to time [Calm] : calm [de-identified] : NAD [de-identified] : NL [de-identified] : LAMIN [de-identified] : H/O HIP SURGERY

## 2021-02-19 LAB — BACTERIA SPEC CULT: NORMAL

## 2021-03-05 ENCOUNTER — APPOINTMENT (OUTPATIENT)
Dept: WOUND CARE | Facility: CLINIC | Age: 76
End: 2021-03-05
Payer: MEDICARE

## 2021-03-05 VITALS
HEIGHT: 56 IN | TEMPERATURE: 95.3 F | BODY MASS INDEX: 29.25 KG/M2 | HEART RATE: 91 BPM | DIASTOLIC BLOOD PRESSURE: 90 MMHG | SYSTOLIC BLOOD PRESSURE: 158 MMHG | WEIGHT: 130 LBS

## 2021-03-05 VITALS — SYSTOLIC BLOOD PRESSURE: 146 MMHG | DIASTOLIC BLOOD PRESSURE: 77 MMHG

## 2021-03-05 DIAGNOSIS — M48.061 SPINAL STENOSIS, LUMBAR REGION WITHOUT NEUROGENIC CLAUDICATION: ICD-10-CM

## 2021-03-05 PROCEDURE — 99213 OFFICE O/P EST LOW 20 MIN: CPT

## 2021-03-05 PROCEDURE — 99072 ADDL SUPL MATRL&STAF TM PHE: CPT

## 2021-03-05 RX ORDER — HYDROCHLOROTHIAZIDE 12.5 MG/1
TABLET ORAL
Refills: 0 | Status: ACTIVE | COMMUNITY

## 2021-03-05 RX ORDER — ATORVASTATIN CALCIUM 20 MG/1
20 TABLET, FILM COATED ORAL
Qty: 30 | Refills: 3 | Status: DISCONTINUED | COMMUNITY
Start: 2020-06-23 | End: 2021-03-05

## 2021-03-05 RX ORDER — CHLORHEXIDINE GLUCONATE, 0.12% ORAL RINSE 1.2 MG/ML
0.12 SOLUTION DENTAL
Qty: 473 | Refills: 0 | Status: DISCONTINUED | COMMUNITY
Start: 2018-02-13 | End: 2021-03-05

## 2021-03-05 RX ORDER — HYDROCODONE BITARTRATE AND ACETAMINOPHEN 5; 325 MG/1; MG/1
5-325 TABLET ORAL
Qty: 60 | Refills: 0 | Status: DISCONTINUED | COMMUNITY
Start: 2017-01-18 | End: 2021-03-05

## 2021-03-09 NOTE — HISTORY OF PRESENT ILLNESS
[de-identified] : hx lt ankle break 2014\par ASTHMA\par HEART ATTACK 2019\par Trauma wd to rt le .  pt accidently kicked herself with lt heel\par  causing a laceration to anterior RUGHT  medial lower calf. 1//30/2021.\par severity- NO FEVER, DID BLEED ALOT, NOW BETTER\par timing/duration 3 WEEKS\par quality PAIN, RED, USING MEDIHONEY\par

## 2021-03-09 NOTE — PHYSICAL EXAM
[1+] : left 1+ [Skin Ulcer] : ulcer [Alert] : alert [Oriented to Person] : oriented to person [Oriented to Place] : oriented to place [Oriented to Time] : oriented to time [Calm] : calm [Normal Breath Sounds] : Normal breath sounds [Normal Rate and Rhythm] : normal rate and rhythm [JVD] : no jugular venous distention  [Abdomen Tenderness] : ~T ~M No abdominal tenderness [de-identified] : NAD [de-identified] : LAMIN [de-identified] : NL [de-identified] : H/O HIP SURGERY

## 2021-03-09 NOTE — ASSESSMENT
[FreeTextEntry1] : \par 75 YR OLD WOMAN WITH NEW RIGHT LEG WOUND\par doing well with medihoney\par  H/O PREVIOUS TRAUMA, RADICULOPATHY HTN\par  data complex- mod lab,xr -reviewed, no films needed, consider venous dopplers if not healing\par culture obtained  last visit - negative\par risk- surgery- tolerated sharp debridement- tender\par time 20\par 3/5/21\par improvement noted pt is providing wd care daily with medihoney gel was using telfa pad findings maceration to alyson wd \par recommendation to use regular gauze to aid in absorption away from wd bed \par

## 2021-03-09 NOTE — PLAN
[FreeTextEntry1] : recommendation for vascular studies \par continue with medihoney gel / gauze qod \par plan to return 2 weeks \par

## 2021-03-10 ENCOUNTER — APPOINTMENT (OUTPATIENT)
Dept: WOUND CARE | Facility: CLINIC | Age: 76
End: 2021-03-10

## 2021-03-18 ENCOUNTER — APPOINTMENT (OUTPATIENT)
Dept: WOUND CARE | Facility: CLINIC | Age: 76
End: 2021-03-18

## 2021-03-20 ENCOUNTER — TRANSCRIPTION ENCOUNTER (OUTPATIENT)
Age: 76
End: 2021-03-20

## 2021-04-12 ENCOUNTER — APPOINTMENT (OUTPATIENT)
Dept: WOUND CARE | Facility: CLINIC | Age: 76
End: 2021-04-12
Payer: MEDICARE

## 2021-04-12 VITALS
DIASTOLIC BLOOD PRESSURE: 79 MMHG | TEMPERATURE: 97.4 F | RESPIRATION RATE: 16 BRPM | HEART RATE: 80 BPM | SYSTOLIC BLOOD PRESSURE: 161 MMHG

## 2021-04-12 DIAGNOSIS — R60.0 LOCALIZED EDEMA: ICD-10-CM

## 2021-04-12 PROCEDURE — 93970 EXTREMITY STUDY: CPT

## 2021-04-12 PROCEDURE — 99072 ADDL SUPL MATRL&STAF TM PHE: CPT

## 2021-04-12 PROCEDURE — 99213 OFFICE O/P EST LOW 20 MIN: CPT

## 2021-04-12 PROCEDURE — 93922 UPR/L XTREMITY ART 2 LEVELS: CPT

## 2021-04-12 RX ORDER — LOTEPREDNOL ETABONATE 10 MG/ML
1 SUSPENSION TOPICAL
Qty: 3 | Refills: 0 | Status: ACTIVE | COMMUNITY
Start: 2021-03-11

## 2021-04-12 RX ORDER — LEVOFLOXACIN 500 MG/1
500 TABLET, FILM COATED ORAL
Qty: 7 | Refills: 0 | Status: DISCONTINUED | COMMUNITY
Start: 2021-03-31

## 2021-04-12 RX ORDER — FLUOXETINE HYDROCHLORIDE 10 MG/1
10 CAPSULE ORAL
Qty: 90 | Refills: 0 | Status: DISCONTINUED | COMMUNITY
Start: 2020-10-12 | End: 2021-04-12

## 2021-04-14 PROBLEM — R60.0 LOWER EXTREMITY EDEMA: Status: ACTIVE | Noted: 2020-12-15

## 2021-04-14 NOTE — PHYSICAL EXAM
[JVD] : no jugular venous distention  [Normal Breath Sounds] : Normal breath sounds [Normal Rate and Rhythm] : normal rate and rhythm [1+] : left 1+ [Abdomen Tenderness] : ~T ~M No abdominal tenderness [Skin Ulcer] : ulcer [Alert] : alert [Oriented to Person] : oriented to person [Oriented to Place] : oriented to place [Oriented to Time] : oriented to time [Calm] : calm [de-identified] : NAD [de-identified] : NL [de-identified] : LAMIN [de-identified] : H/O HIP SURGERY

## 2021-04-14 NOTE — PLAN
[FreeTextEntry1] : Ms. SHEELA MENDEZ is a 75 year with persistent and worsening  leftt lower extremity venous insufficiency, CEAP classification C5 which is  unresponsive to at least 3 months of compression stockings 20-30 mmHg, leg elevation, exercise  Patient has complaints of worsening  leg discomfort with swelling, fatigue, heaviness, achiness, . Patient has intact pulses in both legs without evidence of arterial insufficiency.  \par \par Treatment is indicated not for cosmetic reasons but for symptomatic venous reflux disease with symptoms which is refractory to conservative therapy. Venous duplex study demonstrates  left  lower extremity venous insufficiency. The need for definitive effective treatment is based on severe, interfering and worsening reflux symptoms with evidence of venous insufficiency on venous ultrasound. \par \par Patient is a candidate for endovenous ablation treatment of the  leftl GSV. \par The risks and benefits of endovenous ablation treatment versus continued conservative management were discussed with the patient.  Patient chooses endovenous ablation treatments. Treatment plan to be scheduled. \par \par \par

## 2021-04-14 NOTE — ASSESSMENT
[FreeTextEntry1] : \par 75 YR OLD WOMAN WITH NEW RIGHT LEG WOUND\par doing well with medihoney\par  H/O PREVIOUS TRAUMA, RADICULOPATHY HTN\par  data complex- mod lab,xr -reviewed, no films needed, consider venous dopplers if not healing\par culture obtained  last visit - negative\par risk- surgery- tolerated sharp debridement- tender\par time 20\par 3/5/21\par improvement noted pt is providing wd care daily with medihoney gel was using telfa pad findings maceration to alyson wd \par recommendation to use regular gauze to aid in absorption away from wd bed \par \par 4/12/21\par wounds healed.  Bilateral lower extremity swelling with lymphedema to the LLE secondary to LLE knee replacement.  AGUS/PvR wnl.  LLE demonstrates significant reflux to the LLE with reflux.  Results d/w paitent and .  \par

## 2021-04-14 NOTE — HISTORY OF PRESENT ILLNESS
[FreeTextEntry1] : Ms. SHEELA MENDEZ   h/o asthma, MI 2019, R ankle fracture 2014 presents to the office with a wound for since Jan 30, 2021.  The wound is located on  the right leg secondary to iatrogenic trauma- .  The patient has complaints of now healed wound-.   The patient has been dressing the wound with medihoney prior.  The patient denies fevers or chills.  The patient has localized pain to the wound upon dressing changes.  The patient has no other complaints or associated symptoms. \par \par

## 2021-05-18 ENCOUNTER — APPOINTMENT (OUTPATIENT)
Dept: VASCULAR SURGERY | Facility: CLINIC | Age: 76
End: 2021-05-18
Payer: MEDICARE

## 2021-05-18 PROCEDURE — 36475 ENDOVENOUS RF 1ST VEIN: CPT | Mod: RT

## 2021-05-18 PROCEDURE — 99072 ADDL SUPL MATRL&STAF TM PHE: CPT

## 2021-05-18 RX ORDER — SODIUM BICARBONATE 84 MG/ML
8.4 INJECTION, SOLUTION INTRAVENOUS
Qty: 1 | Refills: 0 | Status: COMPLETED | COMMUNITY
Start: 2021-05-12 | End: 2021-05-18

## 2021-05-18 RX ORDER — LIDOCAINE HYDROCHLORIDE 10 MG/ML
1 INJECTION, SOLUTION INFILTRATION; PERINEURAL
Qty: 1 | Refills: 0 | Status: COMPLETED | COMMUNITY
Start: 2021-05-12 | End: 2021-05-18

## 2021-05-18 RX ORDER — SODIUM CHLORIDE 9 G/ML
0.9 INJECTION, SOLUTION INTRAVENOUS
Qty: 1 | Refills: 0 | Status: COMPLETED | COMMUNITY
Start: 2021-05-12 | End: 2021-05-18

## 2021-05-18 NOTE — PROCEDURE
[FreeTextEntry1] : left GSV RFA [FreeTextEntry3] : Procedural safety checklist and time out completed:\par Confirmed patient identification (Patient Name, , and/or medical record number including when possible affirmation by patient or parent/family/other).\par Confirmed procedure with the patient. Consent present, accurate and signed. \par Confirmed special equipment and supplies are present.\par Sterility confirmed. Position verified. \par Site/ side is marked and visible and confirmed. \par Procedure confirmed by consent. Accurate consent including side and site.\par Review of medical records, including venous ultrasound, noting correct procedure including site and side.\par MD/PA verifies presence and review of imaging studies and or written report of imaging studies.\par Agreement on the procedure to be performed\par Time out completed.\par All of the above has been confirmed by the team.\par All patient-specific concerns have been addressed. \par \par Indication: left lower extremity varicose veins with inflammation, leg pain, leg swelling, and leg cramping.  Venous insufficiency/ reflux.\par \par Procedure: radiofrequency ablation of the left great saphenous vein. \par 	\par Ms. SHEELA MENDEZ is a 75 year old F with a history of left lower extremity varicose veins previously seen in the office.  Ultrasound examination demonstrated venous insufficiency. A trial of compression stockings, exercise, elevation, and pain medication was attempted without relief and definitive treatment with radiofrequency ablation was offered. \par \par The patient has come for radiofrequency ablation treatment of the left great saphenous vein. Pre-procedure COVID PCR test was negative.\par I have discussed the risks of the procedure at length with the patient. The risks discussed were inclusive of but not limited to infection, irritation at the site of infiltration of local anesthesia, and also rare risk of deep venous thrombosis and pulmonary emboli. The patient agrees to proceed with the procedure. \par The patient was escorted into the procedure room and a time out called.\par The entire limb was prepped and draped in sterile fashion. The RF fiber was placed on the sterile field and connected by a sterile cable. Actuation, temperature, and impedance testing were performed to ensure that all components were connected and operating properly. \par The patient was placed on the procedure table and local anesthesia was instilled in the skin overlying the access site. Under ultrasound guidance, the vein was punctured with a micropuncture needle, using the anterior wall technique. A guide wire was now introduced through the needle, and the needle was then exchanged over the guide wire for a 7F sheath. The guide wire was removed and the RF probe was then placed into the left great saphenous vein through the sheath and position confirmed using ultrasound guidance. After the RF probe position was verified by ultrasound, tumescent anesthesia consisting of normal saline, 1% lidocaine with 8.4% sodium bicarbonate was infiltrated, under ultrasound guidance, precisely into the perivenous compartment along the entire length of the vein until a “halo” of fluid was noted around the vein. After RF probe position was again confirmed with ultrasound imaging, RF energy was applied. The probe was gradually and carefully withdrawn at a rate of 6.5cm/20seconds. \par \par 6 cycles of RF performed using the 7 cm probe\par Total treatment time was 120 seconds.\par The total volume injected was 400 cc\par Treatment length was 33 cm and \par The probe is 3.6 cm from the SFJ.\par \par Estimated Blood Loss: minimal\par Repeat ultrasound of the treated vein was performed confirming successful treatment. The catheter and sheath were withdrawn and hemostasis established with direct pressure. After assuring hemostasis, a sterile 4x4 was placed on the access site and an ACE compression wrap was applied. Patient tolerated procedure well. Patient was given post-procedure instructions and follow up appointment was scheduled.\par \par \par

## 2021-05-21 ENCOUNTER — APPOINTMENT (OUTPATIENT)
Dept: VASCULAR SURGERY | Facility: CLINIC | Age: 76
End: 2021-05-21
Payer: MEDICARE

## 2021-05-21 PROCEDURE — 93971 EXTREMITY STUDY: CPT | Mod: LT

## 2021-05-21 PROCEDURE — 99072 ADDL SUPL MATRL&STAF TM PHE: CPT

## 2021-06-18 ENCOUNTER — NON-APPOINTMENT (OUTPATIENT)
Age: 76
End: 2021-06-18

## 2021-06-21 ENCOUNTER — APPOINTMENT (OUTPATIENT)
Dept: WOUND CARE | Facility: CLINIC | Age: 76
End: 2021-06-21
Payer: MEDICARE

## 2021-06-21 VITALS
HEART RATE: 73 BPM | BODY MASS INDEX: 29.25 KG/M2 | RESPIRATION RATE: 16 BRPM | HEIGHT: 56 IN | SYSTOLIC BLOOD PRESSURE: 153 MMHG | WEIGHT: 130 LBS | DIASTOLIC BLOOD PRESSURE: 80 MMHG

## 2021-06-21 VITALS — TEMPERATURE: 97.2 F

## 2021-06-21 DIAGNOSIS — I83.893 VARICOSE VEINS OF BILATERAL LOWER EXTREMITIES WITH OTHER COMPLICATIONS: ICD-10-CM

## 2021-06-21 PROCEDURE — 99213 OFFICE O/P EST LOW 20 MIN: CPT

## 2021-06-21 PROCEDURE — 99072 ADDL SUPL MATRL&STAF TM PHE: CPT

## 2021-06-21 RX ORDER — PREDNISONE 50 MG/1
TABLET ORAL
Refills: 0 | Status: ACTIVE | COMMUNITY

## 2021-06-21 RX ORDER — FLUTICASONE FUROATE, UMECLIDINIUM BROMIDE AND VILANTEROL TRIFENATATE 100; 62.5; 25 UG/1; UG/1; UG/1
100-62.5-25 POWDER RESPIRATORY (INHALATION)
Qty: 60 | Refills: 0 | Status: ACTIVE | COMMUNITY
Start: 2021-05-06

## 2021-06-21 RX ORDER — CEFDINIR 300 MG/1
300 CAPSULE ORAL
Refills: 0 | Status: ACTIVE | COMMUNITY

## 2021-06-21 NOTE — HISTORY OF PRESENT ILLNESS
[FreeTextEntry1] : Ms. SHEELA MENDEZ   h/o asthma, MI 2019, R ankle fracture 2014 presents to the office with a wound for since Jan 30, 2021.  The wound is located on  the right leg secondary to iatrogenic trauma- .  The patient has complaints of now healed wound-.   The patient has been dressing the wound with medihoney prior.  The patient denies fevers or chills.  The patient has localized pain to the wound upon dressing changes.  The patient has no other complaints or associated symptoms. Patient has a h/o back surgery for osteoarthritis.  She has difficulty bending over to put on her compression stockings.\par \par

## 2021-06-21 NOTE — PHYSICAL EXAM
[JVD] : no jugular venous distention  [Normal Breath Sounds] : Normal breath sounds [Normal Rate and Rhythm] : normal rate and rhythm [1+] : left 1+ [Abdomen Tenderness] : ~T ~M No abdominal tenderness [Skin Ulcer] : ulcer [Alert] : alert [Oriented to Person] : oriented to person [Oriented to Place] : oriented to place [Oriented to Time] : oriented to time [Calm] : calm [de-identified] : NAD [de-identified] : LAMIN [de-identified] : NL [de-identified] : H/O HIP SURGERY

## 2021-06-30 ENCOUNTER — APPOINTMENT (OUTPATIENT)
Dept: WOUND CARE | Facility: CLINIC | Age: 76
End: 2021-06-30
Payer: MEDICARE

## 2021-06-30 DIAGNOSIS — I89.0 LYMPHEDEMA, NOT ELSEWHERE CLASSIFIED: ICD-10-CM

## 2021-06-30 DIAGNOSIS — T14.90XA INJURY, UNSPECIFIED, INITIAL ENCOUNTER: ICD-10-CM

## 2021-06-30 PROCEDURE — 93971 EXTREMITY STUDY: CPT

## 2021-06-30 PROCEDURE — 99072 ADDL SUPL MATRL&STAF TM PHE: CPT

## 2021-06-30 PROCEDURE — 99213 OFFICE O/P EST LOW 20 MIN: CPT

## 2021-07-01 PROBLEM — T14.90XA CLOSED WOUND: Status: ACTIVE | Noted: 2021-07-01

## 2021-07-02 ENCOUNTER — APPOINTMENT (OUTPATIENT)
Dept: INFECTIOUS DISEASE | Facility: CLINIC | Age: 76
End: 2021-07-02
Payer: MEDICARE

## 2021-07-02 VITALS
HEIGHT: 56 IN | OXYGEN SATURATION: 97 % | RESPIRATION RATE: 16 BRPM | WEIGHT: 135 LBS | HEART RATE: 70 BPM | SYSTOLIC BLOOD PRESSURE: 145 MMHG | TEMPERATURE: 99.7 F | DIASTOLIC BLOOD PRESSURE: 80 MMHG | BODY MASS INDEX: 30.37 KG/M2

## 2021-07-02 PROCEDURE — 99072 ADDL SUPL MATRL&STAF TM PHE: CPT

## 2021-07-02 PROCEDURE — 99214 OFFICE O/P EST MOD 30 MIN: CPT

## 2021-07-02 NOTE — ASSESSMENT
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  Check repeat cultures x 3. \par Check cbc, cmp, inr in preparation for PICC line.  Hope to use cefepime.  \par \par Unable to use fluoroquinolones at this time b/c her recent isolate is resistant.\par \par I will repeat sputum 3 for bacteria, fungus, and afb. \par Will check labs as well.\par IgG noted to be mildly low last year.  Can consider AI evaluation. \par \par RTC 1 month.

## 2021-07-02 NOTE — CONSULT LETTER
[Dear  ___] : Dear  [unfilled], [Consult Letter:] : I had the pleasure of evaluating your patient, [unfilled]. [Please see my note below.] : Please see my note below. [Consult Closing:] : Thank you very much for allowing me to participate in the care of this patient.  If you have any questions, please do not hesitate to contact me. [Sincerely,] : Sincerely, [FreeTextEntry2] : Dr. Michael Byrne [FreeTextEntry3] : \par Siomara Massey MD\par  of Medicine\par Division of Infectious Diseases\par [facility] [employer] [facility] [employer] [facility]\par [address] [place] [address]\[place] [address]\par Tel: [phone]\par Fax: [phone]\par Email: [email or web address] \par \par [facility]\par Visit us at [facility] [email or web address]

## 2021-07-02 NOTE — REVIEW OF SYSTEMS
[Cough] : cough [Sputum] : coughing up ~M sputum [Joint Pain] : joint pain [Negative] : Heme/Lymph [Pleuritic Chest Pain] : no pleuritic chest pain [FreeTextEntry8] : constipated

## 2021-07-02 NOTE — HISTORY OF PRESENT ILLNESS
[FreeTextEntry1] : This is a 74 yo F with h/o chronic bronchitis, chronic cough, asthma, pulmonary pseudomonal colonization, asthma, arthritis, HLD, HTN, osteoporosis, hemachromatosis, chronic pain who presents today for chronic cough and pseudomonas in sputum.  Last checked 5/2021.  Given course of levaquin 3/2021 and then cefdinir x 2 to see if helped with cough. No improvement.  Sent back to ID for possible IV antibiotics for pseudomonas. Last isolate resistant to quinolones.\par \par Denies fevers or chills. \par Has chronic cough.  Cough is worse than last year. Dark in color.\par She uses Acapella device and nebulizers.\par \par She continues to grow pseudomonas in sputum.\par Was prescribed inhaled tobramycin but it was very expensive (>$1,000 for 60 day supply) and she was not able to obtain it.\par \par Asked to evaluate today for possible need for IV antibiotics.\par \par She also has grown CINDY in her sputum before.  Not treated for this.\par She was referred for audiology testing prior to inhaled nora which she has done.  \par \par Recent ablation LLE. Left leg is swollen and red since then. No improvement after antibiotic. She is going to try compression with wrap as per wound care. \par

## 2021-07-02 NOTE — PHYSICAL EXAM
[General Appearance - Alert] : alert [General Appearance - In No Acute Distress] : in no acute distress [General Appearance - Well Nourished] : well nourished [General Appearance - Well-Appearing] : healthy appearing [Sclera] : the sclera and conjunctiva were normal [PERRL With Normal Accommodation] : pupils were equal in size, round, reactive to light [Extraocular Movements] : extraocular movements were intact [Outer Ear] : the ears and nose were normal in appearance [Oropharynx] : the oropharynx was normal with no thrush [Neck Appearance] : the appearance of the neck was normal [Neck Cervical Mass (___cm)] : no neck mass was observed [] : no respiratory distress [Jugular Venous Distention Increased] : there was no jugular-venous distention [Heart Rate And Rhythm] : heart rate was normal and rhythm regular [Heart Sounds] : normal S1 and S2 [Heart Sounds Gallop] : no gallops [Murmurs] : no murmurs [Heart Sounds Pericardial Friction Rub] : no pericardial rub [Edema] : there was no peripheral edema [Bowel Sounds] : normal bowel sounds [Abdomen Soft] : soft [Cervical Lymph Nodes Enlarged Posterior Bilaterally] : posterior cervical [Cervical Lymph Nodes Enlarged Anterior Bilaterally] : anterior cervical [Supraclavicular Lymph Nodes Enlarged Bilaterally] : supraclavicular [Skin Lesions] : no skin lesions [No Focal Deficits] : no focal deficits [Oriented To Time, Place, And Person] : oriented to person, place, and time [Affect] : the affect was normal [Exaggerated Use Of Accessory Muscles For Inspiration] : no accessory muscle use [FreeTextEntry1] : Diffuse wheezing and rhonchi

## 2021-07-06 LAB
ALBUMIN SERPL ELPH-MCNC: 3.8 G/DL
ALP BLD-CCNC: 80 U/L
ALT SERPL-CCNC: 16 U/L
ANION GAP SERPL CALC-SCNC: 10 MMOL/L
AST SERPL-CCNC: 25 U/L
BASOPHILS # BLD AUTO: 0.04 K/UL
BASOPHILS NFR BLD AUTO: 0.4 %
BILIRUB SERPL-MCNC: 0.5 MG/DL
BUN SERPL-MCNC: 19 MG/DL
CALCIUM SERPL-MCNC: 9.1 MG/DL
CHLORIDE SERPL-SCNC: 98 MMOL/L
CO2 SERPL-SCNC: 31 MMOL/L
CREAT SERPL-MCNC: 0.63 MG/DL
EOSINOPHIL # BLD AUTO: 0.17 K/UL
EOSINOPHIL NFR BLD AUTO: 1.7 %
GLUCOSE SERPL-MCNC: 76 MG/DL
HCT VFR BLD CALC: 44.2 %
HGB BLD-MCNC: 14 G/DL
IMM GRANULOCYTES NFR BLD AUTO: 0.3 %
INR PPP: 0.96 RATIO
LYMPHOCYTES # BLD AUTO: 2.99 K/UL
LYMPHOCYTES NFR BLD AUTO: 30.7 %
MAN DIFF?: NORMAL
MCHC RBC-ENTMCNC: 31 PG
MCHC RBC-ENTMCNC: 31.7 GM/DL
MCV RBC AUTO: 98 FL
MONOCYTES # BLD AUTO: 0.83 K/UL
MONOCYTES NFR BLD AUTO: 8.5 %
NEUTROPHILS # BLD AUTO: 5.68 K/UL
NEUTROPHILS NFR BLD AUTO: 58.4 %
PLATELET # BLD AUTO: 214 K/UL
POTASSIUM SERPL-SCNC: 4.5 MMOL/L
PROT SERPL-MCNC: 6.2 G/DL
PT BLD: 11.3 SEC
RBC # BLD: 4.51 M/UL
RBC # FLD: 13 %
SODIUM SERPL-SCNC: 139 MMOL/L
WBC # FLD AUTO: 9.74 K/UL

## 2021-07-09 PROBLEM — I89.0 LYMPHEDEMA: Status: RESOLVED | Noted: 2021-07-01 | Resolved: 2021-07-09

## 2021-07-09 NOTE — HISTORY OF PRESENT ILLNESS
[FreeTextEntry1] : Ms. SHEELA MENDEZ   h/o asthma, MI 2019, R ankle fracture 2014 presents to the office with a wound for since Jan 30, 2021 now healed.  \par \par Previously, the wound is located on  the right leg secondary to iatrogenic trauma- .  The patient has complaints of now healed wound-.   The patient has been dressing the wound with medihoney prior.  The patient denies fevers or chills.  The patient has localized pain to the wound upon dressing changes.  The patient has no other complaints or associated symptoms. Patient has a h/o back surgery for osteoarthritis.  She has difficulty bending over to put on her compression stockings.\par \par

## 2021-07-09 NOTE — ASSESSMENT
[FreeTextEntry1] : \par 75 YR OLD WOMAN WITH NEW RIGHT LEG WOUND\par doing well with medihoney\par  H/O PREVIOUS TRAUMA, RADICULOPATHY HTN\par  data complex- mod lab,xr -reviewed, no films needed, consider venous dopplers if not healing\par culture obtained  last visit - negative\par risk- surgery- tolerated sharp debridement- tender\par time 20\par 3/5/21\par improvement noted pt is providing wd care daily with medihoney gel was using telfa pad findings maceration to alyson wd \par recommendation to use regular gauze to aid in absorption away from wd bed \par \par 4/12/21\par wounds healed.  Bilateral lower extremity swelling with lymphedema to the LLE secondary to LLE knee replacement.  AGUS/PvR wnl.  LLE demonstrates significant reflux to the LLE with reflux.  Results d/w pitent and .  Edema noted on venous reflux study.  No evidence of venous reflux.\par Continue compression therapy.  Patient would benefit from a lymphedema pump secondary to chronic lymphedema

## 2021-07-09 NOTE — PHYSICAL EXAM
[Normal Breath Sounds] : Normal breath sounds [Normal Rate and Rhythm] : normal rate and rhythm [1+] : left 1+ [Skin Ulcer] : ulcer [Alert] : alert [Oriented to Person] : oriented to person [Oriented to Place] : oriented to place [Oriented to Time] : oriented to time [Calm] : calm [JVD] : no jugular venous distention  [Abdomen Tenderness] : ~T ~M No abdominal tenderness [de-identified] : NAD [de-identified] : LAMIN [de-identified] : NL [de-identified] : H/O HIP SURGERY

## 2021-07-19 LAB
BACTERIA SPT CULT: ABNORMAL

## 2021-07-21 ENCOUNTER — NON-APPOINTMENT (OUTPATIENT)
Age: 76
End: 2021-07-21

## 2021-07-26 ENCOUNTER — OUTPATIENT (OUTPATIENT)
Dept: OUTPATIENT SERVICES | Facility: HOSPITAL | Age: 76
LOS: 1 days | End: 2021-07-26
Payer: MEDICARE

## 2021-07-26 ENCOUNTER — RESULT REVIEW (OUTPATIENT)
Age: 76
End: 2021-07-26

## 2021-07-26 VITALS — SYSTOLIC BLOOD PRESSURE: 139 MMHG | OXYGEN SATURATION: 95 % | TEMPERATURE: 100 F | DIASTOLIC BLOOD PRESSURE: 51 MMHG

## 2021-07-26 DIAGNOSIS — Z98.890 OTHER SPECIFIED POSTPROCEDURAL STATES: Chronic | ICD-10-CM

## 2021-07-26 DIAGNOSIS — Z87.81 PERSONAL HISTORY OF (HEALED) TRAUMATIC FRACTURE: Chronic | ICD-10-CM

## 2021-07-26 DIAGNOSIS — Z96.652 PRESENCE OF LEFT ARTIFICIAL KNEE JOINT: Chronic | ICD-10-CM

## 2021-07-26 DIAGNOSIS — A49.8 OTHER BACTERIAL INFECTIONS OF UNSPECIFIED SITE: ICD-10-CM

## 2021-07-26 DIAGNOSIS — Z96.641 PRESENCE OF RIGHT ARTIFICIAL HIP JOINT: Chronic | ICD-10-CM

## 2021-07-26 PROCEDURE — 36573 INSJ PICC RS&I 5 YR+: CPT | Mod: 53

## 2021-07-26 PROCEDURE — C1751: CPT

## 2021-07-26 PROCEDURE — 36573 INSJ PICC RS&I 5 YR+: CPT

## 2021-07-26 RX ORDER — GABAPENTIN 400 MG/1
1 CAPSULE ORAL
Qty: 0 | Refills: 0 | DISCHARGE

## 2021-07-26 RX ORDER — FLUTICASONE PROPIONATE AND SALMETEROL 50; 250 UG/1; UG/1
10 POWDER ORAL; RESPIRATORY (INHALATION)
Qty: 0 | Refills: 0 | DISCHARGE

## 2021-07-26 NOTE — ASU DISCHARGE PLAN (ADULT/PEDIATRIC) - NURSING INSTRUCTIONS
Please feel free to contact us at [phone] if any problems arise. After 6PM, Monday through Friday, on weekends and on holidays, please call [phone] and ask for the radiology resident on call to be paged..

## 2021-07-26 NOTE — ASU PREOP CHECKLIST - ISOLATION PRECAUTIONS
----- Message from Jenifer Thomas RN sent at 10/24/2018  4:00 PM CDT -----  Pt scheduled for Bilateral reduction mammoplasty by Dr. Jarrett 11/5/18  Scheduled for 3.5 hours general anesthesia.  Request medical clearance jazlyn.      NADEGE Thomas RN BC  Pre-op anesthesia  
Attempted to call pt to schedule sx clearance appt. LMOM requesting a return call to schedule.   
none

## 2021-07-26 NOTE — PRE PROCEDURE NOTE - PRE PROCEDURE EVALUATION
Interventional Radiology    HPI: 75y Female with PMHx COPD presenting for PICC line placement for IV antibiotic therapy for pseudomonas pneumonia.     Allergies: No Known Allergies    Medications (Abx/Cardiac/Anticoagulation/Blood Products)  Home Medications:  albuterol 90 mcg/inh inhalation aerosol: 2 puff(s) inhaled 4 times a day, As Needed (26 Jul 2021 09:20)  FLUoxetine 10 mg oral tablet: orally 2 times a day (26 Jul 2021 09:20)  gabapentin 300 mg oral capsule: 1 cap(s) orally 2 times a day (26 Jul 2021 09:20)  hydroCHLOROthiazide 12.5 mg oral tablet: 1 tab(s) orally once a day (26 Jul 2021 09:20)  methadone 10 mg oral tablet: 1 tab(s) orally 2 times a day (26 Jul 2021 09:20)  omeprazole 40 mg oral delayed release capsule: 40 milligram(s) orally 2 times a day (26 Jul 2021 09:20)  Singulair 10 mg oral tablet: 1 tab(s) orally once a day (26 Jul 2021 09:20)  Zofran 8 mg oral tablet: 1 tab(s) orally 3 times a day, As Needed (26 Jul 2021 09:20)    Data:  T(C): 37.7  HR: --  BP: 139/51  RR: --  SpO2: 95%    Exam  General: No acute distress  Chest: Non labored breathing  Abdomen: Non-distended  Extremities: No swelling, warm      Plan: 75y Female presents for PICC line placement   -Risks/Benefits/alternatives explained with the patient and/or healthcare proxy and witnessed informed consent obtained.

## 2021-07-26 NOTE — ASU DISCHARGE PLAN (ADULT/PEDIATRIC) - BRAND OF COVID-19 VACCINATION
JACKELYN FRIED


Moderna dose 1 and 2

## 2021-07-26 NOTE — ASU DISCHARGE PLAN (ADULT/PEDIATRIC) - ASU DC SPECIAL INSTRUCTIONSFT
PICC Placement    Discharge Instructions  - You have had a PICC implanted in your arm.   - The PICC is ready for use.  - You may shower as long as the PICC and dressing remains dry.  -  No soaking or swimming until the PICC is removed or when the site is completely healed.  - Keep the area covered and dry for as long as the PICC remains in. It may be removed and changed by a nurse as needed.  - Do not perform any heavy lifting or put tension on the area for the next week or until the site is healed.  - You may resume your normal diet.  - You may resume your normal medications however you should wait 48 hours before restarting aspirin, plavix, or blood thinners.  - It is normal to experience some pain over the site for the next few days. You may take apply ice to the area (20 minutes on, 20 minutes off) and take Tylenol for that pain. Do not take more frequently than every 6 hours and do not exceed more than 3000mg of Tylenol in a 24 hour period.    Notify your primary physician and/or Interventional Radiology IMMEDIATELY if you experience any of the following       - Fever of 100.4F or 38C       - Chills or Rigors/ Shakes       - Swelling and/or Redness in the area around the port       - Worsening Pain       - Blood soaked bandages or worsening bleeding       - Lightheadedness and/or dizziness upon standing       - Chest Pain/ Tightness       - Shortness of Breath       - Difficulty walking    If you have a problem that you believe requires IMMEDIATE attention, please go to your NEAREST Emergency Room. If you believe your problem can safely wait until you speak to a physician, please call Interventional Radiology for any concerns.    During Normal Weekday Business Hours- You can contact the Interventional Radiology department during normal business hours via telephone.  During Evenings and Weekends- If you need to contact Interventional Radiology during off hours, do so by calling the hospital and requesting to be connected to the Interventional Radiologist on call.

## 2021-07-28 DIAGNOSIS — Z45.2 ENCOUNTER FOR ADJUSTMENT AND MANAGEMENT OF VASCULAR ACCESS DEVICE: ICD-10-CM

## 2021-07-28 DIAGNOSIS — B96.5 PSEUDOMONAS (AERUGINOSA) (MALLEI) (PSEUDOMALLEI) AS THE CAUSE OF DISEASES CLASSIFIED ELSEWHERE: ICD-10-CM

## 2021-08-06 LAB — FUNGUS SPT CULT: ABNORMAL

## 2021-08-09 ENCOUNTER — APPOINTMENT (OUTPATIENT)
Dept: INFECTIOUS DISEASE | Facility: CLINIC | Age: 76
End: 2021-08-09
Payer: MEDICARE

## 2021-08-09 VITALS
WEIGHT: 132 LBS | HEIGHT: 56 IN | DIASTOLIC BLOOD PRESSURE: 54 MMHG | SYSTOLIC BLOOD PRESSURE: 123 MMHG | OXYGEN SATURATION: 95 % | BODY MASS INDEX: 29.69 KG/M2 | TEMPERATURE: 99.1 F | HEART RATE: 74 BPM | RESPIRATION RATE: 16 BRPM

## 2021-08-09 PROCEDURE — 99213 OFFICE O/P EST LOW 20 MIN: CPT

## 2021-08-09 NOTE — REVIEW OF SYSTEMS
[Cough] : cough [Sputum] : coughing up ~M sputum [Joint Pain] : joint pain [Pleuritic Chest Pain] : no pleuritic chest pain [Negative] : Gastrointestinal

## 2021-08-09 NOTE — PHYSICAL EXAM
[General Appearance - Alert] : alert [General Appearance - In No Acute Distress] : in no acute distress [General Appearance - Well Nourished] : well nourished [General Appearance - Well-Appearing] : healthy appearing [Sclera] : the sclera and conjunctiva were normal [PERRL With Normal Accommodation] : pupils were equal in size, round, reactive to light [Extraocular Movements] : extraocular movements were intact [Outer Ear] : the ears and nose were normal in appearance [Oropharynx] : the oropharynx was normal with no thrush [Neck Appearance] : the appearance of the neck was normal [Neck Cervical Mass (___cm)] : no neck mass was observed [Jugular Venous Distention Increased] : there was no jugular-venous distention [] : no respiratory distress [Exaggerated Use Of Accessory Muscles For Inspiration] : no accessory muscle use [Heart Rate And Rhythm] : heart rate was normal and rhythm regular [Heart Sounds] : normal S1 and S2 [Heart Sounds Gallop] : no gallops [Murmurs] : no murmurs [Heart Sounds Pericardial Friction Rub] : no pericardial rub [Edema] : there was no peripheral edema [Bowel Sounds] : normal bowel sounds [Abdomen Soft] : soft [Cervical Lymph Nodes Enlarged Posterior Bilaterally] : posterior cervical [Cervical Lymph Nodes Enlarged Anterior Bilaterally] : anterior cervical [Supraclavicular Lymph Nodes Enlarged Bilaterally] : supraclavicular [Skin Lesions] : no skin lesions [No Focal Deficits] : no focal deficits [Oriented To Time, Place, And Person] : oriented to person, place, and time [Affect] : the affect was normal [FreeTextEntry1] : [facility] RUE - site karyna

## 2021-08-09 NOTE — HISTORY OF PRESENT ILLNESS
[FreeTextEntry1] : This is a 74 yo F with h/o chronic bronchitis, chronic cough, asthma, pulmonary pseudomonal colonization, asthma, arthritis, HLD, HTN, osteoporosis, hemachromatosis, chronic pain who presents today for chronic cough and pseudomonas in sputum.  Last checked 5/2021.  Given course of levaquin 3/2021 and then cefdinir x 2 to see if helped with cough. No improvement.  Sent back to ID for possible IV antibiotics for pseudomonas. Last isolate resistant to quinolones and intermediate to cefepime. \par \par Denies fevers or chills. \par Has chronic cough.  Cough is worse than last year. Dark in color.\par She uses Acapella device and nebulizers.\par \par She continues to grow pseudomonas in sputum.\par Was prescribed inhaled tobramycin but it was very expensive (>$1,000 for 60 day supply) and she was not able to obtain it.\par \par She also has grown CINDY in her sputum before.  Not treated for this.\par She was referred for audiology testing prior to inhaled nora which she has done.  \par \par Since last visit:\par Started IV meropenem TID for pseudomonal lung infection. Started 7/27/2021.\par Planned for 3 weeks but at the 2 week beck today and not feeling much better yet. May have to extend to 4 weeks. \par Weekly labs done last week and stable.

## 2021-08-09 NOTE — ASSESSMENT
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  \par She has a cough with productive thick, dark phlegm.  This only has very mild improvement.\par Would like to continue treating pseudomonas.\par Will plan for 4 weeks. End date 8/23/21.\par \par Unable to use fluoroquinolones at this time b/c her recent isolate is resistant.\par Unable to use cefepime as isolate had intermediate resistance. \par \par Will check labs weekly. Stable and wnl last week.\par IgG noted to be mildly low last year.  Can consider AI evaluation. \par \par RTC 3 weeks.

## 2021-08-09 NOTE — CONSULT LETTER
[Dear  ___] : Dear  [unfilled], [Consult Letter:] : I had the pleasure of evaluating your patient, [unfilled]. [Please see my note below.] : Please see my note below. [Consult Closing:] : Thank you very much for allowing me to participate in the care of this patient.  If you have any questions, please do not hesitate to contact me. [Sincerely,] : Sincerely, [FreeTextEntry2] : Dr. Michael Byrne [FreeTextEntry3] : \par Siomara Massey MD\par  of Medicine\par Division of Infectious Diseases\par [employer] [facility] [employer] [facility]\par [address] [place] [address]\[place] [address]\par Tel: [phone]\par Fax: [phone]\par Email: [email or web address] \par \par [facility]\par Visit us at [facility] [email or web address]

## 2021-08-11 NOTE — REVIEW OF SYSTEMS
[Hearing Loss] : hearing loss [Negative] : Heme/Lymph O-T Plasty Text: The defect edges were debeveled with a #15 scalpel blade.  Given the location of the defect, shape of the defect and the proximity to free margins an O-T plasty was deemed most appropriate.  Using a sterile surgical marker, an appropriate O-T plasty was drawn incorporating the defect and placing the expected incisions within the relaxed skin tension lines where possible.    The area thus outlined was incised deep to adipose tissue with a #15 scalpel blade.  The skin margins were undermined to an appropriate distance in all directions utilizing iris scissors.

## 2021-08-31 ENCOUNTER — APPOINTMENT (OUTPATIENT)
Dept: INFECTIOUS DISEASE | Facility: CLINIC | Age: 76
End: 2021-08-31
Payer: MEDICARE

## 2021-08-31 VITALS
SYSTOLIC BLOOD PRESSURE: 145 MMHG | WEIGHT: 131 LBS | BODY MASS INDEX: 29.47 KG/M2 | HEIGHT: 56 IN | TEMPERATURE: 98.6 F | RESPIRATION RATE: 16 BRPM | HEART RATE: 72 BPM | DIASTOLIC BLOOD PRESSURE: 71 MMHG | OXYGEN SATURATION: 94 %

## 2021-08-31 LAB
ACID FAST STN SPT: NORMAL
FUNGUS SPT CULT: ABNORMAL

## 2021-08-31 PROCEDURE — 99213 OFFICE O/P EST LOW 20 MIN: CPT

## 2021-08-31 NOTE — HISTORY OF PRESENT ILLNESS
[FreeTextEntry1] : This is a 74 yo F with h/o chronic bronchitis, chronic cough, asthma, pulmonary pseudomonal colonization, asthma, arthritis, HLD, HTN, osteoporosis, hemachromatosis, chronic pain who presents today for chronic cough and pseudomonas in sputum.  Last checked 5/2021.  Given course of levaquin 3/2021 and then cefdinir x 2 to see if helped with cough. No improvement.  Sent back to ID for possible IV antibiotics for pseudomonas. Last isolate resistant to quinolones and intermediate to cefepime. \par \par Denies fevers or chills. \par Has chronic cough.  Cough is worse than last year. Dark in color.\par She uses Acapella device and nebulizers.\par \par She continues to grow pseudomonas in sputum.\par Was prescribed inhaled tobramycin but it was very expensive (>$1,000 for 60 day supply) and she was not able to obtain it.\par \par She also has grown CINDY in her sputum before.  Not treated for this.\par She was referred for audiology testing prior to inhaled nora which she has done.  \par \par \par Started IV meropenem TID for pseudomonal lung infection. Started 7/27/2021.\par Planned for 3 weeks but at the 2 week beck today and not feeling much better yet. Extended to 4 weeks. \par Weekly labs done 8/17  and were stable\par \par Reports she feels very tired since finishing abx and wobbly on her feet. Was not as mobile with home IV abx though.

## 2021-08-31 NOTE — REVIEW OF SYSTEMS
[Feeling Tired] : feeling tired [Cough] : cough [Sputum] : coughing up ~M sputum [Pleuritic Chest Pain] : no pleuritic chest pain [Joint Pain] : joint pain [Negative] : Heme/Lymph

## 2021-08-31 NOTE — CONSULT LETTER
[Dear  ___] : Dear  [unfilled], [Consult Letter:] : I had the pleasure of evaluating your patient, [unfilled]. [Please see my note below.] : Please see my note below. [Consult Closing:] : Thank you very much for allowing me to participate in the care of this patient.  If you have any questions, please do not hesitate to contact me. [Sincerely,] : Sincerely, [FreeTextEntry2] : Dr. Michael Byrne [FreeTextEntry3] : \par Siomara Massey MD\par  of Medicine\par Division of Infectious Diseases\par [employer] [facility] [employer] at [facility]\par [address] [place] [address]\[place] [address]\par Tel: [phone]\par Fax: [phone]\par Email: [email or web address] \par \par [facility]\par Visit us at [facility] [email or web address]

## 2021-08-31 NOTE — ASSESSMENT
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  End date 8/23/21.\par She had a cough with productive thick, dark phlegm.  Cough is less and phlegm is not dark now. Harder to bring up phlegm as well. \par Completed 4 weeks.  End date 8/23/21.\par \par Unable to use fluoroquinolones at this time b/c her recent isolate is resistant.\par Unable to use cefepime as isolate had intermediate resistance. \par \par Will check labs weekly. Stable and wnl 8/17/21.  Repeat today given fatigue.\par IgG noted to be mildly low last year.  Can consider AI evaluation. \par \par RTC as needed. Will call pt with results.

## 2021-08-31 NOTE — PHYSICAL EXAM
[General Appearance - Alert] : alert [General Appearance - In No Acute Distress] : in no acute distress [General Appearance - Well Nourished] : well nourished [General Appearance - Well-Appearing] : healthy appearing [Sclera] : the sclera and conjunctiva were normal [PERRL With Normal Accommodation] : pupils were equal in size, round, reactive to light [Extraocular Movements] : extraocular movements were intact [Outer Ear] : the ears and nose were normal in appearance [Oropharynx] : the oropharynx was normal with no thrush [Neck Appearance] : the appearance of the neck was normal [Neck Cervical Mass (___cm)] : no neck mass was observed [Jugular Venous Distention Increased] : there was no jugular-venous distention [] : no respiratory distress [Exaggerated Use Of Accessory Muscles For Inspiration] : no accessory muscle use [Heart Rate And Rhythm] : heart rate was normal and rhythm regular [Heart Sounds] : normal S1 and S2 [Heart Sounds Gallop] : no gallops [Murmurs] : no murmurs [Heart Sounds Pericardial Friction Rub] : no pericardial rub [Edema] : there was no peripheral edema [FreeTextEntry1] : PICC RUE now removed - site okay, slight rash from dermatitis. No cellulitis. [Bowel Sounds] : normal bowel sounds [Abdomen Soft] : soft [Cervical Lymph Nodes Enlarged Posterior Bilaterally] : posterior cervical [Cervical Lymph Nodes Enlarged Anterior Bilaterally] : anterior cervical [Supraclavicular Lymph Nodes Enlarged Bilaterally] : supraclavicular [Skin Lesions] : no skin lesions [No Focal Deficits] : no focal deficits [Oriented To Time, Place, And Person] : oriented to person, place, and time [Affect] : the affect was normal

## 2021-09-01 LAB
ALBUMIN SERPL ELPH-MCNC: 4.2 G/DL
ALP BLD-CCNC: 97 U/L
ALT SERPL-CCNC: 16 U/L
ANION GAP SERPL CALC-SCNC: 10 MMOL/L
AST SERPL-CCNC: 23 U/L
BASOPHILS # BLD AUTO: 0.04 K/UL
BASOPHILS NFR BLD AUTO: 0.6 %
BILIRUB SERPL-MCNC: 0.4 MG/DL
BUN SERPL-MCNC: 14 MG/DL
CALCIUM SERPL-MCNC: 9.7 MG/DL
CHLORIDE SERPL-SCNC: 97 MMOL/L
CO2 SERPL-SCNC: 31 MMOL/L
CREAT SERPL-MCNC: 0.6 MG/DL
CRP SERPL-MCNC: 13 MG/L
EOSINOPHIL # BLD AUTO: 0.15 K/UL
EOSINOPHIL NFR BLD AUTO: 2.3 %
ERYTHROCYTE [SEDIMENTATION RATE] IN BLOOD BY WESTERGREN METHOD: 6 MM/HR
GLUCOSE SERPL-MCNC: 97 MG/DL
HCT VFR BLD CALC: 45 %
HGB BLD-MCNC: 14.5 G/DL
IMM GRANULOCYTES NFR BLD AUTO: 0.2 %
LYMPHOCYTES # BLD AUTO: 2.22 K/UL
LYMPHOCYTES NFR BLD AUTO: 33.5 %
MAN DIFF?: NORMAL
MCHC RBC-ENTMCNC: 30.2 PG
MCHC RBC-ENTMCNC: 32.2 GM/DL
MCV RBC AUTO: 93.8 FL
MONOCYTES # BLD AUTO: 0.56 K/UL
MONOCYTES NFR BLD AUTO: 8.5 %
NEUTROPHILS # BLD AUTO: 3.64 K/UL
NEUTROPHILS NFR BLD AUTO: 54.9 %
PLATELET # BLD AUTO: 259 K/UL
POTASSIUM SERPL-SCNC: 4.7 MMOL/L
PROT SERPL-MCNC: 6.5 G/DL
RBC # BLD: 4.8 M/UL
RBC # FLD: 12.1 %
SODIUM SERPL-SCNC: 138 MMOL/L
WBC # FLD AUTO: 6.62 K/UL

## 2021-09-17 LAB — BACTERIA SPT CULT: ABNORMAL

## 2021-09-27 ENCOUNTER — INPATIENT (INPATIENT)
Facility: HOSPITAL | Age: 76
LOS: 1 days | Discharge: HOME | End: 2021-09-29
Attending: STUDENT IN AN ORGANIZED HEALTH CARE EDUCATION/TRAINING PROGRAM | Admitting: STUDENT IN AN ORGANIZED HEALTH CARE EDUCATION/TRAINING PROGRAM
Payer: MEDICARE

## 2021-09-27 VITALS
HEART RATE: 94 BPM | TEMPERATURE: 98 F | OXYGEN SATURATION: 95 % | SYSTOLIC BLOOD PRESSURE: 154 MMHG | HEIGHT: 56 IN | DIASTOLIC BLOOD PRESSURE: 74 MMHG | RESPIRATION RATE: 16 BRPM

## 2021-09-27 DIAGNOSIS — E83.119 HEMOCHROMATOSIS, UNSPECIFIED: ICD-10-CM

## 2021-09-27 DIAGNOSIS — Z96.641 PRESENCE OF RIGHT ARTIFICIAL HIP JOINT: Chronic | ICD-10-CM

## 2021-09-27 DIAGNOSIS — Z87.81 PERSONAL HISTORY OF (HEALED) TRAUMATIC FRACTURE: Chronic | ICD-10-CM

## 2021-09-27 DIAGNOSIS — I10 ESSENTIAL (PRIMARY) HYPERTENSION: ICD-10-CM

## 2021-09-27 DIAGNOSIS — J45.901 UNSPECIFIED ASTHMA WITH (ACUTE) EXACERBATION: ICD-10-CM

## 2021-09-27 DIAGNOSIS — R06.02 SHORTNESS OF BREATH: ICD-10-CM

## 2021-09-27 DIAGNOSIS — Z98.890 OTHER SPECIFIED POSTPROCEDURAL STATES: Chronic | ICD-10-CM

## 2021-09-27 DIAGNOSIS — Z29.9 ENCOUNTER FOR PROPHYLACTIC MEASURES, UNSPECIFIED: ICD-10-CM

## 2021-09-27 DIAGNOSIS — Z02.9 ENCOUNTER FOR ADMINISTRATIVE EXAMINATIONS, UNSPECIFIED: ICD-10-CM

## 2021-09-27 DIAGNOSIS — Z96.652 PRESENCE OF LEFT ARTIFICIAL KNEE JOINT: Chronic | ICD-10-CM

## 2021-09-27 LAB
ALBUMIN SERPL ELPH-MCNC: 3.8 G/DL — SIGNIFICANT CHANGE UP (ref 3.3–5)
ALP SERPL-CCNC: 86 U/L — SIGNIFICANT CHANGE UP (ref 40–120)
ALT FLD-CCNC: 15 U/L — SIGNIFICANT CHANGE UP (ref 4–33)
ANION GAP SERPL CALC-SCNC: 11 MMOL/L — SIGNIFICANT CHANGE UP (ref 7–14)
AST SERPL-CCNC: 18 U/L — SIGNIFICANT CHANGE UP (ref 4–32)
B PERT DNA SPEC QL NAA+PROBE: SIGNIFICANT CHANGE UP
B PERT+PARAPERT DNA PNL SPEC NAA+PROBE: SIGNIFICANT CHANGE UP
BASOPHILS # BLD AUTO: 0.05 K/UL — SIGNIFICANT CHANGE UP (ref 0–0.2)
BASOPHILS NFR BLD AUTO: 0.4 % — SIGNIFICANT CHANGE UP (ref 0–2)
BILIRUB SERPL-MCNC: 0.5 MG/DL — SIGNIFICANT CHANGE UP (ref 0.2–1.2)
BLOOD GAS VENOUS COMPREHENSIVE RESULT: SIGNIFICANT CHANGE UP
BORDETELLA PARAPERTUSSIS (RAPRVP): SIGNIFICANT CHANGE UP
BUN SERPL-MCNC: 13 MG/DL — SIGNIFICANT CHANGE UP (ref 7–23)
C PNEUM DNA SPEC QL NAA+PROBE: SIGNIFICANT CHANGE UP
CALCIUM SERPL-MCNC: 9.3 MG/DL — SIGNIFICANT CHANGE UP (ref 8.4–10.5)
CHLORIDE SERPL-SCNC: 100 MMOL/L — SIGNIFICANT CHANGE UP (ref 98–107)
CO2 SERPL-SCNC: 28 MMOL/L — SIGNIFICANT CHANGE UP (ref 22–31)
CREAT SERPL-MCNC: 0.59 MG/DL — SIGNIFICANT CHANGE UP (ref 0.5–1.3)
D DIMER BLD IA.RAPID-MCNC: 687 NG/ML DDU — HIGH
EOSINOPHIL # BLD AUTO: 0.1 K/UL — SIGNIFICANT CHANGE UP (ref 0–0.5)
EOSINOPHIL NFR BLD AUTO: 0.8 % — SIGNIFICANT CHANGE UP (ref 0–6)
FLUAV SUBTYP SPEC NAA+PROBE: SIGNIFICANT CHANGE UP
FLUBV RNA SPEC QL NAA+PROBE: SIGNIFICANT CHANGE UP
GLUCOSE SERPL-MCNC: 94 MG/DL — SIGNIFICANT CHANGE UP (ref 70–99)
HADV DNA SPEC QL NAA+PROBE: SIGNIFICANT CHANGE UP
HCOV 229E RNA SPEC QL NAA+PROBE: SIGNIFICANT CHANGE UP
HCOV HKU1 RNA SPEC QL NAA+PROBE: SIGNIFICANT CHANGE UP
HCOV NL63 RNA SPEC QL NAA+PROBE: SIGNIFICANT CHANGE UP
HCOV OC43 RNA SPEC QL NAA+PROBE: SIGNIFICANT CHANGE UP
HCT VFR BLD CALC: 42.2 % — SIGNIFICANT CHANGE UP (ref 34.5–45)
HGB BLD-MCNC: 14 G/DL — SIGNIFICANT CHANGE UP (ref 11.5–15.5)
HMPV RNA SPEC QL NAA+PROBE: SIGNIFICANT CHANGE UP
HPIV1 RNA SPEC QL NAA+PROBE: SIGNIFICANT CHANGE UP
HPIV2 RNA SPEC QL NAA+PROBE: SIGNIFICANT CHANGE UP
HPIV3 RNA SPEC QL NAA+PROBE: SIGNIFICANT CHANGE UP
HPIV4 RNA SPEC QL NAA+PROBE: SIGNIFICANT CHANGE UP
IANC: 9.73 K/UL — HIGH (ref 1.5–8.5)
IMM GRANULOCYTES NFR BLD AUTO: 0.5 % — SIGNIFICANT CHANGE UP (ref 0–1.5)
LYMPHOCYTES # BLD AUTO: 1.01 K/UL — SIGNIFICANT CHANGE UP (ref 1–3.3)
LYMPHOCYTES # BLD AUTO: 8.4 % — LOW (ref 13–44)
M PNEUMO DNA SPEC QL NAA+PROBE: SIGNIFICANT CHANGE UP
MAGNESIUM SERPL-MCNC: 2.1 MG/DL — SIGNIFICANT CHANGE UP (ref 1.6–2.6)
MCHC RBC-ENTMCNC: 30.4 PG — SIGNIFICANT CHANGE UP (ref 27–34)
MCHC RBC-ENTMCNC: 33.2 GM/DL — SIGNIFICANT CHANGE UP (ref 32–36)
MCV RBC AUTO: 91.7 FL — SIGNIFICANT CHANGE UP (ref 80–100)
MONOCYTES # BLD AUTO: 1.03 K/UL — HIGH (ref 0–0.9)
MONOCYTES NFR BLD AUTO: 8.6 % — SIGNIFICANT CHANGE UP (ref 2–14)
NEUTROPHILS # BLD AUTO: 9.73 K/UL — HIGH (ref 1.8–7.4)
NEUTROPHILS NFR BLD AUTO: 81.3 % — HIGH (ref 43–77)
NRBC # BLD: 0 /100 WBCS — SIGNIFICANT CHANGE UP
NRBC # FLD: 0 K/UL — SIGNIFICANT CHANGE UP
NT-PROBNP SERPL-SCNC: 216 PG/ML — SIGNIFICANT CHANGE UP
PLATELET # BLD AUTO: 220 K/UL — SIGNIFICANT CHANGE UP (ref 150–400)
POTASSIUM SERPL-MCNC: 4.4 MMOL/L — SIGNIFICANT CHANGE UP (ref 3.5–5.3)
POTASSIUM SERPL-SCNC: 4.4 MMOL/L — SIGNIFICANT CHANGE UP (ref 3.5–5.3)
PROT SERPL-MCNC: 6.5 G/DL — SIGNIFICANT CHANGE UP (ref 6–8.3)
RAPID RVP RESULT: SIGNIFICANT CHANGE UP
RBC # BLD: 4.6 M/UL — SIGNIFICANT CHANGE UP (ref 3.8–5.2)
RBC # FLD: 13 % — SIGNIFICANT CHANGE UP (ref 10.3–14.5)
RSV RNA SPEC QL NAA+PROBE: SIGNIFICANT CHANGE UP
RV+EV RNA SPEC QL NAA+PROBE: SIGNIFICANT CHANGE UP
SARS-COV-2 RNA SPEC QL NAA+PROBE: SIGNIFICANT CHANGE UP
SODIUM SERPL-SCNC: 139 MMOL/L — SIGNIFICANT CHANGE UP (ref 135–145)
TROPONIN T, HIGH SENSITIVITY RESULT: 10 NG/L — SIGNIFICANT CHANGE UP
TROPONIN T, HIGH SENSITIVITY RESULT: 15 NG/L — SIGNIFICANT CHANGE UP
WBC # BLD: 11.98 K/UL — HIGH (ref 3.8–10.5)
WBC # FLD AUTO: 11.98 K/UL — HIGH (ref 3.8–10.5)

## 2021-09-27 PROCEDURE — 71045 X-RAY EXAM CHEST 1 VIEW: CPT | Mod: 26

## 2021-09-27 PROCEDURE — 99223 1ST HOSP IP/OBS HIGH 75: CPT | Mod: GC

## 2021-09-27 PROCEDURE — 71275 CT ANGIOGRAPHY CHEST: CPT | Mod: 26

## 2021-09-27 PROCEDURE — 99285 EMERGENCY DEPT VISIT HI MDM: CPT | Mod: 25

## 2021-09-27 PROCEDURE — 93010 ELECTROCARDIOGRAM REPORT: CPT

## 2021-09-27 RX ORDER — METHADONE HYDROCHLORIDE 40 MG/1
1 TABLET ORAL
Qty: 0 | Refills: 0 | DISCHARGE

## 2021-09-27 RX ORDER — FLUOXETINE HCL 10 MG
10 CAPSULE ORAL
Refills: 0 | Status: DISCONTINUED | OUTPATIENT
Start: 2021-09-27 | End: 2021-09-29

## 2021-09-27 RX ORDER — FLUOXETINE HCL 10 MG
1 CAPSULE ORAL
Qty: 0 | Refills: 0 | DISCHARGE

## 2021-09-27 RX ORDER — HYDROCHLOROTHIAZIDE 25 MG
12.5 TABLET ORAL DAILY
Refills: 0 | Status: DISCONTINUED | OUTPATIENT
Start: 2021-09-27 | End: 2021-09-29

## 2021-09-27 RX ORDER — FLUOXETINE HCL 10 MG
0 CAPSULE ORAL
Qty: 0 | Refills: 0 | DISCHARGE

## 2021-09-27 RX ORDER — ENOXAPARIN SODIUM 100 MG/ML
40 INJECTION SUBCUTANEOUS DAILY
Refills: 0 | Status: DISCONTINUED | OUTPATIENT
Start: 2021-09-27 | End: 2021-09-29

## 2021-09-27 RX ORDER — ASPIRIN/CALCIUM CARB/MAGNESIUM 324 MG
81 TABLET ORAL DAILY
Refills: 0 | Status: DISCONTINUED | OUTPATIENT
Start: 2021-09-27 | End: 2021-09-29

## 2021-09-27 RX ORDER — GABAPENTIN 400 MG/1
1 CAPSULE ORAL
Qty: 0 | Refills: 0 | DISCHARGE

## 2021-09-27 RX ORDER — SENNA PLUS 8.6 MG/1
2 TABLET ORAL AT BEDTIME
Refills: 0 | Status: DISCONTINUED | OUTPATIENT
Start: 2021-09-27 | End: 2021-09-29

## 2021-09-27 RX ORDER — IPRATROPIUM/ALBUTEROL SULFATE 18-103MCG
3 AEROSOL WITH ADAPTER (GRAM) INHALATION ONCE
Refills: 0 | Status: COMPLETED | OUTPATIENT
Start: 2021-09-27 | End: 2021-09-27

## 2021-09-27 RX ORDER — FLUTICASONE FUROATE, UMECLIDINIUM BROMIDE AND VILANTEROL TRIFENATATE 200; 62.5; 25 UG/1; UG/1; UG/1
1 POWDER RESPIRATORY (INHALATION)
Qty: 0 | Refills: 0 | DISCHARGE

## 2021-09-27 RX ORDER — INFLUENZA VIRUS VACCINE 15; 15; 15; 15 UG/.5ML; UG/.5ML; UG/.5ML; UG/.5ML
0.7 SUSPENSION INTRAMUSCULAR ONCE
Refills: 0 | Status: COMPLETED | OUTPATIENT
Start: 2021-09-27 | End: 2021-09-29

## 2021-09-27 RX ORDER — OMEPRAZOLE 10 MG/1
40 CAPSULE, DELAYED RELEASE ORAL
Qty: 0 | Refills: 0 | DISCHARGE

## 2021-09-27 RX ORDER — HYDROCHLOROTHIAZIDE 25 MG
12.5 TABLET ORAL DAILY
Refills: 0 | Status: DISCONTINUED | OUTPATIENT
Start: 2021-09-27 | End: 2021-09-27

## 2021-09-27 RX ORDER — MONTELUKAST 4 MG/1
1 TABLET, CHEWABLE ORAL
Qty: 0 | Refills: 0 | DISCHARGE

## 2021-09-27 RX ORDER — MONTELUKAST 4 MG/1
10 TABLET, CHEWABLE ORAL AT BEDTIME
Refills: 0 | Status: DISCONTINUED | OUTPATIENT
Start: 2021-09-27 | End: 2021-09-29

## 2021-09-27 RX ORDER — ALBUTEROL 90 UG/1
2 AEROSOL, METERED ORAL
Qty: 0 | Refills: 0 | DISCHARGE

## 2021-09-27 RX ORDER — ASPIRIN/CALCIUM CARB/MAGNESIUM 324 MG
1 TABLET ORAL
Qty: 0 | Refills: 0 | DISCHARGE

## 2021-09-27 RX ORDER — METHADONE HYDROCHLORIDE 40 MG/1
10 TABLET ORAL
Refills: 0 | Status: DISCONTINUED | OUTPATIENT
Start: 2021-09-27 | End: 2021-09-29

## 2021-09-27 RX ORDER — INFLUENZA VIRUS VACCINE 15; 15; 15; 15 UG/.5ML; UG/.5ML; UG/.5ML; UG/.5ML
0.5 SUSPENSION INTRAMUSCULAR ONCE
Refills: 0 | Status: DISCONTINUED | OUTPATIENT
Start: 2021-09-27 | End: 2021-09-27

## 2021-09-27 RX ORDER — FLUOXETINE HCL 10 MG
10 CAPSULE ORAL DAILY
Refills: 0 | Status: DISCONTINUED | OUTPATIENT
Start: 2021-09-27 | End: 2021-09-27

## 2021-09-27 RX ORDER — SODIUM CHLORIDE 9 MG/ML
500 INJECTION INTRAMUSCULAR; INTRAVENOUS; SUBCUTANEOUS ONCE
Refills: 0 | Status: COMPLETED | OUTPATIENT
Start: 2021-09-27 | End: 2021-09-27

## 2021-09-27 RX ORDER — ONDANSETRON 8 MG/1
1 TABLET, FILM COATED ORAL
Qty: 0 | Refills: 0 | DISCHARGE

## 2021-09-27 RX ORDER — PANTOPRAZOLE SODIUM 20 MG/1
40 TABLET, DELAYED RELEASE ORAL
Refills: 0 | Status: DISCONTINUED | OUTPATIENT
Start: 2021-09-27 | End: 2021-09-29

## 2021-09-27 RX ORDER — GABAPENTIN 400 MG/1
300 CAPSULE ORAL THREE TIMES A DAY
Refills: 0 | Status: DISCONTINUED | OUTPATIENT
Start: 2021-09-27 | End: 2021-09-29

## 2021-09-27 RX ORDER — IPRATROPIUM/ALBUTEROL SULFATE 18-103MCG
3 AEROSOL WITH ADAPTER (GRAM) INHALATION EVERY 6 HOURS
Refills: 0 | Status: DISCONTINUED | OUTPATIENT
Start: 2021-09-27 | End: 2021-09-29

## 2021-09-27 RX ADMIN — SENNA PLUS 2 TABLET(S): 8.6 TABLET ORAL at 23:55

## 2021-09-27 RX ADMIN — Medication 10 MILLIGRAM(S): at 22:47

## 2021-09-27 RX ADMIN — Medication 3 MILLILITER(S): at 14:01

## 2021-09-27 RX ADMIN — Medication 10 MILLIGRAM(S): at 17:17

## 2021-09-27 RX ADMIN — Medication 60 MILLIGRAM(S): at 10:43

## 2021-09-27 RX ADMIN — METHADONE HYDROCHLORIDE 10 MILLIGRAM(S): 40 TABLET ORAL at 18:22

## 2021-09-27 RX ADMIN — Medication 3 MILLILITER(S): at 10:43

## 2021-09-27 RX ADMIN — MONTELUKAST 10 MILLIGRAM(S): 4 TABLET, CHEWABLE ORAL at 23:38

## 2021-09-27 RX ADMIN — SODIUM CHLORIDE 500 MILLILITER(S): 9 INJECTION INTRAMUSCULAR; INTRAVENOUS; SUBCUTANEOUS at 10:43

## 2021-09-27 RX ADMIN — GABAPENTIN 300 MILLIGRAM(S): 400 CAPSULE ORAL at 18:24

## 2021-09-27 RX ADMIN — Medication 3 MILLILITER(S): at 12:57

## 2021-09-27 NOTE — H&P ADULT - NSICDXPASTSURGICALHX_GEN_ALL_CORE_FT
No PAST SURGICAL HISTORY:  H/O fracture of femur Left    H/O laminectomy thoracolumbar    S/P hip replacement, right     S/P knee replacement, left     S/P rotator cuff repair R shoulder

## 2021-09-27 NOTE — ED ADULT NURSE NOTE - CHIEF COMPLAINT QUOTE
pt c/o generalized chest pressure, SOB as of this morning. hx of asthma, MI, PNA, pseudomonas in lungs 3 weeks ago. daughter Jessica, [phone]

## 2021-09-27 NOTE — H&P ADULT - PROBLEM SELECTOR PLAN 1
SOB likely 2/2 to Asthma dx, possibly COPD component as pt was a smoker previously (1 pack x 20 years)  Possibly triggered by infection  However afebrile, mild leukocytosis, lactate normal  CXR: clear lungs. CTA negative for PE, no signs of PNA  Continue on duonebs  Started on prednisone 10 mg QD  Sputum cx to be collected  Levofloxacin started  Chest PT

## 2021-09-27 NOTE — H&P ADULT - ATTENDING COMMENTS
Acute respiratory distress presumed 2/2 COPD/asthma exacerbation. Will give short course of steroids, levofloxacin, nebs, chest PT, incentive spirometry. Check sputum culture given history of Pseudomonas.

## 2021-09-27 NOTE — ED ADULT TRIAGE NOTE - CHIEF COMPLAINT QUOTE
pt c/o generalized chest pressure, SOB as of this morning. hx of asthma, MI, PNA, pseudomonas in lungs 3 weeks ago. daughter Jessica, [phone]

## 2021-09-27 NOTE — H&P ADULT - NSHPSOCIALHISTORY_GEN_ALL_CORE
Lives with . Has 1 adult daughter. Previous smoker for ~30 years, 1ppd; quit about 30-40 years ago; Denies alcohol or recreational drug use.

## 2021-09-27 NOTE — H&P ADULT - NSHPREVIEWOFSYSTEMS_GEN_ALL_CORE
REVIEW OF SYSTEMS:    CONSTITUTIONAL: No weakness, fevers or chills  EYES/ENT: No visual changes;  +throat pain, congestion  NECK: No pain or stiffness  RESPIRATORY: +SOB, cough with sputum, no hemoptysis  CARDIOVASCULAR: +chest pain, no palpitations  GASTROINTESTINAL: No abdominal or epigastric pain. No nausea, vomiting, or hematemesis; No diarrhea or constipation. No melena or hematochezia.  GENITOURINARY: No dysuria, frequency or hematuria  NEUROLOGICAL: No numbness or weakness  SKIN: No itching, rashes

## 2021-09-27 NOTE — H&P ADULT - HISTORY OF PRESENT ILLNESS
75 y/o F with PMH asthma, chronic cough with pseudomonal+ sputum, hemochromatosis, NSTEMI (2019), HTN presenting to the ED for difficulty breathing with chest pain. Pt has a history of pseudomonas+ sputum cultures with most recent treatment from July-September via IV meropenam. In September, patient was put on a prednisone taper. She reports worsening chest tightness while on prednisone. This morning, pt reports waking up with chest tightness, productive cough with a new onset chest pain upon inspiration. Sputum was green yesterday and yellow today, relatively unchanged from baseline. Denies blood in sputum. Pt reports severe pain in her chest that radiates to her neck and jaw. Pt has never had sx like this before. Pt reports new congestion that began yesterday. She endorses a sore throat this morning. Denies HA, fever, eye discharge, orthopnea, abdominal pain, n/v, urinary sx, changes in bowel movements, rashes or weakness.     Pt followed by Dr. Byrne ([facility]; pulmonologist). Pt unsure of past PFT results.    ED Course: afebrile, hypertensive, desating to high 80s on RA; PE significant for diffuse wheezing; elevated WBC (11.98) and D-dimer (687), troponin T wnl, RVP neg, Bordatella neg, COVID neg, CT angio neg for PE, CXR neg for acute pathology; pt given 0.5L NS x1, duoneb x3, solumedrol x1; Pt put on 3L NC, sating at 100%

## 2021-09-27 NOTE — ED PROVIDER NOTE - PHYSICAL EXAMINATION
General: Well developed, well nourished  HEENT: Normocephalic and atraumatic, Trachea midline.   Cardiac: Normal S1 and S2 w/ RRR. No JVD  Pulmonary: Diffuse wheezing L>R. No increased WOB but desating to high 80s on RA.   Abdominal: Soft, NTND  Neurologic: No focal sensory or motor deficits.  Musculoskeletal: No limited ROM.  Vascular: Warm and well perfused  Skin: Color appropriate for race.   Psychiatric: Appropriate mood and affect. No apparent risk to self or others.  Jacky Lugo M.D. PGY-4

## 2021-09-27 NOTE — CHART NOTE - NSCHARTNOTEFT_GEN_A_CORE
iSTOP records:    Patient Name: Althea Wise Date: 1945  Address: [address] [place], [place] [address]Eie: Female  Rx Written	Rx Dispensed	Drug	Quantity	Days Supply	Prescriber Name	Prescriber Rody #	Payment Method	Dispenser  07/22/2021	07/23/2021	methadone hcl 10 mg tablet	60	30	Kristen Hermosillo MD	[id number]	Medicare	[facility]    Patient Name: Althea Wise Date: 1945  Address: [address] [place], [place] [address]Ylv: Female  Rx Written	Rx Dispensed	Drug	Quantity	Days Supply	Prescriber Name	Prescriber Rody #	Payment Method	Dispenser  08/19/2021	08/20/2021	methadone hcl 10 mg tablet	120	30	Pastora Cantu MD	[id number]	Medicare	[id number]  07/23/2021	07/23/2021	methadone hcl 10 mg tablet	28	7	[place]	[id number]	[id number]  05/24/2021	05/26/2021	methadone hcl 10 mg tablet	120	30	Pastora Cantu MD	[id number]	Medicare	[id number]  03/23/2021	03/24/2021	methadone hcl 10 mg tablet	120	30	Pastora Cantu MD	[id number]	Medicare	[id number]  01/22/2021	01/23/2021	methadone hcl 10 mg tablet	120	30	Pastora Cantu MD	[id number]	Medicare	[id number]  11/23/2020	11/24/2020	methadone hcl 10 mg tablet	120	30	Pastora Cantu MD	[id number]	Medicare	[id number]    [place] MD, PGY2  Internal Medicine

## 2021-09-27 NOTE — ED ADULT NURSE REASSESSMENT NOTE - NS ED NURSE REASSESS COMMENT FT1
pt at baseline mental status, verbalizing partial relief in symptoms and improvement in breathing. RR even and unlabored. Remains on 3L NC, O2 sat 100%. Receiving third duoneb at this time. Pt sinus tachy at 105 on cardiac monitor. Will continue to monitor.

## 2021-09-27 NOTE — ED PROVIDER NOTE - PROGRESS NOTE DETAILS
Savana FLYNN: Jessica, daughter: [phone]. Discussed results in detail. Pending CT Chest. Pt feeling better but still has significant amount of wheezing on exam and mild hypoxia on RA. Will admit.   Jacky Lugo M.D. PGY-4

## 2021-09-27 NOTE — ED ADULT NURSE NOTE - OBJECTIVE STATEMENT
Pt presenting to room 25 AxOx4 ambulatory at baseline with a cane with c/o CP, SOB, and productive cough. PMH MI without stents, asthma. On arrival to ED pt's breathing is mildly labored. Pt placed on 3L NC for O2 sat 93-94% on RA. O2 improving to 100% with NC. Palor/diaphoresis not noted. Pt denies fever, N/V, chills. Pt NSR on cardiac monitor. IV established with 20g in LAC. Labs drawn and sent. MD at bedside. Will continue to monitor.

## 2021-09-27 NOTE — ED PROVIDER NOTE - OBJECTIVE STATEMENT
76F HTN, Asthma, chronic cough presents with chest pain. Pt states 1 week ago she finished a steroid taper. States since then she has been having increasing cough, shortness of breath. This AM felt sob so used her home inhaler. While using it had burning sensation in chest and pain with inspiration so came to ER. No fevers, +cough (chronic). No TUCKER, no Orthopnea, no leg swelling.

## 2021-09-27 NOTE — H&P ADULT - NSICDXPASTMEDICALHX_GEN_ALL_CORE_FT
PAST MEDICAL HISTORY:  Asthma     Chronic back pain     Hemochromatosis     HTN (hypertension)     Osteoarthritis

## 2021-09-27 NOTE — ED PROVIDER NOTE - CARE PLAN
Prior Authorization Not Needed per Insurance        Medication: ubrogepant (UBRELVY) 50 MG tablet-PA NOT NEEDED   Insurance Company: [employer] - Phone [phone] Fax [phone]  Expected CoPay:      Pharmacy Filling the Rx: [id number] - [facility], [place] [address] AT [place] [address]  Pharmacy Notified: Yes  Patient Notified: No    Called pharmacy and pharmacy stated that PA is Not Needed and medication is covered. **Instructed pharmacy to notify patient when script is ready to /ship.** Pharmacy stated that they have a paid claim on medication and placed an order for medication which will be available on 6/18/2021. Pharmacy states that they will notify patient when medication is ready for . Insurance also states that PA is Not Needed and medication is covered.    1 Principal Discharge DX:	Shortness of breath

## 2021-09-27 NOTE — ED PROVIDER NOTE - CLINICAL SUMMARY MEDICAL DECISION MAKING FREE TEXT BOX
76F presents with CP/SOB after finishing steroid taper. Exam has wheezing and pleuritic component of CP. Likely asthma vs COPD exacerbation. some concern for recurrent PNA vs PE. Less likely cardiac as has no s/s fluid overload or PND/Orthopnea, also relatively normal and recent echo/cath however will send trops and BNP. Nebs/steroids, labs imaging. Likely admit.

## 2021-09-27 NOTE — H&P ADULT - ASSESSMENT
75 y/o female with pmh of Asthma/COPD (not on home oxygen), HTN, NSTEMI (2019), hemochromatosis, and chronic pseudomonas + infection who presented to the ED for SOB found to be in an acute asthma/copd exacerbation.

## 2021-09-27 NOTE — H&P ADULT - PROBLEM SELECTOR PLAN 5
PCP: Dr. Harlan Alicia ([facility])  Pulmonologist: Dr. Michael Byrne ([facility]): [phone]  PT consult pending

## 2021-09-27 NOTE — H&P ADULT - NSHPPHYSICALEXAM_GEN_ALL_CORE
PHYSICAL EXAM:  GENERAL: NAD, wearing nasal cannula; well-groomed  HEAD:  Atraumatic, Normocephalic  EYES: EOMI, PERRLA, conjunctiva and sclera clear  HEENT: No tonsillar erythema, exudates, or enlargement; Moist mucous membranes, No lesions  NECK: Supple, No JVD, nontender to palpation  NERVOUS SYSTEM:  Alert & Oriented X3, Motor Strength 5/5 B/L upper and lower extremities  CHEST/LUNG: bilateral expiratory wheezes with upper respiratory transmissions when coughing  HEART: Regular rate and rhythm; No murmurs, rubs, or gallops  ABDOMEN: Soft, Nontender, Nondistended; Bowel sounds present  EXTREMITIES:  2+ Peripheral Pulses, No clubbing, cyanosis, or edema  SKIN: No rashes or lesions

## 2021-09-27 NOTE — ED PROVIDER NOTE - ATTENDING CONTRIBUTION TO CARE
Patient is a 77 yo HTN, asthma, hx of nstemi, former smoker, here for evaluation of chest pain this morning. She reports she has had a cough and finished a steroid taper about 1 week ago. She has a history of pseudomonas pneumonia in 2019. She reports productive cough with green sputum, using albuterol about 3-4 times a day. Denies fevers. She states she had midsternal chest pain with radiation to her neck this morning and is still having the pain.     VS noted  Gen. no acute distress, coughing  HEENT: EOMI, mmm  Lungs: bilateral and rhonchi and expiratory wheezing  CVS: RRR   Abd; Soft non tender, non distended   Ext: no edema  Skin: no rash  Neuro AAOx3 non focal clear speech  a/p: chest pain/ cough - bilateral BS are rhonchi with wheezing. plan for CXR, labs, nebs, consider CT given prolonged course. Will check ekg, trop.   Madiha Sawant MD

## 2021-09-28 LAB
ANION GAP SERPL CALC-SCNC: 12 MMOL/L — SIGNIFICANT CHANGE UP (ref 7–14)
BASOPHILS # BLD AUTO: 0 K/UL — SIGNIFICANT CHANGE UP (ref 0–0.2)
BASOPHILS NFR BLD AUTO: 0 % — SIGNIFICANT CHANGE UP (ref 0–2)
BUN SERPL-MCNC: 11 MG/DL — SIGNIFICANT CHANGE UP (ref 7–23)
CALCIUM SERPL-MCNC: 9.3 MG/DL — SIGNIFICANT CHANGE UP (ref 8.4–10.5)
CHLORIDE SERPL-SCNC: 101 MMOL/L — SIGNIFICANT CHANGE UP (ref 98–107)
CO2 SERPL-SCNC: 26 MMOL/L — SIGNIFICANT CHANGE UP (ref 22–31)
COVID-19 SPIKE DOMAIN AB INTERP: POSITIVE
COVID-19 SPIKE DOMAIN ANTIBODY RESULT: 249 U/ML — HIGH
CREAT SERPL-MCNC: 0.45 MG/DL — LOW (ref 0.5–1.3)
EOSINOPHIL # BLD AUTO: 0 K/UL — SIGNIFICANT CHANGE UP (ref 0–0.5)
EOSINOPHIL NFR BLD AUTO: 0 % — SIGNIFICANT CHANGE UP (ref 0–6)
FERRITIN SERPL-MCNC: 150 NG/ML — SIGNIFICANT CHANGE UP (ref 15–150)
GLUCOSE SERPL-MCNC: 130 MG/DL — HIGH (ref 70–99)
GRAM STN FLD: SIGNIFICANT CHANGE UP
HCT VFR BLD CALC: 40.6 % — SIGNIFICANT CHANGE UP (ref 34.5–45)
HGB BLD-MCNC: 13.5 G/DL — SIGNIFICANT CHANGE UP (ref 11.5–15.5)
IANC: 5.79 K/UL — SIGNIFICANT CHANGE UP (ref 1.5–8.5)
IMM GRANULOCYTES NFR BLD AUTO: 0.3 % — SIGNIFICANT CHANGE UP (ref 0–1.5)
LYMPHOCYTES # BLD AUTO: 0.76 K/UL — LOW (ref 1–3.3)
LYMPHOCYTES # BLD AUTO: 10.5 % — LOW (ref 13–44)
MAGNESIUM SERPL-MCNC: 2.3 MG/DL — SIGNIFICANT CHANGE UP (ref 1.6–2.6)
MCHC RBC-ENTMCNC: 30.1 PG — SIGNIFICANT CHANGE UP (ref 27–34)
MCHC RBC-ENTMCNC: 33.3 GM/DL — SIGNIFICANT CHANGE UP (ref 32–36)
MCV RBC AUTO: 90.6 FL — SIGNIFICANT CHANGE UP (ref 80–100)
MONOCYTES # BLD AUTO: 0.65 K/UL — SIGNIFICANT CHANGE UP (ref 0–0.9)
MONOCYTES NFR BLD AUTO: 9 % — SIGNIFICANT CHANGE UP (ref 2–14)
NEUTROPHILS # BLD AUTO: 5.79 K/UL — SIGNIFICANT CHANGE UP (ref 1.8–7.4)
NEUTROPHILS NFR BLD AUTO: 80.2 % — HIGH (ref 43–77)
NRBC # BLD: 0 /100 WBCS — SIGNIFICANT CHANGE UP
NRBC # FLD: 0 K/UL — SIGNIFICANT CHANGE UP
PHOSPHATE SERPL-MCNC: 3.1 MG/DL — SIGNIFICANT CHANGE UP (ref 2.5–4.5)
PLATELET # BLD AUTO: 209 K/UL — SIGNIFICANT CHANGE UP (ref 150–400)
POTASSIUM SERPL-MCNC: 4 MMOL/L — SIGNIFICANT CHANGE UP (ref 3.5–5.3)
POTASSIUM SERPL-SCNC: 4 MMOL/L — SIGNIFICANT CHANGE UP (ref 3.5–5.3)
RBC # BLD: 4.48 M/UL — SIGNIFICANT CHANGE UP (ref 3.8–5.2)
RBC # FLD: 13 % — SIGNIFICANT CHANGE UP (ref 10.3–14.5)
SARS-COV-2 IGG+IGM SERPL QL IA: 249 U/ML — HIGH
SARS-COV-2 IGG+IGM SERPL QL IA: POSITIVE
SODIUM SERPL-SCNC: 139 MMOL/L — SIGNIFICANT CHANGE UP (ref 135–145)
SPECIMEN SOURCE: SIGNIFICANT CHANGE UP
WBC # BLD: 7.22 K/UL — SIGNIFICANT CHANGE UP (ref 3.8–10.5)
WBC # FLD AUTO: 7.22 K/UL — SIGNIFICANT CHANGE UP (ref 3.8–10.5)

## 2021-09-28 PROCEDURE — 99233 SBSQ HOSP IP/OBS HIGH 50: CPT | Mod: GC

## 2021-09-28 RX ORDER — SODIUM CHLORIDE 0.65 %
1 AEROSOL, SPRAY (ML) NASAL ONCE
Refills: 0 | Status: COMPLETED | OUTPATIENT
Start: 2021-09-28 | End: 2021-09-28

## 2021-09-28 RX ORDER — POLYETHYLENE GLYCOL 3350 17 G/17G
17 POWDER, FOR SOLUTION ORAL
Refills: 0 | Status: DISCONTINUED | OUTPATIENT
Start: 2021-09-28 | End: 2021-09-29

## 2021-09-28 RX ADMIN — Medication 10 MILLIGRAM(S): at 10:34

## 2021-09-28 RX ADMIN — PANTOPRAZOLE SODIUM 40 MILLIGRAM(S): 20 TABLET, DELAYED RELEASE ORAL at 05:15

## 2021-09-28 RX ADMIN — MONTELUKAST 10 MILLIGRAM(S): 4 TABLET, CHEWABLE ORAL at 21:04

## 2021-09-28 RX ADMIN — Medication 81 MILLIGRAM(S): at 12:30

## 2021-09-28 RX ADMIN — ENOXAPARIN SODIUM 40 MILLIGRAM(S): 100 INJECTION SUBCUTANEOUS at 12:30

## 2021-09-28 RX ADMIN — Medication 3 MILLILITER(S): at 00:13

## 2021-09-28 RX ADMIN — POLYETHYLENE GLYCOL 3350 17 GRAM(S): 17 POWDER, FOR SOLUTION ORAL at 21:04

## 2021-09-28 RX ADMIN — METHADONE HYDROCHLORIDE 10 MILLIGRAM(S): 40 TABLET ORAL at 10:34

## 2021-09-28 RX ADMIN — GABAPENTIN 300 MILLIGRAM(S): 400 CAPSULE ORAL at 21:05

## 2021-09-28 RX ADMIN — Medication 3 MILLILITER(S): at 04:46

## 2021-09-28 RX ADMIN — Medication 10 MILLIGRAM(S): at 05:15

## 2021-09-28 RX ADMIN — Medication 12.5 MILLIGRAM(S): at 10:34

## 2021-09-28 RX ADMIN — GABAPENTIN 300 MILLIGRAM(S): 400 CAPSULE ORAL at 10:34

## 2021-09-28 RX ADMIN — Medication 3 MILLILITER(S): at 23:07

## 2021-09-28 RX ADMIN — METHADONE HYDROCHLORIDE 10 MILLIGRAM(S): 40 TABLET ORAL at 21:04

## 2021-09-28 RX ADMIN — SENNA PLUS 2 TABLET(S): 8.6 TABLET ORAL at 21:04

## 2021-09-28 RX ADMIN — Medication 3 MILLILITER(S): at 15:14

## 2021-09-28 RX ADMIN — Medication 3 MILLILITER(S): at 09:25

## 2021-09-28 RX ADMIN — Medication 10 MILLIGRAM(S): at 18:04

## 2021-09-28 NOTE — PHYSICAL THERAPY INITIAL EVALUATION ADULT - PERTINENT HX OF CURRENT PROBLEM, REHAB EVAL
Pt. admitted for acute asthma exacerbation. Per documentation, CXR: clear lungs. CTA negative for PE, no signs of PNA.

## 2021-09-28 NOTE — PROGRESS NOTE ADULT - SUBJECTIVE AND OBJECTIVE BOX
Patient is a 76y old  Female who presents with a chief complaint of asthma/COPD exacerbation (27 Sep 2021 15:11)      OVERNIGHT EVENTS: No overnight events. Pt reports no longer having chest/neck pain but continues to have chest tightness with chronic cough. Denies n/v, abdominal pain, urinary sx, or weakness.    REVIEW OF SYSTEMS:  CONSTITUTIONAL: No fever, weight loss, or fatigue  EYES: No eye pain, visual disturbances, or discharge  HEENT:  No difficulty hearing, tinnitus, vertigo; No sinus or throat pain  NECK: No pain or stiffness  RESPIRATORY: +productive cough, chest tightness, wheezes  CARDIOVASCULAR: No chest pain, palpitations, dizziness, or leg swelling  GASTROINTESTINAL: No abdominal or epigastric pain. No nausea, vomiting, or hematemesis; No diarrhea or constipation. No melena or hematochezia.  GENITOURINARY: No dysuria, frequency, hematuria, or incontinence  NEUROLOGICAL: No headaches, numbness or tremors  SKIN: No itching, burning, rashes, or lesions   MUSCULOSKELETAL: No joint pain or swelling; No muscle, back, or extremity pain    ALLERGIES:  No Known Allergies      FAMILY HISTORY:  FAMILY HISTORY:  Family history of asthma  in Father and Mother        VITALS:  T(C): 36.6 (09-28-21 @ 05:16), Max: 37.3 (09-27-21 @ 18:03)  HR: 87 (09-28-21 @ 05:16) (85 - 100)  BP: 146/61 (09-28-21 @ 05:16) (114/62 - 154/74)  RR: 16 (09-28-21 @ 05:16) (16 - 18)  SpO2: 98% (09-28-21 @ 05:16) (95% - 100%)  Wt(kg): --      PHYSICAL EXAM:  GENERAL: NAD, well-groomed  HEAD:  Atraumatic, Normocephalic  EYES: EOMI, PERRLA, conjunctiva and sclera clear  HEENT: No tonsillar erythema, exudates, or enlargement; Moist mucous membranes, No lesions  NECK: Supple, No JVD  NERVOUS SYSTEM:  Alert & Oriented X3, Motor Strength 5/5 B/L upper and lower extremities  CHEST/LUNG: bilateral expiratory wheezes R>L  HEART: Regular rate and rhythm; No murmurs, rubs, or gallops  ABDOMEN: Soft, mildly tender, Nondistended; Bowel sounds present  EXTREMITIES:  2+ Peripheral Pulses, No clubbing, cyanosis, or edema  SKIN: No rashes or lesions; longstanding sensitivity to palpation of LE    MEDICATIONS  (STANDING):  albuterol/ipratropium for Nebulization 3 milliLiter(s) Nebulizer every 6 hours  aspirin enteric coated 81 milliGRAM(s) Oral daily  enoxaparin Injectable 40 milliGRAM(s) SubCutaneous daily  FLUoxetine 10 milliGRAM(s) Oral two times a day  gabapentin 300 milliGRAM(s) Oral three times a day  hydrochlorothiazide 12.5 milliGRAM(s) Oral daily  influenza  Vaccine (HIGH DOSE) 0.7 milliLiter(s) IntraMuscular once  levoFLOXacin  Tablet 500 milliGRAM(s) Oral every 24 hours  methadone    Tablet 10 milliGRAM(s) Oral two times a day  montelukast 10 milliGRAM(s) Oral at bedtime  pantoprazole    Tablet 40 milliGRAM(s) Oral before breakfast  predniSONE   Tablet 10 milliGRAM(s) Oral daily  senna 2 Tablet(s) Oral at bedtime    LAB/STUDIES:                        14.0   11.98 )-----------( 220      ( 27 Sep 2021 11:24 )             42.2     09-27    139  |  100  |  13  ----------------------------<  94  4.4   |  28  |  0.59    Ca    9.3      27 Sep 2021 11:24  Mg     2.10     09-27    TPro  6.5  /  Alb  3.8  /  TBili  0.5  /  DBili  x   /  AST  18  /  ALT  15  /  AlkPhos  86  09-27               6.5  | 0.5  | 18       ------------------[86      ( 27 Sep 2021 11:24 )  3.8  | x    | 15          Lipase:x      Amylase:x

## 2021-09-28 NOTE — PROGRESS NOTE ADULT - ASSESSMENT
75 y/o female with hx of asthma, HTN, hemochromatosis, NSTEMI and chronic pseudomonas infection admitted for SOB, likely 2/2 to asthma/COPD exacerbation. Pt currently managed on levofloxacin, prednisone and duonebs.

## 2021-09-28 NOTE — PHYSICAL THERAPY INITIAL EVALUATION ADULT - ADDITIONAL COMMENTS
Pt. reports owning a straight cane.     Pt. was left in restroom post PT Evaluation, no apparent distress,  present. Saundra MARLEY made aware of pt. status and participation in PT.

## 2021-09-28 NOTE — PROGRESS NOTE ADULT - PROBLEM SELECTOR PLAN 1
SOB likely 2/2 to Asthma dx, possibly COPD component as pt was a smoker previously (1 pack x 20 years)  Possibly triggered by infection  However afebrile, mild leukocytosis, lactate normal  CXR: clear lungs. CTA negative for PE, no signs of PNA  Continue on duonebs  Continue with prednisone 10 mg QD  Continue with levofloxacin  Sputum cx to be collected  Chest PT

## 2021-09-28 NOTE — PROGRESS NOTE ADULT - PROBLEM SELECTOR PLAN 3
Family hx of disease and pt with disease  Last follow up last year  No active issues at this time  - Serum ferritin pending Family hx of disease and pt with disease  Last follow up last year  No active issues at this time  - Normal ferritin (150)

## 2021-09-29 ENCOUNTER — TRANSCRIPTION ENCOUNTER (OUTPATIENT)
Age: 76
End: 2021-09-29

## 2021-09-29 VITALS
RESPIRATION RATE: 18 BRPM | DIASTOLIC BLOOD PRESSURE: 80 MMHG | SYSTOLIC BLOOD PRESSURE: 162 MMHG | OXYGEN SATURATION: 95 % | HEART RATE: 90 BPM | TEMPERATURE: 99 F

## 2021-09-29 PROBLEM — E83.119 HEMOCHROMATOSIS, UNSPECIFIED: Chronic | Status: ACTIVE | Noted: 2021-09-27

## 2021-09-29 LAB
ALBUMIN SERPL ELPH-MCNC: 3.5 G/DL — SIGNIFICANT CHANGE UP (ref 3.3–5)
ALP SERPL-CCNC: 64 U/L — SIGNIFICANT CHANGE UP (ref 40–120)
ALT FLD-CCNC: 14 U/L — SIGNIFICANT CHANGE UP (ref 4–33)
ANION GAP SERPL CALC-SCNC: 20 MMOL/L — HIGH (ref 7–14)
AST SERPL-CCNC: 23 U/L — SIGNIFICANT CHANGE UP (ref 4–32)
BILIRUB SERPL-MCNC: 0.3 MG/DL — SIGNIFICANT CHANGE UP (ref 0.2–1.2)
BUN SERPL-MCNC: 13 MG/DL — SIGNIFICANT CHANGE UP (ref 7–23)
CALCIUM SERPL-MCNC: 9.2 MG/DL — SIGNIFICANT CHANGE UP (ref 8.4–10.5)
CHLORIDE SERPL-SCNC: 95 MMOL/L — LOW (ref 98–107)
CO2 SERPL-SCNC: 27 MMOL/L — SIGNIFICANT CHANGE UP (ref 22–31)
CREAT SERPL-MCNC: 0.58 MG/DL — SIGNIFICANT CHANGE UP (ref 0.5–1.3)
GLUCOSE SERPL-MCNC: 76 MG/DL — SIGNIFICANT CHANGE UP (ref 70–99)
HCT VFR BLD CALC: 36.1 % — SIGNIFICANT CHANGE UP (ref 34.5–45)
HGB BLD-MCNC: 12.3 G/DL — SIGNIFICANT CHANGE UP (ref 11.5–15.5)
MAGNESIUM SERPL-MCNC: 2.2 MG/DL — SIGNIFICANT CHANGE UP (ref 1.6–2.6)
MCHC RBC-ENTMCNC: 30.8 PG — SIGNIFICANT CHANGE UP (ref 27–34)
MCHC RBC-ENTMCNC: 34.1 GM/DL — SIGNIFICANT CHANGE UP (ref 32–36)
MCV RBC AUTO: 90.5 FL — SIGNIFICANT CHANGE UP (ref 80–100)
NRBC # BLD: 0 /100 WBCS — SIGNIFICANT CHANGE UP
NRBC # FLD: 0 K/UL — SIGNIFICANT CHANGE UP
PHOSPHATE SERPL-MCNC: 2.8 MG/DL — SIGNIFICANT CHANGE UP (ref 2.5–4.5)
PLATELET # BLD AUTO: 218 K/UL — SIGNIFICANT CHANGE UP (ref 150–400)
POTASSIUM SERPL-MCNC: 3.8 MMOL/L — SIGNIFICANT CHANGE UP (ref 3.5–5.3)
POTASSIUM SERPL-SCNC: 3.8 MMOL/L — SIGNIFICANT CHANGE UP (ref 3.5–5.3)
PROT SERPL-MCNC: 6 G/DL — SIGNIFICANT CHANGE UP (ref 6–8.3)
RBC # BLD: 3.99 M/UL — SIGNIFICANT CHANGE UP (ref 3.8–5.2)
RBC # FLD: 13.3 % — SIGNIFICANT CHANGE UP (ref 10.3–14.5)
SODIUM SERPL-SCNC: 142 MMOL/L — SIGNIFICANT CHANGE UP (ref 135–145)
WBC # BLD: 9.29 K/UL — SIGNIFICANT CHANGE UP (ref 3.8–10.5)
WBC # FLD AUTO: 9.29 K/UL — SIGNIFICANT CHANGE UP (ref 3.8–10.5)

## 2021-09-29 PROCEDURE — 99239 HOSP IP/OBS DSCHRG MGMT >30: CPT | Mod: GC

## 2021-09-29 RX ORDER — GABAPENTIN 400 MG/1
1 CAPSULE ORAL
Qty: 0 | Refills: 0 | DISCHARGE

## 2021-09-29 RX ORDER — BUDESONIDE AND FORMOTEROL FUMARATE DIHYDRATE 160; 4.5 UG/1; UG/1
2 AEROSOL RESPIRATORY (INHALATION)
Refills: 0 | Status: DISCONTINUED | OUTPATIENT
Start: 2021-09-29 | End: 2021-09-29

## 2021-09-29 RX ORDER — SIMETHICONE 80 MG/1
80 TABLET, CHEWABLE ORAL ONCE
Refills: 0 | Status: COMPLETED | OUTPATIENT
Start: 2021-09-29 | End: 2021-09-29

## 2021-09-29 RX ORDER — TIOTROPIUM BROMIDE 18 UG/1
1 CAPSULE ORAL; RESPIRATORY (INHALATION) DAILY
Refills: 0 | Status: DISCONTINUED | OUTPATIENT
Start: 2021-09-29 | End: 2021-09-29

## 2021-09-29 RX ORDER — ALBUTEROL 90 UG/1
2 AEROSOL, METERED ORAL
Qty: 0 | Refills: 0 | DISCHARGE

## 2021-09-29 RX ADMIN — POLYETHYLENE GLYCOL 3350 17 GRAM(S): 17 POWDER, FOR SOLUTION ORAL at 08:54

## 2021-09-29 RX ADMIN — ENOXAPARIN SODIUM 40 MILLIGRAM(S): 100 INJECTION SUBCUTANEOUS at 11:16

## 2021-09-29 RX ADMIN — TIOTROPIUM BROMIDE 1 CAPSULE(S): 18 CAPSULE ORAL; RESPIRATORY (INHALATION) at 11:12

## 2021-09-29 RX ADMIN — SIMETHICONE 80 MILLIGRAM(S): 80 TABLET, CHEWABLE ORAL at 15:05

## 2021-09-29 RX ADMIN — Medication 10 MILLIGRAM(S): at 05:44

## 2021-09-29 RX ADMIN — GABAPENTIN 300 MILLIGRAM(S): 400 CAPSULE ORAL at 08:54

## 2021-09-29 RX ADMIN — BUDESONIDE AND FORMOTEROL FUMARATE DIHYDRATE 2 PUFF(S): 160; 4.5 AEROSOL RESPIRATORY (INHALATION) at 11:12

## 2021-09-29 RX ADMIN — METHADONE HYDROCHLORIDE 10 MILLIGRAM(S): 40 TABLET ORAL at 08:54

## 2021-09-29 RX ADMIN — Medication 10 MILLIGRAM(S): at 08:53

## 2021-09-29 RX ADMIN — Medication 81 MILLIGRAM(S): at 11:16

## 2021-09-29 RX ADMIN — Medication 12.5 MILLIGRAM(S): at 08:53

## 2021-09-29 RX ADMIN — INFLUENZA VIRUS VACCINE 0.7 MILLILITER(S): 15; 15; 15; 15 SUSPENSION INTRAMUSCULAR at 15:16

## 2021-09-29 RX ADMIN — Medication 3 MILLILITER(S): at 05:18

## 2021-09-29 RX ADMIN — PANTOPRAZOLE SODIUM 40 MILLIGRAM(S): 20 TABLET, DELAYED RELEASE ORAL at 06:13

## 2021-09-29 NOTE — DISCHARGE NOTE PROVIDER - NSDCCPCAREPLAN_GEN_ALL_CORE_FT
PRINCIPAL DISCHARGE DIAGNOSIS  Diagnosis: Shortness of breath  Assessment and Plan of Treatment: You came to the hospital on 9/27/21 for shortness of breath (SOB) and chest pain. You did not have a fever but your oxygen levels decreased to the high 80s, prompting us to give you 2L of supplemental oxygen via a nasal cannula. We obtained bloodwork that showed a small increase in your white blood cells (WBC). Your imaging consisted of a chest X-ray that showed clear lungs and a CT angiography that did not show signs of pneumonia or any blood clots in your lungs. Your labwork and imaging were suggestive of an asthma exacerbation and less likely an infection. In addition to the supplemental oxygen, we began you on a medication, Duonebs (ipratropium/albuterol) and admitted you to our hospital. In the hospital, we added prednisone 10mg daily and levofloxacin 500mg daily and continued your home medications (except for Trelegy which is not carried in the hospital).  While in the hospital, your chest pain resolved and your oxygenation improved so we took you off the supplemental oxygen.       PRINCIPAL DISCHARGE DIAGNOSIS  Diagnosis: Shortness of breath  Assessment and Plan of Treatment: You came to the hospital on 9/27/21 for shortness of breath (SOB) and chest pain. You did not have a fever but your oxygen levels decreased to the high 80s, prompting us to give you 2L of supplemental oxygen via a nasal cannula. We obtained bloodwork that showed a small increase in your white blood cells (WBC). Your imaging consisted of a chest X-ray that showed clear lungs and a CT angiography that did not show signs of pneumonia or any blood clots in your lungs. Your labwork and imaging were suggestive of an asthma exacerbation and less likely an infection. In addition to the supplemental oxygen, we began you on a medication, Duonebs (ipratropium/albuterol) and admitted you to our hospital. In the hospital, we added prednisone 10mg daily and levofloxacin 500mg daily and continued your home medications (except for Trelegy which is not carried in the hospital).  While in the hospital, your chest pain resolved and your oxygenation improved so we took you off the supplemental oxygen. We are resuming your Trelegy and discharging you home where you will follow-up with your outpatient pulmonologist.   If you experience worsening SOB or chest pain, please return to the emergency room.       PRINCIPAL DISCHARGE DIAGNOSIS  Diagnosis: Shortness of breath  Assessment and Plan of Treatment: You came to the hospital on 9/27/21 for shortness of breath (SOB) and chest pain. You did not have a fever but your oxygen levels became low so we gave you supplemental oxygen. We obtained bloodwork that showed a small increase in your white blood cells (WBC). Your imaging did not show signs of pneumonia or any blood clots in your lungs. Your labwork and imaging were suggestive of an asthma exacerbation and less likely an infection. We also began you on a medication, Duonebs (ipratropium/albuterol) and admitted you to our hospital. In the hospital, we started you on steroids, antibiotics and continued your home medications (except for Trelegy which is not carried in the hospital).  While in the hospital, your oxygenation improved and your chest pain resolved. We are resuming your Trelegy and discharging you home with steroids and antibiotics where you will follow-up with your outpatient pulmonologist.   If you experience worsening SOB or chest pain, please return to the emergency room.

## 2021-09-29 NOTE — DISCHARGE NOTE NURSING/CASE MANAGEMENT/SOCIAL WORK - PATIENT PORTAL LINK FT
You can access the FollowMyHealth Patient Portal offered by [facility] by registering at the following website: [email or web address]. By joining [facility]’s FollowMyHealth portal, you will also be able to view your health information using other applications (apps) compatible with our system.

## 2021-09-29 NOTE — DISCHARGE NOTE PROVIDER - NSDCCPTREATMENT_GEN_ALL_CORE_FT
PRINCIPAL PROCEDURE  Procedure: CT angiogram chest w contrast  Findings and Treatment: INTERPRETATION:  Clinical information: Evaluate for pulmonary embolus. Exam is compared to previous study of 11/6/2019.  CT angiogram of the chest was obtained following administration of intravenous contrast. Approximately 90 cc Omnipaque 350 was administered and 10 cc was discarded. Coronal, sagittal and MIP images were submitted for review.  No hilar or mediastinal adenopathy is noted.  Heart is normal in size. Calcification of the coronary arteries noted. No pericardial effusion is noted. Pulmonary arteries are normal in caliber. No filling defects are noted.  No endobronchial lesions are noted. Few small patchy groundglass opacities are noted within both lungs. 0.5 cm solid nodule in the left lower lobe is unchanged when compared to previous exam. No pleural effusions are noted.  Below the diaphragm, visualized portions of the abdomen are unremarkable.  Degenerative changes of the spine as well as loss of height of multiple vertebral bodies is noted.  IMPRESSION: No pulmonary embolus is noted.  Few small patchy groundglass opacities noted within both lungs are of uncertain etiology.      SECONDARY PROCEDURE  Procedure: XR chest 1V portable  Findings and Treatment: EXAM:  Frontal Chest  FINDINGS:  Both lungs are equally aerated and free of any focal abnormalities. The heart is not enlarged and there is no effusion.  COMPARISON:  November 5, 2019  IMPRESSION:  No acute pulmonary disease.

## 2021-09-29 NOTE — PROGRESS NOTE ADULT - PROBLEM SELECTOR PLAN 1
SOB likely 2/2 to Asthma dx, possibly COPD component as pt was a smoker previously (1 pack x 20 years)  Possibly triggered by infection  In ED afebrile, mild leukocytosis, lactate normal  CXR: clear lungs. CTA negative for PE, no signs of PNA  Today, afebrile, no leukocytosis  Continue on duonebs  Continue with prednisone 10 mg QD  Continue with levofloxacin  Sputum cx shows numerous G(-) rods, rare G(+)C, few PMN  Chest PT  Likely d/c today with f/u with outpatient pulmonologist SOB likely 2/2 to Asthma dx, possibly COPD component as pt was a smoker previously (1 pack x 20 years)  Possibly triggered by infection  In ED afebrile, mild leukocytosis, lactate normal  CXR: clear lungs. CTA negative for PE, no signs of PNA  Today, afebrile, no leukocytosis  Switch duonebs to pt's home med Trelegy  Continue with prednisone 10 mg QD  Continue with levofloxacin  Sputum cx shows numerous G(-) rods, rare G(+)C, few PMN  Chest PT  Likely d/c today with f/u with outpatient pulmonologist

## 2021-09-29 NOTE — PROGRESS NOTE ADULT - ATTENDING COMMENTS
Pt subjectively improved, now off supplemental O2. Still fatigued when ambulating. Vitals stable, exam with +expiratory wheezes. Labs unremarkable. Pending sputum culture. Will continue empiric levaquin and discuss with Dr. Siomara Massey (ID outpatient). PT consult. Anticipate pt may be able to go home on short course of steroids as soon as tomorrow.
Pt still with some chest tightness, but improved cough, no chest pain. Good SpO2 on room air. Sputum showing GRNs -- presumably Pseudomonas -- but not clear this represents infection (as pt was treated outpatient with a month of meropenem previously and still +cultures). Suspect current symptoms more related to asthma/COPD exacerbation. If amenable, will d/c home on steroid taper with ID/Pulm follow up. Time planning discharge 35 minutes.

## 2021-09-29 NOTE — PROGRESS NOTE ADULT - PROBLEM SELECTOR PLAN 2
BP stable  Home med: HCTZ 12.5 mg QD  Continue to monitor
BP stable  Home med: HCTZ 12.5 mg QD  Continue to monitor

## 2021-09-29 NOTE — DISCHARGE NOTE PROVIDER - NSRESEARCHGRANT_PROPHYLAXISRECOMFT_GEN_A_CORE
No post-discharge thromboprophylaxis indicated. IMPROVE-DD Application Not Available Rivaroxaban 10 mg oral tablet: 1 tab orally once a day for 30 days

## 2021-09-29 NOTE — DISCHARGE NOTE PROVIDER - PROVIDER TOKENS
PROVIDER:[[id number]],SCHEDULEDAPPT:[10/04/2021],SCHEDULEDAPPTTIME:[02:30 AM],ESTABLISHEDPATIENT:[T]],PROVIDER:[[id number]],SCHEDULEDAPPT:[10/12/2021],SCHEDULEDAPPTTIME:[01:20 PM],ESTABLISHEDPATIENT:[T]]

## 2021-09-29 NOTE — PROGRESS NOTE ADULT - PROBLEM SELECTOR PLAN 3
Family hx of disease and pt with disease  Last follow up last year  No active issues at this time  - Normal ferritin (150)

## 2021-09-29 NOTE — DISCHARGE NOTE PROVIDER - NSDCMRMEDTOKEN_GEN_ALL_CORE_FT
aspirin 81 mg oral delayed release tablet: 1 tab(s) orally once a day  Caltrate 600 mg oral tablet: 2 tab(s) orally once a day  Centrum oral tablet: 1 tab(s) orally once a day  FLUoxetine (Eqv-Prozac) 10 mg oral tablet: 1 tab(s) orally 2 times a day  gabapentin 300 mg oral tablet: 1 tab(s) orally 3 times a day  hydroCHLOROthiazide 12.5 mg oral tablet: 1 tab(s) orally once a day  methadone 10 mg oral tablet: 1 tab(s) orally every 6 hours    To note pt takes it only twice a day, even though it was prescribed every 6 hours.  montelukast 10 mg oral tablet: 1 tab(s) orally once a day  omeprazole 40 mg oral delayed release capsule: 1 cap(s) orally 2 times a day  ProAir HFA 90 mcg/inh inhalation aerosol: 2 puff(s) inhaled every 6 hours  Trelegy Ellipta 200 mcg-62.5 mcg-25 mcg/inh inhalation powder: 1 puff(s) inhaled once a day  Turmeric 500 mg oral capsule: 2 cap(s) orally once a day   aspirin 81 mg oral delayed release tablet: 1 tab(s) orally once a day  Caltrate 600 mg oral tablet: 2 tab(s) orally once a day  Centrum oral tablet: 1 tab(s) orally once a day  FLUoxetine (Eqv-Prozac) 10 mg oral tablet: 1 tab(s) orally 2 times a day  gabapentin 300 mg oral tablet: 1 tab(s) orally 2 times a day  hydroCHLOROthiazide 12.5 mg oral tablet: 1 tab(s) orally once a day  methadone 10 mg oral tablet: 1 tab(s) orally every 6 hours    To note pt takes it only twice a day, even though it was prescribed every 6 hours.  montelukast 10 mg oral tablet: 1 tab(s) orally once a day  omeprazole 40 mg oral delayed release capsule: 1 cap(s) orally 2 times a day  predniSONE 10 mg oral tablet: 1 tab(s) orally once a day  ProAir HFA 90 mcg/inh inhalation aerosol: 2 puff(s) inhaled every 6 hours, As Needed  Trelegy Ellipta 200 mcg-62.5 mcg-25 mcg/inh inhalation powder: 1 puff(s) inhaled once a day  Turmeric 500 mg oral capsule: 2 cap(s) orally once a day   aspirin 81 mg oral delayed release tablet: 1 tab(s) orally once a day  Caltrate 600 mg oral tablet: 2 tab(s) orally once a day  Centrum oral tablet: 1 tab(s) orally once a day  FLUoxetine (Eqv-Prozac) 10 mg oral tablet: 1 tab(s) orally 2 times a day  gabapentin 300 mg oral tablet: 1 tab(s) orally 2 times a day  hydroCHLOROthiazide 12.5 mg oral tablet: 1 tab(s) orally once a day  levoFLOXacin 500 mg oral tablet: 1 tab(s) orally every 24 hours  methadone 10 mg oral tablet: 1 tab(s) orally every 6 hours    To note pt takes it only twice a day, even though it was prescribed every 6 hours.  montelukast 10 mg oral tablet: 1 tab(s) orally once a day  omeprazole 40 mg oral delayed release capsule: 1 cap(s) orally 2 times a day  predniSONE 10 mg oral tablet: 1 tab(s) orally once a day     Start on 10/14/21 to 10/20/21  predniSONE 20 mg oral tablet: 1 tab(s) orally once a day   Start on 9/30/21-10/6/21  predniSONE 5 mg oral tablet: 3 tab(s) orally once a day   Please start on 10/7/21-10/13/21  predniSONE 5 mg oral tablet: 1 tab(s) orally once a day     Start on 10/21/21-10/27/21  ProAir HFA 90 mcg/inh inhalation aerosol: 2 puff(s) inhaled every 6 hours, As Needed  Trelegy Ellipta 200 mcg-62.5 mcg-25 mcg/inh inhalation powder: 1 puff(s) inhaled once a day  Turmeric 500 mg oral capsule: 2 cap(s) orally once a day   aspirin 81 mg oral delayed release tablet: 1 tab(s) orally once a day  Caltrate 600 mg oral tablet: 2 tab(s) orally once a day  Centrum oral tablet: 1 tab(s) orally once a day  FLUoxetine (Eqv-Prozac) 10 mg oral tablet: 1 tab(s) orally 2 times a day  gabapentin 300 mg oral tablet: 1 tab(s) orally 2 times a day  hydroCHLOROthiazide 12.5 mg oral tablet: 1 tab(s) orally once a day  levoFLOXacin 500 mg oral tablet: 1 tab(s) orally every 24 hours  methadone 10 mg oral tablet: 1 tab(s) orally every 6 hours    To note pt takes it only twice a day, even though it was prescribed every 6 hours.  montelukast 10 mg oral tablet: 1 tab(s) orally once a day  omeprazole 40 mg oral delayed release capsule: 1 cap(s) orally 2 times a day  predniSONE 10 mg oral tablet: 1 tab(s) orally once a day     Start on 10/14/21 to 10/20/21  predniSONE 20 mg oral tablet: 1 tab(s) orally once a day   Start on 9/30/21-10/6/21  predniSONE 5 mg oral tablet: 3 tab(s) orally once a day   Please start on 10/7/21-10/13/21  predniSONE 5 mg oral tablet: 1 tab(s) orally once a day     Start on 10/21/21-10/27/21  ProAir HFA 90 mcg/inh inhalation aerosol: 2 puff(s) inhaled every 6 hours, As Needed  rivaroxaban 10 mg oral tablet: 1 tab(s) orally once a day  Trelegy Ellipta 200 mcg-62.5 mcg-25 mcg/inh inhalation powder: 1 puff(s) inhaled once a day  Turmeric 500 mg oral capsule: 2 cap(s) orally once a day

## 2021-09-29 NOTE — PROGRESS NOTE ADULT - SUBJECTIVE AND OBJECTIVE BOX
Patient is a 76y old  Female who presents with a chief complaint of asthma/COPD exacerbation (29 Sep 2021 07:55)      OVERNIGHT EVENTS: Pt examined at bedside, reporting continued SOB with chest tightness, no pain. Cough remains unchanged. Pt has not had a bowel movement. Denies HA, fever, chest pain, n/v, abdominal pain or urinary sx. Pt eating, drinking, and urinating with no issues.    REVIEW OF SYSTEMS:  CONSTITUTIONAL: No fever, weight loss, or fatigue  EYES: No eye pain, visual disturbances, or discharge  HEENT:  No difficulty hearing, tinnitus, vertigo; No sinus or throat pain; +nose bleed  NECK: No pain or stiffness  RESPIRATORY: +productive cough, wheezes; denies hemoptysis  CARDIOVASCULAR: No chest pain, palpitations, dizziness, or leg swelling  GASTROINTESTINAL: +constipation; No abdominal or epigastric pain. No nausea, vomiting, or hematemesis; No melena or hematochezia.  GENITOURINARY: No dysuria, frequency, hematuria, or incontinence  NEUROLOGICAL: No headaches, memory loss, loss of strength, numbness, or tremors  SKIN: No itching, burning, rashes, or lesions   MUSCULOSKELETAL: No joint pain or swelling; No muscle, back, or extremity pain    ALLERGIES:  No Known Allergies      FAMILY HISTORY:  FAMILY HISTORY:  Family history of asthma  in Father and Mother        VITALS:  T(C): 36.6 (09-29-21 @ 05:47), Max: 37.2 (09-28-21 @ 20:55)  HR: 87 (09-29-21 @ 05:47) (87 - 113)  BP: 131/63 (09-29-21 @ 05:47) (113/70 - 133/61)  RR: 20 (09-29-21 @ 05:47) (17 - 20)  SpO2: 96% (09-29-21 @ 05:47) (96% - 97%)  Wt(kg): --      PHYSICAL EXAM:  GENERAL: NAD, well-groomed  HEAD:  Atraumatic, Normocephalic  EYES: EOMI, PERRLA, conjunctiva and sclera clear  HEENT: No tonsillar erythema, exudates, or enlargement; Dry mucus membranes; No lesions  NECK: Supple, No JVD  NERVOUS SYSTEM:  Alert & Oriented X3, Motor Strength 5/5 B/L upper and lower extremities  CHEST/LUNG: bilateral expiratory wheezes throughout  HEART: Regular rate and rhythm; No murmurs, rubs, or gallops  ABDOMEN: +distended, Soft, Nondistended; Bowel sounds present  EXTREMITIES:  2+ Peripheral Pulses, No clubbing, cyanosis, or edema; longstanding sensitivity to palpation in LE  SKIN: No rashes or lesions    MEDICATIONS  (STANDING):  albuterol/ipratropium for Nebulization 3 milliLiter(s) Nebulizer every 6 hours  aspirin enteric coated 81 milliGRAM(s) Oral daily  enoxaparin Injectable 40 milliGRAM(s) SubCutaneous daily  FLUoxetine 10 milliGRAM(s) Oral two times a day  gabapentin 300 milliGRAM(s) Oral three times a day  hydrochlorothiazide 12.5 milliGRAM(s) Oral daily  influenza  Vaccine (HIGH DOSE) 0.7 milliLiter(s) IntraMuscular once  levoFLOXacin  Tablet 500 milliGRAM(s) Oral every 24 hours  methadone    Tablet 10 milliGRAM(s) Oral two times a day  montelukast 10 milliGRAM(s) Oral at bedtime  pantoprazole    Tablet 40 milliGRAM(s) Oral before breakfast  polyethylene glycol 3350 17 Gram(s) Oral two times a day  predniSONE   Tablet 10 milliGRAM(s) Oral daily  senna 2 Tablet(s) Oral at bedtime    LAB/STUDIES:                        13.5   7.22  )-----------( 209      ( 28 Sep 2021 08:35 )             40.6     09-28    139  |  101  |  11  ----------------------------<  130<H>  4.0   |  26  |  0.45<L>    Ca    9.3      28 Sep 2021 08:35  Phos  3.1     09-28  Mg     2.30     09-28    TPro  6.5  /  Alb  3.8  /  TBili  0.5  /  DBili  x   /  AST  18  /  ALT  15  /  AlkPhos  86  09-27               6.5  | 0.5  | 18       ------------------[86      ( 27 Sep 2021 11:24 )  3.8  | x    | 15          Culture - Sputum (collected 28 Sep 2021 16:21)  Source: .Sputum Sputum  Gram Stain (28 Sep 2021 23:33):    Few polymorphonuclear leukocytes per low power field    No Squamous epithelial cells per low power field    Numerous Gram Negative Rods per oil power field    Rare Gram Positive Cocci in Clusters per oil power field    Imaging reviewed: [X] YES   [] NO   Patient is a 76y old  Female who presents with a chief complaint of asthma/COPD exacerbation (29 Sep 2021 07:55)      OVERNIGHT EVENTS: Pt examined at bedside, reporting continued SOB with chest tightness, no pain. Cough remains unchanged. Pt has not had a bowel movement. Denies HA, fever, chest pain, n/v, abdominal pain or urinary sx. Pt eating, drinking, and urinating with no issues.    REVIEW OF SYSTEMS:  CONSTITUTIONAL: No fever, weight loss, or fatigue  EYES: No eye pain, visual disturbances, or discharge  HEENT:  No difficulty hearing, tinnitus, vertigo; No sinus or throat pain; +nose bleed  NECK: No pain or stiffness  RESPIRATORY: +productive cough, wheezes; denies hemoptysis  CARDIOVASCULAR: No chest pain, palpitations, dizziness, or leg swelling  GASTROINTESTINAL: +constipation; No abdominal or epigastric pain. No nausea, vomiting, or hematemesis; No melena or hematochezia.  GENITOURINARY: No dysuria, frequency, hematuria, or incontinence  NEUROLOGICAL: No headaches, memory loss, loss of strength, numbness, or tremors  SKIN: No itching, burning, rashes, or lesions   MUSCULOSKELETAL: No joint pain or swelling; No muscle, back, or extremity pain    ALLERGIES:  No Known Allergies      FAMILY HISTORY:  FAMILY HISTORY:  Family history of asthma  in Father and Mother        VITALS:  T(C): 36.6 (09-29-21 @ 05:47), Max: 37.2 (09-28-21 @ 20:55)  HR: 87 (09-29-21 @ 05:47) (87 - 113)  BP: 131/63 (09-29-21 @ 05:47) (113/70 - 133/61)  RR: 20 (09-29-21 @ 05:47) (17 - 20)  SpO2: 96% (09-29-21 @ 05:47) (96% - 97%)  Wt(kg): --      PHYSICAL EXAM:  GENERAL: NAD, well-groomed, flushed appearing  HEAD:  Atraumatic, Normocephalic  EYES: EOMI, PERRLA, conjunctiva and sclera clear  HEENT: No tonsillar erythema, exudates, or enlargement; Dry mucus membranes; No lesions  NECK: Supple, No JVD  NERVOUS SYSTEM:  Alert & Oriented X3, Motor Strength 5/5 B/L upper and lower extremities  CHEST/LUNG: bilateral expiratory wheezes throughout  HEART: Regular rate and rhythm; No murmurs, rubs, or gallops  ABDOMEN: +distended, Soft, Nondistended; Bowel sounds present  EXTREMITIES:  2+ Peripheral Pulses, No clubbing, cyanosis, or edema; longstanding sensitivity to palpation in LE  SKIN: No rashes or lesions    MEDICATIONS  (STANDING):  albuterol/ipratropium for Nebulization 3 milliLiter(s) Nebulizer every 6 hours  aspirin enteric coated 81 milliGRAM(s) Oral daily  enoxaparin Injectable 40 milliGRAM(s) SubCutaneous daily  FLUoxetine 10 milliGRAM(s) Oral two times a day  gabapentin 300 milliGRAM(s) Oral three times a day  hydrochlorothiazide 12.5 milliGRAM(s) Oral daily  influenza  Vaccine (HIGH DOSE) 0.7 milliLiter(s) IntraMuscular once  levoFLOXacin  Tablet 500 milliGRAM(s) Oral every 24 hours  methadone    Tablet 10 milliGRAM(s) Oral two times a day  montelukast 10 milliGRAM(s) Oral at bedtime  pantoprazole    Tablet 40 milliGRAM(s) Oral before breakfast  polyethylene glycol 3350 17 Gram(s) Oral two times a day  predniSONE   Tablet 10 milliGRAM(s) Oral daily  senna 2 Tablet(s) Oral at bedtime    LAB/STUDIES:                        13.5   7.22  )-----------( 209      ( 28 Sep 2021 08:35 )             40.6     09-28    139  |  101  |  11  ----------------------------<  130<H>  4.0   |  26  |  0.45<L>    Ca    9.3      28 Sep 2021 08:35  Phos  3.1     09-28  Mg     2.30     09-28    TPro  6.5  /  Alb  3.8  /  TBili  0.5  /  DBili  x   /  AST  18  /  ALT  15  /  AlkPhos  86  09-27               6.5  | 0.5  | 18       ------------------[86      ( 27 Sep 2021 11:24 )  3.8  | x    | 15          Culture - Sputum (collected 28 Sep 2021 16:21)  Source: .Sputum Sputum  Gram Stain (28 Sep 2021 23:33):    Few polymorphonuclear leukocytes per low power field    No Squamous epithelial cells per low power field    Numerous Gram Negative Rods per oil power field    Rare Gram Positive Cocci in Clusters per oil power field    Imaging reviewed: [X] YES   [] NO

## 2021-09-29 NOTE — DISCHARGE NOTE PROVIDER - NSDCFUADDAPPT_GEN_ALL_CORE_FT
For your follow up appointment with Dr. Siomara Massey on Monday, Oct 4th at 2:30pm, please arrive 15 minutes before your scheduled time and bring your ID and insurance card. They are currently not allowing accompaniments to your appointment so please arrive alone.    For your follow up appointment with Dr. Michael Byrne on Tuesday, Oct 12th at 1:20pm, you will first get an X-Ray and then will be seen by Dr. Byrne.    Please call your primary care provider, Dr. Kristen Hermosillo to schedule an appointment.    Please call your gastroenterologist, Dr. Danilo Griffin to schedule an appointment to discuss your recent ferritin levels. Your most recent ferritin levels on 9/28/21 is 150.

## 2021-09-29 NOTE — PROGRESS NOTE ADULT - PROBLEM SELECTOR PLAN 5
PCP: Dr. Harlan Alicia ([place])  Pulmonologist: Dr. Michael Byrne ([facility]): [phone]  PT consulted - home with no skilled PT needs
PCP: Dr. Harlan Alicia ([place])  Pulmonologist: Dr. Michael Byrne ([facility]): [phone]  PT consult pending

## 2021-09-29 NOTE — DISCHARGE NOTE PROVIDER - CARE PROVIDER_API CALL
Siomara Massey)  Infectious Disease; Internal Medicine  [address] [place] [address]  Phone: [phone]  Fax: [phone]  Established Patient  Scheduled Appointment: 10/04/2021 02:30 AM    Michael Byrne  CRITICAL CARE MEDICINE  [address] [place] [address], [address]  [place] [address]  Phone: [phone]  Fax: [phone]  Established Patient  Scheduled Appointment: 10/12/2021 01:20 PM

## 2021-09-29 NOTE — DISCHARGE NOTE PROVIDER - HOSPITAL COURSE
75 y/o female with pmh of Asthma/COPD (not on home oxygen), HTN, NSTEMI (2019), hemochromatosis, and chronic pseudomonas + infection who presented to the ED for SOB with chest pain found to be in an acute asthma/COPD exacerbation.    In the ED, the patient was afebrile, hypertensive, desatting to the high 80s on RA. Exam was significant for diffuse wheezing. Labs showed mild leukocytosis with a normal lactate and troponin T. CXR showed clear lungs. CTA was negative for PE with no signs of pneumonia. The patient was started on duonebs 3ml q6h and 2L NC and admitted to the medical floor for medical management.    9/27/21: Pt started on prednisone 10mg qD and levofloxacin 500mg qD. Sputum cultures were ordered. In the evening, the patient reported continued SOB but resolution of chest pain.    9/28/21: Pt breathing comfortably on RA but still reporting chest tightness. Sputum cultures obtained. Serum ferritin within normal limits (150).    9/29/21: Pt continues to be afebrile and hemodynamically stable, with continued chest tightness when breathing. Sputum cultures revealed numerous G(-)R, rare G(+)C, few PMN.   77 y/o female with pmh of Asthma/COPD (not on home oxygen), HTN, NSTEMI (2019), hemochromatosis, and chronic pseudomonas + infection who presented to the ED for SOB with chest pain found to be in an acute asthma/COPD exacerbation.    In the ED, the patient was afebrile, hypertensive, desatting to the high 80s on RA. Exam was significant for diffuse wheezing. Labs showed mild leukocytosis with a normal lactate and troponin T. CXR showed clear lungs. CTA was negative for PE with no signs of pneumonia. The patient was started on duonebs 3ml q6h and 2L NC and admitted to the medical floor for medical management.    9/27/21: Pt started on prednisone 10mg qD and levofloxacin 500mg qD. Sputum cultures were ordered. In the evening, the patient reported continued SOB but resolution of chest pain.    9/28/21: Pt breathing comfortably on RA but still reporting chest tightness. Sputum cultures obtained. Serum ferritin within normal limits (150).    9/29/21: Pt continues to be afebrile and hemodynamically stable, residual chest tightness but otherwise in no acute distress. Sputum cultures revealed numerous G(-)R, rare G(+)C, few PMN. Pt switched to home med Trelegy and to be discharged on steroid taper and levofloxacin 500mg qD.   75 y/o female with pmh of Asthma/COPD (not on home oxygen), HTN, NSTEMI (2019), hemochromatosis, and chronic pseudomonas + infection who presented to the ED on 9/27/21 for SOB with chest pain found to be in an acute asthma/COPD exacerbation. In the ED, the patient was afebrile, hypertensive, desatting to the high 80s on RA. Exam was significant for diffuse wheezing bilaterally. ED labs showed mild leukocytosis (9.73) and elevated D-dimer (687) with a normal lactate, troponin T, and BNP. CXR showed clear lungs. CTA was negative for PE with no signs of pneumonia. While in the ED, the patient was put on 2L NC and started on duonebs 3ml q6h. Because of her desaturations at RA, the patient was admitted to the medical floor for medical management.    Once on the floor, the patient was started on prednisone 10mg qD and a 5 day course of levofloxacin 500mg qD on 9/27/21. Sputum cultures were ordered, given the patient's history of chronic pseudomonal infection. The patient's home medication regimen, except for Trelegy (not carried by hospital), was restarted. During this time, the patient's reported chest pain resolved, but she continued to endorse SOB requiring 2L O2 via NC. The next day 9/28/21, the patient continued to endorse chest tightness but was now breathing comfortably on room air. She remained afebrile and hemodynamically stable. She continued to have residual bilateral expiratory wheezes on exam. Her labs were unremarkable and resolution of the leukocytosis. She received her 2nd dose of levofloxacin and continued to take prednisone and duonebs. The patient appeared medically stable and ready for discharge. Given her history of hemochromatosis, the patient's serum ferritin was obtained and was within normal limits (150 ng/ml). On 9/29/21, the patient remained stable and was prepared for discharge. Exam showed residual bilateral expiratory wheezes. Sputum cultures revealed rare PMN, no squamous epithelial cells, numerous G-R and few G+C. Her duonebs was switched back to her home medication Trelegy. She was instructed to continue with her remaining 2 days of levofloxacin and to taper her prednisone. She was instructed to follow up with her outpatient ID and pulmonologist after discharge.   75 y/o female with pmh of Asthma/COPD (not on home oxygen), HTN, NSTEMI (2019), hemochromatosis, and chronic pseudomonas + infection who presented to the ED on 9/27/21 for SOB with chest pain found to be in an acute asthma/COPD exacerbation. The patient has a history of pseudomonas+ sputum cultures with most recent treatment from July-September via IV meropenem. in september, she was put on a prednisone taper. She reports worsening chest tightness while on prednisone. The patient waking up with SOB, chest tightness, and pain that radiated from her chest to neck. The pain is severe and worse when breathing. Her sputum production is green/yellow and has remained unchanged. Denied hemoptysis, abdominal pain, bowel or urinary changes.    In the ED, the patient was afebrile, hypertensive, desatting to the high 80s on RA. Exam was significant for diffuse wheezing bilaterally. ED labs showed mild leukocytosis (9.73) and elevated D-dimer (687) with a normal lactate, troponin T, and BNP. CXR showed clear lungs. CTA was negative for PE with no signs of pneumonia. While in the ED, the patient was put on 2L NC and started on duonebs 3ml q6h. Because of her desaturations at RA, the patient was admitted to the medical floor for medical management.    Once on the floor, the patient was started on prednisone 10mg qD and a 5 day course of levofloxacin 500mg qD on 9/27/21. Sputum cultures were ordered, given the patient's history of chronic pseudomonal infection. The patient's home medication regimen, except for Trelegy (not carried by hospital), was restarted. During this time, the patient's reported chest pain resolved, but she continued to endorse SOB requiring 2L O2 via NC. The next day 9/28/21, the patient continued to endorse chest tightness but was now breathing comfortably on room air. She remained afebrile and hemodynamically stable. She continued to have residual bilateral expiratory wheezes on exam. Her labs were unremarkable and resolution of the leukocytosis. She received her 2nd dose of levofloxacin and continued to take prednisone and duonebs. The patient appeared medically stable and ready for discharge. Given her history of hemochromatosis, the patient's serum ferritin was obtained and was within normal limits (150 ng/ml). On 9/29/21, the patient remained stable and was prepared for discharge. Exam showed residual bilateral expiratory wheezes. Sputum cultures revealed rare PMN, no squamous epithelial cells, numerous G-R and few G+C. Her duonebs was switched back to her home medication Trelegy. She was instructed to continue with her remaining 2 days of levofloxacin and to taper her prednisone. She was instructed to follow up with her outpatient ID and pulmonologist after discharge.

## 2021-09-29 NOTE — DISCHARGE NOTE NURSING/CASE MANAGEMENT/SOCIAL WORK - NSDCVIVACCINE_GEN_ALL_CORE_FT
influenza, high-dose, quadrivalent; 29-Sep-2021 15:16; Tammy Bardales (RN); [employer]; [id number] (Exp. Date: 30-Jun-2022); IntraMuscular; Deltoid Right.; 0.7 milliLiter(s); VIS (VIS Published: 15-Aug-2019, VIS Presented: 29-Sep-2021);   Tdap; 19-Jun-2014 09:25; Yaa Tijerina (AYAKA); [id number]; IntraMuscular; Deltoid Left.; 0.5 milliLiter(s);

## 2021-09-30 RX ORDER — RIVAROXABAN 15 MG-20MG
1 KIT ORAL
Qty: 0 | Refills: 0 | DISCHARGE
Start: 2021-09-30 | End: 2021-10-29

## 2021-09-30 RX ORDER — LEVOFLOXACIN 5 MG/ML
1 INJECTION, SOLUTION INTRAVENOUS
Qty: 2 | Refills: 0
Start: 2021-09-30 | End: 2021-10-01

## 2021-09-30 RX ORDER — RIVAROXABAN 15 MG-20MG
1 KIT ORAL
Qty: 30 | Refills: 0
Start: 2021-09-30 | End: 2021-10-29

## 2021-10-01 LAB
-  AMIKACIN: SIGNIFICANT CHANGE UP
-  AMIKACIN: SIGNIFICANT CHANGE UP
-  AZTREONAM: SIGNIFICANT CHANGE UP
-  AZTREONAM: SIGNIFICANT CHANGE UP
-  CEFEPIME: SIGNIFICANT CHANGE UP
-  CEFEPIME: SIGNIFICANT CHANGE UP
-  CEFTAZIDIME: SIGNIFICANT CHANGE UP
-  CEFTRIAXONE: SIGNIFICANT CHANGE UP
-  CIPROFLOXACIN: SIGNIFICANT CHANGE UP
-  CIPROFLOXACIN: SIGNIFICANT CHANGE UP
-  GENTAMICIN: SIGNIFICANT CHANGE UP
-  GENTAMICIN: SIGNIFICANT CHANGE UP
-  IMIPENEM: SIGNIFICANT CHANGE UP
-  LEVOFLOXACIN: SIGNIFICANT CHANGE UP
-  LEVOFLOXACIN: SIGNIFICANT CHANGE UP
-  MEROPENEM: SIGNIFICANT CHANGE UP
-  MEROPENEM: SIGNIFICANT CHANGE UP
-  PIPERACILLIN/TAZOBACTAM: SIGNIFICANT CHANGE UP
-  PIPERACILLIN/TAZOBACTAM: SIGNIFICANT CHANGE UP
-  TOBRAMYCIN: SIGNIFICANT CHANGE UP
-  TOBRAMYCIN: SIGNIFICANT CHANGE UP
-  TRIMETHOPRIM/SULFAMETHOXAZOLE: SIGNIFICANT CHANGE UP
CULTURE RESULTS: SIGNIFICANT CHANGE UP
METHOD TYPE: SIGNIFICANT CHANGE UP
METHOD TYPE: SIGNIFICANT CHANGE UP
ORGANISM # SPEC MICROSCOPIC CNT: SIGNIFICANT CHANGE UP
SPECIMEN SOURCE: SIGNIFICANT CHANGE UP

## 2021-10-04 ENCOUNTER — APPOINTMENT (OUTPATIENT)
Dept: INFECTIOUS DISEASE | Facility: CLINIC | Age: 76
End: 2021-10-04
Payer: MEDICARE

## 2021-10-04 VITALS
BODY MASS INDEX: 29.25 KG/M2 | HEIGHT: 56 IN | DIASTOLIC BLOOD PRESSURE: 69 MMHG | OXYGEN SATURATION: 93 % | SYSTOLIC BLOOD PRESSURE: 139 MMHG | WEIGHT: 130 LBS | TEMPERATURE: 98.6 F | HEART RATE: 78 BPM

## 2021-10-04 PROCEDURE — 99214 OFFICE O/P EST MOD 30 MIN: CPT

## 2021-10-04 NOTE — REVIEW OF SYSTEMS
[Feeling Tired] : feeling tired [Cough] : cough [Sputum] : coughing up ~M sputum [Joint Pain] : joint pain [Negative] : Heme/Lymph [Pleuritic Chest Pain] : no pleuritic chest pain

## 2021-10-04 NOTE — PHYSICAL EXAM
[General Appearance - Alert] : alert [General Appearance - In No Acute Distress] : in no acute distress [General Appearance - Well Nourished] : well nourished [General Appearance - Well-Appearing] : healthy appearing [Sclera] : the sclera and conjunctiva were normal [Extraocular Movements] : extraocular movements were intact [PERRL With Normal Accommodation] : pupils were equal in size, round, reactive to light [Outer Ear] : the ears and nose were normal in appearance [Oropharynx] : the oropharynx was normal with no thrush [Neck Appearance] : the appearance of the neck was normal [Neck Cervical Mass (___cm)] : no neck mass was observed [Jugular Venous Distention Increased] : there was no jugular-venous distention [] : no respiratory distress [Exaggerated Use Of Accessory Muscles For Inspiration] : no accessory muscle use [Heart Rate And Rhythm] : heart rate was normal and rhythm regular [Heart Sounds] : normal S1 and S2 [Heart Sounds Gallop] : no gallops [Murmurs] : no murmurs [Heart Sounds Pericardial Friction Rub] : no pericardial rub [Edema] : there was no peripheral edema [Bowel Sounds] : normal bowel sounds [Abdomen Soft] : soft [Cervical Lymph Nodes Enlarged Posterior Bilaterally] : posterior cervical [Cervical Lymph Nodes Enlarged Anterior Bilaterally] : anterior cervical [Supraclavicular Lymph Nodes Enlarged Bilaterally] : supraclavicular [Skin Lesions] : no skin lesions [No Focal Deficits] : no focal deficits [Oriented To Time, Place, And Person] : oriented to person, place, and time [Affect] : the affect was normal [FreeTextEntry1] : Diffuse wheezing and rhonchi

## 2021-10-04 NOTE — CONSULT LETTER
[Dear  ___] : Dear  [unfilled], [Consult Letter:] : I had the pleasure of evaluating your patient, [unfilled]. [Please see my note below.] : Please see my note below. [Consult Closing:] : Thank you very much for allowing me to participate in the care of this patient.  If you have any questions, please do not hesitate to contact me. [Sincerely,] : Sincerely, [FreeTextEntry2] : Dr. Michael Byrne [FreeTextEntry3] : \par Siomara Massey MD\par  of Medicine\par Division of Infectious Diseases\par [facility] [employer] [facility]\par [address] [place] [address]\[place] [address]\par Tel: [phone]\par Fax: [phone]\par Email: [email or web address] \par \par [facility]\par Visit us at [facility] [email or web address]

## 2021-10-04 NOTE — HISTORY OF PRESENT ILLNESS
[FreeTextEntry1] : This is a 75 yo F with h/o chronic bronchitis, chronic cough, asthma, pulmonary pseudomonal colonization, asthma, arthritis, HLD, HTN, osteoporosis, hemachromatosis, chronic pain who presents today for chronic cough and pseudomonas in sputum.  Last checked 9/2021.  Given course of levaquin 3/2021 and then cefdinir x 2weeks to see if helped with cough. No improvement.  Sent back to ID for possible IV antibiotics for pseudomonas. Pseudomonas has become resistant to quinolones. Intermediate to cefepime. Was treated with 4 weeks of IV meropenem w/o much improvement.\par \par Denies fevers or chills. \par Has chronic cough.  Cough is worse than last year. Dark in color.\par She uses Acapella device and nebulizers but not as she should.  Had stoppe\par \par She continues to grow pseudomonas in sputum.\par Was prescribed inhaled tobramycin but it was very expensive (>$1,000 for 60 day supply) and she was not able to obtain it.\par \par She also has grown CINDY in her sputum before.  Not treated for this.\par She was referred for audiology testing prior to inhaled nora which she has done.  \par \par \par Started IV meropenem TID for pseudomonal lung infection. Started 7/27/2021.\par Planned for 3 weeks but at the 2 week beck today and not feeling much better yet. Extended to 4 weeks. \par Weekly labs done 8/17  and were stable\par \par Was hospitalized 9/27-9/29 for chest pain and cough. CTA done and did not have PE.  Found to have stable b/l ground glass opacities.  Sputum again grew achromobacter and pseudomonas.  She was given 5 days of levaquin w/o relief and 1 month prednisone taper which ends 10/27/21.  To see Dr. Byrne 10/12/21. \par \par Updated Culture Data:\par 9/28/21: Sputum Achromonacter & Pseudomonas\par 9/13/21:  Stenothrophomonas, Normal Resp Frederic,  AFB pending, Rare aspergillus niger\par 7/8/21: Pseudomonas, Actinomadura in AFB culture, rare aspergillus niger, few penicillium, rare other mold\par 7/7/21: Pseudomonas, AFB negative, rare penicilium, rare aspergillus niger, few yeast\par 7/6/21: Moderate pseudomonas, normal resp frederic present\par \par \par

## 2021-10-04 NOTE — ASSESSMENT
[FreeTextEntry1] : This is a 75 yo F with h/o chronic bronchitis, chronic cough, asthma, pulmonary pseudomonal colonization, asthma, arthritis, HLD, HTN, osteoporosis, hemochromatosis, chronic pain who presents today for chronic cough and pseudomonas in sputum.\par \par I suspect she has chronic colonization with pseudomonas.\par Given meropenem 1 gram IV q8 at home 7/27/21.  End date 8/23/21.\par She had a cough with productive thick, dark phlegm.  Cough is less and phlegm is not dark now after treatment.  However cough returned and she developed chest pain requiring hospitalization for her pain and cough.\par \par She is chronically colonized with numerous bacteria and fungi.  She has grown CINDY in the past as well but not this year in 2021.\par \par I feel she needs better mucus clearing and she admits to not using her device and will start again.\par She was not able to get inhaled nora due to price and i'm not sure it would be very helpful for her anyway.\par \par She did grow an Actinomyces like organism on AFB culture from 7/8/21.  I'm not certain this is a true pathogen in this setting.  It has not grown since.  To dx this, would favor bronch if it was indicated for path and evaluate for sulfur granules.  Meropenem would treat this so she may have been partially treated. It requires a long course of antimicrobials with penicillin if proven to have it.\par \par Fow now she will f/up with pulmonary. Complete steroid taper as indicated.\par She can return to me any time and will call me. \par  \par

## 2021-10-15 LAB — ACID FAST STN SPT: ABNORMAL

## 2021-10-19 ENCOUNTER — TRANSCRIPTION ENCOUNTER (OUTPATIENT)
Age: 76
End: 2021-10-19

## 2021-10-21 ENCOUNTER — APPOINTMENT (OUTPATIENT)
Dept: OTOLARYNGOLOGY | Facility: CLINIC | Age: 76
End: 2021-10-21
Payer: MEDICARE

## 2021-10-21 VITALS
SYSTOLIC BLOOD PRESSURE: 118 MMHG | WEIGHT: 130 LBS | TEMPERATURE: 97.2 F | HEART RATE: 77 BPM | HEIGHT: 56 IN | DIASTOLIC BLOOD PRESSURE: 64 MMHG | BODY MASS INDEX: 29.25 KG/M2

## 2021-10-21 DIAGNOSIS — H90.3 SENSORINEURAL HEARING LOSS, BILATERAL: ICD-10-CM

## 2021-10-21 PROCEDURE — 92557 COMPREHENSIVE HEARING TEST: CPT

## 2021-10-21 PROCEDURE — 99214 OFFICE O/P EST MOD 30 MIN: CPT | Mod: 25

## 2021-10-21 PROCEDURE — 92567 TYMPANOMETRY: CPT

## 2021-10-21 NOTE — ASSESSMENT
[FreeTextEntry1] : With a history of mild bilateral sensorineural hearing loss feels that her hearing is worsened over the last year and examination is no wax audiogram confirmed she is certainly a candidate for hearing aid at this time and she is cleared for hearing aid if she so desires.

## 2021-10-21 NOTE — END OF VISIT
[FreeTextEntry3] : I saw and examined this patient in person. I have discussed with Ameena Whitfield, Physician Assistant, in detail the above note and agree with the above assessment and plan of care.\par

## 2021-10-21 NOTE — HISTORY OF PRESENT ILLNESS
[de-identified] : Patient has been noticing changes in hearing since last year that has been significant. SHe does not have any issues with ringing in the ears or dizziness. She does use a Qtip on occasion to clean the ears. SHe does not have any issues with nasal congestion or runny nose. SHe is not having any issues with ear pain or pressure.

## 2021-10-26 LAB — FUNGUS SPT CULT: ABNORMAL

## 2021-10-29 ENCOUNTER — EMERGENCY (EMERGENCY)
Facility: HOSPITAL | Age: 76
LOS: 1 days | Discharge: HOME | End: 2021-10-29
Attending: EMERGENCY MEDICINE | Admitting: EMERGENCY MEDICINE
Payer: MEDICARE

## 2021-10-29 VITALS
HEART RATE: 86 BPM | TEMPERATURE: 98 F | DIASTOLIC BLOOD PRESSURE: 64 MMHG | SYSTOLIC BLOOD PRESSURE: 142 MMHG | HEIGHT: 56 IN | RESPIRATION RATE: 18 BRPM | OXYGEN SATURATION: 95 %

## 2021-10-29 DIAGNOSIS — Z98.890 OTHER SPECIFIED POSTPROCEDURAL STATES: Chronic | ICD-10-CM

## 2021-10-29 DIAGNOSIS — Z96.641 PRESENCE OF RIGHT ARTIFICIAL HIP JOINT: Chronic | ICD-10-CM

## 2021-10-29 DIAGNOSIS — Z87.81 PERSONAL HISTORY OF (HEALED) TRAUMATIC FRACTURE: Chronic | ICD-10-CM

## 2021-10-29 DIAGNOSIS — Z96.652 PRESENCE OF LEFT ARTIFICIAL KNEE JOINT: Chronic | ICD-10-CM

## 2021-10-29 PROCEDURE — 99284 EMERGENCY DEPT VISIT MOD MDM: CPT | Mod: 25

## 2021-10-29 PROCEDURE — 12034 INTMD RPR S/TR/EXT 7.6-12.5: CPT

## 2021-10-29 PROCEDURE — 73562 X-RAY EXAM OF KNEE 3: CPT | Mod: 26,LT

## 2021-10-29 RX ORDER — TETANUS TOXOID, REDUCED DIPHTHERIA TOXOID AND ACELLULAR PERTUSSIS VACCINE, ADSORBED 5; 2.5; 8; 8; 2.5 [IU]/.5ML; [IU]/.5ML; UG/.5ML; UG/.5ML; UG/.5ML
0.5 SUSPENSION INTRAMUSCULAR ONCE
Refills: 0 | Status: COMPLETED | OUTPATIENT
Start: 2021-10-29 | End: 2021-10-29

## 2021-10-29 RX ORDER — CIPROFLOXACIN LACTATE 400MG/40ML
1 VIAL (ML) INTRAVENOUS
Qty: 14 | Refills: 0
Start: 2021-10-29 | End: 2021-11-04

## 2021-10-29 RX ORDER — CIPROFLOXACIN LACTATE 400MG/40ML
500 VIAL (ML) INTRAVENOUS ONCE
Refills: 0 | Status: COMPLETED | OUTPATIENT
Start: 2021-10-29 | End: 2021-10-29

## 2021-10-29 RX ADMIN — TETANUS TOXOID, REDUCED DIPHTHERIA TOXOID AND ACELLULAR PERTUSSIS VACCINE, ADSORBED 0.5 MILLILITER(S): 5; 2.5; 8; 8; 2.5 SUSPENSION INTRAMUSCULAR at 16:37

## 2021-10-29 RX ADMIN — Medication 500 MILLIGRAM(S): at 17:21

## 2021-10-29 NOTE — ED PROVIDER NOTE - ATTENDING CONTRIBUTION TO CARE
I have seen and examined the patient on the patient´s visit date. I have reviewed the note written by Mariano Romo MD on that visit day. I have supervised and participated as necessary in the performance of procedures indicated for patient management and was available at all phases of the patient´s visit when needed. We discussed the history, physical exam findings, management plan, and  medical decision making. I have made my additions, exceptions, and revisions within the chart and I agree with H and P as documented in its entirety. The data and my interpretation of any data collected from labs, interventions and imaging appear below as well as my independent medical decision making and considerations      The patient is a 76y Female who has a past medical and surgery history of HTN Hemochromatosis Chronic back pain left TKR  right THR  laminectomy thoracolumbar R shoulder rotator cuff repair PTED with mechanical fall and lac over Left TKR as described no other injuries   Vital Signs   PE: as described; my additions and exceptions are noted in the chart    IMPRESSION/RISK:  Knee pain and lac    Plan  Knee films  analgesics  suture lac   given colonization will d/c with antibiotics with pseudomonal coverage  RTED PRN

## 2021-10-29 NOTE — ED PROVIDER NOTE - PROGRESS NOTE DETAILS
Demetrius PGY3: laceration repaired, discussed w/ radiology who agreed likely no acute fx. will dc on 1 week course of cipro given longstanding chronic pseudomonal infection. Pt to f/u with pcp and return for suture removal in two weeks.

## 2021-10-29 NOTE — ED ADULT NURSE NOTE - NSIMPLEMENTINTERV_GEN_ALL_ED
Implemented All Fall Risk Interventions:  Fellows to call system. Call bell, personal items and telephone within reach. Instruct patient to call for assistance. Room bathroom lighting operational. Non-slip footwear when patient is off stretcher. Physically safe environment: no spills, clutter or unnecessary equipment. Stretcher in lowest position, wheels locked, appropriate side rails in place. Provide visual cue, wrist band, yellow gown, etc. Monitor gait and stability. Monitor for mental status changes and reorient to person, place, and time. Review medications for side effects contributing to fall risk. Reinforce activity limits and safety measures with patient and family.

## 2021-10-29 NOTE — ED PROVIDER NOTE - OBJECTIVE STATEMENT
76F hx asthma/copd (no home o2), HTN, NSTEMI, hemochromatosis, chronic pseudomonas infection p/w mechanical fall. PT was walking in store and tripped landing onto L knee. Reports her skin is thin and inflexible 2/2 longstanding prednisone use, notes large skin tear over patella. hx of L knee fx s/p replacement. Denies head strike, no LOC, states feels otherwise well and denies dizziness/ feeling faint, no HA/CP/SOB/abd pain. Pt w/ hx of L ankle fx w/ longstanding ankle malrotation however notes no change today.

## 2021-10-29 NOTE — ED PROVIDER NOTE - PHYSICAL EXAMINATION
Gen: WDWN, NAD  HEENT: EOMI, no nasal discharge, mucous membranes moist  CV: RRR, +S1/S2, no M/R/G  Resp: very mild wheeze bilaterally   GI: Abdomen soft non-distended, NTTP, no masses  MSK: + open 10 cm vertical laceration inferior to L patella, FROM to L knee. Notable for b/l shin swelling and warmth to touch, unchanged.   Neuro: A&Ox4, following commands, moving all four extremities spontaneously  Psych: appropriate mood, denies AH, VH, SI

## 2021-10-29 NOTE — ED PROVIDER NOTE - NSFOLLOWUPINSTRUCTIONS_ED_ALL_ED_FT
Please follow up with your primary care provider for further concerns you may have regarding your general health. Attached you will find your results from today's visit. Continue taking your medications as prescribed and keep your upcoming medical appointments.    Take your antibiotics as prescribed and return in 12 days to have your 8 STITCHES removed as they are not absorbable.

## 2021-10-29 NOTE — ED ADULT NURSE NOTE - OBJECTIVE STATEMENT
A&Ox4. ambulatory. c/o laceration to lower left leg after falling at a store. NAD. pt denies cp, n/v/d, dizziness, LOC, blurred vision, hitting head, weakness. respirations are even and un labored. no deformities observed to extremities.  4" by 1" laceration observed to lower left leg, below knee. positive swelling to left lower leg. positive pulse, motor and sensory to left leg. safety precautions maintained. call  bell placed at bedside.

## 2021-10-29 NOTE — ED PROVIDER NOTE - CLINICAL SUMMARY MEDICAL DECISION MAKING FREE TEXT BOX
76F w/ mechanical fall, not on AC. L knee laceration w/ ttp to site, FROM LLE, pt has ambulated since incident. Plan for laceration repair, XR, adacel, reassess.

## 2021-10-29 NOTE — ED ADULT TRIAGE NOTE - CHIEF COMPLAINT QUOTE
states turned ,fell to floor injury l side of body. reported skin tear to l leg. l knee replacement   2014 . redness l leg known states present  x past wk. takes 1 baby  asa

## 2021-10-29 NOTE — ED PROVIDER NOTE - PATIENT PORTAL LINK FT
You can access the FollowMyHealth Patient Portal offered by [facility] by registering at the following website: [email or web address]. By joining [employer]’s FollowMyHealth portal, you will also be able to view your health information using other applications (apps) compatible with our system.

## 2021-10-29 NOTE — ED PROCEDURE NOTE - PROCEDURE ADDITIONAL DETAILS
Pt repaired w/ 3-0 nylon.     3 horizontal mattress sutures placed w/ 5 interrupted sutures between them. Pt informed of return precautions and when to return to emergency department.

## 2021-10-29 NOTE — ED PROVIDER NOTE - CARE PLAN
1 Principal Discharge DX:	Superficial laceration of skin  Secondary Diagnosis:	Knee pain  Secondary Diagnosis:	Fall

## 2021-11-05 LAB — ACID FAST STN SPT: NORMAL

## 2021-11-09 ENCOUNTER — APPOINTMENT (OUTPATIENT)
Dept: WOUND CARE | Facility: CLINIC | Age: 76
End: 2021-11-09
Payer: MEDICARE

## 2021-11-09 DIAGNOSIS — Z48.02 ENCOUNTER FOR REMOVAL OF SUTURES: ICD-10-CM

## 2021-11-09 DIAGNOSIS — S89.92XA UNSPECIFIED INJURY OF LEFT LOWER LEG, INITIAL ENCOUNTER: ICD-10-CM

## 2021-11-09 PROCEDURE — 99215 OFFICE O/P EST HI 40 MIN: CPT

## 2021-11-11 PROBLEM — S89.92XA TRAUMATIC LEG INJURY, LEFT, INITIAL ENCOUNTER: Status: ACTIVE | Noted: 2021-11-11

## 2021-11-11 PROBLEM — Z48.02 ENCOUNTER FOR REMOVAL OF SUTURES: Status: ACTIVE | Noted: 2021-11-11

## 2021-11-11 NOTE — PHYSICAL EXAM
[Normal Breath Sounds] : Normal breath sounds [Normal Rate and Rhythm] : normal rate and rhythm [1+] : left 1+ [Skin Ulcer] : ulcer [Alert] : alert [Oriented to Person] : oriented to person [Oriented to Place] : oriented to place [Oriented to Time] : oriented to time [Calm] : calm [JVD] : no jugular venous distention  [Abdomen Tenderness] : ~T ~M No abdominal tenderness [de-identified] : NAD [de-identified] : LAMIN [de-identified] : NL [de-identified] : H/O HIP SURGERY

## 2021-11-11 NOTE — PLAN
[FreeTextEntry1] : 11/9/21\par Plan - adaptic touch/aquacel/elizabeth/ace to knee\par start wearing compression garment to both legs\par supplies ordered\par resume lymphedema pump\par Follow up 2 weeks

## 2021-11-11 NOTE — ASSESSMENT
[FreeTextEntry1] : \par 75 YR OLD WOMAN WITH NEW RIGHT LEG WOUND\par doing well with medihoney\par  H/O PREVIOUS TRAUMA, RADICULOPATHY HTN\par  data complex- mod lab,xr -reviewed, no films needed, consider venous dopplers if not healing\par culture obtained  last visit - negative\par risk- surgery- tolerated sharp debridement- tender\par time 20\par 3/5/21\par improvement noted pt is providing wd care daily with medihoney gel was using telfa pad findings maceration to alyson wd \par recommendation to use regular gauze to aid in absorption away from wd bed \par \par 4/12/21\par wounds healed.  Bilateral lower extremity swelling with lymphedema to the LLE secondary to LLE knee replacement.  AGUS/PvR wnl.  LLE demonstrates significant reflux to the LLE with reflux.  Results d/w pitent and .  Edema noted on venous reflux study.  No evidence of venous reflux.\par Continue compression therapy.  Patient would benefit from a lymphedema pump secondary to chronic lymphedema\par \par 11/11/21\par Patient presents today with sutures to left knee after a traumatic fall in a store, sutures placed in the ER.\par left knee and calf are edematous, pt. does have superficial venous insufficiency in the left leg with a history of RFA to left side.\par Sutures removed, were very inbeded into skin, after removal, area slightly oozing but wound is approximated and 95% closed\par patient has not been wearing any compression post RFA

## 2021-11-23 ENCOUNTER — APPOINTMENT (OUTPATIENT)
Dept: WOUND CARE | Facility: CLINIC | Age: 76
End: 2021-11-23
Payer: MEDICARE

## 2021-11-23 DIAGNOSIS — L97.922 NON-PRESSURE CHRONIC ULCER OF UNSPECIFIED PART OF LEFT LOWER LEG WITH FAT LAYER EXPOSED: ICD-10-CM

## 2021-11-23 PROCEDURE — 11042 DBRDMT SUBQ TIS 1ST 20SQCM/<: CPT

## 2021-11-23 NOTE — PHYSICAL EXAM
[Normal Breath Sounds] : Normal breath sounds [Normal Rate and Rhythm] : normal rate and rhythm [1+] : left 1+ [Skin Ulcer] : ulcer [Alert] : alert [Oriented to Person] : oriented to person [Oriented to Place] : oriented to place [Oriented to Time] : oriented to time [Calm] : calm [JVD] : no jugular venous distention  [Abdomen Tenderness] : ~T ~M No abdominal tenderness [de-identified] : NAD [de-identified] : LAMIN [de-identified] : NL [de-identified] : H/O HIP SURGERY

## 2021-11-23 NOTE — PLAN
[FreeTextEntry1] : 11/23/21\par Plan - shower wound, c/w adaptic/aquacel/elizabeth and compression, only 2 small areas open , continue until closed\par follow up 3 weeks

## 2021-11-23 NOTE — ASSESSMENT
[FreeTextEntry1] : \par 75 YR OLD WOMAN WITH NEW RIGHT LEG WOUND\par doing well with medihoney\par  H/O PREVIOUS TRAUMA, RADICULOPATHY HTN\par  data complex- mod lab,xr -reviewed, no films needed, consider venous dopplers if not healing\par culture obtained  last visit - negative\par risk- surgery- tolerated sharp debridement- tender\par time 20\par 3/5/21\par improvement noted pt is providing wd care daily with medihoney gel was using telfa pad findings maceration to alyson wd \par recommendation to use regular gauze to aid in absorption away from wd bed \par \par 4/12/21\par wounds healed.  Bilateral lower extremity swelling with lymphedema to the LLE secondary to LLE knee replacement.  AGUS/PvR wnl.  LLE demonstrates significant reflux to the LLE with reflux.  Results d/w pitent and .  Edema noted on venous reflux study.  No evidence of venous reflux.\par Continue compression therapy.  Patient would benefit from a lymphedema pump secondary to chronic lymphedema\par \par 11/11/21\par Patient presents today with sutures to left knee after a traumatic fall in a store, sutures placed in the ER.\par left knee and calf are edematous, pt. does have superficial venous insufficiency in the left leg with a history of RFA to left side.\par Sutures removed, were very inbeded into skin, after removal, area slightly oozing but wound is approximated and 95% closed\par patient has not been wearing any compression post RFA\par 11/23/21\par s/p trauma to left knee, last visit, sutures removed, today knee less edematous, has been wearing compression, heavy scabbing removed after hydrogel applied\par s/p excisional debridement today, does not appear to have any retained suture material in area

## 2021-11-24 ENCOUNTER — APPOINTMENT (OUTPATIENT)
Dept: PHARMACY | Facility: CLINIC | Age: 76
End: 2021-11-24
Payer: SELF-PAY

## 2021-11-24 PROCEDURE — V5010 ASSESSMENT FOR HEARING AID: CPT

## 2021-12-27 ENCOUNTER — APPOINTMENT (OUTPATIENT)
Dept: PHARMACY | Facility: CLINIC | Age: 76
End: 2021-12-27
Payer: SELF-PAY

## 2021-12-27 PROCEDURE — V5261F: CUSTOM | Mod: LT,RT

## 2022-01-03 ENCOUNTER — APPOINTMENT (OUTPATIENT)
Dept: DERMATOLOGY | Facility: CLINIC | Age: 77
End: 2022-01-03

## 2022-01-27 ENCOUNTER — APPOINTMENT (OUTPATIENT)
Dept: PHARMACY | Facility: CLINIC | Age: 77
End: 2022-01-27
Payer: SELF-PAY

## 2022-01-27 PROCEDURE — V5299A: CUSTOM | Mod: NC

## 2022-02-24 ENCOUNTER — APPOINTMENT (OUTPATIENT)
Dept: PHARMACY | Facility: CLINIC | Age: 77
End: 2022-02-24

## 2022-02-28 ENCOUNTER — APPOINTMENT (OUTPATIENT)
Dept: PHARMACY | Facility: CLINIC | Age: 77
End: 2022-02-28
Payer: SELF-PAY

## 2022-02-28 PROCEDURE — V5299A: CUSTOM | Mod: NC

## 2022-03-09 NOTE — ASU DISCHARGE PLAN (ADULT/PEDIATRIC) - "IF YOU OR YOUR GUARDIAN/FAMILY IS A SMOKER, IT IS IMPORTANT FOR YOUR HEALTH TO STOP SMOKING. PLEASE BE AWARE THAT SECOND HAND SMOKE IS ALSO HARMFUL."
Patient cleared to discharge from EP standpoint  Heart rates reasonably controlled    Recommendations  · Continue current home medications of metoprolol and diltiazem  · 14 day cardiac event monitor  · Follow-up with Dr. Felix from electrophysiology in about 3 weeks   Statement Selected

## 2022-05-24 ENCOUNTER — APPOINTMENT (OUTPATIENT)
Dept: PHARMACY | Facility: CLINIC | Age: 77
End: 2022-05-24
Payer: SELF-PAY

## 2022-05-24 PROCEDURE — V5299A: CUSTOM | Mod: NC

## 2022-06-10 ENCOUNTER — OUTPATIENT (OUTPATIENT)
Dept: OUTPATIENT SERVICES | Facility: HOSPITAL | Age: 77
LOS: 1 days | Discharge: HOME | End: 2022-06-10

## 2022-06-10 ENCOUNTER — RESULT REVIEW (OUTPATIENT)
Age: 77
End: 2022-06-10

## 2022-06-10 ENCOUNTER — APPOINTMENT (OUTPATIENT)
Dept: HEMATOLOGY ONCOLOGY | Facility: CLINIC | Age: 77
End: 2022-06-10

## 2022-06-10 DIAGNOSIS — Z96.641 PRESENCE OF RIGHT ARTIFICIAL HIP JOINT: Chronic | ICD-10-CM

## 2022-06-10 DIAGNOSIS — Z98.890 OTHER SPECIFIED POSTPROCEDURAL STATES: Chronic | ICD-10-CM

## 2022-06-10 DIAGNOSIS — Z87.81 PERSONAL HISTORY OF (HEALED) TRAUMATIC FRACTURE: Chronic | ICD-10-CM

## 2022-06-10 DIAGNOSIS — E83.119 HEMOCHROMATOSIS, UNSPECIFIED: ICD-10-CM

## 2022-06-10 DIAGNOSIS — Z96.652 PRESENCE OF LEFT ARTIFICIAL KNEE JOINT: Chronic | ICD-10-CM

## 2022-06-10 LAB
BASOPHILS # BLD AUTO: 0.02 K/UL — SIGNIFICANT CHANGE UP (ref 0–0.2)
BASOPHILS NFR BLD AUTO: 0.2 % — SIGNIFICANT CHANGE UP (ref 0–2)
EOSINOPHIL # BLD AUTO: 0.05 K/UL — SIGNIFICANT CHANGE UP (ref 0–0.5)
EOSINOPHIL NFR BLD AUTO: 0.4 % — SIGNIFICANT CHANGE UP (ref 0–6)
HCT VFR BLD CALC: 45.1 % — HIGH (ref 34.5–45)
HGB BLD-MCNC: 14.5 G/DL — SIGNIFICANT CHANGE UP (ref 11.5–15.5)
IMM GRANULOCYTES NFR BLD AUTO: 0.4 % — SIGNIFICANT CHANGE UP (ref 0–1.5)
LYMPHOCYTES # BLD AUTO: 19 % — SIGNIFICANT CHANGE UP (ref 13–44)
LYMPHOCYTES # BLD AUTO: 2.11 K/UL — SIGNIFICANT CHANGE UP (ref 1–3.3)
MCHC RBC-ENTMCNC: 29.7 PG — SIGNIFICANT CHANGE UP (ref 27–34)
MCHC RBC-ENTMCNC: 32.2 G/DL — SIGNIFICANT CHANGE UP (ref 32–36)
MCV RBC AUTO: 92.2 FL — SIGNIFICANT CHANGE UP (ref 80–100)
MONOCYTES # BLD AUTO: 0.73 K/UL — SIGNIFICANT CHANGE UP (ref 0–0.9)
MONOCYTES NFR BLD AUTO: 6.6 % — SIGNIFICANT CHANGE UP (ref 2–14)
NEUTROPHILS # BLD AUTO: 8.18 K/UL — HIGH (ref 1.8–7.4)
NEUTROPHILS NFR BLD AUTO: 73.4 % — SIGNIFICANT CHANGE UP (ref 43–77)
NRBC # BLD: 0 /100 WBCS — SIGNIFICANT CHANGE UP (ref 0–0)
PLATELET # BLD AUTO: 352 K/UL — SIGNIFICANT CHANGE UP (ref 150–400)
RBC # BLD: 4.89 M/UL — SIGNIFICANT CHANGE UP (ref 3.8–5.2)
RBC # FLD: 13.2 % — SIGNIFICANT CHANGE UP (ref 10.3–14.5)
WBC # BLD: 11.13 K/UL — HIGH (ref 3.8–10.5)
WBC # FLD AUTO: 11.13 K/UL — HIGH (ref 3.8–10.5)

## 2022-06-16 ENCOUNTER — NON-APPOINTMENT (OUTPATIENT)
Age: 77
End: 2022-06-16

## 2022-06-17 LAB
ALBUMIN SERPL ELPH-MCNC: 4.1 G/DL
ALP BLD-CCNC: 89 U/L
ALT SERPL-CCNC: 15 U/L
ANION GAP SERPL CALC-SCNC: 13 MMOL/L
AST SERPL-CCNC: 21 U/L
BILIRUB SERPL-MCNC: 0.2 MG/DL
BUN SERPL-MCNC: 19 MG/DL
CALCIUM SERPL-MCNC: 9.4 MG/DL
CHLORIDE SERPL-SCNC: 101 MMOL/L
CO2 SERPL-SCNC: 30 MMOL/L
CREAT SERPL-MCNC: 0.64 MG/DL
EGFR: 92 ML/MIN/1.73M2
FERRITIN SERPL-MCNC: 100 NG/ML
GLUCOSE SERPL-MCNC: 99 MG/DL
IRON SATN MFR SERPL: 35 %
IRON SERPL-MCNC: 92 UG/DL
POTASSIUM SERPL-SCNC: 4.6 MMOL/L
PROT SERPL-MCNC: 6.8 G/DL
SODIUM SERPL-SCNC: 143 MMOL/L
TIBC SERPL-MCNC: 265 UG/DL
UIBC SERPL-MCNC: 173 UG/DL

## 2022-09-30 NOTE — DISCHARGE NOTE NURSING/CASE MANAGEMENT/SOCIAL WORK - PATIENT PORTAL LINK FT
Patient Education - Discharge Counseling You can access the FollowMyHealth Patient Portal offered by [facility] by registering at the following website: [email or web address]. By joining [employer]’s FollowMyHealth portal, you will also be able to view your health information using other applications (apps) compatible with our system.

## 2023-01-17 ENCOUNTER — APPOINTMENT (OUTPATIENT)
Dept: PHARMACY | Facility: CLINIC | Age: 78
End: 2023-01-17
Payer: SELF-PAY

## 2023-01-17 PROCEDURE — V5299A: CUSTOM | Mod: NC,RT,LT

## 2023-02-10 ENCOUNTER — INPATIENT (INPATIENT)
Facility: HOSPITAL | Age: 78
LOS: 3 days | Discharge: HOME HEALTH | End: 2023-02-14
Attending: HOSPITALIST | Admitting: HOSPITALIST
Payer: MEDICARE

## 2023-02-10 VITALS
SYSTOLIC BLOOD PRESSURE: 171 MMHG | DIASTOLIC BLOOD PRESSURE: 96 MMHG | RESPIRATION RATE: 16 BRPM | HEART RATE: 94 BPM | OXYGEN SATURATION: 100 % | TEMPERATURE: 99 F

## 2023-02-10 DIAGNOSIS — Z87.81 PERSONAL HISTORY OF (HEALED) TRAUMATIC FRACTURE: Chronic | ICD-10-CM

## 2023-02-10 DIAGNOSIS — I21.4 NON-ST ELEVATION (NSTEMI) MYOCARDIAL INFARCTION: ICD-10-CM

## 2023-02-10 DIAGNOSIS — Z96.652 PRESENCE OF LEFT ARTIFICIAL KNEE JOINT: Chronic | ICD-10-CM

## 2023-02-10 DIAGNOSIS — Z96.641 PRESENCE OF RIGHT ARTIFICIAL HIP JOINT: Chronic | ICD-10-CM

## 2023-02-10 DIAGNOSIS — Z98.890 OTHER SPECIFIED POSTPROCEDURAL STATES: Chronic | ICD-10-CM

## 2023-02-10 LAB
ALBUMIN SERPL ELPH-MCNC: 4.2 G/DL — SIGNIFICANT CHANGE UP (ref 3.3–5)
ALP SERPL-CCNC: 83 U/L — SIGNIFICANT CHANGE UP (ref 40–120)
ALT FLD-CCNC: 15 U/L — SIGNIFICANT CHANGE UP (ref 4–33)
ANION GAP SERPL CALC-SCNC: 10 MMOL/L — SIGNIFICANT CHANGE UP (ref 7–14)
AST SERPL-CCNC: 28 U/L — SIGNIFICANT CHANGE UP (ref 4–32)
BASE EXCESS BLDV CALC-SCNC: 2.9 MMOL/L — SIGNIFICANT CHANGE UP (ref -2–3)
BASOPHILS # BLD AUTO: 0.03 K/UL — SIGNIFICANT CHANGE UP (ref 0–0.2)
BASOPHILS NFR BLD AUTO: 0.2 % — SIGNIFICANT CHANGE UP (ref 0–2)
BILIRUB SERPL-MCNC: 0.3 MG/DL — SIGNIFICANT CHANGE UP (ref 0.2–1.2)
BLOOD GAS VENOUS COMPREHENSIVE RESULT: SIGNIFICANT CHANGE UP
BUN SERPL-MCNC: 11 MG/DL — SIGNIFICANT CHANGE UP (ref 7–23)
CALCIUM SERPL-MCNC: 9.2 MG/DL — SIGNIFICANT CHANGE UP (ref 8.4–10.5)
CHLORIDE BLDV-SCNC: 99 MMOL/L — SIGNIFICANT CHANGE UP (ref 96–108)
CHLORIDE SERPL-SCNC: 101 MMOL/L — SIGNIFICANT CHANGE UP (ref 98–107)
CK MB BLD-MCNC: 13.4 % — HIGH (ref 0–2.5)
CK MB CFR SERPL CALC: 23.4 NG/ML — HIGH
CK SERPL-CCNC: 175 U/L — HIGH (ref 25–170)
CO2 BLDV-SCNC: 30.5 MMOL/L — HIGH (ref 22–26)
CO2 SERPL-SCNC: 26 MMOL/L — SIGNIFICANT CHANGE UP (ref 22–31)
CREAT SERPL-MCNC: 0.47 MG/DL — LOW (ref 0.5–1.3)
EGFR: 98 ML/MIN/1.73M2 — SIGNIFICANT CHANGE UP
EOSINOPHIL # BLD AUTO: 0.01 K/UL — SIGNIFICANT CHANGE UP (ref 0–0.5)
EOSINOPHIL NFR BLD AUTO: 0.1 % — SIGNIFICANT CHANGE UP (ref 0–6)
FLUAV AG NPH QL: SIGNIFICANT CHANGE UP
FLUBV AG NPH QL: SIGNIFICANT CHANGE UP
GAS PNL BLDV: 133 MMOL/L — LOW (ref 136–145)
GAS PNL BLDV: SIGNIFICANT CHANGE UP
GLUCOSE BLDV-MCNC: 174 MG/DL — HIGH (ref 70–99)
GLUCOSE SERPL-MCNC: 175 MG/DL — HIGH (ref 70–99)
HCO3 BLDV-SCNC: 29 MMOL/L — SIGNIFICANT CHANGE UP (ref 22–29)
HCT VFR BLD CALC: 43.3 % — SIGNIFICANT CHANGE UP (ref 34.5–45)
HCT VFR BLDA CALC: 44 % — SIGNIFICANT CHANGE UP (ref 34.5–46.5)
HGB BLD CALC-MCNC: 14.8 G/DL — SIGNIFICANT CHANGE UP (ref 11.5–15.5)
HGB BLD-MCNC: 14 G/DL — SIGNIFICANT CHANGE UP (ref 11.5–15.5)
IANC: 12.15 K/UL — HIGH (ref 1.8–7.4)
IMM GRANULOCYTES NFR BLD AUTO: 0.3 % — SIGNIFICANT CHANGE UP (ref 0–0.9)
LACTATE BLDV-MCNC: 1.6 MMOL/L — SIGNIFICANT CHANGE UP (ref 0.5–2)
LACTATE SERPL-SCNC: 1.4 MMOL/L — SIGNIFICANT CHANGE UP (ref 0.5–2)
LYMPHOCYTES # BLD AUTO: 1.13 K/UL — SIGNIFICANT CHANGE UP (ref 1–3.3)
LYMPHOCYTES # BLD AUTO: 8 % — LOW (ref 13–44)
MCHC RBC-ENTMCNC: 29 PG — SIGNIFICANT CHANGE UP (ref 27–34)
MCHC RBC-ENTMCNC: 32.3 GM/DL — SIGNIFICANT CHANGE UP (ref 32–36)
MCV RBC AUTO: 89.6 FL — SIGNIFICANT CHANGE UP (ref 80–100)
MONOCYTES # BLD AUTO: 0.72 K/UL — SIGNIFICANT CHANGE UP (ref 0–0.9)
MONOCYTES NFR BLD AUTO: 5.1 % — SIGNIFICANT CHANGE UP (ref 2–14)
NEUTROPHILS # BLD AUTO: 12.15 K/UL — HIGH (ref 1.8–7.4)
NEUTROPHILS NFR BLD AUTO: 86.3 % — HIGH (ref 43–77)
NRBC # BLD: 0 /100 WBCS — SIGNIFICANT CHANGE UP (ref 0–0)
NRBC # FLD: 0 K/UL — SIGNIFICANT CHANGE UP (ref 0–0)
PCO2 BLDV: 49 MMHG — SIGNIFICANT CHANGE UP (ref 39–52)
PH BLDV: 7.38 — SIGNIFICANT CHANGE UP (ref 7.32–7.43)
PLATELET # BLD AUTO: 256 K/UL — SIGNIFICANT CHANGE UP (ref 150–400)
PO2 BLDV: 40 MMHG — SIGNIFICANT CHANGE UP (ref 25–45)
POTASSIUM BLDV-SCNC: 3.9 MMOL/L — SIGNIFICANT CHANGE UP (ref 3.5–5.1)
POTASSIUM SERPL-MCNC: 3.9 MMOL/L — SIGNIFICANT CHANGE UP (ref 3.5–5.3)
POTASSIUM SERPL-SCNC: 3.9 MMOL/L — SIGNIFICANT CHANGE UP (ref 3.5–5.3)
PROT SERPL-MCNC: 7.1 G/DL — SIGNIFICANT CHANGE UP (ref 6–8.3)
RBC # BLD: 4.83 M/UL — SIGNIFICANT CHANGE UP (ref 3.8–5.2)
RBC # FLD: 13.8 % — SIGNIFICANT CHANGE UP (ref 10.3–14.5)
RSV RNA NPH QL NAA+NON-PROBE: SIGNIFICANT CHANGE UP
SAO2 % BLDV: 71.3 % — SIGNIFICANT CHANGE UP (ref 67–88)
SARS-COV-2 RNA SPEC QL NAA+PROBE: SIGNIFICANT CHANGE UP
SODIUM SERPL-SCNC: 137 MMOL/L — SIGNIFICANT CHANGE UP (ref 135–145)
TROPONIN T, HIGH SENSITIVITY RESULT: 746 NG/L — CRITICAL HIGH
TROPONIN T, HIGH SENSITIVITY RESULT: 836 NG/L — CRITICAL HIGH
WBC # BLD: 14.08 K/UL — HIGH (ref 3.8–10.5)
WBC # FLD AUTO: 14.08 K/UL — HIGH (ref 3.8–10.5)

## 2023-02-10 PROCEDURE — 71045 X-RAY EXAM CHEST 1 VIEW: CPT | Mod: 26

## 2023-02-10 PROCEDURE — 99285 EMERGENCY DEPT VISIT HI MDM: CPT

## 2023-02-10 PROCEDURE — 93458 L HRT ARTERY/VENTRICLE ANGIO: CPT | Mod: 26

## 2023-02-10 PROCEDURE — 74018 RADEX ABDOMEN 1 VIEW: CPT | Mod: 26

## 2023-02-10 RX ORDER — METOCLOPRAMIDE HCL 10 MG
10 TABLET ORAL ONCE
Refills: 0 | Status: COMPLETED | OUTPATIENT
Start: 2023-02-10 | End: 2023-02-10

## 2023-02-10 RX ORDER — HEPARIN SODIUM 5000 [USP'U]/ML
3500 INJECTION INTRAVENOUS; SUBCUTANEOUS ONCE
Refills: 0 | Status: DISCONTINUED | OUTPATIENT
Start: 2023-02-10 | End: 2023-02-10

## 2023-02-10 RX ORDER — ONDANSETRON 8 MG/1
4 TABLET, FILM COATED ORAL ONCE
Refills: 0 | Status: COMPLETED | OUTPATIENT
Start: 2023-02-10 | End: 2023-02-10

## 2023-02-10 RX ORDER — METHADONE HYDROCHLORIDE 40 MG/1
10 TABLET ORAL
Refills: 0 | Status: DISCONTINUED | OUTPATIENT
Start: 2023-02-10 | End: 2023-02-10

## 2023-02-10 RX ORDER — PANTOPRAZOLE SODIUM 20 MG/1
40 TABLET, DELAYED RELEASE ORAL ONCE
Refills: 0 | Status: COMPLETED | OUTPATIENT
Start: 2023-02-10 | End: 2023-02-10

## 2023-02-10 RX ORDER — TICAGRELOR 90 MG/1
180 TABLET ORAL ONCE
Refills: 0 | Status: COMPLETED | OUTPATIENT
Start: 2023-02-10 | End: 2023-02-10

## 2023-02-10 RX ORDER — HEPARIN SODIUM 5000 [USP'U]/ML
3500 INJECTION INTRAVENOUS; SUBCUTANEOUS EVERY 6 HOURS
Refills: 0 | Status: DISCONTINUED | OUTPATIENT
Start: 2023-02-10 | End: 2023-02-10

## 2023-02-10 RX ORDER — MONTELUKAST 4 MG/1
10 TABLET, CHEWABLE ORAL AT BEDTIME
Refills: 0 | Status: DISCONTINUED | OUTPATIENT
Start: 2023-02-10 | End: 2023-02-14

## 2023-02-10 RX ORDER — FLUOXETINE HCL 10 MG
1 CAPSULE ORAL
Qty: 0 | Refills: 0 | DISCHARGE

## 2023-02-10 RX ORDER — METHADONE HYDROCHLORIDE 40 MG/1
10 TABLET ORAL
Refills: 0 | Status: DISCONTINUED | OUTPATIENT
Start: 2023-02-10 | End: 2023-02-14

## 2023-02-10 RX ORDER — MULTIVIT-MIN/FERROUS GLUCONATE 9 MG/15 ML
1 LIQUID (ML) ORAL
Qty: 0 | Refills: 0 | DISCHARGE

## 2023-02-10 RX ORDER — ONDANSETRON 8 MG/1
4 TABLET, FILM COATED ORAL EVERY 12 HOURS
Refills: 0 | Status: DISCONTINUED | OUTPATIENT
Start: 2023-02-10 | End: 2023-02-14

## 2023-02-10 RX ORDER — BUDESONIDE AND FORMOTEROL FUMARATE DIHYDRATE 160; 4.5 UG/1; UG/1
2 AEROSOL RESPIRATORY (INHALATION)
Refills: 0 | Status: DISCONTINUED | OUTPATIENT
Start: 2023-02-10 | End: 2023-02-14

## 2023-02-10 RX ORDER — PANTOPRAZOLE SODIUM 20 MG/1
40 TABLET, DELAYED RELEASE ORAL
Refills: 0 | Status: DISCONTINUED | OUTPATIENT
Start: 2023-02-10 | End: 2023-02-12

## 2023-02-10 RX ORDER — CHLORHEXIDINE GLUCONATE 213 G/1000ML
1 SOLUTION TOPICAL
Refills: 0 | Status: DISCONTINUED | OUTPATIENT
Start: 2023-02-10 | End: 2023-02-14

## 2023-02-10 RX ORDER — GABAPENTIN 400 MG/1
300 CAPSULE ORAL AT BEDTIME
Refills: 0 | Status: DISCONTINUED | OUTPATIENT
Start: 2023-02-10 | End: 2023-02-14

## 2023-02-10 RX ORDER — IPRATROPIUM/ALBUTEROL SULFATE 18-103MCG
3 AEROSOL WITH ADAPTER (GRAM) INHALATION EVERY 6 HOURS
Refills: 0 | Status: DISCONTINUED | OUTPATIENT
Start: 2023-02-10 | End: 2023-02-14

## 2023-02-10 RX ORDER — TIOTROPIUM BROMIDE 18 UG/1
2 CAPSULE ORAL; RESPIRATORY (INHALATION) DAILY
Refills: 0 | Status: DISCONTINUED | OUTPATIENT
Start: 2023-02-10 | End: 2023-02-14

## 2023-02-10 RX ORDER — PANTOPRAZOLE SODIUM 20 MG/1
40 TABLET, DELAYED RELEASE ORAL
Refills: 0 | Status: DISCONTINUED | OUTPATIENT
Start: 2023-02-10 | End: 2023-02-10

## 2023-02-10 RX ORDER — HYDROCHLOROTHIAZIDE 25 MG
1 TABLET ORAL
Qty: 0 | Refills: 0 | DISCHARGE

## 2023-02-10 RX ORDER — CALCIUM CARBONATE 500(1250)
2 TABLET ORAL
Qty: 0 | Refills: 0 | DISCHARGE

## 2023-02-10 RX ORDER — FAMOTIDINE 10 MG/ML
20 INJECTION INTRAVENOUS ONCE
Refills: 0 | Status: COMPLETED | OUTPATIENT
Start: 2023-02-10 | End: 2023-02-10

## 2023-02-10 RX ORDER — MILK THISTLE 150 MG
2 CAPSULE ORAL
Qty: 0 | Refills: 0 | DISCHARGE

## 2023-02-10 RX ORDER — HEPARIN SODIUM 5000 [USP'U]/ML
INJECTION INTRAVENOUS; SUBCUTANEOUS
Qty: 25000 | Refills: 0 | Status: DISCONTINUED | OUTPATIENT
Start: 2023-02-10 | End: 2023-02-10

## 2023-02-10 RX ORDER — ASPIRIN/CALCIUM CARB/MAGNESIUM 324 MG
81 TABLET ORAL DAILY
Refills: 0 | Status: DISCONTINUED | OUTPATIENT
Start: 2023-02-10 | End: 2023-02-14

## 2023-02-10 RX ORDER — FENTANYL CITRATE 50 UG/ML
25 INJECTION INTRAVENOUS ONCE
Refills: 0 | Status: DISCONTINUED | OUTPATIENT
Start: 2023-02-10 | End: 2023-02-10

## 2023-02-10 RX ORDER — ACETAMINOPHEN 500 MG
1000 TABLET ORAL ONCE
Refills: 0 | Status: COMPLETED | OUTPATIENT
Start: 2023-02-10 | End: 2023-02-10

## 2023-02-10 RX ORDER — ASPIRIN/CALCIUM CARB/MAGNESIUM 324 MG
162 TABLET ORAL ONCE
Refills: 0 | Status: COMPLETED | OUTPATIENT
Start: 2023-02-10 | End: 2023-02-10

## 2023-02-10 RX ORDER — ACETAMINOPHEN 500 MG
650 TABLET ORAL EVERY 6 HOURS
Refills: 0 | Status: DISCONTINUED | OUTPATIENT
Start: 2023-02-10 | End: 2023-02-14

## 2023-02-10 RX ADMIN — Medication 1000 MILLIGRAM(S): at 21:20

## 2023-02-10 RX ADMIN — Medication 400 MILLIGRAM(S): at 21:04

## 2023-02-10 RX ADMIN — PANTOPRAZOLE SODIUM 40 MILLIGRAM(S): 20 TABLET, DELAYED RELEASE ORAL at 18:59

## 2023-02-10 RX ADMIN — Medication 30 MILLILITER(S): at 19:10

## 2023-02-10 RX ADMIN — MONTELUKAST 10 MILLIGRAM(S): 4 TABLET, CHEWABLE ORAL at 21:38

## 2023-02-10 RX ADMIN — Medication 10 MILLIGRAM(S): at 22:40

## 2023-02-10 RX ADMIN — FENTANYL CITRATE 25 MICROGRAM(S): 50 INJECTION INTRAVENOUS at 21:20

## 2023-02-10 RX ADMIN — METHADONE HYDROCHLORIDE 10 MILLIGRAM(S): 40 TABLET ORAL at 21:42

## 2023-02-10 RX ADMIN — Medication 162 MILLIGRAM(S): at 15:52

## 2023-02-10 RX ADMIN — FAMOTIDINE 20 MILLIGRAM(S): 10 INJECTION INTRAVENOUS at 15:52

## 2023-02-10 RX ADMIN — TICAGRELOR 180 MILLIGRAM(S): 90 TABLET ORAL at 17:11

## 2023-02-10 RX ADMIN — ONDANSETRON 4 MILLIGRAM(S): 8 TABLET, FILM COATED ORAL at 21:07

## 2023-02-10 RX ADMIN — GABAPENTIN 300 MILLIGRAM(S): 400 CAPSULE ORAL at 21:37

## 2023-02-10 RX ADMIN — ONDANSETRON 4 MILLIGRAM(S): 8 TABLET, FILM COATED ORAL at 15:52

## 2023-02-10 RX ADMIN — FENTANYL CITRATE 25 MICROGRAM(S): 50 INJECTION INTRAVENOUS at 21:04

## 2023-02-10 NOTE — CONSULT NOTE ADULT - SUBJECTIVE AND OBJECTIVE BOX
Patient seen and evaluated at bedside    Chief Complaint:    HPI:  77-year-old female with asthma, known CAD with MI in 2021 (managed medically) who presented to [facility] ED 2/10/23 complaining of epigastric burning chest pain radiating to her neck with associated diaphoresis and nausea that began 1 hour prior to arrival. Admits that these symptoms continue to be similar to her prior MI.  Denies dizziness, syncope, edema, orthopnea, and PND.   R/I NSTEMI with first troponin 746. Loaded with ASA and Brilinta and started on Heparin drip.   In light of patients CAD history, symptoms and abnormal noninvasive test findings there is high suspicion for CAD progression. Patient is now referred to [facility] for an urgent cardiac catheterization with possible PTCA/stent.     Denies fever, cough, chills, headache, flu like symptoms, sick contact or recent travel  COVID PCR not detected on 2/10/23 (10 Feb 2023 17:30)    78 yo F w/ PMH asthma, CAD, hemochromatosis, HTN who presents w/ chest pain. Patient noted around noon, had sudden chest pain radiating across chest. Also endorsed nausea and headache as well as diaphoresis. Chest pain was constant until she came to the ED. Patient still reporting headache but chest pain resolved. She denies any current SOB (baseline SOB on exertion 2/2 asthma), palpitations, NV, diaphoresis.     In the ED, VSS. Given aspirin, brilinta.       PMHx:   Asthma    Osteoarthritis    Chronic back pain    HTN (hypertension)    Hemochromatosis    NSTEMI (non-ST elevation myocardial infarction)        PSHx:   S/P knee replacement, left    S/P hip replacement, right    H/O fracture of femur    H/O laminectomy    S/P rotator cuff repair        Allergies:  No Known Allergies      Home Meds:    Current Medications:   heparin   Injectable 3500 Unit(s) IV Push once  heparin   Injectable 3500 Unit(s) IV Push every 6 hours PRN  heparin  Infusion.  Unit(s)/Hr IV Continuous <Continuous>      FAMILY HISTORY:  Family history of asthma  in Father and Mother        Social History:  Smoking History:  Alcohol Use:  Drug Use:    REVIEW OF SYSTEMS:  As above       Physical Exam:  T(F): 98.7 (02-10), Max: 98.9 (02-10)  HR: 89 (02-10) (89 - 94)  BP: 146/81 (02-10) (146/81 - 171/96)  RR: 16 (02-10)  SpO2: 96% (02-10)  GENERAL: No acute distress   CHEST/LUNG: Clear to auscultation bilaterally; No wheeze, equal breath sounds bilaterally   HEART: Regular rate and rhythm; No murmurs, rubs, or gallops  EXTREMITIES:  No clubbing, cyanosis, or edema  PSYCH: Nl behavior, nl affect  NEUROLOGY: AAOx3, non-focal   SKIN: Normal color, No rashes or lesions  LINES:    Cardiovascular Diagnostic Testing:    ECG:   NSR, ST elevations in I and aVL but not meeting STEMI criteria     Echo: Personally reviewed:    Stress Testing:    Cath:    Imaging:    CXR: Personally reviewed    Labs: Personally reviewed                        14.0   14.08 )-----------( 256      ( 10 Feb 2023 16:00 )             43.3     02-10    137  |  101  |  11  ----------------------------<  175<H>  3.9   |  26  |  0.47<L>    Ca    9.2      10 Feb 2023 16:00    TPro  7.1  /  Alb  4.2  /  TBili  0.3  /  DBili  x   /  AST  28  /  ALT  15  /  AlkPhos  83  02-10             Patient seen and evaluated at bedside    Chief Complaint:    HPI:  77-year-old female with asthma, known CAD with MI in 2021 (managed medically) who presented to [facility] ED 2/10/23 complaining of epigastric burning chest pain radiating to her neck with associated diaphoresis and nausea that began 1 hour prior to arrival. Admits that these symptoms continue to be similar to her prior MI.  Denies dizziness, syncope, edema, orthopnea, and PND.   R/I NSTEMI with first troponin 746. Loaded with ASA and Brilinta and started on Heparin drip.   In light of patients CAD history, symptoms and abnormal noninvasive test findings there is high suspicion for CAD progression. Patient is now referred to [facility] for an urgent cardiac catheterization with possible PTCA/stent.     Denies fever, cough, chills, headache, flu like symptoms, sick contact or recent travel  COVID PCR not detected on 2/10/23 (10 Feb 2023 17:30)    76 yo F w/ PMH asthma, CAD, hemochromatosis, HTN who presents w/ chest pain. Patient noted around noon, had sudden chest pain radiating across chest. Also endorsed nausea and headache as well as diaphoresis. Chest pain was constant until she came to the ED. Patient still reporting headache but chest pain resolved. She denies any current SOB (baseline SOB on exertion 2/2 asthma), palpitations, NV, diaphoresis.     In the ED, VSS. Given aspirin, brilinta.       PMHx:   Asthma    Osteoarthritis    Chronic back pain    HTN (hypertension)    Hemochromatosis    NSTEMI (non-ST elevation myocardial infarction)        PSHx:   S/P knee replacement, left    S/P hip replacement, right    H/O fracture of femur    H/O laminectomy    S/P rotator cuff repair        Allergies:  No Known Allergies      Home Meds:    Current Medications:   heparin   Injectable 3500 Unit(s) IV Push once  heparin   Injectable 3500 Unit(s) IV Push every 6 hours PRN  heparin  Infusion.  Unit(s)/Hr IV Continuous <Continuous>      FAMILY HISTORY:  Family history of asthma  in Father and Mother        Social History:  Smoking History:  Alcohol Use:  Drug Use:    REVIEW OF SYSTEMS:  As above       Physical Exam:  T(F): 98.7 (02-10), Max: 98.9 (02-10)  HR: 89 (02-10) (89 - 94)  BP: 146/81 (02-10) (146/81 - 171/96)  RR: 16 (02-10)  SpO2: 96% (02-10)  GENERAL: No acute distress   CHEST/LUNG: Clear to auscultation bilaterally; No wheeze, equal breath sounds bilaterally   HEART: Regular rate and rhythm; No murmurs, rubs, or gallops  EXTREMITIES:  No clubbing, cyanosis, or edema  PSYCH: Nl behavior, nl affect  NEUROLOGY: AAOx3, non-focal   SKIN: Normal color, No rashes or lesions  LINES:    Cardiovascular Diagnostic Testing:    ECG:   NSR, ST elevations in I and aVL but not meeting STEMI criteria     Echo:      Stress Testing:    Cath:    Imaging:    CXR:     Labs: Personally reviewed                        14.0   14.08 )-----------( 256      ( 10 Feb 2023 16:00 )             43.3     02-10    137  |  101  |  11  ----------------------------<  175<H>  3.9   |  26  |  0.47<L>    Ca    9.2      10 Feb 2023 16:00    TPro  7.1  /  Alb  4.2  /  TBili  0.3  /  DBili  x   /  AST  28  /  ALT  15  /  AlkPhos  83  02-10

## 2023-02-10 NOTE — H&P CARDIOLOGY - HISTORY OF PRESENT ILLNESS
77-year-old female with asthma, known CAD with MI in 2021 (managed medically) who presented to [facility] ED 2/10/23 complaining of epigastric burning chest pain radiating to her neck with associated diaphoresis and nausea that began 1 hour prior to arrival. Admits that these symptoms continue to be similar to her prior MI.  Denies dizziness, syncope, edema, orthopnea, and PND.   R/I NSTEMI with first troponin 746. Loaded with ASA and Brilinta and started on Heparin drip.   In light of patients CAD history, symptoms and abnormal noninvasive test findings there is high suspicion for CAD progression. Patient is now referred to [facility] for an urgent cardiac catheterization with possible PTCA/stent.     Denies fever, cough, chills, headache, flu like symptoms, sick contact or recent travel  COVID PCR not detected on 2/10/23

## 2023-02-10 NOTE — CONSULT NOTE ADULT - ASSESSMENT
78 yo F w/ PMH asthma, CAD, hemochromatosis, HTN who presents w/ chest pain.     #NSTEMI   EKG shows ST elevations in lateral leads but not meeting STEMI criteria   -F/u [facility]   -S/p aspirin and brilinta load and continuing maintenance dose tomorrow   -Statin   -Recommend lipid panel, A1c, and TSH   -Recommend echo

## 2023-02-10 NOTE — H&P CARDIOLOGY - NSICDXPASTMEDICALHX_GEN_ALL_CORE_FT
PAST MEDICAL HISTORY:  Asthma     Chronic back pain     Hemochromatosis     HTN (hypertension)     NSTEMI (non-ST elevation myocardial infarction)     Osteoarthritis

## 2023-02-10 NOTE — ED ADULT TRIAGE NOTE - CHIEF COMPLAINT QUOTE
Pt w/ hx of asthma arthritis MI 2021 c/o generalized chest pain radiating to shoulders neck ears and head, w/ associated nausea and reports diaphoresis, dizziness approximately 1 hr ago

## 2023-02-10 NOTE — H&P CARDIOLOGY - RESPIRATORY
Chief complaint:   Chief Complaint   Patient presents with   • Throat Problem     Patient has had a ST since yesterday.        Vitals:  Visit Vitals  /80   Pulse 91   Temp 97.6 °F (36.4 °C)   Resp 14   Wt 60 kg (132 lb 4.4 oz)   LMP 09/26/2022 (Approximate)   SpO2 98%       HISTORY OF PRESENT ILLNESS     Pharyngitis  This is a new problem. The current episode started yesterday. The problem occurs constantly. The problem has been rapidly worsening. Associated symptoms include fatigue, a fever, headaches, a sore throat and swollen glands. Pertinent negatives include no abdominal pain, anorexia, arthralgias, change in bowel habit, chest pain, chills, congestion, coughing, diaphoresis, joint swelling, myalgias, nausea, neck pain, numbness, rash, urinary symptoms, vertigo, visual change, vomiting or weakness. The symptoms are aggravated by eating and drinking. She has tried acetaminophen and drinking for the symptoms. The treatment provided no relief.       Other significant problems:  Patient Active Problem List    Diagnosis Date Noted   • RIKA (juvenile idiopathic arthritis), oligoarthritis, persistent (CMS/[facility]) 12/04/2017     Priority: Low     Formatting of this note might be different from the original.  Sees ophthalmology yearly to check for uveitis.  She has rare flares and takes ibuprofen. No need for rheum unless a bad flare.         PAST MEDICAL, FAMILY AND SOCIAL HISTORY     Medications:  Current Outpatient Medications   Medication Sig Dispense Refill   • predniSONE (DELTASONE) 10 MG tablet TAPER: Start with 40 mg PO days 1 to 3, then decrease by 10 mg every day until complete. 6 days total. 18 tablet 0     No current facility-administered medications for this visit.       Allergies:  ALLERGIES:  No Known Allergies    Past Medical  History/Surgeries:  History reviewed. No pertinent past medical history.    History reviewed. No pertinent surgical history.    Family History:  History reviewed. No pertinent  family history.    Social History:  Social History     Tobacco Use   • Smoking status: Never Smoker   • Smokeless tobacco: Never Used   Substance Use Topics   • Alcohol use: Not on file       REVIEW OF SYSTEMS     Review of Systems   Constitutional: Positive for fatigue and fever. Negative for chills and diaphoresis.   HENT: Positive for sore throat. Negative for congestion.    Respiratory: Negative for cough.    Cardiovascular: Negative for chest pain.   Gastrointestinal: Negative for abdominal pain, anorexia, change in bowel habit, nausea and vomiting.   Musculoskeletal: Negative for arthralgias, joint swelling, myalgias and neck pain.   Skin: Negative for rash.   Neurological: Positive for headaches. Negative for vertigo, weakness and numbness.       PHYSICAL EXAM     Physical Exam  Vitals and nursing note reviewed.   Constitutional:       General: She is not in acute distress.     Appearance: She is well-developed and well-groomed. She is ill-appearing. She is not toxic-appearing or diaphoretic.   HENT:      Head: Normocephalic and atraumatic. No right periorbital erythema or left periorbital erythema.      Right Ear: Tympanic membrane, ear canal and external ear normal.      Left Ear: Tympanic membrane, ear canal and external ear normal.      Nose: Nose normal.      Mouth/Throat:      Lips: Pink. No lesions.      Mouth: Mucous membranes are moist. No oral lesions or angioedema.      Tongue: No lesions. Tongue does not deviate from midline.      Palate: No mass and lesions.      Pharynx: Uvula midline. Pharyngeal swelling (mild) and posterior oropharyngeal erythema (moderate) present. No oropharyngeal exudate or uvula swelling.      Tonsils: No tonsillar exudate or tonsillar abscesses. 1+ on the right. 1+ on the left.      Neck: Full passive range of motion without pain and neck supple.   Cardiovascular:      Rate and Rhythm: Normal rate and regular rhythm.      Heart sounds: Normal heart sounds.   Pulmonary:       Effort: Pulmonary effort is normal.      Breath sounds: Normal breath sounds and air entry. No stridor, decreased air movement or transmitted upper airway sounds. No wheezing.   Abdominal:      General: Abdomen is flat. Bowel sounds are normal. There is no distension.      Palpations: Abdomen is soft. There is splenomegaly (mild).      Tenderness: There is no abdominal tenderness. There is no right CVA tenderness, left CVA tenderness, guarding or rebound.   Lymphadenopathy:      Cervical: Cervical adenopathy present.      Right cervical: Superficial cervical adenopathy and deep cervical adenopathy present. No posterior cervical adenopathy.     Left cervical: Superficial cervical adenopathy and deep cervical adenopathy present. No posterior cervical adenopathy.   Skin:     General: Skin is warm and dry.      Coloration: Skin is not ashen, cyanotic, jaundiced, mottled, pale or sallow.   Neurological:      Mental Status: She is alert and oriented to person, place, and time.   Psychiatric:         Behavior: Behavior is cooperative.         ASSESSMENT/PLAN     Petra was seen today for throat problem.    Diagnoses and all orders for this visit:    Infectious mononucleosis without complication, infectious mononucleosis due to unspecified organism    Pharyngitis, unspecified etiology  -     Cancel: MONOSPOT WITHOUT REFLEX  -     predniSONE (DELTASONE) 10 MG tablet; TAPER: Start with 40 mg PO days 1 to 3, then decrease by 10 mg every day until complete. 6 days total.  -     POCT INFECTIOUS MONONUCLEOSIS ANTIBODY  -     POCT RAPID STREP A      Rapid strep negative. Mono positive. Strep PCR and COVID/Influenza PCR pending - will still rule out. Prednisone for sore throat. Spleen precautions for 6 weeks. Gym note provided. Push fluids and rest. Soft bland diet for 5-7 days and then advance as tolerated. If fever greater than 102, severe headache or neck pain, prolonged vomiting, poor po intake, urine decreased go to  ER.    All questions answered and patient (parent if applicable) in agreement with treatment and discharge plan. Patient appropriately stable at time of discharge from urgent care clinic. The provisional diagnosis that the patient is discharged with today was based on the history taken, presenting symptoms, physical exam, and/or any ancillary testing.  Parameters for following up with Primary Care Provider and/or going to the ER were reviewed verbally and in writing, as discussed in the After Visit Summary. If new symptoms occur or worsen, patient should seek immediate medical attention for re-evaluation. Patient (parent/guardian if applicable) states understanding that often times the diagnosis can change.     Breath Sounds equal & clear to percussion & auscultation, no accessory muscle use

## 2023-02-10 NOTE — ED PROVIDER NOTE - CLINICAL SUMMARY MEDICAL DECISION MAKING FREE TEXT BOX
77-year-old female with a past medical history of asthma and MI in 2021 presenting with concern of epigastric burning chest pain that began 1 hour prior to arrival.  Differential diagnosis includes but is not limited to ACS, infection, CHF, asthma exacerbation, GERD. PT has a hx of ACS, would be likely given pt states symptoms are similar. would get ecg, cxr, labs, and give asa. likely cardiology c/s pending further workup. pt may require a cath.

## 2023-02-10 NOTE — ED PROVIDER NOTE - OBJECTIVE STATEMENT
77-year-old female with a past medical history of asthma and MI in 2021 presenting with concern of epigastric burning chest pain that began 1 hour prior to arrival.  Patient states there is associated diaphoresis and nausea.  States this feels very similar to her prior MI.  Pain radiates up towards her neck.  Did not have any intervention and was managed medically initially after her first MI.  Patient denies fever, chills, vomiting, diarrhea, abdominal pain, urinary symptoms.

## 2023-02-10 NOTE — ED PROVIDER NOTE - PROGRESS NOTE DETAILS
Tawny Bill, PGY-2, EM: S/W cardiology fellow regarding pt for concern of NSTEMI. Troponin > 700, they will evaluate the pt. Attending MD Marks.  Pt signed out to me in stable condition pending ACS risk radhat, MANUELA to tele, 78 yo fem with CP with minimal exertion with pressure-like, diaphoresis, SOB, high risk ACS.  Shortly after signout pt has been seen by cardiology and is accepted to their service in stable condition with plan for cath.

## 2023-02-10 NOTE — ED PROVIDER NOTE - PHYSICAL EXAMINATION
General: WN/WD, pt appears uncomfortable  Head: Atraumatic, normocephalic  Eyes: EOM grossly in tact, no scleral icterus, no discharge  ENT: moist mucous membranes  Neurology: A&Ox 3, nonfocal, BERGMAN x 4  Respiratory: CTAB, no wheezing, normal respiratory effort  CV: RRR, good s1/s2, no S3, Extremities warm and well perfused  Abdominal: Soft, non-distended, non-tender, no masses  Extremities: No edema, no deformities  Skin: warm and dry. No rashes

## 2023-02-10 NOTE — ED ADULT NURSE NOTE - OBJECTIVE STATEMENT
Ambulatory reporting episode of chest pain with diaphoresis and nausea with 1 episode of vomiting at approx 1300. Patient has history of asthma and MI 2021, NSTEMI. Pain is relieved with laying. Daughter at bedside. 20g PIV inserted to R forearm, CCM in place, labs drawn and sent, medicated as ordered.

## 2023-02-10 NOTE — ED PROVIDER NOTE - ATTENDING CONTRIBUTION TO CARE
Pt was seen and evaluated by me. Pt is a 78 y/o female with PMHx of CAD and Asthma who presented to the ED for chest pain today. Pt states 1 hour PTA having epigastric burning chest pain. Pt notes having nausea with 1 episode of vomiting and diaphoresis with the pain. Pt denies any fever, chills, SOB, or abd pain.   VITALS: Vitals have been reviewed.  GEN APPEARANCE: Alert and cooperative, non-toxic appearing and in NAD  HEAD: Atraumatic, normocephalic.   EYES: PERRL, EOMI.   EARS: Gross hearing intact.   NOSE: No nasal discharge.   THROAT: MMM. Oral cavity and pharynx normal.   CV: RRR, S1S2, no c/r/m/g. No cyanosis or pallor.   LUNGS: Diffuse wheezing  ABDOMEN: Soft, NTND. No guarding or rebound.   MSK/EXT: Spine appears normal, no spine point tenderness. No LE swelling or calf tenderness.    NEURO: Alert, follows commands. Speech normal. Sensation and motor normal x4 extremities.   SKIN: Normal color for race, warm, dry and intact. No evidence of rash.  78 y/o female with PMHx of CAD and Asthma who presented to the ED for chest pain today.  Concern for ACS/CHF/Asthma Exacerbation/GERD  Labs, EKG, CXR, Analgesia

## 2023-02-11 LAB
A1C WITH ESTIMATED AVERAGE GLUCOSE RESULT: 4.8 % — SIGNIFICANT CHANGE UP (ref 4–5.6)
ALBUMIN SERPL ELPH-MCNC: 3.8 G/DL — SIGNIFICANT CHANGE UP (ref 3.3–5)
ALP SERPL-CCNC: 77 U/L — SIGNIFICANT CHANGE UP (ref 40–120)
ALT FLD-CCNC: 13 U/L — SIGNIFICANT CHANGE UP (ref 4–33)
ANION GAP SERPL CALC-SCNC: 12 MMOL/L — SIGNIFICANT CHANGE UP (ref 7–14)
AST SERPL-CCNC: 39 U/L — HIGH (ref 4–32)
BILIRUB SERPL-MCNC: 0.4 MG/DL — SIGNIFICANT CHANGE UP (ref 0.2–1.2)
BUN SERPL-MCNC: 10 MG/DL — SIGNIFICANT CHANGE UP (ref 7–23)
CALCIUM SERPL-MCNC: 9.2 MG/DL — SIGNIFICANT CHANGE UP (ref 8.4–10.5)
CHLORIDE SERPL-SCNC: 97 MMOL/L — LOW (ref 98–107)
CHOLEST SERPL-MCNC: 195 MG/DL — SIGNIFICANT CHANGE UP
CK MB BLD-MCNC: 12.1 % — HIGH (ref 0–2.5)
CK MB CFR SERPL CALC: 18.2 NG/ML — HIGH
CK SERPL-CCNC: 151 U/L — SIGNIFICANT CHANGE UP (ref 25–170)
CO2 SERPL-SCNC: 26 MMOL/L — SIGNIFICANT CHANGE UP (ref 22–31)
CREAT SERPL-MCNC: 0.49 MG/DL — LOW (ref 0.5–1.3)
EGFR: 97 ML/MIN/1.73M2 — SIGNIFICANT CHANGE UP
ESTIMATED AVERAGE GLUCOSE: 91 — SIGNIFICANT CHANGE UP
GLUCOSE SERPL-MCNC: 122 MG/DL — HIGH (ref 70–99)
HCT VFR BLD CALC: 42.6 % — SIGNIFICANT CHANGE UP (ref 34.5–45)
HDLC SERPL-MCNC: 59 MG/DL — SIGNIFICANT CHANGE UP
HGB BLD-MCNC: 13.9 G/DL — SIGNIFICANT CHANGE UP (ref 11.5–15.5)
LIPID PNL WITH DIRECT LDL SERPL: 125 MG/DL — HIGH
MAGNESIUM SERPL-MCNC: 2.1 MG/DL — SIGNIFICANT CHANGE UP (ref 1.6–2.6)
MCHC RBC-ENTMCNC: 29.3 PG — SIGNIFICANT CHANGE UP (ref 27–34)
MCHC RBC-ENTMCNC: 32.6 GM/DL — SIGNIFICANT CHANGE UP (ref 32–36)
MCV RBC AUTO: 89.9 FL — SIGNIFICANT CHANGE UP (ref 80–100)
NON HDL CHOLESTEROL: 136 MG/DL — HIGH
NRBC # BLD: 0 /100 WBCS — SIGNIFICANT CHANGE UP (ref 0–0)
NRBC # FLD: 0 K/UL — SIGNIFICANT CHANGE UP (ref 0–0)
PHOSPHATE SERPL-MCNC: 3.5 MG/DL — SIGNIFICANT CHANGE UP (ref 2.5–4.5)
PLATELET # BLD AUTO: 247 K/UL — SIGNIFICANT CHANGE UP (ref 150–400)
POTASSIUM SERPL-MCNC: 3.8 MMOL/L — SIGNIFICANT CHANGE UP (ref 3.5–5.3)
POTASSIUM SERPL-SCNC: 3.8 MMOL/L — SIGNIFICANT CHANGE UP (ref 3.5–5.3)
PROT SERPL-MCNC: 6.6 G/DL — SIGNIFICANT CHANGE UP (ref 6–8.3)
RBC # BLD: 4.74 M/UL — SIGNIFICANT CHANGE UP (ref 3.8–5.2)
RBC # FLD: 14.1 % — SIGNIFICANT CHANGE UP (ref 10.3–14.5)
SODIUM SERPL-SCNC: 135 MMOL/L — SIGNIFICANT CHANGE UP (ref 135–145)
T3 SERPL-MCNC: 83 NG/DL — SIGNIFICANT CHANGE UP (ref 80–200)
T4 AB SER-ACNC: 6.99 UG/DL — SIGNIFICANT CHANGE UP (ref 5.1–13)
TRIGL SERPL-MCNC: 54 MG/DL — SIGNIFICANT CHANGE UP
TROPONIN T, HIGH SENSITIVITY RESULT: 557 NG/L — CRITICAL HIGH
TSH SERPL-MCNC: 2.19 UIU/ML — SIGNIFICANT CHANGE UP (ref 0.27–4.2)
WBC # BLD: 10.42 K/UL — SIGNIFICANT CHANGE UP (ref 3.8–10.5)
WBC # FLD AUTO: 10.42 K/UL — SIGNIFICANT CHANGE UP (ref 3.8–10.5)

## 2023-02-11 PROCEDURE — 93306 TTE W/DOPPLER COMPLETE: CPT | Mod: 26

## 2023-02-11 PROCEDURE — 99233 SBSQ HOSP IP/OBS HIGH 50: CPT

## 2023-02-11 PROCEDURE — 71045 X-RAY EXAM CHEST 1 VIEW: CPT | Mod: 26

## 2023-02-11 RX ORDER — SIMETHICONE 80 MG/1
80 TABLET, CHEWABLE ORAL EVERY 6 HOURS
Refills: 0 | Status: DISCONTINUED | OUTPATIENT
Start: 2023-02-11 | End: 2023-02-14

## 2023-02-11 RX ORDER — METOPROLOL TARTRATE 50 MG
12.5 TABLET ORAL EVERY 12 HOURS
Refills: 0 | Status: DISCONTINUED | OUTPATIENT
Start: 2023-02-11 | End: 2023-02-11

## 2023-02-11 RX ORDER — METOCLOPRAMIDE HCL 10 MG
10 TABLET ORAL ONCE
Refills: 0 | Status: COMPLETED | OUTPATIENT
Start: 2023-02-11 | End: 2023-02-11

## 2023-02-11 RX ORDER — FLUOXETINE HCL 10 MG
20 CAPSULE ORAL DAILY
Refills: 0 | Status: DISCONTINUED | OUTPATIENT
Start: 2023-02-11 | End: 2023-02-14

## 2023-02-11 RX ORDER — METOPROLOL TARTRATE 50 MG
12.5 TABLET ORAL EVERY 12 HOURS
Refills: 0 | Status: DISCONTINUED | OUTPATIENT
Start: 2023-02-11 | End: 2023-02-14

## 2023-02-11 RX ORDER — INFLUENZA VIRUS VACCINE 15; 15; 15; 15 UG/.5ML; UG/.5ML; UG/.5ML; UG/.5ML
0.7 SUSPENSION INTRAMUSCULAR ONCE
Refills: 0 | Status: DISCONTINUED | OUTPATIENT
Start: 2023-02-11 | End: 2023-02-14

## 2023-02-11 RX ORDER — LANOLIN ALCOHOL/MO/W.PET/CERES
6 CREAM (GRAM) TOPICAL AT BEDTIME
Refills: 0 | Status: DISCONTINUED | OUTPATIENT
Start: 2023-02-11 | End: 2023-02-14

## 2023-02-11 RX ORDER — SCOPALAMINE 1 MG/3D
1 PATCH, EXTENDED RELEASE TRANSDERMAL
Refills: 0 | Status: DISCONTINUED | OUTPATIENT
Start: 2023-02-11 | End: 2023-02-14

## 2023-02-11 RX ORDER — HEPARIN SODIUM 5000 [USP'U]/ML
5000 INJECTION INTRAVENOUS; SUBCUTANEOUS EVERY 8 HOURS
Refills: 0 | Status: DISCONTINUED | OUTPATIENT
Start: 2023-02-11 | End: 2023-02-14

## 2023-02-11 RX ADMIN — BUDESONIDE AND FORMOTEROL FUMARATE DIHYDRATE 2 PUFF(S): 160; 4.5 AEROSOL RESPIRATORY (INHALATION) at 20:34

## 2023-02-11 RX ADMIN — METHADONE HYDROCHLORIDE 10 MILLIGRAM(S): 40 TABLET ORAL at 09:23

## 2023-02-11 RX ADMIN — Medication 3 MILLILITER(S): at 09:18

## 2023-02-11 RX ADMIN — METHADONE HYDROCHLORIDE 10 MILLIGRAM(S): 40 TABLET ORAL at 17:24

## 2023-02-11 RX ADMIN — BUDESONIDE AND FORMOTEROL FUMARATE DIHYDRATE 2 PUFF(S): 160; 4.5 AEROSOL RESPIRATORY (INHALATION) at 09:19

## 2023-02-11 RX ADMIN — Medication 3 MILLILITER(S): at 20:31

## 2023-02-11 RX ADMIN — Medication 20 MILLIGRAM(S): at 14:34

## 2023-02-11 RX ADMIN — Medication 1 TABLET(S): at 12:23

## 2023-02-11 RX ADMIN — Medication 10 MILLIGRAM(S): at 14:33

## 2023-02-11 RX ADMIN — HEPARIN SODIUM 5000 UNIT(S): 5000 INJECTION INTRAVENOUS; SUBCUTANEOUS at 21:13

## 2023-02-11 RX ADMIN — PANTOPRAZOLE SODIUM 40 MILLIGRAM(S): 20 TABLET, DELAYED RELEASE ORAL at 05:57

## 2023-02-11 RX ADMIN — PANTOPRAZOLE SODIUM 40 MILLIGRAM(S): 20 TABLET, DELAYED RELEASE ORAL at 17:24

## 2023-02-11 RX ADMIN — SCOPALAMINE 1 PATCH: 1 PATCH, EXTENDED RELEASE TRANSDERMAL at 23:11

## 2023-02-11 RX ADMIN — Medication 12.5 MILLIGRAM(S): at 12:23

## 2023-02-11 RX ADMIN — Medication 81 MILLIGRAM(S): at 12:22

## 2023-02-11 RX ADMIN — Medication 3 MILLILITER(S): at 16:16

## 2023-02-11 RX ADMIN — Medication 3 MILLILITER(S): at 03:10

## 2023-02-11 RX ADMIN — CHLORHEXIDINE GLUCONATE 1 APPLICATION(S): 213 SOLUTION TOPICAL at 09:23

## 2023-02-11 RX ADMIN — GABAPENTIN 300 MILLIGRAM(S): 400 CAPSULE ORAL at 21:10

## 2023-02-11 RX ADMIN — SIMETHICONE 80 MILLIGRAM(S): 80 TABLET, CHEWABLE ORAL at 09:23

## 2023-02-11 RX ADMIN — TIOTROPIUM BROMIDE 2 PUFF(S): 18 CAPSULE ORAL; RESPIRATORY (INHALATION) at 11:22

## 2023-02-11 RX ADMIN — HEPARIN SODIUM 5000 UNIT(S): 5000 INJECTION INTRAVENOUS; SUBCUTANEOUS at 14:34

## 2023-02-11 RX ADMIN — Medication 12.5 MILLIGRAM(S): at 21:11

## 2023-02-11 RX ADMIN — MONTELUKAST 10 MILLIGRAM(S): 4 TABLET, CHEWABLE ORAL at 21:11

## 2023-02-11 NOTE — PATIENT PROFILE ADULT - FALL HARM RISK - HARM RISK INTERVENTIONS

## 2023-02-11 NOTE — PROGRESS NOTE ADULT - ASSESSMENT
77F with asthma, HTN, hemochromatosis, intermittent nausea X 3 years, chronic pain from arthritis, and CAD with luminal disease in 2019 presents with epigastric pain with N/V and concern for possible CONG on EKG, also with elevated troponin. Taken for urgent LHC which was c/w likely Takotsubo CM, LVEDP 50, Lasix given X1. Admitted to CCU for ongoing monitoring.     NEURO:  - A & O X 3  - no active issues    RESP:  # asthma  - no SOB but + loose cough  - continue home meds- Symbicort, Singulair, Spiriva  - duonebs as needed    CV:  # ?Takotsubo CM  - seen on LHC  - ECHO pending today  - LVEDP 50, diuresed well after Lasix 20 mg IV X 1  - appears euvolemic at present    #CAD  - luminal disease  - continue ASA  - cardiac markers trending down  - continue to monitor    GI:  # h/o intermittent nausea  - post procedure patient with persistent nausea eventually relieved with protonix, reglan  - feels much better today  - amylase, lipase, lactate WNL    /RENAL:  - Scr stable  - no active issues    ID:  - afebrile  - mild leukocytosis on admit but now normal, may be reactive    ENDO:  - A1c 4.8  - thyroid studies normal    DVT PPx:  - Heparin Sq  - OOB as tolerated

## 2023-02-11 NOTE — PATIENT PROFILE ADULT - FUNCTIONAL ASSESSMENT - BASIC MOBILITY 6.
2-calculated by average/Not able to assess (calculate score using [facility] averaging method)

## 2023-02-11 NOTE — PROGRESS NOTE ADULT - SUBJECTIVE AND OBJECTIVE BOX
Subjective/Objective: Feels well, no complaints, nausea improved.     MEDICATIONS  (STANDING):  albuterol/ipratropium for Nebulization 3 milliLiter(s) Nebulizer every 6 hours  aspirin enteric coated 81 milliGRAM(s) Oral daily  budesonide  80 MICROgram(s)/formoterol 4.5 MICROgram(s) Inhaler 2 Puff(s) Inhalation two times a day  calcium carbonate 1250 mG  + Vitamin D (OsCal 500 + D) 1 Tablet(s) Oral daily  chlorhexidine 2% Cloths 1 Application(s) Topical <User Schedule>  gabapentin 300 milliGRAM(s) Oral at bedtime  influenza  Vaccine (HIGH DOSE) 0.7 milliLiter(s) IntraMuscular once  methadone    Tablet 10 milliGRAM(s) Oral two times a day  montelukast 10 milliGRAM(s) Oral at bedtime  pantoprazole  Injectable 40 milliGRAM(s) IV Push two times a day  tiotropium 2.5 MICROgram(s) Inhaler 2 Puff(s) Inhalation daily    MEDICATIONS  (PRN):  acetaminophen     Tablet .. 650 milliGRAM(s) Oral every 6 hours PRN Temp greater or equal to 38C (100.4F), Mild Pain (1 - 3)  aluminum hydroxide/magnesium hydroxide/simethicone Suspension 30 milliLiter(s) Oral every 4 hours PRN Dyspepsia  melatonin 6 milliGRAM(s) Oral at bedtime PRN Insomnia  ondansetron Injectable 4 milliGRAM(s) IV Push every 12 hours PRN Nausea and/or Vomiting  simethicone 80 milliGRAM(s) Chew every 6 hours PRN Gas          Vital Signs Last 24 Hrs  T(C): 36.9 (11 Feb 2023 08:00), Max: 37.3 (10 Feb 2023 18:47)  T(F): 98.5 (11 Feb 2023 08:00), Max: 99.1 (10 Feb 2023 18:47)  HR: 73 (11 Feb 2023 08:00) (71 - 97)  BP: 103/35 (11 Feb 2023 08:00) (103/35 - 171/96)  BP(mean): 49 (11 Feb 2023 08:00) (49 - 116)  RR: 12 (11 Feb 2023 08:00) (12 - 19)  SpO2: 99% (11 Feb 2023 08:00) (91% - 100%)    Parameters below as of 11 Feb 2023 08:00  Patient On (Oxygen Delivery Method): nasal cannula  O2 Flow (L/min): 2    I&O's Detail    10 Feb 2023 07:01  -  11 Feb 2023 07:00  --------------------------------------------------------  IN:    Oral Fluid: 60 mL  Total IN: 60 mL    OUT:    Voided (mL): 1100 mL  Total OUT: 1100 mL    Total NET: -1040 mL    PHYSICAL EXAM  GEN: NAD, skin W & D  RESP: decreased BS, + rhonchi  CV: nl S1S2, no M/R/C  GI: soft, NT/ND, BS +  EXT: no C/C/E. RFA closure site clean and dry  NEURO: A & O X 3    EKG/ TELEM: NSR    LABS:                          13.9   10.42 )-----------( 247      ( 11 Feb 2023 05:50 )             42.6       11 Feb 2023 05:50    135    |  97<L>  |  10     ----------------------------<  122<H>  3.8     |  26     |  0.49<L>    10 Feb 2023 16:00    137    |  101    |  11     ----------------------------<  175<H>  3.9     |  26     |  0.47<L>    Ca    9.2        11 Feb 2023 05:50  Ca    9.2        10 Feb 2023 16:00  Phos  3.5       11 Feb 2023 05:50  Mg     2.10      11 Feb 2023 05:50    TPro  6.6    /  Alb  3.8    /  TBili  0.4    /  DBili  x      /  AST  39<H>  /  ALT  13     /  AlkPhos  77     11 Feb 2023 05:50  TPro  7.1    /  Alb  4.2    /  TBili  0.3    /  DBili  x      /  AST  28     /  ALT  15     /  AlkPhos  83     10 Feb 2023 16:00    CARDIAC MARKERS ( 11 Feb 2023 05:50 )  x     / x     / 151 U/L / x     / 18.2 ng/mL  CARDIAC MARKERS ( 10 Feb 2023 21:05 )  x     / x     / 175 U/L / x     / 23.4 ng/mL        Creatine Kinase, Serum: 151 U/L (02-11-23 @ 05:50)  Creatine Kinase, Serum: 175 U/L (02-10-23 @ 21:05)

## 2023-02-11 NOTE — CHART NOTE - NSCHARTNOTEFT_GEN_A_CORE
Pt c/o Abd and epigastric pain, nausea and burning not relieved by protonix, zofran, maalox. Abd xray showed non-obstructive dilated lg intestinal gas pattern. No free air. Pt given reglan with relief. Sleeping, in no acute distress. Pt c/o Abd and epigastric pain, nausea and burning not relieved by protonix, zofran, maalox. Abd xray showed non-obstructive dilated lg intestinal gas pattern. No free air. Pt given reglan with relief.  Amylase, lipase and lactate returned WNL. Sleeping, in no acute distress. Pt c/o Abd and epigastric pain, nausea and burning not relieved by protonix, zofran, maalox. fentynal and IV tylenol. Abd xray showed non-obstructive dilated lg intestinal gas pattern. No free air. Pt given reglan with relief.  Amylase, lipase and lactate returned WNL. Sleeping, in no acute distress.

## 2023-02-11 NOTE — CHART NOTE - NSCHARTNOTEFT_GEN_A_CORE
Transfer Note    Accepting physician:    Transferred to:    Sign out to:    77F with asthma, HTN, hemochromatosis, intermittent nausea X 3 years, chronic pain from arthritis, and CAD with luminal disease in 2019 presents with epigastric pain with N/V and concern for possible CONG on EKG, also with elevated troponin. Taken for urgent LHC which was c/w likely Takotsubo CM, LVEDP 50, Lasix given X1. Admitted to CCU for ongoing monitoring.     NEURO:  - A & O X 3  - no active issues    RESP:  # asthma  - no SOB but + loose cough  - continue home meds- Symbicort, Singulair, Spiriva  - duonebs as needed    CV:  # ?Takotsubo CM  - seen on LHC  - ECHO pending today  - LVEDP 50, diuresed well after Lasix 20 mg IV X 1  - appears euvolemic at present  - added metoprolol 12.5 mg po BID    #CAD  - luminal disease  - continue ASA  - cardiac markers trending down  - continue to monitor    GI:  # h/o intermittent nausea  - post procedure patient with persistent nausea eventually relieved with protonix, reglan  - feels much better today  - amylase, lipase, lactate WNL    /RENAL:  - Scr stable  - no active issues    ID:  - afebrile  - mild leukocytosis on admit but now normal, may be reactive    ENDO:  - A1c 4.8  - thyroid studies normal    DVT PPx:  - Heparin Sq  - OOB as tolerated      To followup:  1. ECHO read  2. PT consult

## 2023-02-12 LAB
ALBUMIN SERPL ELPH-MCNC: 3.9 G/DL — SIGNIFICANT CHANGE UP (ref 3.3–5)
ALP SERPL-CCNC: 73 U/L — SIGNIFICANT CHANGE UP (ref 40–120)
ALT FLD-CCNC: 14 U/L — SIGNIFICANT CHANGE UP (ref 4–33)
ANION GAP SERPL CALC-SCNC: 6 MMOL/L — LOW (ref 7–14)
AST SERPL-CCNC: 29 U/L — SIGNIFICANT CHANGE UP (ref 4–32)
BILIRUB SERPL-MCNC: 0.4 MG/DL — SIGNIFICANT CHANGE UP (ref 0.2–1.2)
BUN SERPL-MCNC: 17 MG/DL — SIGNIFICANT CHANGE UP (ref 7–23)
CALCIUM SERPL-MCNC: 9.6 MG/DL — SIGNIFICANT CHANGE UP (ref 8.4–10.5)
CHLORIDE SERPL-SCNC: 97 MMOL/L — LOW (ref 98–107)
CK MB BLD-MCNC: 8.6 % — HIGH (ref 0–2.5)
CK MB CFR SERPL CALC: 6 NG/ML — HIGH
CK SERPL-CCNC: 70 U/L — SIGNIFICANT CHANGE UP (ref 25–170)
CO2 SERPL-SCNC: 31 MMOL/L — SIGNIFICANT CHANGE UP (ref 22–31)
CREAT SERPL-MCNC: 0.6 MG/DL — SIGNIFICANT CHANGE UP (ref 0.5–1.3)
EGFR: 92 ML/MIN/1.73M2 — SIGNIFICANT CHANGE UP
GLUCOSE SERPL-MCNC: 86 MG/DL — SIGNIFICANT CHANGE UP (ref 70–99)
HCT VFR BLD CALC: 43 % — SIGNIFICANT CHANGE UP (ref 34.5–45)
HGB BLD-MCNC: 13.6 G/DL — SIGNIFICANT CHANGE UP (ref 11.5–15.5)
MAGNESIUM SERPL-MCNC: 2.2 MG/DL — SIGNIFICANT CHANGE UP (ref 1.6–2.6)
MCHC RBC-ENTMCNC: 28.9 PG — SIGNIFICANT CHANGE UP (ref 27–34)
MCHC RBC-ENTMCNC: 31.6 GM/DL — LOW (ref 32–36)
MCV RBC AUTO: 91.5 FL — SIGNIFICANT CHANGE UP (ref 80–100)
NRBC # BLD: 0 /100 WBCS — SIGNIFICANT CHANGE UP (ref 0–0)
NRBC # FLD: 0 K/UL — SIGNIFICANT CHANGE UP (ref 0–0)
PHOSPHATE SERPL-MCNC: 3.7 MG/DL — SIGNIFICANT CHANGE UP (ref 2.5–4.5)
PLATELET # BLD AUTO: 259 K/UL — SIGNIFICANT CHANGE UP (ref 150–400)
POTASSIUM SERPL-MCNC: 4.8 MMOL/L — SIGNIFICANT CHANGE UP (ref 3.5–5.3)
POTASSIUM SERPL-SCNC: 4.8 MMOL/L — SIGNIFICANT CHANGE UP (ref 3.5–5.3)
PROT SERPL-MCNC: 6.6 G/DL — SIGNIFICANT CHANGE UP (ref 6–8.3)
RBC # BLD: 4.7 M/UL — SIGNIFICANT CHANGE UP (ref 3.8–5.2)
RBC # FLD: 14.4 % — SIGNIFICANT CHANGE UP (ref 10.3–14.5)
SODIUM SERPL-SCNC: 134 MMOL/L — LOW (ref 135–145)
TROPONIN T, HIGH SENSITIVITY RESULT: 279 NG/L — CRITICAL HIGH
WBC # BLD: 9.2 K/UL — SIGNIFICANT CHANGE UP (ref 3.8–10.5)
WBC # FLD AUTO: 9.2 K/UL — SIGNIFICANT CHANGE UP (ref 3.8–10.5)

## 2023-02-12 PROCEDURE — 99233 SBSQ HOSP IP/OBS HIGH 50: CPT

## 2023-02-12 RX ORDER — SENNA PLUS 8.6 MG/1
2 TABLET ORAL AT BEDTIME
Refills: 0 | Status: DISCONTINUED | OUTPATIENT
Start: 2023-02-12 | End: 2023-02-14

## 2023-02-12 RX ORDER — PANTOPRAZOLE SODIUM 20 MG/1
40 TABLET, DELAYED RELEASE ORAL
Refills: 0 | Status: DISCONTINUED | OUTPATIENT
Start: 2023-02-12 | End: 2023-02-14

## 2023-02-12 RX ADMIN — SCOPALAMINE 1 PATCH: 1 PATCH, EXTENDED RELEASE TRANSDERMAL at 19:34

## 2023-02-12 RX ADMIN — Medication 20 MILLIGRAM(S): at 11:35

## 2023-02-12 RX ADMIN — BUDESONIDE AND FORMOTEROL FUMARATE DIHYDRATE 2 PUFF(S): 160; 4.5 AEROSOL RESPIRATORY (INHALATION) at 21:10

## 2023-02-12 RX ADMIN — SENNA PLUS 2 TABLET(S): 8.6 TABLET ORAL at 21:26

## 2023-02-12 RX ADMIN — METHADONE HYDROCHLORIDE 10 MILLIGRAM(S): 40 TABLET ORAL at 09:12

## 2023-02-12 RX ADMIN — HEPARIN SODIUM 5000 UNIT(S): 5000 INJECTION INTRAVENOUS; SUBCUTANEOUS at 14:35

## 2023-02-12 RX ADMIN — Medication 3 MILLILITER(S): at 11:22

## 2023-02-12 RX ADMIN — Medication 3 MILLILITER(S): at 21:08

## 2023-02-12 RX ADMIN — METHADONE HYDROCHLORIDE 10 MILLIGRAM(S): 40 TABLET ORAL at 21:26

## 2023-02-12 RX ADMIN — HEPARIN SODIUM 5000 UNIT(S): 5000 INJECTION INTRAVENOUS; SUBCUTANEOUS at 21:30

## 2023-02-12 RX ADMIN — Medication 12.5 MILLIGRAM(S): at 06:35

## 2023-02-12 RX ADMIN — Medication 1 TABLET(S): at 11:35

## 2023-02-12 RX ADMIN — PANTOPRAZOLE SODIUM 40 MILLIGRAM(S): 20 TABLET, DELAYED RELEASE ORAL at 06:35

## 2023-02-12 RX ADMIN — GABAPENTIN 300 MILLIGRAM(S): 400 CAPSULE ORAL at 21:26

## 2023-02-12 RX ADMIN — Medication 81 MILLIGRAM(S): at 11:35

## 2023-02-12 RX ADMIN — BUDESONIDE AND FORMOTEROL FUMARATE DIHYDRATE 2 PUFF(S): 160; 4.5 AEROSOL RESPIRATORY (INHALATION) at 11:21

## 2023-02-12 RX ADMIN — Medication 12.5 MILLIGRAM(S): at 18:20

## 2023-02-12 RX ADMIN — Medication 3 MILLILITER(S): at 16:22

## 2023-02-12 RX ADMIN — SCOPALAMINE 1 PATCH: 1 PATCH, EXTENDED RELEASE TRANSDERMAL at 06:45

## 2023-02-12 RX ADMIN — TIOTROPIUM BROMIDE 2 PUFF(S): 18 CAPSULE ORAL; RESPIRATORY (INHALATION) at 11:22

## 2023-02-12 RX ADMIN — MONTELUKAST 10 MILLIGRAM(S): 4 TABLET, CHEWABLE ORAL at 21:27

## 2023-02-12 RX ADMIN — CHLORHEXIDINE GLUCONATE 1 APPLICATION(S): 213 SOLUTION TOPICAL at 09:12

## 2023-02-12 RX ADMIN — HEPARIN SODIUM 5000 UNIT(S): 5000 INJECTION INTRAVENOUS; SUBCUTANEOUS at 06:35

## 2023-02-12 NOTE — PROGRESS NOTE ADULT - SUBJECTIVE AND OBJECTIVE BOX
Patient is a 77y old  Female who presents with a chief complaint of epigastric pain (2023 09:07)    HPI:  77-year-old female with asthma, known CAD with MI in  (managed medically) who presented to [facility] ED 2/10/23 complaining of epigastric burning chest pain radiating to her neck with associated diaphoresis and nausea that began 1 hour prior to arrival. Admits that these symptoms continue to be similar to her prior MI.  Denies dizziness, syncope, edema, orthopnea, and PND.   R/I NSTEMI with first troponin 746. Loaded with ASA and Brilinta and started on Heparin drip.   In light of patients CAD history, symptoms and abnormal noninvasive test findings there is high suspicion for CAD progression. Patient is now referred to [facility] for an urgent cardiac catheterization with possible PTCA/stent.     Denies fever, cough, chills, headache, flu like symptoms, sick contact or recent travel  COVID PCR not detected on 2/10/23 (10 Feb 2023 17:30)       INTERVAL HPI/OVERNIGHT EVENTS:   No overnight events   Afebrile, hemodynamically stable     Subjective:   Pt was seen at the bedside and lying comfortably in the bed. Pt denied fever, chill, chest pain, sob, n/v, constipation, diarrhea.     ICU Vital Signs Last 24 Hrs  T(C): 36.8 (2023 08:00), Max: 37.6 (2023 13:00)  T(F): 98.2 (2023 08:00), Max: 99.6 (2023 13:00)  HR: 70 (2023 08:00) (63 - 90)  BP: 99/56 (2023 08:00) (87/48 - 121/74)  BP(mean): 68 (2023 08:00) (60 - 98)  ABP: --  ABP(mean): --  RR: 14 (2023 08:00) (10 - 22)  SpO2: 94% (2023 08:00) (91% - 100%)    O2 Parameters below as of 2023 08:00  Patient On (Oxygen Delivery Method): room air          I&O's Summary    2023 07:01  -  2023 07:00  --------------------------------------------------------  IN: 630 mL / OUT: 1300 mL / NET: -670 mL          Daily     Daily Weight in k (2023 04:00)    Adult Advanced Hemodynamics Last 24 Hrs  CVP(mm Hg): --  CVP(cm H2O): --  CO: --  CI: --  PA: --  PA(mean): --  PCWP: --  SVR: --  SVRI: --  PVR: --  PVRI: --    EKG/Telemetry Events:    MEDICATIONS  (STANDING):  albuterol/ipratropium for Nebulization 3 milliLiter(s) Nebulizer every 6 hours  aspirin enteric coated 81 milliGRAM(s) Oral daily  budesonide  80 MICROgram(s)/formoterol 4.5 MICROgram(s) Inhaler 2 Puff(s) Inhalation two times a day  calcium carbonate 1250 mG  + Vitamin D (OsCal 500 + D) 1 Tablet(s) Oral daily  chlorhexidine 2% Cloths 1 Application(s) Topical <User Schedule>  FLUoxetine 20 milliGRAM(s) Oral daily  gabapentin 300 milliGRAM(s) Oral at bedtime  heparin   Injectable 5000 Unit(s) SubCutaneous every 8 hours  influenza  Vaccine (HIGH DOSE) 0.7 milliLiter(s) IntraMuscular once  methadone    Tablet 10 milliGRAM(s) Oral two times a day  metoprolol tartrate 12.5 milliGRAM(s) Oral every 12 hours  montelukast 10 milliGRAM(s) Oral at bedtime  pantoprazole  Injectable 40 milliGRAM(s) IV Push two times a day  scopolamine 1 mG/72 Hr(s) Patch 1 Patch Transdermal every 72 hours  tiotropium 2.5 MICROgram(s) Inhaler 2 Puff(s) Inhalation daily    MEDICATIONS  (PRN):  acetaminophen     Tablet .. 650 milliGRAM(s) Oral every 6 hours PRN Temp greater or equal to 38C (100.4F), Mild Pain (1 - 3)  aluminum hydroxide/magnesium hydroxide/simethicone Suspension 30 milliLiter(s) Oral every 4 hours PRN Dyspepsia  melatonin 6 milliGRAM(s) Oral at bedtime PRN Insomnia  ondansetron Injectable 4 milliGRAM(s) IV Push every 12 hours PRN Nausea and/or Vomiting  simethicone 80 milliGRAM(s) Chew every 6 hours PRN Gas      PHYSICAL EXAM:  GENERAL:   HEAD:  Atraumatic, Normocephalic  EYES: EOMI, PERRLA, conjunctiva and sclera clear  NECK: Supple, No JVD, Normal thyroid, no enlarged nodes  NERVOUS SYSTEM:  Alert & Awake.   CHEST/LUNG: B/L good air entry; No rales, rhonchi, or wheezing  HEART: S1S2 normal, no S3, Regular rate and rhythm; No murmurs  ABDOMEN: Soft, Nontender, Nondistended; Bowel sounds present  EXTREMITIES:  2+ Peripheral Pulses, No clubbing, cyanosis, or edema. Chronic pain in lower legs.   LYMPH: No lymphadenopathy noted  SKIN: No rashes or lesions    LABS:                        13.6   9.20  )-----------( 259      ( 2023 04:28 )             43.0         134<L>  |  97<L>  |  17  ----------------------------<  86  4.8   |  31  |  0.60    Ca    9.6      2023 04:28  Phos  3.7       Mg     2.20         TPro  6.6  /  Alb  3.9  /  TBili  0.4  /  DBili  x   /  AST  29  /  ALT  14  /  AlkPhos  73  12    LIVER FUNCTIONS - ( 2023 04:28 )  Alb: 3.9 g/dL / Pro: 6.6 g/dL / ALK PHOS: 73 U/L / ALT: 14 U/L / AST: 29 U/L / GGT: x             CAPILLARY BLOOD GLUCOSE          Creatine Kinase, Serum: 70 U/L ( @ 04:28)  CKMB Units: 6.0 ng/mL ( @ 04:28)    CARDIAC MARKERS ( 2023 04:28 )  x     / x     / 70 U/L / x     / 6.0 ng/mL  CARDIAC MARKERS ( 2023 05:50 )  x     / x     / 151 U/L / x     / 18.2 ng/mL  CARDIAC MARKERS ( 10 Feb 2023 21:05 )  x     / x     / 175 U/L / x     / 23.4 ng/mL            RADIOLOGY & ADDITIONAL TESTS:  CXR:        Care Discussed with Consultants/Other Providers [ x] YES  [ ] NO           Patient is a 76yo old woman with a chief complaint of chest pain (2023 09:07)    HPI:  77-year-old female with asthma, known CAD with MI in  (managed medically) who presented to [facility] ED 2/10/23 complaining of epigastric burning chest pain radiating to her neck with associated diaphoresis and nausea that began 1 hour prior to arrival. Admits that these symptoms continue to be similar to her prior MI.  Denies dizziness, syncope, edema, orthopnea, and PND.   R/I NSTEMI with first troponin 746. Loaded with ASA and Brilinta and started on Heparin drip.   In light of patients CAD history, symptoms and abnormal noninvasive test findings there is high suspicion for CAD progression. Patient is was referred to [facility] for cardiac catheterization and coronary angiography.    COVID PCR not detected on 2/10/23 (10 Feb 2023 17:30)     Cardiac cath showed no obstructive CAD and evidence of apical ballooning suggestive of Takotsubo or stress type cardiomyopathy.    INTERVAL HPI/OVERNIGHT EVENTS:   No chest pain  Wheezing/shortness of breath improved      ICU Vital Signs Last 24 Hrs  T(C): 36.8 (2023 08:00), Max: 37.6 (2023 13:00)  T(F): 98.2 (2023 08:00), Max: 99.6 (2023 13:00)  HR: 70 (2023 08:00) (63 - 90)  BP: 99/56 (2023 08:00) (87/48 - 121/74)  BP(mean): 68 (2023 08:00) (60 - 98)    RR: 14 (2023 08:00) (10 - 22)  SpO2: 94% (2023 08:00) (91% - 100%)    O2 Parameters below as of 2023 08:00  Patient On (Oxygen Delivery Method): room air          I&O's Summary    2023 07:01  -  2023 07:00  --------------------------------------------------------  IN: 630 mL / OUT: 1300 mL / NET: -670 mL    Daily     Daily Weight in k (2023 04:00)    EKG/Telemetry Events:    MEDICATIONS  (STANDING):  albuterol/ipratropium for Nebulization 3 milliLiter(s) Nebulizer every 6 hours  aspirin enteric coated 81 milliGRAM(s) Oral daily  budesonide  80 MICROgram(s)/formoterol 4.5 MICROgram(s) Inhaler 2 Puff(s) Inhalation two times a day  calcium carbonate 1250 mG  + Vitamin D (OsCal 500 + D) 1 Tablet(s) Oral daily  chlorhexidine 2% Cloths 1 Application(s) Topical <User Schedule>  FLUoxetine 20 milliGRAM(s) Oral daily  gabapentin 300 milliGRAM(s) Oral at bedtime  heparin   Injectable 5000 Unit(s) SubCutaneous every 8 hours  influenza  Vaccine (HIGH DOSE) 0.7 milliLiter(s) IntraMuscular once  methadone    Tablet 10 milliGRAM(s) Oral two times a day  metoprolol tartrate 12.5 milliGRAM(s) Oral every 12 hours  montelukast 10 milliGRAM(s) Oral at bedtime  pantoprazole  Injectable 40 milliGRAM(s) IV Push two times a day  scopolamine 1 mG/72 Hr(s) Patch 1 Patch Transdermal every 72 hours  tiotropium 2.5 MICROgram(s) Inhaler 2 Puff(s) Inhalation daily    MEDICATIONS  (PRN):  acetaminophen     Tablet .. 650 milliGRAM(s) Oral every 6 hours PRN Temp greater or equal to 38C (100.4F), Mild Pain (1 - 3)  aluminum hydroxide/magnesium hydroxide/simethicone Suspension 30 milliLiter(s) Oral every 4 hours PRN Dyspepsia  melatonin 6 milliGRAM(s) Oral at bedtime PRN Insomnia  ondansetron Injectable 4 milliGRAM(s) IV Push every 12 hours PRN Nausea and/or Vomiting  simethicone 80 milliGRAM(s) Chew every 6 hours PRN Gas      PHYSICAL EXAM:  GENERAL: awake and alert in no distress  HEAD:  Atraumatic, Normocephalic  EYES: EOMI, PERRLA, conjunctiva and sclera clear  NECK: no jugular venous distension  NERVOUS SYSTEM:  Alert & Awake.   CHEST/LUNG: wheezes appreciated  HEART: regular rate and rhythm. No gallop.  ABDOMEN: benign  EXTREMITIES:  2+ Peripheral Pulses, No clubbing, cyanosis, or edema.   SKIN: No rash    LABS:                        13.6   9.20  )-----------( 259      ( 2023 04:28 )             43.0     12    134<L>  |  97<L>  |  17  ----------------------------<  86  4.8   |  31  |  0.60    Ca    9.6      2023 04:28  Phos  3.7       Mg     2.20         TPro  6.6  /  Alb  3.9  /  TBili  0.4  /  DBili  x   /  AST  29  /  ALT  14  /  AlkPhos  73  0212    LIVER FUNCTIONS - ( 2023 04:28 )  Alb: 3.9 g/dL / Pro: 6.6 g/dL / ALK PHOS: 73 U/L / ALT: 14 U/L / AST: 29 U/L / GGT: x             CAPILLARY BLOOD GLUCOSE    Creatine Kinase, Serum: 70 U/L ( @ 04:28)  CKMB Units: 6.0 ng/mL ( @ 04:28)    CARDIAC MARKERS ( 2023 04:28 )  x     / x     / 70 U/L / x     / 6.0 ng/mL  CARDIAC MARKERS ( 2023 05:50 )  x     / x     / 151 U/L / x     / 18.2 ng/mL  CARDIAC MARKERS ( 10 Feb 2023 21:05 )  x     / x     / 175 U/L / x     / 23.4 ng/mL    RADIOLOGY & ADDITIONAL TESTS:  CXR: clear lungs    Care Discussed with Consultants/Other Providers [ x] YES  [ ] NO

## 2023-02-12 NOTE — PHYSICAL THERAPY INITIAL EVALUATION ADULT - GENERAL OBSERVATIONS, REHAB EVAL
Pt received seated in reclining chair at bedside, +telemetry, +primafit, all lines intact, in NAD,  at bedside. Pt agreeable to participate in PT evaluation.

## 2023-02-12 NOTE — CHART NOTE - NSCHARTNOTEFT_GEN_A_CORE
CCU Transfer Note    Transfer from: CCU  Transfer to:  ( X ) Medicine    ( X ) Telemetry    (  ) RCU    (  ) Palliative    (  ) Stroke Unit    (  ) _______________  Accepting physician: ??     MEDICATIONS:  STANDING MEDICATIONS  albuterol/ipratropium for Nebulization 3 milliLiter(s) Nebulizer every 6 hours  aspirin enteric coated 81 milliGRAM(s) Oral daily  budesonide  80 MICROgram(s)/formoterol 4.5 MICROgram(s) Inhaler 2 Puff(s) Inhalation two times a day  calcium carbonate 1250 mG  + Vitamin D (OsCal 500 + D) 1 Tablet(s) Oral daily  chlorhexidine 2% Cloths 1 Application(s) Topical <User Schedule>  FLUoxetine 20 milliGRAM(s) Oral daily  gabapentin 300 milliGRAM(s) Oral at bedtime  heparin   Injectable 5000 Unit(s) SubCutaneous every 8 hours  influenza  Vaccine (HIGH DOSE) 0.7 milliLiter(s) IntraMuscular once  methadone    Tablet 10 milliGRAM(s) Oral two times a day  metoprolol tartrate 12.5 milliGRAM(s) Oral every 12 hours  montelukast 10 milliGRAM(s) Oral at bedtime  pantoprazole  Injectable 40 milliGRAM(s) IV Push two times a day  scopolamine 1 mG/72 Hr(s) Patch 1 Patch Transdermal every 72 hours  tiotropium 2.5 MICROgram(s) Inhaler 2 Puff(s) Inhalation daily    PRN MEDICATIONS  acetaminophen     Tablet .. 650 milliGRAM(s) Oral every 6 hours PRN  aluminum hydroxide/magnesium hydroxide/simethicone Suspension 30 milliLiter(s) Oral every 4 hours PRN  melatonin 6 milliGRAM(s) Oral at bedtime PRN  ondansetron Injectable 4 milliGRAM(s) IV Push every 12 hours PRN  simethicone 80 milliGRAM(s) Chew every 6 hours PRN      VITAL SIGNS: Last 24 Hours  T(C): 36.8 (12 Feb 2023 08:00), Max: 37.6 (11 Feb 2023 13:00)  T(F): 98.2 (12 Feb 2023 08:00), Max: 99.6 (11 Feb 2023 13:00)  HR: 59 (12 Feb 2023 10:00) (59 - 90)  BP: 89/48 (12 Feb 2023 10:00) (87/48 - 121/74)  BP(mean): 60 (12 Feb 2023 10:00) (55 - 98)  RR: 12 (12 Feb 2023 10:00) (10 - 22)  SpO2: 93% (12 Feb 2023 10:00) (91% - 100%)    LABS:                        13.6   9.20  )-----------( 259      ( 12 Feb 2023 04:28 )             43.0     02-12    134<L>  |  97<L>  |  17  ----------------------------<  86  4.8   |  31  |  0.60    Ca    9.6      12 Feb 2023 04:28  Phos  3.7     02-12  Mg     2.20     02-12    TPro  6.6  /  Alb  3.9  /  TBili  0.4  /  DBili  x   /  AST  29  /  ALT  14  /  AlkPhos  73  02-12            CARDIAC MARKERS ( 12 Feb 2023 04:28 )  x     / x     / 70 U/L / x     / 6.0 ng/mL  CARDIAC MARKERS ( 11 Feb 2023 05:50 )  x     / x     / 151 U/L / x     / 18.2 ng/mL  CARDIAC MARKERS ( 10 Feb 2023 21:05 )  x     / x     / 175 U/L / x     / 23.4 ng/mL      RADIOLOGY:    CCU COURSE:    Admitted to CCU for close monitoring for cardiac events after LHC.  LHC ruled out new vascular occlusions. S/P TTE which showed mid-distal and apex hypokinesis with EF 25%, consistent with Takotsubo CM. Unclear etiology since pt has not experience acute stress, however chronic anxiety on medications. Pt has been medically optimized in euvolemia and started low dose metoprolol. Pt will need to closely follow up with cardiology outpatient, and repeat echo to rule out cardiac thrombus.       ASSESSMENT & PLAN:     77F with asthma, HTN, hemochromatosis, chronic pseudomonas lung infection,  intermittent nausea X 3 years, chronic pain from arthritis, and CAD/MI without stent in 2021, presents with epigastric pain with N/V and concern for possible CONG on EKG, also with elevated troponin. Taken for urgent LHC which was c/w presumable Takotsubo CM, LVEDP 50, Lasix given X1. Admitted to CCU for ongoing monitoring.     NEURO:  - A & O X 3  - no active issues    RESP:  # asthma  - no SOB but + loose cough  - continue home meds- Symbicort, Singulair, Spiriva  - duonebs as needed    CV:  # ?Takotsubo CM  - seen on LHC> No new occlusions  - ECHO showed mid-distal and apex hypokinesis with EF 20%, no cardiac thrombus noted.   - LVEDP 50, diuresed well after Lasix 20 mg IV X 1  - appears euvolemic at present  - echo repeat in the outpatient    #CAD  - luminal disease  - continue ASA  - cardiac markers trending down  - continue to monitor    GI:  # h/o intermittent nausea  - post procedure patient with persistent nausea eventually relieved with protonix, reglan  - Started scopolamine   - feels much better today  - amylase, lipase, lactate WNL    /RENAL:  - Scr stable  - no active issues    ID:  - afebrile  - mild leukocytosis on admit but now normal, may be reactive    ENDO:  - A1c 4.8  - thyroid studies normal    DVT PPx:  - Heparin Sq  - OOB as tolerated      For Follow-Up:  [] TTE outpatient   [] cardiology follow up CCU Transfer Note    Transfer from: CCU  Transfer to:  ( X ) Medicine    ( X ) Telemetry    (  ) RCU    (  ) Palliative    (  ) Stroke Unit    (  ) _______________  Accepting physician: Dr. Solo    MEDICATIONS:  STANDING MEDICATIONS  albuterol/ipratropium for Nebulization 3 milliLiter(s) Nebulizer every 6 hours  aspirin enteric coated 81 milliGRAM(s) Oral daily  budesonide  80 MICROgram(s)/formoterol 4.5 MICROgram(s) Inhaler 2 Puff(s) Inhalation two times a day  calcium carbonate 1250 mG  + Vitamin D (OsCal 500 + D) 1 Tablet(s) Oral daily  chlorhexidine 2% Cloths 1 Application(s) Topical <User Schedule>  FLUoxetine 20 milliGRAM(s) Oral daily  gabapentin 300 milliGRAM(s) Oral at bedtime  heparin   Injectable 5000 Unit(s) SubCutaneous every 8 hours  influenza  Vaccine (HIGH DOSE) 0.7 milliLiter(s) IntraMuscular once  methadone    Tablet 10 milliGRAM(s) Oral two times a day  metoprolol tartrate 12.5 milliGRAM(s) Oral every 12 hours  montelukast 10 milliGRAM(s) Oral at bedtime  pantoprazole  Injectable 40 milliGRAM(s) IV Push two times a day  scopolamine 1 mG/72 Hr(s) Patch 1 Patch Transdermal every 72 hours  tiotropium 2.5 MICROgram(s) Inhaler 2 Puff(s) Inhalation daily    PRN MEDICATIONS  acetaminophen     Tablet .. 650 milliGRAM(s) Oral every 6 hours PRN  aluminum hydroxide/magnesium hydroxide/simethicone Suspension 30 milliLiter(s) Oral every 4 hours PRN  melatonin 6 milliGRAM(s) Oral at bedtime PRN  ondansetron Injectable 4 milliGRAM(s) IV Push every 12 hours PRN  simethicone 80 milliGRAM(s) Chew every 6 hours PRN      VITAL SIGNS: Last 24 Hours  T(C): 36.8 (12 Feb 2023 08:00), Max: 37.6 (11 Feb 2023 13:00)  T(F): 98.2 (12 Feb 2023 08:00), Max: 99.6 (11 Feb 2023 13:00)  HR: 59 (12 Feb 2023 10:00) (59 - 90)  BP: 89/48 (12 Feb 2023 10:00) (87/48 - 121/74)  BP(mean): 60 (12 Feb 2023 10:00) (55 - 98)  RR: 12 (12 Feb 2023 10:00) (10 - 22)  SpO2: 93% (12 Feb 2023 10:00) (91% - 100%)    LABS:                        13.6   9.20  )-----------( 259      ( 12 Feb 2023 04:28 )             43.0     02-12    134<L>  |  97<L>  |  17  ----------------------------<  86  4.8   |  31  |  0.60    Ca    9.6      12 Feb 2023 04:28  Phos  3.7     02-12  Mg     2.20     02-12    TPro  6.6  /  Alb  3.9  /  TBili  0.4  /  DBili  x   /  AST  29  /  ALT  14  /  AlkPhos  73  02-12            CARDIAC MARKERS ( 12 Feb 2023 04:28 )  x     / x     / 70 U/L / x     / 6.0 ng/mL  CARDIAC MARKERS ( 11 Feb 2023 05:50 )  x     / x     / 151 U/L / x     / 18.2 ng/mL  CARDIAC MARKERS ( 10 Feb 2023 21:05 )  x     / x     / 175 U/L / x     / 23.4 ng/mL      RADIOLOGY:    CCU COURSE:    Admitted to CCU for close monitoring for cardiac events after LHC.  LHC ruled out new vascular occlusions. S/P TTE which showed mid-distal and apex hypokinesis with EF 25%, consistent with Takotsubo CM. Unclear etiology since pt has not experience acute stress, however chronic anxiety on medications. Pt has been medically optimized in euvolemia and started low dose metoprolol. Pt will need to closely follow up with cardiology outpatient, and repeat echo to rule out cardiac thrombus and monitor EF.       ASSESSMENT & PLAN:     77F with asthma, HTN, hemochromatosis, chronic pseudomonas lung infection,  intermittent nausea X 3 years, chronic pain from arthritis, and CAD/MI without stent in 2021, presents with epigastric pain with N/V and concern for possible CONG on EKG, also with elevated troponin. Taken for urgent LHC which was c/w presumable Takotsubo CM, LVEDP 50, Lasix given X1. Admitted to CCU for ongoing monitoring.     NEURO:  - A & O X 3  - no active issues    RESP:  # asthma  - no SOB but + loose cough  - continue home meds- Symbicort, Singulair, Spiriva  - duonebs as needed    CV:  # ?Takotsubo CM  - seen on LHC> No new occlusions  - ECHO showed mid-distal and apex hypokinesis with EF 25%, no cardiac thrombus noted.   - LVEDP 50, diuresed well after Lasix 20 mg IV X 1  - appears euvolemic at present  - echo repeat in the outpatient    #CAD  - luminal disease  - continue ASA  - cardiac markers trending down  - continue to monitor    GI:  # h/o intermittent nausea  - post procedure patient with persistent nausea eventually relieved with protonix, reglan  - Started scopolamine   - feels much better today  - amylase, lipase, lactate WNL    /RENAL:  - Scr stable  - no active issues    ID:  - afebrile  - mild leukocytosis on admit but now normal, may be reactive    ENDO:  - A1c 4.8  - thyroid studies normal    DVT PPx:  - Heparin Sq  - OOB as tolerated      For Follow-Up:  [] TTE outpatient   [] cardiology follow up CCU Transfer Note    Transfer from: CCU  Transfer to:  ( X ) Medicine    ( X ) Telemetry    (  ) RCU    (  ) Palliative    (  ) Stroke Unit    (  ) _______________  Accepting physician: Dr. Solo    MEDICATIONS:  STANDING MEDICATIONS  albuterol/ipratropium for Nebulization 3 milliLiter(s) Nebulizer every 6 hours  aspirin enteric coated 81 milliGRAM(s) Oral daily  budesonide  80 MICROgram(s)/formoterol 4.5 MICROgram(s) Inhaler 2 Puff(s) Inhalation two times a day  calcium carbonate 1250 mG  + Vitamin D (OsCal 500 + D) 1 Tablet(s) Oral daily  chlorhexidine 2% Cloths 1 Application(s) Topical <User Schedule>  FLUoxetine 20 milliGRAM(s) Oral daily  gabapentin 300 milliGRAM(s) Oral at bedtime  heparin   Injectable 5000 Unit(s) SubCutaneous every 8 hours  influenza  Vaccine (HIGH DOSE) 0.7 milliLiter(s) IntraMuscular once  methadone    Tablet 10 milliGRAM(s) Oral two times a day  metoprolol tartrate 12.5 milliGRAM(s) Oral every 12 hours  montelukast 10 milliGRAM(s) Oral at bedtime  pantoprazole  Injectable 40 milliGRAM(s) IV Push two times a day  scopolamine 1 mG/72 Hr(s) Patch 1 Patch Transdermal every 72 hours  tiotropium 2.5 MICROgram(s) Inhaler 2 Puff(s) Inhalation daily    PRN MEDICATIONS  acetaminophen     Tablet .. 650 milliGRAM(s) Oral every 6 hours PRN  aluminum hydroxide/magnesium hydroxide/simethicone Suspension 30 milliLiter(s) Oral every 4 hours PRN  melatonin 6 milliGRAM(s) Oral at bedtime PRN  ondansetron Injectable 4 milliGRAM(s) IV Push every 12 hours PRN  simethicone 80 milliGRAM(s) Chew every 6 hours PRN      VITAL SIGNS: Last 24 Hours  T(C): 36.8 (12 Feb 2023 08:00), Max: 37.6 (11 Feb 2023 13:00)  T(F): 98.2 (12 Feb 2023 08:00), Max: 99.6 (11 Feb 2023 13:00)  HR: 59 (12 Feb 2023 10:00) (59 - 90)  BP: 89/48 (12 Feb 2023 10:00) (87/48 - 121/74)  BP(mean): 60 (12 Feb 2023 10:00) (55 - 98)  RR: 12 (12 Feb 2023 10:00) (10 - 22)  SpO2: 93% (12 Feb 2023 10:00) (91% - 100%)    LABS:                        13.6   9.20  )-----------( 259      ( 12 Feb 2023 04:28 )             43.0     02-12    134<L>  |  97<L>  |  17  ----------------------------<  86  4.8   |  31  |  0.60    Ca    9.6      12 Feb 2023 04:28  Phos  3.7     02-12  Mg     2.20     02-12    TPro  6.6  /  Alb  3.9  /  TBili  0.4  /  DBili  x   /  AST  29  /  ALT  14  /  AlkPhos  73  02-12             CARDIAC MARKERS ( 12 Feb 2023 04:28 )  x     / x     / 70 U/L / x     / 6.0 ng/mL  CARDIAC MARKERS ( 11 Feb 2023 05:50 )  x     / x     / 151 U/L / x     / 18.2 ng/mL  CARDIAC MARKERS ( 10 Feb 2023 21:05 )  x     / x     / 175 U/L / x     / 23.4 ng/mL      RADIOLOGY:    CCU COURSE:    Admitted to CCU for close monitoring for cardiac events after LHC.  LHC ruled out new vascular occlusions. S/P TTE which showed mid-distal and apex hypokinesis with EF 25%, consistent with Takotsubo CM. Unclear etiology since pt has not experience acute stress, however chronic anxiety on medications. Pt has been medically optimized in euvolemia and started low dose metoprolol. Pt will need to closely follow up with cardiology outpatient, and repeat echo to rule out cardiac thrombus and monitor EF.       ASSESSMENT & PLAN:     77F with asthma, HTN, hemochromatosis, chronic pseudomonas lung infection,  intermittent nausea X 3 years, chronic pain from arthritis, and CAD/MI without stent in 2021, presents with epigastric pain with N/V and concern for possible CONG on EKG, also with elevated troponin. Taken for urgent LHC which was c/w presumable Takotsubo CM, LVEDP 50, Lasix given X1. Admitted to CCU for ongoing monitoring.     NEURO:  - A & O X 3  - no active issues    RESP:  # asthma  - no SOB but + loose cough  - continue home meds- Symbicort, Singulair, Spiriva  - duonebs as needed    CV:  # ?Takotsubo CM  - seen on LHC> No new occlusions  - ECHO showed mid-distal and apex hypokinesis with EF 25%, no cardiac thrombus noted.   - LVEDP 50, diuresed well after Lasix 20 mg IV X 1  - appears euvolemic at present  - echo repeat in the outpatient    #CAD  - luminal disease  - continue ASA  - cardiac markers trending down  - continue to monitor    GI:  # h/o intermittent nausea  - post procedure patient with persistent nausea eventually relieved with protonix, reglan  - Started scopolamine   - feels much better today  - amylase, lipase, lactate WNL    /RENAL:  - Scr stable  - no active issues    ID:  - afebrile  - mild leukocytosis on admit but now normal, may be reactive    ENDO:  - A1c 4.8  - thyroid studies normal    DVT PPx:  - Heparin Sq  - OOB as tolerated      For Follow-Up:  [] TTE outpatient   [] cardiology follow up  [] If BP can tolerate, start low dose losartan at 12.5 mg daily later today

## 2023-02-12 NOTE — PHYSICAL THERAPY INITIAL EVALUATION ADULT - ADDITIONAL COMMENTS
Pt lives in a private house with , +5 steps to enter, +1 flight to negotiate inside between floors via motorized chair lift (acorn). Pt uses a Single Axis Cane at baseline for all functional mobility, owns a rolling walker but does not use, and requires some assistance for dressing via .     Pt left seated in reclining chair at bedside, all lines intact, all needs in reach, in NAD. AYAKA Herrera present.

## 2023-02-12 NOTE — PHYSICAL THERAPY INITIAL EVALUATION ADULT - GROSSLY INTACT, SENSORY
Grossly Intact bilateral upper extremity Passive ROM was WFL (within functional limits)/Left LE Passive ROM was WFL (within functional limits)/Rt. hip WFL, Rt. knee not assessed as braced, Rt. ankle to ~neutral

## 2023-02-12 NOTE — PROGRESS NOTE ADULT - ASSESSMENT
77F with asthma, HTN, hemochromatosis, chronic pseudomonas lung infection,  intermittent nausea X 3 years, chronic pain from arthritis, and CAD/MI without stent in 2021, presents with epigastric pain with N/V and concern for possible CONG on EKG, also with elevated troponin. Taken for urgent LHC which was c/w likely Takotsubo CM, LVEDP 50, Lasix given X1. Admitted to CCU for ongoing monitoring.     NEURO:  - A & O X 3  - no active issues    RESP:  # asthma  - no SOB but + loose cough  - continue home meds- Symbicort, Singulair, Spiriva  - duonebs as needed    CV:  # ?Takotsubo CM  - seen on LHC  - ECHO showed mid-distal and apex hypokinesis with EF 20%  - LVEDP 50, diuresed well after Lasix 20 mg IV X 1  - appears euvolemic at present    #CAD  - luminal disease  - continue ASA  - cardiac markers trending down  - continue to monitor    GI:  # h/o intermittent nausea  - post procedure patient with persistent nausea eventually relieved with protonix, reglan  - Started scopolamine   - feels much better today  - amylase, lipase, lactate WNL    /RENAL:  - Scr stable  - no active issues    ID:  - afebrile  - mild leukocytosis on admit but now normal, may be reactive    ENDO:  - A1c 4.8  - thyroid studies normal    DVT PPx:  - Heparin Sq  - OOB as tolerated   77 year old woman with asthma, HTN, hemochromatosis, chronic pseudomonas lung infection, arthritis, and prior MI in 2021, who presented with chest/epigastric pain, ischemic changes on EKG and elevated troponin. Cardiac catheterization showed no obstructive epicardial disease and LV gram suggested possible "Takotsubo" stress cardiomyopathy. LVEDP 50, Lasix given X1. Admitted to CCU for ongoing management.    NEURO:  - A & O X 3  - anxiety, outpatient psychiatric medications resumed (fluoxetine)    RESP:  # asthma  - cough/congestion, but wheezing improved  - continue home meds- Symbicort, Singulair, Spiriva  - duonebs as needed  - CXR clear  - CT chest if ongoing symptoms    CV:  # NSTEMI: possible Takotsubo stress CM or microvascular ischemia.    - ECHO confirmed mid-distal and apex hypokinesis with EF 20%  - LVEDP 50, diuresed well after Lasix 20 mg IV X 1  - appears euvolemic at present  - continue ASA for luminal (non-obstructive) CAD  - cardiac markers trending down  - continue to monitor  - start Toprol XL 25 mg daily  - add ARB or ANRI given acute systolic HF    GI:  # h/o intermittent nausea  - post procedure patient with persistent nausea eventually relieved with protonix, reglan  - Started scopolamine   - feels much better today  - amylase, lipase, lactate WNL    /RENAL:  - Scr stable  - no active issues    ID: history of chronic pseudomonal infection per patient  - afebrile  - mild leukocytosis on admit but now normal, may be reactive    ENDO:  - A1c 4.8  - thyroid studies normal    DVT PPx:  - Heparin Sq  - OOB as tolerated    DC planning

## 2023-02-12 NOTE — PHYSICAL THERAPY INITIAL EVALUATION ADULT - PERTINENT HX OF CURRENT PROBLEM, REHAB EVAL
77 year old female with asthma, HTN, hemochromatosis, and CAD with luminal disease in 2019 presents with epigastric pain/nausea/vomiting. EKG with CONG 1 and L but did not meet STEMI criteria. T elevated at 746. ACS protocol started, taken for [facility] which was c/w Noemy GUSTAFSON. LVEDP 50- lasix 20 mg IV X 1 given

## 2023-02-13 LAB
ALBUMIN SERPL ELPH-MCNC: 3.8 G/DL — SIGNIFICANT CHANGE UP (ref 3.3–5)
ALP SERPL-CCNC: 70 U/L — SIGNIFICANT CHANGE UP (ref 40–120)
ALT FLD-CCNC: 12 U/L — SIGNIFICANT CHANGE UP (ref 4–33)
ANION GAP SERPL CALC-SCNC: 8 MMOL/L — SIGNIFICANT CHANGE UP (ref 7–14)
AST SERPL-CCNC: 22 U/L — SIGNIFICANT CHANGE UP (ref 4–32)
BILIRUB SERPL-MCNC: 0.2 MG/DL — SIGNIFICANT CHANGE UP (ref 0.2–1.2)
BUN SERPL-MCNC: 14 MG/DL — SIGNIFICANT CHANGE UP (ref 7–23)
CALCIUM SERPL-MCNC: 9.4 MG/DL — SIGNIFICANT CHANGE UP (ref 8.4–10.5)
CHLORIDE SERPL-SCNC: 99 MMOL/L — SIGNIFICANT CHANGE UP (ref 98–107)
CO2 SERPL-SCNC: 29 MMOL/L — SIGNIFICANT CHANGE UP (ref 22–31)
CREAT SERPL-MCNC: 0.67 MG/DL — SIGNIFICANT CHANGE UP (ref 0.5–1.3)
EGFR: 90 ML/MIN/1.73M2 — SIGNIFICANT CHANGE UP
GLUCOSE SERPL-MCNC: 90 MG/DL — SIGNIFICANT CHANGE UP (ref 70–99)
HCT VFR BLD CALC: 41.5 % — SIGNIFICANT CHANGE UP (ref 34.5–45)
HGB BLD-MCNC: 13.4 G/DL — SIGNIFICANT CHANGE UP (ref 11.5–15.5)
MAGNESIUM SERPL-MCNC: 2.1 MG/DL — SIGNIFICANT CHANGE UP (ref 1.6–2.6)
MCHC RBC-ENTMCNC: 29 PG — SIGNIFICANT CHANGE UP (ref 27–34)
MCHC RBC-ENTMCNC: 32.3 GM/DL — SIGNIFICANT CHANGE UP (ref 32–36)
MCV RBC AUTO: 89.8 FL — SIGNIFICANT CHANGE UP (ref 80–100)
NRBC # BLD: 0 /100 WBCS — SIGNIFICANT CHANGE UP (ref 0–0)
NRBC # FLD: 0 K/UL — SIGNIFICANT CHANGE UP (ref 0–0)
PHOSPHATE SERPL-MCNC: 4.2 MG/DL — SIGNIFICANT CHANGE UP (ref 2.5–4.5)
PLATELET # BLD AUTO: 265 K/UL — SIGNIFICANT CHANGE UP (ref 150–400)
POTASSIUM SERPL-MCNC: 4 MMOL/L — SIGNIFICANT CHANGE UP (ref 3.5–5.3)
POTASSIUM SERPL-SCNC: 4 MMOL/L — SIGNIFICANT CHANGE UP (ref 3.5–5.3)
PROT SERPL-MCNC: 6.4 G/DL — SIGNIFICANT CHANGE UP (ref 6–8.3)
RBC # BLD: 4.62 M/UL — SIGNIFICANT CHANGE UP (ref 3.8–5.2)
RBC # FLD: 14.3 % — SIGNIFICANT CHANGE UP (ref 10.3–14.5)
SODIUM SERPL-SCNC: 136 MMOL/L — SIGNIFICANT CHANGE UP (ref 135–145)
WBC # BLD: 8.14 K/UL — SIGNIFICANT CHANGE UP (ref 3.8–10.5)
WBC # FLD AUTO: 8.14 K/UL — SIGNIFICANT CHANGE UP (ref 3.8–10.5)

## 2023-02-13 PROCEDURE — 99233 SBSQ HOSP IP/OBS HIGH 50: CPT

## 2023-02-13 RX ORDER — ATORVASTATIN CALCIUM 80 MG/1
40 TABLET, FILM COATED ORAL AT BEDTIME
Refills: 0 | Status: DISCONTINUED | OUTPATIENT
Start: 2023-02-13 | End: 2023-02-14

## 2023-02-13 RX ADMIN — PANTOPRAZOLE SODIUM 40 MILLIGRAM(S): 20 TABLET, DELAYED RELEASE ORAL at 06:24

## 2023-02-13 RX ADMIN — Medication 1 TABLET(S): at 11:39

## 2023-02-13 RX ADMIN — MONTELUKAST 10 MILLIGRAM(S): 4 TABLET, CHEWABLE ORAL at 21:13

## 2023-02-13 RX ADMIN — HEPARIN SODIUM 5000 UNIT(S): 5000 INJECTION INTRAVENOUS; SUBCUTANEOUS at 06:24

## 2023-02-13 RX ADMIN — BUDESONIDE AND FORMOTEROL FUMARATE DIHYDRATE 2 PUFF(S): 160; 4.5 AEROSOL RESPIRATORY (INHALATION) at 21:13

## 2023-02-13 RX ADMIN — METHADONE HYDROCHLORIDE 10 MILLIGRAM(S): 40 TABLET ORAL at 09:03

## 2023-02-13 RX ADMIN — Medication 3 MILLILITER(S): at 09:10

## 2023-02-13 RX ADMIN — SENNA PLUS 2 TABLET(S): 8.6 TABLET ORAL at 21:13

## 2023-02-13 RX ADMIN — GABAPENTIN 300 MILLIGRAM(S): 400 CAPSULE ORAL at 21:12

## 2023-02-13 RX ADMIN — Medication 3 MILLILITER(S): at 16:34

## 2023-02-13 RX ADMIN — Medication 3 MILLILITER(S): at 22:12

## 2023-02-13 RX ADMIN — Medication 12.5 MILLIGRAM(S): at 17:09

## 2023-02-13 RX ADMIN — SCOPALAMINE 1 PATCH: 1 PATCH, EXTENDED RELEASE TRANSDERMAL at 07:21

## 2023-02-13 RX ADMIN — Medication 20 MILLIGRAM(S): at 11:38

## 2023-02-13 RX ADMIN — METHADONE HYDROCHLORIDE 10 MILLIGRAM(S): 40 TABLET ORAL at 21:12

## 2023-02-13 RX ADMIN — SCOPALAMINE 1 PATCH: 1 PATCH, EXTENDED RELEASE TRANSDERMAL at 20:10

## 2023-02-13 RX ADMIN — Medication 81 MILLIGRAM(S): at 11:39

## 2023-02-13 RX ADMIN — HEPARIN SODIUM 5000 UNIT(S): 5000 INJECTION INTRAVENOUS; SUBCUTANEOUS at 13:37

## 2023-02-13 RX ADMIN — HEPARIN SODIUM 5000 UNIT(S): 5000 INJECTION INTRAVENOUS; SUBCUTANEOUS at 21:12

## 2023-02-13 RX ADMIN — ATORVASTATIN CALCIUM 40 MILLIGRAM(S): 80 TABLET, FILM COATED ORAL at 21:13

## 2023-02-13 RX ADMIN — BUDESONIDE AND FORMOTEROL FUMARATE DIHYDRATE 2 PUFF(S): 160; 4.5 AEROSOL RESPIRATORY (INHALATION) at 09:10

## 2023-02-13 RX ADMIN — TIOTROPIUM BROMIDE 2 PUFF(S): 18 CAPSULE ORAL; RESPIRATORY (INHALATION) at 16:34

## 2023-02-13 RX ADMIN — CHLORHEXIDINE GLUCONATE 1 APPLICATION(S): 213 SOLUTION TOPICAL at 06:24

## 2023-02-13 RX ADMIN — Medication 12.5 MILLIGRAM(S): at 06:25

## 2023-02-13 NOTE — PROGRESS NOTE ADULT - SUBJECTIVE AND OBJECTIVE BOX
Patient is a 77y old  Female who presents with a chief complaint of NSTEMI (2023 08:26)    HPI:  77-year-old female with asthma, known CAD with MI in  (managed medically) who presented to [facility] ED 2/10/23 complaining of epigastric burning chest pain radiating to her neck with associated diaphoresis and nausea that began 1 hour prior to arrival. Admits that these symptoms continue to be similar to her prior MI.  Denies dizziness, syncope, edema, orthopnea, and PND.   R/I NSTEMI with first troponin 746. Loaded with ASA and Brilinta and started on Heparin drip.   In light of patients CAD history, symptoms and abnormal noninvasive test findings there is high suspicion for CAD progression. Patient is now referred to [facility] for an urgent cardiac catheterization with possible PTCA/stent.     Denies fever, cough, chills, headache, flu like symptoms, sick contact or recent travel  COVID PCR not detected on 2/10/23 (10 Feb 2023 17:30)       INTERVAL HPI/OVERNIGHT EVENTS:   No overnight events   Afebrile, hemodynamically stable     Subjective: Patient was seen and examined at bedside this morning. Denies any nausea/vomiting/diarrhea, headache, shortness of breath, abdominal pain or chest pain/palpitations. Patient responding appropriately to questions and able to make needs known. Vital signs/imaging/telemetry events reviewed.     ICU Vital Signs Last 24 Hrs  T(C): 36.7 (2023 04:00), Max: 37.2 (2023 20:09)  T(F): 98.1 (2023 04:00), Max: 98.9 (2023 20:09)  HR: 79 (2023 06:24) (59 - 89)  BP: 111/52 (2023 06:24) (83/46 - 129/58)  BP(mean): 70 (2023 06:24) (55 - 88)  ABP: --  ABP(mean): --  RR: 22 (2023 06:24) (12 - 25)  SpO2: 94% (2023 06:24) (92% - 98%)    O2 Parameters below as of 2023 23:00  Patient On (Oxygen Delivery Method): room air          I&O's Summary    2023 07:01  -  2023 07:00  --------------------------------------------------------  IN: 610 mL / OUT: 1025 mL / NET: -415 mL          Daily     Daily Weight in k.5 (2023 04:00)    Adult Advanced Hemodynamics Last 24 Hrs  CVP(mm Hg): --  CVP(cm H2O): --  CO: --  CI: --  PA: --  PA(mean): --  PCWP: --  SVR: --  SVRI: --  PVR: --  PVRI: --    EKG/Telemetry Events: sinus 60s    MEDICATIONS  (STANDING):  albuterol/ipratropium for Nebulization 3 milliLiter(s) Nebulizer every 6 hours  aspirin enteric coated 81 milliGRAM(s) Oral daily  budesonide  80 MICROgram(s)/formoterol 4.5 MICROgram(s) Inhaler 2 Puff(s) Inhalation two times a day  calcium carbonate 1250 mG  + Vitamin D (OsCal 500 + D) 1 Tablet(s) Oral daily  chlorhexidine 2% Cloths 1 Application(s) Topical <User Schedule>  FLUoxetine 20 milliGRAM(s) Oral daily  gabapentin 300 milliGRAM(s) Oral at bedtime  heparin   Injectable 5000 Unit(s) SubCutaneous every 8 hours  influenza  Vaccine (HIGH DOSE) 0.7 milliLiter(s) IntraMuscular once  methadone    Tablet 10 milliGRAM(s) Oral two times a day  metoprolol tartrate 12.5 milliGRAM(s) Oral every 12 hours  montelukast 10 milliGRAM(s) Oral at bedtime  pantoprazole    Tablet 40 milliGRAM(s) Oral before breakfast  scopolamine 1 mG/72 Hr(s) Patch 1 Patch Transdermal every 72 hours  senna 2 Tablet(s) Oral at bedtime  tiotropium 2.5 MICROgram(s) Inhaler 2 Puff(s) Inhalation daily    MEDICATIONS  (PRN):  acetaminophen     Tablet .. 650 milliGRAM(s) Oral every 6 hours PRN Temp greater or equal to 38C (100.4F), Mild Pain (1 - 3)  aluminum hydroxide/magnesium hydroxide/simethicone Suspension 30 milliLiter(s) Oral every 4 hours PRN Dyspepsia  melatonin 6 milliGRAM(s) Oral at bedtime PRN Insomnia  ondansetron Injectable 4 milliGRAM(s) IV Push every 12 hours PRN Nausea and/or Vomiting  simethicone 80 milliGRAM(s) Chew every 6 hours PRN Gas      PHYSICAL EXAM:  GENERAL:   HEAD:  Atraumatic, Normocephalic  EYES: EOMI, PERRLA, conjunctiva and sclera clear  NECK: Supple, No JVD, Normal thyroid, no enlarged nodes  NERVOUS SYSTEM:  Alert & Awake.   CHEST/LUNG: B/L good air entry; No rales, rhonchi, or wheezing  HEART: S1S2 normal, no S3, Regular rate and rhythm; No murmurs  ABDOMEN: Soft, Nontender, Nondistended; Bowel sounds present  EXTREMITIES:  2+ Peripheral Pulses, No clubbing, cyanosis, or edema  LYMPH: No lymphadenopathy noted  SKIN: No rashes or lesions    LABS:                        13.4   8.14  )-----------( 265      ( 2023 04:10 )             41.5     02-13    136  |  99  |  14  ----------------------------<  90  4.0   |  29  |  0.67    Ca    9.4      2023 04:10  Phos  4.2     02-13  Mg     2.10     02-13    TPro  6.4  /  Alb  3.8  /  TBili  0.2  /  DBili  x   /  AST  22  /  ALT  12  /  AlkPhos  70  02-13    LIVER FUNCTIONS - ( 2023 04:10 )  Alb: 3.8 g/dL / Pro: 6.4 g/dL / ALK PHOS: 70 U/L / ALT: 12 U/L / AST: 22 U/L / GGT: x             CAPILLARY BLOOD GLUCOSE            CARDIAC MARKERS ( 2023 04:28 )  x     / x     / 70 U/L / x     / 6.0 ng/mL            RADIOLOGY & ADDITIONAL TESTS:  CXR:        Care Discussed with Consultants/Other Providers [ x] YES  [ ] NO           Patient is a 77y old  Female who presents with a chief complaint of NSTEMI (2023 08:26)    HPI:  77-year-old female with asthma, known CAD with MI in  (managed medically) who presented to [facility] ED 2/10/23 complaining of epigastric burning chest pain radiating to her neck with associated diaphoresis and nausea that began 1 hour prior to arrival. Admits that these symptoms continue to be similar to her prior MI.  Denies dizziness, syncope, edema, orthopnea, and PND.   R/I NSTEMI with first troponin 746. Loaded with ASA and Brilinta and started on Heparin drip.   In light of patients CAD history, symptoms and abnormal noninvasive test findings there is high suspicion for CAD progression. Patient is now referred to [facility] for an urgent cardiac catheterization with possible PTCA/stent.     Denies fever, cough, chills, headache, flu like symptoms, sick contact or recent travel  COVID PCR not detected on 2/10/23 (10 Feb 2023 17:30)       INTERVAL HPI/OVERNIGHT EVENTS:   No overnight events   Afebrile, hemodynamically stable     Subjective: Patient was seen and examined at bedside this morning. Denies any nausea/vomiting/diarrhea, headache, shortness of breath, abdominal pain or chest pain/palpitations. Patient responding appropriately to questions and able to make needs known. Vital signs/imaging/telemetry events reviewed.     ICU Vital Signs Last 24 Hrs  T(C): 36.7 (2023 04:00), Max: 37.2 (2023 20:09)  T(F): 98.1 (2023 04:00), Max: 98.9 (2023 20:09)  HR: 79 (2023 06:24) (59 - 89)  BP: 111/52 (2023 06:24) (83/46 - 129/58)  BP(mean): 70 (2023 06:24) (55 - 88)  ABP: --  ABP(mean): --  RR: 22 (2023 06:24) (12 - 25)  SpO2: 94% (2023 06:24) (92% - 98%)    O2 Parameters below as of 2023 23:00  Patient On (Oxygen Delivery Method): room air          I&O's Summary    2023 07:01  -  2023 07:00  --------------------------------------------------------  IN: 610 mL / OUT: 1025 mL / NET: -415 mL          Daily     Daily Weight in k.5 (2023 04:00)    Adult Advanced Hemodynamics Last 24 Hrs  CVP(mm Hg): --  CVP(cm H2O): --  CO: --  CI: --  PA: --  PA(mean): --  PCWP: --  SVR: --  SVRI: --  PVR: --  PVRI: --    EKG/Telemetry Events: sinus 60s    MEDICATIONS  (STANDING):  albuterol/ipratropium for Nebulization 3 milliLiter(s) Nebulizer every 6 hours  aspirin enteric coated 81 milliGRAM(s) Oral daily  budesonide  80 MICROgram(s)/formoterol 4.5 MICROgram(s) Inhaler 2 Puff(s) Inhalation two times a day  calcium carbonate 1250 mG  + Vitamin D (OsCal 500 + D) 1 Tablet(s) Oral daily  chlorhexidine 2% Cloths 1 Application(s) Topical <User Schedule>  FLUoxetine 20 milliGRAM(s) Oral daily  gabapentin 300 milliGRAM(s) Oral at bedtime  heparin   Injectable 5000 Unit(s) SubCutaneous every 8 hours  influenza  Vaccine (HIGH DOSE) 0.7 milliLiter(s) IntraMuscular once  methadone    Tablet 10 milliGRAM(s) Oral two times a day  metoprolol tartrate 12.5 milliGRAM(s) Oral every 12 hours  montelukast 10 milliGRAM(s) Oral at bedtime  pantoprazole    Tablet 40 milliGRAM(s) Oral before breakfast  scopolamine 1 mG/72 Hr(s) Patch 1 Patch Transdermal every 72 hours  senna 2 Tablet(s) Oral at bedtime  tiotropium 2.5 MICROgram(s) Inhaler 2 Puff(s) Inhalation daily    MEDICATIONS  (PRN):  acetaminophen     Tablet .. 650 milliGRAM(s) Oral every 6 hours PRN Temp greater or equal to 38C (100.4F), Mild Pain (1 - 3)  aluminum hydroxide/magnesium hydroxide/simethicone Suspension 30 milliLiter(s) Oral every 4 hours PRN Dyspepsia  melatonin 6 milliGRAM(s) Oral at bedtime PRN Insomnia  ondansetron Injectable 4 milliGRAM(s) IV Push every 12 hours PRN Nausea and/or Vomiting  simethicone 80 milliGRAM(s) Chew every 6 hours PRN Gas      PHYSICAL EXAM:  GENERAL:   HEAD:  Atraumatic, Normocephalic  EYES: EOMI, PERRLA, conjunctiva and sclera clear  NECK: Supple, No JVD, Normal thyroid, no enlarged nodes  NERVOUS SYSTEM:  Alert & Awake.   CHEST/LUNG: B/L good air entry; No rales, rhonchi, or wheezing  HEART: S1S2 normal, no S3, Regular rate and rhythm; No murmurs  ABDOMEN: Soft, Nontender, Nondistended; Bowel sounds present  EXTREMITIES: 2+ Peripheral Pulses, No clubbing, cyanosis, or edema  LYMPH: No lymphadenopathy noted  SKIN: +Chronic venous stasis changes on saritha LE    LABS:                        13.4   8.14  )-----------( 265      ( 2023 04:10 )             41.5     02-13    136  |  99  |  14  ----------------------------<  90  4.0   |  29  |  0.67    Ca    9.4      2023 04:10  Phos  4.2     02-13  Mg     2.10     02-13    TPro  6.4  /  Alb  3.8  /  TBili  0.2  /  DBili  x   /  AST  22  /  ALT  12  /  AlkPhos  70  02-13    LIVER FUNCTIONS - ( 2023 04:10 )  Alb: 3.8 g/dL / Pro: 6.4 g/dL / ALK PHOS: 70 U/L / ALT: 12 U/L / AST: 22 U/L / GGT: x             CAPILLARY BLOOD GLUCOSE            CARDIAC MARKERS ( 2023 04:28 )  x     / x     / 70 U/L / x     / 6.0 ng/mL            RADIOLOGY & ADDITIONAL TESTS:  CXR:        Care Discussed with Consultants/Other Providers [ x] YES  [ ] NO           Patient is a 77y old  Female who presents with a chief complaint of NSTEMI (2023 08:26)    HPI:  77-year-old female with asthma, known CAD with MI in  (managed medically) who presented to [facility] ED 2/10/23 complaining of epigastric burning chest pain radiating to her neck with associated diaphoresis and nausea that began 1 hour prior to arrival. Admits that these symptoms continue to be similar to her prior MI.  Denies dizziness, syncope, edema, orthopnea, and PND.   R/I NSTEMI with first troponin 746. Loaded with ASA and Brilinta and started on Heparin drip.   In light of patients CAD history, symptoms and abnormal noninvasive test findings there is high suspicion for CAD progression. Patient is now referred to [facility] for an urgent cardiac catheterization with possible PTCA/stent.     Denies fever, cough, chills, headache, flu like symptoms, sick contact or recent travel  COVID PCR not detected on 2/10/23 (10 Feb 2023 17:30)       INTERVAL HPI/OVERNIGHT EVENTS:   No overnight events   Afebrile, hemodynamically stable     Subjective: Patient was seen and examined at bedside this morning. Denies any nausea/vomiting/diarrhea, headache, shortness of breath, abdominal pain or chest pain/palpitations. Patient responding appropriately to questions and able to make needs known. Vital signs/imaging/telemetry events reviewed.     ICU Vital Signs Last 24 Hrs  T(C): 36.7 (2023 04:00), Max: 37.2 (2023 20:09)  T(F): 98.1 (2023 04:00), Max: 98.9 (2023 20:09)  HR: 79 (2023 06:24) (59 - 89)  BP: 111/52 (2023 06:24) (83/46 - 129/58)  BP(mean): 70 (2023 06:24) (55 - 88)  ABP: --  ABP(mean): --  RR: 22 (2023 06:24) (12 - 25)  SpO2: 94% (2023 06:24) (92% - 98%)    O2 Parameters below as of 2023 23:00  Patient On (Oxygen Delivery Method): room air          I&O's Summary    2023 07:01  -  2023 07:00  --------------------------------------------------------  IN: 610 mL / OUT: 1025 mL / NET: -415 mL          Daily     Daily Weight in k.5 (2023 04:00)    Adult Advanced Hemodynamics Last 24 Hrs  CVP(mm Hg): --  CVP(cm H2O): --  CO: --  CI: --  PA: --  PA(mean): --  PCWP: --  SVR: --  SVRI: --  PVR: --  PVRI: --    EKG/Telemetry Events: sinus 60s    MEDICATIONS  (STANDING):  albuterol/ipratropium for Nebulization 3 milliLiter(s) Nebulizer every 6 hours  aspirin enteric coated 81 milliGRAM(s) Oral daily  budesonide  80 MICROgram(s)/formoterol 4.5 MICROgram(s) Inhaler 2 Puff(s) Inhalation two times a day  calcium carbonate 1250 mG  + Vitamin D (OsCal 500 + D) 1 Tablet(s) Oral daily  chlorhexidine 2% Cloths 1 Application(s) Topical <User Schedule>  FLUoxetine 20 milliGRAM(s) Oral daily  gabapentin 300 milliGRAM(s) Oral at bedtime  heparin   Injectable 5000 Unit(s) SubCutaneous every 8 hours  influenza  Vaccine (HIGH DOSE) 0.7 milliLiter(s) IntraMuscular once  methadone    Tablet 10 milliGRAM(s) Oral two times a day  metoprolol tartrate 12.5 milliGRAM(s) Oral every 12 hours  montelukast 10 milliGRAM(s) Oral at bedtime  pantoprazole    Tablet 40 milliGRAM(s) Oral before breakfast  scopolamine 1 mG/72 Hr(s) Patch 1 Patch Transdermal every 72 hours  senna 2 Tablet(s) Oral at bedtime  tiotropium 2.5 MICROgram(s) Inhaler 2 Puff(s) Inhalation daily    MEDICATIONS  (PRN):  acetaminophen     Tablet .. 650 milliGRAM(s) Oral every 6 hours PRN Temp greater or equal to 38C (100.4F), Mild Pain (1 - 3)  aluminum hydroxide/magnesium hydroxide/simethicone Suspension 30 milliLiter(s) Oral every 4 hours PRN Dyspepsia  melatonin 6 milliGRAM(s) Oral at bedtime PRN Insomnia  ondansetron Injectable 4 milliGRAM(s) IV Push every 12 hours PRN Nausea and/or Vomiting  simethicone 80 milliGRAM(s) Chew every 6 hours PRN Gas      PHYSICAL EXAM:  GENERAL:   HEAD:  Atraumatic, Normocephalic  EYES: EOMI, PERRLA, conjunctiva and sclera clear  NECK: Supple, No JVD, Normal thyroid, no enlarged nodes  NERVOUS SYSTEM:  Alert & Awake.   CHEST/LUNG: B/L good air entry; No rales, rhonchi, or wheezing  HEART: S1S2 normal, no S3, Regular rate and rhythm; No murmurs  ABDOMEN: Soft, Nontender, Nondistended; Bowel sounds present  EXTREMITIES: 2+ Peripheral Pulses, No clubbing, cyanosis, or edema  LYMPH: No lymphadenopathy noted  SKIN: +Chronic venous stasis changes on saritha LE    LABS:                        13.4   8.14  )-----------( 265      ( 2023 04:10 )             41.5     02-13    136  |  99  |  14  ----------------------------<  90  4.0   |  29  |  0.67    Ca    9.4      2023 04:10  Phos  4.2     02-13  Mg     2.10     02-13    TPro  6.4  /  Alb  3.8  /  TBili  0.2  /  DBili  x   /  AST  22  /  ALT  12  /  AlkPhos  70  02-13    LIVER FUNCTIONS - ( 2023 04:10 )  Alb: 3.8 g/dL / Pro: 6.4 g/dL / ALK PHOS: 70 U/L / ALT: 12 U/L / AST: 22 U/L / GGT: x             CAPILLARY BLOOD GLUCOSE            CARDIAC MARKERS ( 2023 04:28 )  x     / x     / 70 U/L / x     / 6.0 ng/mL            RADIOLOGY & ADDITIONAL TESTS:  Cardiac Cath Diagnostic Findings:     Coronary Angiography   The coronary circulation is right dominant.      LM   Left main artery: Angiography shows mild atherosclerosis.      LAD   Left anterior descending artery: Angiography shows mild  atherosclerosis.    CX   Circumflex: There is a 30 % stenosis. ANN MARIE Flow 3.      RCA   Mid right coronary artery: There is a 40 % stenosis. ANN MARIE Flow 3.      Left Heart Cath   Ejection fraction is 20%.        CXR:        Care Discussed with Consultants/Other Providers [ x] YES  [ ] NO

## 2023-02-13 NOTE — PROGRESS NOTE ADULT - ASSESSMENT
77 year old woman with asthma, HTN, hemochromatosis, chronic pseudomonas lung infection, arthritis, and prior MI in 2021, who presented with chest/epigastric pain, ischemic changes on EKG and elevated troponin. Cardiac catheterization showed no obstructive epicardial disease and LV gram suggested possible "Takotsubo" stress cardiomyopathy. LVEDP 50, Lasix given X1. Admitted to CCU for ongoing management.    NEURO:  - A & O X 3  - anxiety, outpatient psychiatric medications resumed (fluoxetine)    RESP:  # asthma  - cough/congestion, but wheezing improved  - continue home meds- Symbicort, Singulair, Spiriva  - duonebs as needed  - CXR clear  - CT chest if ongoing symptoms    CV:  # NSTEMI: possible Takotsubo stress CM or microvascular ischemia.    - ECHO confirmed mid-distal and apex hypokinesis with EF 20%  - LVEDP 50, diuresed well after Lasix 20 mg IV X 1  - appears euvolemic at present  - continue ASA for luminal (non-obstructive) CAD  - cardiac markers trending down  - continue to monitor  - C/w Toprol XL 25 mg daily  - add ARB or ARNI given acute systolic HF    GI:  # h/o intermittent nausea  - post procedure patient with persistent nausea eventually relieved with protonix, reglan  - Started scopolamine   - feels much better today  - amylase, lipase, lactate WNL    /RENAL:  - Scr stable  - no active issues    ID: history of chronic pseudomonal infection per patient  - afebrile  - mild leukocytosis on admit but now normal, may be reactive    ENDO:  - A1c 4.8  - thyroid studies normal    DVT PPx:  - Heparin Sq  - OOB as tolerated    DC planning

## 2023-02-14 ENCOUNTER — TRANSCRIPTION ENCOUNTER (OUTPATIENT)
Age: 78
End: 2023-02-14

## 2023-02-14 VITALS
HEART RATE: 67 BPM | TEMPERATURE: 98 F | OXYGEN SATURATION: 96 % | RESPIRATION RATE: 18 BRPM | SYSTOLIC BLOOD PRESSURE: 107 MMHG | DIASTOLIC BLOOD PRESSURE: 43 MMHG

## 2023-02-14 DIAGNOSIS — B99.9 UNSPECIFIED INFECTIOUS DISEASE: ICD-10-CM

## 2023-02-14 DIAGNOSIS — E83.119 HEMOCHROMATOSIS, UNSPECIFIED: ICD-10-CM

## 2023-02-14 DIAGNOSIS — J45.909 UNSPECIFIED ASTHMA, UNCOMPLICATED: ICD-10-CM

## 2023-02-14 DIAGNOSIS — Z29.9 ENCOUNTER FOR PROPHYLACTIC MEASURES, UNSPECIFIED: ICD-10-CM

## 2023-02-14 DIAGNOSIS — I21.4 NON-ST ELEVATION (NSTEMI) MYOCARDIAL INFARCTION: ICD-10-CM

## 2023-02-14 DIAGNOSIS — R11.2 NAUSEA WITH VOMITING, UNSPECIFIED: ICD-10-CM

## 2023-02-14 DIAGNOSIS — I10 ESSENTIAL (PRIMARY) HYPERTENSION: ICD-10-CM

## 2023-02-14 DIAGNOSIS — F41.9 ANXIETY DISORDER, UNSPECIFIED: ICD-10-CM

## 2023-02-14 PROCEDURE — 99233 SBSQ HOSP IP/OBS HIGH 50: CPT

## 2023-02-14 PROCEDURE — 99232 SBSQ HOSP IP/OBS MODERATE 35: CPT

## 2023-02-14 RX ORDER — PANTOPRAZOLE SODIUM 20 MG/1
1 TABLET, DELAYED RELEASE ORAL
Qty: 30 | Refills: 0
Start: 2023-02-14 | End: 2023-03-15

## 2023-02-14 RX ORDER — TIOTROPIUM BROMIDE 18 UG/1
2 CAPSULE ORAL; RESPIRATORY (INHALATION)
Qty: 60 | Refills: 0
Start: 2023-02-14 | End: 2023-03-15

## 2023-02-14 RX ORDER — ATORVASTATIN CALCIUM 80 MG/1
1 TABLET, FILM COATED ORAL
Qty: 30 | Refills: 0
Start: 2023-02-14 | End: 2023-03-15

## 2023-02-14 RX ORDER — METOPROLOL TARTRATE 50 MG
25 TABLET ORAL DAILY
Refills: 0 | Status: DISCONTINUED | OUTPATIENT
Start: 2023-02-14 | End: 2023-02-14

## 2023-02-14 RX ORDER — METOPROLOL TARTRATE 50 MG
1 TABLET ORAL
Qty: 30 | Refills: 0
Start: 2023-02-14 | End: 2023-03-15

## 2023-02-14 RX ORDER — ALBUTEROL 90 UG/1
2 AEROSOL, METERED ORAL
Qty: 240 | Refills: 0
Start: 2023-02-14 | End: 2023-03-15

## 2023-02-14 RX ORDER — ALBUTEROL 90 UG/1
2 AEROSOL, METERED ORAL
Qty: 0 | Refills: 0 | DISCHARGE

## 2023-02-14 RX ORDER — OMEPRAZOLE 10 MG/1
1 CAPSULE, DELAYED RELEASE ORAL
Qty: 0 | Refills: 0 | DISCHARGE

## 2023-02-14 RX ORDER — SENNA PLUS 8.6 MG/1
2 TABLET ORAL
Qty: 0 | Refills: 0 | DISCHARGE
Start: 2023-02-14

## 2023-02-14 RX ORDER — SIMETHICONE 80 MG/1
1 TABLET, CHEWABLE ORAL
Qty: 0 | Refills: 0 | DISCHARGE
Start: 2023-02-14

## 2023-02-14 RX ADMIN — CHLORHEXIDINE GLUCONATE 1 APPLICATION(S): 213 SOLUTION TOPICAL at 12:18

## 2023-02-14 RX ADMIN — Medication 3 MILLILITER(S): at 03:39

## 2023-02-14 RX ADMIN — Medication 20 MILLIGRAM(S): at 12:18

## 2023-02-14 RX ADMIN — Medication 81 MILLIGRAM(S): at 12:18

## 2023-02-14 RX ADMIN — Medication 6 MILLIGRAM(S): at 00:30

## 2023-02-14 RX ADMIN — PANTOPRAZOLE SODIUM 40 MILLIGRAM(S): 20 TABLET, DELAYED RELEASE ORAL at 05:09

## 2023-02-14 RX ADMIN — BUDESONIDE AND FORMOTEROL FUMARATE DIHYDRATE 2 PUFF(S): 160; 4.5 AEROSOL RESPIRATORY (INHALATION) at 08:36

## 2023-02-14 RX ADMIN — SCOPALAMINE 1 PATCH: 1 PATCH, EXTENDED RELEASE TRANSDERMAL at 06:46

## 2023-02-14 RX ADMIN — Medication 12.5 MILLIGRAM(S): at 05:09

## 2023-02-14 RX ADMIN — Medication 3 MILLILITER(S): at 08:59

## 2023-02-14 RX ADMIN — TIOTROPIUM BROMIDE 2 PUFF(S): 18 CAPSULE ORAL; RESPIRATORY (INHALATION) at 12:17

## 2023-02-14 RX ADMIN — HEPARIN SODIUM 5000 UNIT(S): 5000 INJECTION INTRAVENOUS; SUBCUTANEOUS at 13:19

## 2023-02-14 RX ADMIN — HEPARIN SODIUM 5000 UNIT(S): 5000 INJECTION INTRAVENOUS; SUBCUTANEOUS at 05:10

## 2023-02-14 RX ADMIN — Medication 1 TABLET(S): at 13:18

## 2023-02-14 RX ADMIN — METHADONE HYDROCHLORIDE 10 MILLIGRAM(S): 40 TABLET ORAL at 07:49

## 2023-02-14 NOTE — DISCHARGE NOTE PROVIDER - HOSPITAL COURSE
77 year old woman with asthma, HTN, hemochromatosis, chronic pseudomonas lung infection, arthritis, and prior MI in 2021, who presented with chest/epigastric pain, ischemic changes on EKG and elevated troponin. Cardiac catheterization showed no obstructive epicardial disease and LV gram suggested possible "Takotsubo" stress cardiomyopathy. LVEDP 50, Lasix given X1. Admitted to CCU for ongoing management. Patient optimized on BB. She will follow up with Dr. Sam Han in 1-2 weeks. ACE/ARB not started due to hypotension.

## 2023-02-14 NOTE — PROGRESS NOTE ADULT - PROBLEM SELECTOR PLAN 7
DVT PPx:  - Heparin Sq  - OOB as tolerated    DC planning for today- pt cleared for DC from CCU   -can follow up outpatient in 1- 2 weeks with [facility] cardiology   Patient hemodynamically stable for discharge home  PT rec home PT and RW   plan of care d/w pt and  at bedside   case d/w CCU NP and ACP

## 2023-02-14 NOTE — DISCHARGE NOTE PROVIDER - NSDCACTIVITY_GEN_ALL_CORE
Bathing allowed/Showering allowed/Stairs allowed/Driving allowed/Walking - Indoors allowed/Walking - Outdoors allowed/Follow Instructions Provided by your Surgical Team

## 2023-02-14 NOTE — DISCHARGE NOTE NURSING/CASE MANAGEMENT/SOCIAL WORK - PATIENT PORTAL LINK FT
You can access the FollowMyHealth Patient Portal offered by [facility] by registering at the following website: [email or web address]. By joining [facility]’s FollowMyHealth portal, you will also be able to view your health information using other applications (apps) compatible with our system.

## 2023-02-14 NOTE — PROGRESS NOTE ADULT - PROBLEM SELECTOR PLAN 5
History of chronic pseudomonal infection per patient  - afebrile  - mild leukocytosis on admit but now resolved , may be reactive  -no evidence of active infection at this time  outpt f/u

## 2023-02-14 NOTE — PROGRESS NOTE ADULT - SUBJECTIVE AND OBJECTIVE BOX
Patient is a 77y old  Female who presents with a chief complaint of takotsubo CM (14 Feb 2023 13:16)      SUBJECTIVE / OVERNIGHT EVENTS: patient seen and examined by bedside at 11:45 AM, pt denies headache, dizziness, SOB, CP, Palpitations , N/V/D, abdominal pain  pt transferred from CCU to Tele floor  Tele: NSR in 70s     MEDICATIONS  (STANDING):  albuterol/ipratropium for Nebulization 3 milliLiter(s) Nebulizer every 6 hours  aspirin enteric coated 81 milliGRAM(s) Oral daily  atorvastatin 40 milliGRAM(s) Oral at bedtime  budesonide  80 MICROgram(s)/formoterol 4.5 MICROgram(s) Inhaler 2 Puff(s) Inhalation two times a day  calcium carbonate 1250 mG  + Vitamin D (OsCal 500 + D) 1 Tablet(s) Oral daily  chlorhexidine 2% Cloths 1 Application(s) Topical <User Schedule>  FLUoxetine 20 milliGRAM(s) Oral daily  gabapentin 300 milliGRAM(s) Oral at bedtime  heparin   Injectable 5000 Unit(s) SubCutaneous every 8 hours  influenza  Vaccine (HIGH DOSE) 0.7 milliLiter(s) IntraMuscular once  methadone    Tablet 10 milliGRAM(s) Oral two times a day  metoprolol succinate ER 25 milliGRAM(s) Oral daily  montelukast 10 milliGRAM(s) Oral at bedtime  pantoprazole    Tablet 40 milliGRAM(s) Oral before breakfast  scopolamine 1 mG/72 Hr(s) Patch 1 Patch Transdermal every 72 hours  senna 2 Tablet(s) Oral at bedtime  tiotropium 2.5 MICROgram(s) Inhaler 2 Puff(s) Inhalation daily    MEDICATIONS  (PRN):  acetaminophen     Tablet .. 650 milliGRAM(s) Oral every 6 hours PRN Temp greater or equal to 38C (100.4F), Mild Pain (1 - 3)  aluminum hydroxide/magnesium hydroxide/simethicone Suspension 30 milliLiter(s) Oral every 4 hours PRN Dyspepsia  melatonin 6 milliGRAM(s) Oral at bedtime PRN Insomnia  ondansetron Injectable 4 milliGRAM(s) IV Push every 12 hours PRN Nausea and/or Vomiting  simethicone 80 milliGRAM(s) Chew every 6 hours PRN Gas      Vital Signs Last 24 Hrs  T(C): 36.9 (14 Feb 2023 12:33), Max: 37.4 (13 Feb 2023 20:10)  T(F): 98.5 (14 Feb 2023 12:33), Max: 99.3 (13 Feb 2023 20:10)  HR: 67 (14 Feb 2023 12:33) (65 - 97)  BP: 107/43 (14 Feb 2023 12:33) (102/58 - 123/59)  BP(mean): 75 (13 Feb 2023 18:00) (72 - 75)  RR: 18 (14 Feb 2023 12:33) (18 - 20)  SpO2: 96% (14 Feb 2023 12:33) (92% - 99%)    Parameters below as of 14 Feb 2023 12:33  Patient On (Oxygen Delivery Method): room air      CAPILLARY BLOOD GLUCOSE        I&O's Summary    13 Feb 2023 07:01  -  14 Feb 2023 07:00  --------------------------------------------------------  IN: 690 mL / OUT: 1400 mL / NET: -710 mL        PHYSICAL EXAM:  GENERAL: NAD, well-developed  HEAD:  Atraumatic, Normocephalic  EYES: EOMI, PERRLA, conjunctiva and sclera clear  NECK: Supple, No JVD  CHEST/LUNG: Clear to auscultation bilaterally; No wheeze  HEART: Regular rate and rhythm; No murmurs, rubs, or gallops  ABDOMEN: Soft, Nontender, Nondistended; Bowel sounds present  EXTREMITIES:  2+ Peripheral Pulses, No clubbing, cyanosis, or edema  PSYCH: AAOx3  NEUROLOGY: non-focal  SKIN: Chronic venous stasis changes on saritha LE      LABS:                        13.4   8.14  )-----------( 265      ( 13 Feb 2023 04:10 )             41.5     02-13    136  |  99  |  14  ----------------------------<  90  4.0   |  29  |  0.67    Ca    9.4      13 Feb 2023 04:10  Phos  4.2     02-13  Mg     2.10     02-13    TPro  6.4  /  Alb  3.8  /  TBili  0.2  /  DBili  x   /  AST  22  /  ALT  12  /  AlkPhos  70  02-13              RADIOLOGY & ADDITIONAL TESTS:  < from: TTE with Doppler (w/Cont) (02.11.23 @ 09:25) >  ------------------------------------------------------------------------  CONCLUSIONS:  1. Mitral annular calcification and calcified mitral  leaflets with decreased diastolic opening. Minimal mitral  regurgitation. Mean transmitral valve gradient equals 2 mm  Hg, consistent with mild mitral stenosis.  2. Increased relative wall thickness with normal left  ventricular mass index, consistent with concentric left  ventricular remodeling.  3. Endocardial visualization enhanced with intravenous  injection of echo contrast (Definity). Severe segmental  left ventricular systolic dysfunction. Mid to distal  segments and apex are hypokinetic.  4. The right ventricle is not well visualized; grossly  normal right ventricular systolic function.  ------------------------------------------------------------------------  Confirmed on  2/11/2023 - 17:45:36 by LUBA Steve  ------------------------------------------------------------------------    < end of copied text >  < from: Cardiac Catheterization (02.10.23 @ 17:58) >    Indications:               Myocardial infarction without ST elevation  (NSTEMI)    Diagnostic Conclusions:     Nonobstrtuctive CAD   Severe segmental LV dysfyunction with hyperdynamic base suggestive of  stress induced cardiomyopathy  Recommendations:     Patient will be admitted to CCU for further management     Acute complication:    No complications     Procedure Narrative:   The risks and alternatives of the procedures and conscious sedation  were explained to the patient and informed consent was  obtained. The patient was brought to the cath lab and placed on the  exam table.  Access   Right femoral artery:   The puncture site was infiltrated with 2% Lidocaine. Vascular access  was obtained using modified seldinger technique and a 6fr  Sheath Wells was advanced into the vessel.      Diagnostic Findings:     Coronary Angiography   The coronary circulation is right dominant.      LM   Left main artery: Angiography shows mild atherosclerosis.      LAD   Left anterior descending artery: Angiography shows mild  atherosclerosis.    Patient: SHEELA MENDEZ         MRN: [id number]  Study Date: 02/10/2023   05:58 PM      Page 1 of 4          CX   Circumflex: There is a 30 % stenosis. ANN MARIE Flow 3.      RCA   Mid right coronary artery: There is a 40 % stenosis. ANN MARIE Flow 3.      Left Heart Cath   Ejection fraction is 20%.        < end of copied text >      Imaging Personally Reviewed:    Consultant(s) Notes Reviewed:  CCU    Care Discussed with Consultants/Other Providers: Cardiology    normal...

## 2023-02-14 NOTE — DISCHARGE NOTE PROVIDER - NSDCMRMEDTOKEN_GEN_ALL_CORE_FT
aspirin 81 mg oral delayed release tablet: 1 tab(s) orally once a day  calcium citrate:   gabapentin 300 mg oral tablet: 1 tab(s) orally 2 times a day  methadone 10 mg oral tablet: 1 tab(s) orally every 6 hours    To note pt takes it only twice a day, even though it was prescribed every 6 hours.  montelukast 10 mg oral tablet: 1 tab(s) orally once a day  omeprazole 40 mg oral delayed release capsule: 1 cap(s) orally 2 times a day  ProAir HFA 90 mcg/inh inhalation aerosol: 2 puff(s) inhaled every 6 hours, As Needed  Trelegy Ellipta 200 mcg-62.5 mcg-25 mcg/inh inhalation powder: 1 puff(s) inhaled once a day   aspirin 81 mg oral delayed release tablet: 1 tab(s) orally once a day  atorvastatin 40 mg oral tablet: 1 tab(s) orally once a day (at bedtime)  calcium citrate:   calcium-vitamin D 500 mg-5 mcg (200 intl units) oral tablet: 1 tab(s) orally once a day  gabapentin 300 mg oral tablet: 1 tab(s) orally 2 times a day  methadone 10 mg oral tablet: 1 tab(s) orally every 6 hours    To note pt takes it only twice a day, even though it was prescribed every 6 hours.  metoprolol succinate 25 mg oral tablet, extended release: 1 tab(s) orally once a day  montelukast 10 mg oral tablet: 1 tab(s) orally once a day  pantoprazole 40 mg oral delayed release tablet: 1 tab(s) orally once a day (before a meal)  ProAir HFA 90 mcg/inh inhalation aerosol: 2 puff(s) inhaled every 6 hours, As Needed  senna leaf extract oral tablet: 2 tab(s) orally once a day (at bedtime)  simethicone 80 mg oral tablet, chewable: 1 tab(s) orally every 6 hours, As needed, Gas  tiotropium 2.5 mcg/inh inhalation aerosol: 2 puff(s) inhaled once a day  Trelegy Ellipta 200 mcg-62.5 mcg-25 mcg/inh inhalation powder: 1 puff(s) inhaled once a day

## 2023-02-14 NOTE — PROGRESS NOTE ADULT - ATTENDING COMMENTS
77 year old woman with asthma, chronic pseudomonas admitted for acute MI with non-obstructive CAD on coronary angiogram. Possible stress-type "Takotsubo" cardiomyopathy or other microvascular ischemia. Ischemic biomarkers downtrending.    To start low dose beta blocker (bisoprolol not on formulary)  Follow up echo  Reconcile outpatient medications she takes for anxiety  CT chest non-contrast  Telemetry
77 year old woman with history of nonobstructive CAD who presented with epigastric pain-->LHC over weekend found to have findings consistent with Takotsubo CM.     TTE 2/11/23  CONCLUSIONS:  1. Mitral annular calcification and calcified mitral  leaflets with decreased diastolic opening. Minimal mitral  regurgitation. Mean transmitral valve gradient equals 2 mm  Hg, consistent with mild mitral stenosis.  2. Increased relative wall thickness with normal left  ventricular mass index, consistent with concentric left  ventricular remodeling.  3. Endocardial visualization enhanced with intravenous  injection of echo contrast (Definity). Severe segmental  left ventricular systolic dysfunction. Mid to distal  segments and apex are hypokinetic.  4. The right ventricle is not well visualized; grossly  normal right ventricular systolic function.      Meds:  ASA  Metoprolol Succinate 25 mg daily  SQ heparin    #Neuro- No active issues  #Pulm- No active issues  #CV- Takotsubo CM  Continue Metoprolol 25 mg daily  If BP can tolerate, start low dose losartan at 12.5 mg daily later today  Continue ASA 81 mg daily  #Renal- No active issues  Continue to monitor  #DVT ppx- Cont heparin sq
77 year old woman with history of nonobstructive CAD who presented with epigastric pain-->LHC over weekend found to have findings consistent with Takotsubo CM.     TTE 2/11/23  CONCLUSIONS:  1. Mitral annular calcification and calcified mitral  leaflets with decreased diastolic opening. Minimal mitral  regurgitation. Mean transmitral valve gradient equals 2 mm  Hg, consistent with mild mitral stenosis.  2. Increased relative wall thickness with normal left  ventricular mass index, consistent with concentric left  ventricular remodeling.  3. Endocardial visualization enhanced with intravenous  injection of echo contrast (Definity). Severe segmental  left ventricular systolic dysfunction. Mid to distal  segments and apex are hypokinetic.  4. The right ventricle is not well visualized; grossly  normal right ventricular systolic function.      Meds:  ASA  Metoprolol Succinate 25 mg daily  SQ heparin    #Neuro- No active issues  #Pulm- No active issues  #CV- Takotsubo CM  Continue Metoprolol 25 mg daily  Low dose losartan at 12.5 mg daily if BP can tolerate  Continue ASA 81 mg daily  #Renal- No active issues  Continue to monitor  #DVT ppx- Cont heparin sq
77 year old woman with history of asthma and anxiety disorder who presented with NSTEMI and had only luminal irregularities on coronary angiogram, with elevated LVEDP and acute systolic heart failure.    Possible stress "takotsubo" cardiomyopathy or other form of microvascular ischemia.    To start GDMT for acute systolic heart failure  asa and statin for non-obstructive CAD  Transfer to floor

## 2023-02-14 NOTE — DISCHARGE NOTE PROVIDER - NSDCCPCAREPLAN_GEN_ALL_CORE_FT
PRINCIPAL DISCHARGE DIAGNOSIS  Diagnosis: Takotsubo cardiomyopathy  Assessment and Plan of Treatment:

## 2023-02-14 NOTE — DISCHARGE NOTE NURSING/CASE MANAGEMENT/SOCIAL WORK - NSDCVIVACCINE_GEN_ALL_CORE_FT
influenza, high-dose, quadrivalent; 29-Sep-2021 15:16; Tammy Bardales (RN); [employer]; [id number] (Exp. Date: 30-Jun-2022); IntraMuscular; Deltoid Right.; 0.7 milliLiter(s); VIS (VIS Published: 15-Aug-2019, VIS Presented: 29-Sep-2021);   Tdap; 19-Jun-2014 09:25; Yaa Tijerina (RN); [id number]; IntraMuscular; Deltoid Left.; 0.5 milliLiter(s);   Tdap; 29-Oct-2021 16:37; Carolynn Encinas (RN); [employer]; [id number] (Exp. Date: 09-Sep-2023); IntraMuscular; Deltoid Right.; 0.5 milliLiter(s); VIS (VIS Published: 09-May-2013, VIS Presented: 29-Oct-2021);

## 2023-02-14 NOTE — DISCHARGE NOTE NURSING/CASE MANAGEMENT/SOCIAL WORK - NSDCPEFALRISK_GEN_ALL_CORE
For information on Fall & Injury Prevention, visit: [email or web address] OR  [email or web address] OR  https://www.cdc.gov/steadi/patient.html

## 2023-02-14 NOTE — PROGRESS NOTE ADULT - ASSESSMENT
77F with asthma, HTN, hemochromatosis, chronic pseudomonas lung infection,  intermittent nausea X 3 years, chronic pain from arthritis, and CAD/MI without stent in 2021, presents with epigastric pain with N/V and concern for possible CONG on EKG, also with elevated troponin. Taken for urgent LHC which was c/w presumable Takotsubo CM, LVEDP 50, Lasix given X1. patient was admitted to CCU for ongoing management, now deemed stable to transfer to Medical floor

## 2023-02-14 NOTE — PROGRESS NOTE ADULT - SUBJECTIVE AND OBJECTIVE BOX
FOLLOW UP:  Takotsubo CM  SUBJECTIVE/OBSERVATIONS:  Patient seen and examined. She is A&O x 3. No complaints of CP or SOB  OVERNIGHT EVENTS:  NO events  TELE EVENTS:   NSR  Vital Signs Last 24 Hrs  T(C): 36.4 (14 Feb 2023 05:09), Max: 37.4 (13 Feb 2023 12:00)  T(F): 97.6 (14 Feb 2023 05:09), Max: 99.3 (13 Feb 2023 12:00)  HR: 65 (14 Feb 2023 05:09) (62 - 97)  BP: 102/58 (14 Feb 2023 05:09) (85/63 - 123/59)  BP(mean): 75 (13 Feb 2023 18:00) (54 - 93)  RR: 18 (14 Feb 2023 05:09) (14 - 23)  SpO2: 96% (14 Feb 2023 05:09) (92% - 100%)    Parameters below as of 14 Feb 2023 05:09  Patient On (Oxygen Delivery Method): room air      I&O's Summary    13 Feb 2023 07:01  -  14 Feb 2023 07:00  --------------------------------------------------------  IN: 690 mL / OUT: 1400 mL / NET: -710 mL        MEDICATIONS:  aspirin enteric coated 81 milliGRAM(s) Oral daily  heparin   Injectable 5000 Unit(s) SubCutaneous every 8 hours  metoprolol tartrate 12.5 milliGRAM(s) Oral every 12 hours      albuterol/ipratropium for Nebulization 3 milliLiter(s) Nebulizer every 6 hours  budesonide  80 MICROgram(s)/formoterol 4.5 MICROgram(s) Inhaler 2 Puff(s) Inhalation two times a day  montelukast 10 milliGRAM(s) Oral at bedtime  tiotropium 2.5 MICROgram(s) Inhaler 2 Puff(s) Inhalation daily    acetaminophen     Tablet .. 650 milliGRAM(s) Oral every 6 hours PRN  FLUoxetine 20 milliGRAM(s) Oral daily  gabapentin 300 milliGRAM(s) Oral at bedtime  melatonin 6 milliGRAM(s) Oral at bedtime PRN  methadone    Tablet 10 milliGRAM(s) Oral two times a day  ondansetron Injectable 4 milliGRAM(s) IV Push every 12 hours PRN  scopolamine 1 mG/72 Hr(s) Patch 1 Patch Transdermal every 72 hours    aluminum hydroxide/magnesium hydroxide/simethicone Suspension 30 milliLiter(s) Oral every 4 hours PRN  pantoprazole    Tablet 40 milliGRAM(s) Oral before breakfast  senna 2 Tablet(s) Oral at bedtime  simethicone 80 milliGRAM(s) Chew every 6 hours PRN    atorvastatin 40 milliGRAM(s) Oral at bedtime    calcium carbonate 1250 mG  + Vitamin D (OsCal 500 + D) 1 Tablet(s) Oral daily  chlorhexidine 2% Cloths 1 Application(s) Topical <User Schedule>  influenza  Vaccine (HIGH DOSE) 0.7 milliLiter(s) IntraMuscular once      REVIEW OF SYSTEMS:  Complete 10point ROS negative.    PHYSICAL EXAM:  General: NAD  Cardiovascular: Normal S1 S2, No JVD, No murmurs, No edema  Respiratory: Lungs clear to auscultation	  Gastrointestinal:  Soft, Non-tender, + BS	  Skin: warm and dry, No rashes, No ecchymoses, No cyanosis	  Extremities: Normal range of motion, No clubbing, cyanosis or edema  Vascular: Peripheral pulses palpable 2+ bilaterally    LABS:	 	    CBC Full  -  ( 13 Feb 2023 04:10 )  WBC Count : 8.14 K/uL  Hemoglobin : 13.4 g/dL  Hematocrit : 41.5 %  Platelet Count - Automated : 265 K/uL  Mean Cell Volume : 89.8 fL  Mean Cell Hemoglobin : 29.0 pg  Mean Cell Hemoglobin Concentration : 32.3 gm/dL  Auto Neutrophil # : x  Auto Lymphocyte # : x  Auto Monocyte # : x  Auto Eosinophil # : x  Auto Basophil # : x  Auto Neutrophil % : x  Auto Lymphocyte % : x  Auto Monocyte % : x  Auto Eosinophil % : x  Auto Basophil % : x    02-13    136  |  99  |  14  ----------------------------<  90  4.0   |  29  |  0.67    Ca    9.4      13 Feb 2023 04:10  Phos  4.2     02-13  Mg     2.10     02-13    TPro  6.4  /  Alb  3.8  /  TBili  0.2  /  DBili  x   /  AST  22  /  ALT  12  /  AlkPhos  70  02-13  < from: TTE with Doppler (w/Cont) (02.11.23 @ 09:25) >  ------------------------------------------------------------------------  CONCLUSIONS:  1. Mitral annular calcification and calcified mitral  leaflets with decreased diastolic opening. Minimal mitral  regurgitation. Mean transmitral valve gradient equals 2 mm  Hg, consistent with mild mitral stenosis.  2. Increased relative wall thickness with normal left  ventricular mass index, consistent with concentric left  ventricular remodeling.  3. Endocardial visualization enhanced with intravenous  injection of echo contrast (Definity). Severe segmental  left ventricular systolic dysfunction. Mid to distal  segments and apex are hypokinetic.  4. The right ventricle is not well visualized; grossly  normal right ventricular systolic function.  ------------------------------------------------------------------------  Confirmed on  2/11/2023 - 17:45:36 by Zay Galarza M.D. RPVI  ------------------------------------------------------------------------    < end of copied text >  < from: Cardiac Catheterization (02.10.23 @ 17:58) >    Indications:               Myocardial infarction without ST elevation  (NSTEMI)    Diagnostic Conclusions:     Nonobstrtuctive CAD   Severe segmental LV dysfyunction with hyperdynamic base suggestive of  stress induced cardiomyopathy  Recommendations:     Patient will be admitted to CCU for further management     Acute complication:    No complications     Procedure Narrative:   The risks and alternatives of the procedures and conscious sedation  were explained to the patient and informed consent was  obtained. The patient was brought to the cath lab and placed on the  exam table.  Access   Right femoral artery:   The puncture site was infiltrated with 2% Lidocaine. Vascular access  was obtained using modified seldinger technique and a 6fr  Sheath Badger was advanced into the vessel.      Diagnostic Findings:     Coronary Angiography   The coronary circulation is right dominant.      LM   Left main artery: Angiography shows mild atherosclerosis.      LAD   Left anterior descending artery: Angiography shows mild  atherosclerosis.    Patient: SHEELA MENDEZ         MRN: [id number]  Study Date: 02/10/2023   05:58 PM      Page 1 of 4          CX   Circumflex: There is a 30 % stenosis. ANN MARIE Flow 3.      RCA   Mid right coronary artery: There is a 40 % stenosis. ANN MARIE Flow 3.      Left Heart Cath   Ejection fraction is 20%.        < end of copied text >

## 2023-02-14 NOTE — DISCHARGE NOTE PROVIDER - CARE PROVIDER_API CALL
Vivek Han)  Internal Medicine  [address] [place] [address]  Phone: [phone]  Fax: [phone]  Follow Up Time: 1 week

## 2023-02-14 NOTE — PROGRESS NOTE ADULT - ASSESSMENT
77 year old woman with asthma, HTN, hemochromatosis, chronic pseudomonas lung infection, arthritis, and prior MI in 2021, who presented with chest/epigastric pain, ischemic changes on EKG and elevated troponin. Cardiac catheterization showed no obstructive epicardial disease and LV gram suggested possible "Takotsubo" stress cardiomyopathy. LVEDP 50, Lasix given X1. Admitted to CCU for ongoing management.    NEURO:  - A & O X 3    PSYCH:  #anxiety, seeks outpt tx  -continue methadone    RESP:  # asthma  - Symbicort, Singulair, Spiriva  - duonebs as needed    CV:  # NSTEMI: possible Takotsubo stress CM or microvascular ischemia.    - ECHO confirmed mid-distal and apex hypokinesis with EF 20%  - appears euvolemic at present  - continue ASA for luminal (non-obstructive) CAD  - cardiac markers trending down  - continue to monitor  - C/w Toprol XL 25 mg daily  -will try to add ACE/ARNI outpatient, BP is soft    GI:  -nausea improved  - Started scopolamine   - feels much better today  - amylase, lipase, lactate WNL    /RENAL:  - Scr stable  - no active issues    ID: history of chronic pseudomonal infection per patient  - afebrile  - mild leukocytosis on admit but now normal, may be reactive    ENDO:  - A1c 4.8  - thyroid studies normal    DVT PPx:  - Heparin Sq  - OOB as tolerated    DC planning for today 77 year old woman with asthma, HTN, hemochromatosis, chronic pseudomonas lung infection, arthritis, and prior MI in 2021, who presented with chest/epigastric pain, ischemic changes on EKG and elevated troponin. Cardiac catheterization showed no obstructive epicardial disease and LV gram suggested possible "Takotsubo" stress cardiomyopathy. LVEDP 50, Lasix given X1. Admitted to CCU for ongoing management.    NEURO:  - A & O X 3    PSYCH:  #anxiety, seeks outpt tx  -continue methadone    RESP:  # asthma  - Symbicort, Singulair, Spiriva  - duonebs as needed    CV:  # NSTEMI: possible Takotsubo stress CM or microvascular ischemia.    - ECHO confirmed mid-distal and apex hypokinesis with EF 20%  - appears euvolemic at present  - continue ASA for luminal (non-obstructive) CAD  - cardiac markers trending down  - continue to monitor  - C/w Toprol XL 25 mg daily  -will try to add ACE/ARNI outpatient, BP is soft    GI:  -nausea improved  - Started scopolamine   - feels much better today  - amylase, lipase, lactate WNL    /RENAL:  - Scr stable  - no active issues    ID: history of chronic pseudomonal infection per patient  - afebrile  - mild leukocytosis on admit but now normal, may be reactive    ENDO:  - A1c 4.8  - thyroid studies normal    DVT PPx:  - Heparin Sq  - OOB as tolerated    DC planning for today  -can follow up outpatient in 1- 2 weeks with [facility] cardiology

## 2023-02-14 NOTE — DISCHARGE NOTE NURSING/CASE MANAGEMENT/SOCIAL WORK - NSSCNAMETXT_GEN_ALL_CORE
[facility] for Registered Nurse day after discharge, followed by Physical Therapy (pending acceptance of case by agency)

## 2023-02-16 PROBLEM — I21.4 NON-ST ELEVATION (NSTEMI) MYOCARDIAL INFARCTION: Chronic | Status: ACTIVE | Noted: 2023-02-10

## 2023-02-20 ENCOUNTER — NON-APPOINTMENT (OUTPATIENT)
Age: 78
End: 2023-02-20

## 2023-02-20 ENCOUNTER — APPOINTMENT (OUTPATIENT)
Dept: CARDIOLOGY | Facility: CLINIC | Age: 78
End: 2023-02-20
Payer: MEDICARE

## 2023-02-20 VITALS
WEIGHT: 126 LBS | DIASTOLIC BLOOD PRESSURE: 64 MMHG | SYSTOLIC BLOOD PRESSURE: 132 MMHG | TEMPERATURE: 98.5 F | OXYGEN SATURATION: 90 % | HEIGHT: 56 IN | HEART RATE: 72 BPM | BODY MASS INDEX: 28.34 KG/M2

## 2023-02-20 PROCEDURE — 99215 OFFICE O/P EST HI 40 MIN: CPT | Mod: 25

## 2023-02-20 PROCEDURE — 93000 ELECTROCARDIOGRAM COMPLETE: CPT

## 2023-02-20 NOTE — DISCUSSION/SUMMARY
[FreeTextEntry1] : 77F with HFrEF (25%) asthma, CAD, presents for f/u \par \par Stress induced CM\par -TTE in hospital showing LV EF 25%\par -no clear stressor \par -grossly euvolemic on exam\par -endorsing a tightness in her chest consistent wit hher chronic asthma symptoms\par -b/l wheezing on exam, no crackles. \par -cont toprol\par -will add lisinopril 5 (wasn’t added as inpt 2/2 concerns of hypotension)\par \par f/u 6 weeks, and will check tte at that time to eval LV\par >40 min spent on complete encounter [EKG obtained to assist in diagnosis and management of assessed problem(s)] : EKG obtained to assist in diagnosis and management of assessed problem(s)

## 2023-02-20 NOTE — HISTORY OF PRESENT ILLNESS
[FreeTextEntry1] : 77F with HFrEF (25%) asthma, CAD, presents for f/u \par \par Previously,\par pt seen in cardiology for an initial evaluation in 11/2019 following a inpt course at [facility] for CP and TUCKER\par On that admission, pt with trop of 600. TTE with mild segmental dys, but cath revealing non-obstructive disease, suspect microvascular dysfunction. pt continued on toprol 25, asa 81 and lipitor. \par pt last maggi here 6/2020, feeling well. \par \par pt hospitalized 2/23 for burning CP, similar in quality to her CP in 2019, pt states the symptoms were not resolving. At [facility], cath showing no dz, pt treated for takotsubo cardiomyopathy.\par \par pt now presents for follow up, has not been seen in office since 6/2020. \par \par today,\par \par pt overall feeling well, states her asthma has been "acting up lately" her chest feels tight like it usually does when her asthma gives her breathing problems. \par states mild LE edema, denies cp on exertion, sob on exertion. \par on toprol, states ace wasn’t started in the hospital 2/2 hypotension. BP today 130s systolic.\par \par \par \par med hx: asthma, hemochromatosis, HFrEF\par sx hx: knee x2, femur frx, R shoulder, R hip replacement. \par fam hx: B- CAD/MI\par social hx: quit tob 30 yrs (20 pk years), no etoh, lives with  in [place]. retired  of [employer] ([employer]par meds: advair, Singulair, methadone, gabapentin, asa, Toprol 25, lipitor 40, albuterol.\par allergies: nkda\par

## 2023-02-20 NOTE — PHYSICAL EXAM
[Well Developed] : well developed [Well Nourished] : well nourished [No Acute Distress] : no acute distress [Normal Venous Pressure] : normal venous pressure [Normal S1, S2] : normal S1, S2 [No Murmur] : no murmur [Wheeze ____] : wheeze [unfilled] [Soft] : abdomen soft [Non Tender] : non-tender [Normal Gait] : normal gait [No Edema] : no edema [Moves all extremities] : moves all extremities [No Focal Deficits] : no focal deficits [Alert and Oriented] : alert and oriented

## 2023-02-20 NOTE — REVIEW OF SYSTEMS
[Fever] : no fever [Headache] : no headache [Weight Gain (___ Lbs)] : no recent weight gain [Chills] : no chills [Feeling Fatigued] : not feeling fatigued [Weight Loss (___ Lbs)] : no recent weight loss [Sore Throat] : no sore throat [SOB] : no shortness of breath [Dyspnea on exertion] : not dyspnea during exertion [Chest Discomfort] : no chest discomfort [Lower Ext Edema] : no extremity edema [Palpitations] : no palpitations [Orthopnea] : no orthopnea [Syncope] : no syncope [Cough] : no cough [Wheezing] : wheezing [Nausea] : no nausea [Vomiting] : no vomiting [Confusion] : no confusion was observed [Easy Bleeding] : no tendency for easy bleeding [Easy Bruising] : no tendency for easy bruising

## 2023-03-08 ENCOUNTER — OUTPATIENT (OUTPATIENT)
Dept: OUTPATIENT SERVICES | Facility: HOSPITAL | Age: 78
LOS: 1 days | Discharge: HOME | End: 2023-03-08

## 2023-03-08 DIAGNOSIS — Z87.81 PERSONAL HISTORY OF (HEALED) TRAUMATIC FRACTURE: Chronic | ICD-10-CM

## 2023-03-08 DIAGNOSIS — Z96.641 PRESENCE OF RIGHT ARTIFICIAL HIP JOINT: Chronic | ICD-10-CM

## 2023-03-08 DIAGNOSIS — Z98.890 OTHER SPECIFIED POSTPROCEDURAL STATES: Chronic | ICD-10-CM

## 2023-03-08 DIAGNOSIS — D64.9 ANEMIA, UNSPECIFIED: ICD-10-CM

## 2023-03-08 DIAGNOSIS — E83.119 HEMOCHROMATOSIS, UNSPECIFIED: ICD-10-CM

## 2023-03-08 DIAGNOSIS — Z96.652 PRESENCE OF LEFT ARTIFICIAL KNEE JOINT: Chronic | ICD-10-CM

## 2023-03-09 ENCOUNTER — RESULT REVIEW (OUTPATIENT)
Age: 78
End: 2023-03-09

## 2023-03-09 ENCOUNTER — APPOINTMENT (OUTPATIENT)
Dept: HEMATOLOGY ONCOLOGY | Facility: CLINIC | Age: 78
End: 2023-03-09

## 2023-03-09 DIAGNOSIS — E83.119 HEMOCHROMATOSIS, UNSPECIFIED: ICD-10-CM

## 2023-03-09 LAB
BASOPHILS # BLD AUTO: 0.03 K/UL — SIGNIFICANT CHANGE UP (ref 0–0.2)
BASOPHILS NFR BLD AUTO: 0.3 % — SIGNIFICANT CHANGE UP (ref 0–2)
EOSINOPHIL # BLD AUTO: 0.04 K/UL — SIGNIFICANT CHANGE UP (ref 0–0.5)
EOSINOPHIL NFR BLD AUTO: 0.4 % — SIGNIFICANT CHANGE UP (ref 0–6)
HCT VFR BLD CALC: 45.7 % — HIGH (ref 34.5–45)
HGB BLD-MCNC: 14.8 G/DL — SIGNIFICANT CHANGE UP (ref 11.5–15.5)
IMM GRANULOCYTES NFR BLD AUTO: 0.2 % — SIGNIFICANT CHANGE UP (ref 0–0.9)
LYMPHOCYTES # BLD AUTO: 2.53 K/UL — SIGNIFICANT CHANGE UP (ref 1–3.3)
LYMPHOCYTES # BLD AUTO: 25.6 % — SIGNIFICANT CHANGE UP (ref 13–44)
MCHC RBC-ENTMCNC: 29.9 PG — SIGNIFICANT CHANGE UP (ref 27–34)
MCHC RBC-ENTMCNC: 32.4 G/DL — SIGNIFICANT CHANGE UP (ref 32–36)
MCV RBC AUTO: 92.3 FL — SIGNIFICANT CHANGE UP (ref 80–100)
MONOCYTES # BLD AUTO: 0.79 K/UL — SIGNIFICANT CHANGE UP (ref 0–0.9)
MONOCYTES NFR BLD AUTO: 8 % — SIGNIFICANT CHANGE UP (ref 2–14)
NEUTROPHILS # BLD AUTO: 6.47 K/UL — SIGNIFICANT CHANGE UP (ref 1.8–7.4)
NEUTROPHILS NFR BLD AUTO: 65.5 % — SIGNIFICANT CHANGE UP (ref 43–77)
NRBC # BLD: 0 /100 WBCS — SIGNIFICANT CHANGE UP (ref 0–0)
PLATELET # BLD AUTO: 257 K/UL — SIGNIFICANT CHANGE UP (ref 150–400)
RBC # BLD: 4.95 M/UL — SIGNIFICANT CHANGE UP (ref 3.8–5.2)
RBC # FLD: 13.8 % — SIGNIFICANT CHANGE UP (ref 10.3–14.5)
WBC # BLD: 9.88 K/UL — SIGNIFICANT CHANGE UP (ref 3.8–10.5)
WBC # FLD AUTO: 9.88 K/UL — SIGNIFICANT CHANGE UP (ref 3.8–10.5)

## 2023-03-19 ENCOUNTER — INPATIENT (INPATIENT)
Facility: HOSPITAL | Age: 78
LOS: 3 days | Discharge: NURSING, REHAB OR LONG-TERM CARE | End: 2023-03-23
Attending: HOSPITALIST | Admitting: HOSPITALIST
Payer: MEDICARE

## 2023-03-19 VITALS
OXYGEN SATURATION: 100 % | RESPIRATION RATE: 16 BRPM | HEIGHT: 56 IN | DIASTOLIC BLOOD PRESSURE: 78 MMHG | HEART RATE: 80 BPM | SYSTOLIC BLOOD PRESSURE: 134 MMHG | TEMPERATURE: 98 F

## 2023-03-19 DIAGNOSIS — S82.209A UNSPECIFIED FRACTURE OF SHAFT OF UNSPECIFIED TIBIA, INITIAL ENCOUNTER FOR CLOSED FRACTURE: ICD-10-CM

## 2023-03-19 DIAGNOSIS — Z96.652 PRESENCE OF LEFT ARTIFICIAL KNEE JOINT: Chronic | ICD-10-CM

## 2023-03-19 DIAGNOSIS — Z98.890 OTHER SPECIFIED POSTPROCEDURAL STATES: Chronic | ICD-10-CM

## 2023-03-19 DIAGNOSIS — Z87.81 PERSONAL HISTORY OF (HEALED) TRAUMATIC FRACTURE: Chronic | ICD-10-CM

## 2023-03-19 DIAGNOSIS — Z96.641 PRESENCE OF RIGHT ARTIFICIAL HIP JOINT: Chronic | ICD-10-CM

## 2023-03-19 LAB
ALBUMIN SERPL ELPH-MCNC: 3.9 G/DL — SIGNIFICANT CHANGE UP (ref 3.3–5)
ALP SERPL-CCNC: 86 U/L — SIGNIFICANT CHANGE UP (ref 40–120)
ALT FLD-CCNC: 17 U/L — SIGNIFICANT CHANGE UP (ref 4–33)
ANION GAP SERPL CALC-SCNC: 10 MMOL/L — SIGNIFICANT CHANGE UP (ref 7–14)
APTT BLD: 27.7 SEC — SIGNIFICANT CHANGE UP (ref 27–36.3)
AST SERPL-CCNC: 25 U/L — SIGNIFICANT CHANGE UP (ref 4–32)
BASOPHILS # BLD AUTO: 0.03 K/UL — SIGNIFICANT CHANGE UP (ref 0–0.2)
BASOPHILS NFR BLD AUTO: 0.2 % — SIGNIFICANT CHANGE UP (ref 0–2)
BILIRUB SERPL-MCNC: 0.5 MG/DL — SIGNIFICANT CHANGE UP (ref 0.2–1.2)
BLD GP AB SCN SERPL QL: NEGATIVE — SIGNIFICANT CHANGE UP
BUN SERPL-MCNC: 10 MG/DL — SIGNIFICANT CHANGE UP (ref 7–23)
CALCIUM SERPL-MCNC: 9.8 MG/DL — SIGNIFICANT CHANGE UP (ref 8.4–10.5)
CHLORIDE SERPL-SCNC: 101 MMOL/L — SIGNIFICANT CHANGE UP (ref 98–107)
CO2 SERPL-SCNC: 29 MMOL/L — SIGNIFICANT CHANGE UP (ref 22–31)
CREAT SERPL-MCNC: 0.54 MG/DL — SIGNIFICANT CHANGE UP (ref 0.5–1.3)
EGFR: 95 ML/MIN/1.73M2 — SIGNIFICANT CHANGE UP
EOSINOPHIL # BLD AUTO: 0.02 K/UL — SIGNIFICANT CHANGE UP (ref 0–0.5)
EOSINOPHIL NFR BLD AUTO: 0.2 % — SIGNIFICANT CHANGE UP (ref 0–6)
GLUCOSE SERPL-MCNC: 96 MG/DL — SIGNIFICANT CHANGE UP (ref 70–99)
HCT VFR BLD CALC: 42.5 % — SIGNIFICANT CHANGE UP (ref 34.5–45)
HGB BLD-MCNC: 13.3 G/DL — SIGNIFICANT CHANGE UP (ref 11.5–15.5)
IANC: 9.38 K/UL — HIGH (ref 1.8–7.4)
IMM GRANULOCYTES NFR BLD AUTO: 0.2 % — SIGNIFICANT CHANGE UP (ref 0–0.9)
INR BLD: 1.05 RATIO — SIGNIFICANT CHANGE UP (ref 0.88–1.16)
LYMPHOCYTES # BLD AUTO: 1.78 K/UL — SIGNIFICANT CHANGE UP (ref 1–3.3)
LYMPHOCYTES # BLD AUTO: 14.7 % — SIGNIFICANT CHANGE UP (ref 13–44)
MCHC RBC-ENTMCNC: 28.9 PG — SIGNIFICANT CHANGE UP (ref 27–34)
MCHC RBC-ENTMCNC: 31.3 GM/DL — LOW (ref 32–36)
MCV RBC AUTO: 92.2 FL — SIGNIFICANT CHANGE UP (ref 80–100)
MONOCYTES # BLD AUTO: 0.88 K/UL — SIGNIFICANT CHANGE UP (ref 0–0.9)
MONOCYTES NFR BLD AUTO: 7.3 % — SIGNIFICANT CHANGE UP (ref 2–14)
NEUTROPHILS # BLD AUTO: 9.38 K/UL — HIGH (ref 1.8–7.4)
NEUTROPHILS NFR BLD AUTO: 77.4 % — HIGH (ref 43–77)
NRBC # BLD: 0 /100 WBCS — SIGNIFICANT CHANGE UP (ref 0–0)
NRBC # FLD: 0 K/UL — SIGNIFICANT CHANGE UP (ref 0–0)
PLATELET # BLD AUTO: 185 K/UL — SIGNIFICANT CHANGE UP (ref 150–400)
POTASSIUM SERPL-MCNC: 4.9 MMOL/L — SIGNIFICANT CHANGE UP (ref 3.5–5.3)
POTASSIUM SERPL-SCNC: 4.9 MMOL/L — SIGNIFICANT CHANGE UP (ref 3.5–5.3)
PROT SERPL-MCNC: 6.4 G/DL — SIGNIFICANT CHANGE UP (ref 6–8.3)
PROTHROM AB SERPL-ACNC: 12.2 SEC — SIGNIFICANT CHANGE UP (ref 10.5–13.4)
RBC # BLD: 4.61 M/UL — SIGNIFICANT CHANGE UP (ref 3.8–5.2)
RBC # FLD: 15.3 % — HIGH (ref 10.3–14.5)
RH IG SCN BLD-IMP: POSITIVE — SIGNIFICANT CHANGE UP
SODIUM SERPL-SCNC: 140 MMOL/L — SIGNIFICANT CHANGE UP (ref 135–145)
WBC # BLD: 12.12 K/UL — HIGH (ref 3.8–10.5)
WBC # FLD AUTO: 12.12 K/UL — HIGH (ref 3.8–10.5)

## 2023-03-19 PROCEDURE — 73564 X-RAY EXAM KNEE 4 OR MORE: CPT | Mod: 26,50

## 2023-03-19 PROCEDURE — 99223 1ST HOSP IP/OBS HIGH 75: CPT

## 2023-03-19 PROCEDURE — 73590 X-RAY EXAM OF LOWER LEG: CPT | Mod: 26,LT

## 2023-03-19 PROCEDURE — 73552 X-RAY EXAM OF FEMUR 2/>: CPT | Mod: 26,LT

## 2023-03-19 PROCEDURE — 12002 RPR S/N/AX/GEN/TRNK2.6-7.5CM: CPT | Mod: RT

## 2023-03-19 PROCEDURE — 99285 EMERGENCY DEPT VISIT HI MDM: CPT | Mod: 25

## 2023-03-19 PROCEDURE — 73502 X-RAY EXAM HIP UNI 2-3 VIEWS: CPT | Mod: 26,LT

## 2023-03-19 PROCEDURE — 74176 CT ABD & PELVIS W/O CONTRAST: CPT | Mod: 26,ME

## 2023-03-19 PROCEDURE — G1004: CPT

## 2023-03-19 PROCEDURE — 72125 CT NECK SPINE W/O DYE: CPT | Mod: 26,MG

## 2023-03-19 PROCEDURE — 73080 X-RAY EXAM OF ELBOW: CPT | Mod: 26,RT

## 2023-03-19 PROCEDURE — 70450 CT HEAD/BRAIN W/O DYE: CPT | Mod: 26,MG

## 2023-03-19 PROCEDURE — 71250 CT THORAX DX C-: CPT | Mod: 26,ME

## 2023-03-19 RX ORDER — ACETAMINOPHEN 500 MG
650 TABLET ORAL ONCE
Refills: 0 | Status: COMPLETED | OUTPATIENT
Start: 2023-03-19 | End: 2023-03-19

## 2023-03-19 RX ORDER — GABAPENTIN 400 MG/1
300 CAPSULE ORAL ONCE
Refills: 0 | Status: COMPLETED | OUTPATIENT
Start: 2023-03-19 | End: 2023-03-19

## 2023-03-19 RX ORDER — TETANUS TOXOID, REDUCED DIPHTHERIA TOXOID AND ACELLULAR PERTUSSIS VACCINE, ADSORBED 5; 2.5; 8; 8; 2.5 [IU]/.5ML; [IU]/.5ML; UG/.5ML; UG/.5ML; UG/.5ML
0.5 SUSPENSION INTRAMUSCULAR ONCE
Refills: 0 | Status: COMPLETED | OUTPATIENT
Start: 2023-03-19 | End: 2023-03-19

## 2023-03-19 RX ORDER — METOCLOPRAMIDE HCL 10 MG
10 TABLET ORAL ONCE
Refills: 0 | Status: COMPLETED | OUTPATIENT
Start: 2023-03-19 | End: 2023-03-19

## 2023-03-19 RX ORDER — METHADONE HYDROCHLORIDE 40 MG/1
10 TABLET ORAL ONCE
Refills: 0 | Status: DISCONTINUED | OUTPATIENT
Start: 2023-03-19 | End: 2023-03-19

## 2023-03-19 RX ORDER — MORPHINE SULFATE 50 MG/1
4 CAPSULE, EXTENDED RELEASE ORAL ONCE
Refills: 0 | Status: DISCONTINUED | OUTPATIENT
Start: 2023-03-19 | End: 2023-03-19

## 2023-03-19 RX ORDER — IPRATROPIUM/ALBUTEROL SULFATE 18-103MCG
3 AEROSOL WITH ADAPTER (GRAM) INHALATION ONCE
Refills: 0 | Status: COMPLETED | OUTPATIENT
Start: 2023-03-19 | End: 2023-03-19

## 2023-03-19 RX ADMIN — GABAPENTIN 300 MILLIGRAM(S): 400 CAPSULE ORAL at 22:51

## 2023-03-19 RX ADMIN — MORPHINE SULFATE 4 MILLIGRAM(S): 50 CAPSULE, EXTENDED RELEASE ORAL at 19:02

## 2023-03-19 RX ADMIN — Medication 3 MILLILITER(S): at 19:02

## 2023-03-19 RX ADMIN — Medication 10 MILLIGRAM(S): at 22:50

## 2023-03-19 RX ADMIN — METHADONE HYDROCHLORIDE 10 MILLIGRAM(S): 40 TABLET ORAL at 23:04

## 2023-03-19 RX ADMIN — TETANUS TOXOID, REDUCED DIPHTHERIA TOXOID AND ACELLULAR PERTUSSIS VACCINE, ADSORBED 0.5 MILLILITER(S): 5; 2.5; 8; 8; 2.5 SUSPENSION INTRAMUSCULAR at 18:35

## 2023-03-19 RX ADMIN — Medication 650 MILLIGRAM(S): at 22:49

## 2023-03-19 NOTE — H&P ADULT - PROBLEM SELECTOR PLAN 3
XR shows L avulsion fracture of the proximal left tibia. Also had hx of previous TNR  -ortho consulted for avulsion fracture of the proximal left tibia  -Pain control with home methadone and percocet PRN

## 2023-03-19 NOTE — ED PROVIDER NOTE - LOWER EXTREMITY EXAM, LEFT
+swelling from distal thigh to proximal shin with significant ttp in mentioned areas diffusey, limited rom of hip and unable to range knee completely, FROM of ankle, sensation and strength intact distally/LIMITED ROM/SWELLING/TENDERNESS

## 2023-03-19 NOTE — H&P ADULT - ASSESSMENT
76 y/o F PMH osteoporosis, asthma, htn, h/o takotsubo syndrome, prior MI 2021, h/o left TKR c/o pain to right side of rib and left knee pain/swelling after a fall. Now admitted for PT eval and asthma exacerbation.

## 2023-03-19 NOTE — ED ADULT NURSE NOTE - OBJECTIVE STATEMENT
Received pt in bed A and O x3 in NAD resting comfortably, c/o multiple injuries s/p fall, note dpt to have swelling to left leg shin with subdural hematoma, no obvious wound or bone protrusion, right  lower leg skin tear with skin fold attached, scan bleeding noted, plan for PA to clean and DSD. right upper arm skin abrasion with scant bleeding, plan for clean and DSD. IV initiated labs drawn and sent,.

## 2023-03-19 NOTE — ED PROVIDER NOTE - MUSCULOSKELETAL NECK EXAM
no c/t/l s[ine midline ttp or deformity, from of c/t/l spine/no deformity, pain or tenderness. no restriction of movement

## 2023-03-19 NOTE — ED PROVIDER NOTE - LOCATION
no obvious swelling, erythema, or deformity, no TTP, FROM, sensation and strength intact/arm/hand/shoulder/wrist/elbow arm/hand/shoulder/wrist/elbow ankle/hip/knee/leg ankle

## 2023-03-19 NOTE — ED PROVIDER NOTE - UPPER EXTREMITY EXAM, RIGHT
wound to elbow, + TTP to elbow, no TTP to shoulder, proximal arm, forearm, or wrist, FROM of shoulder/elbow/wrist, sensation and strength intact/TENDERNESS

## 2023-03-19 NOTE — H&P ADULT - NSHPLABSRESULTS_GEN_ALL_CORE
03-19    140  |  101  |  10  ----------------------------<  96  4.9   |  29  |  0.54    Ca    9.8      19 Mar 2023 19:05    TPro  6.4  /  Alb  3.9  /  TBili  0.5  /  DBili  x   /  AST  25  /  ALT  17  /  AlkPhos  86  03-19                        13.3   12.12 )-----------( 185      ( 19 Mar 2023 19:05 )             42.5     LIVER FUNCTIONS - ( 19 Mar 2023 19:05 )  Alb: 3.9 g/dL / Pro: 6.4 g/dL / ALK PHOS: 86 U/L / ALT: 17 U/L / AST: 25 U/L / GGT: x           PT/INR - ( 19 Mar 2023 19:05 )   PT: 12.2 sec;   INR: 1.05 ratio       PTT - ( 19 Mar 2023 19:05 )  PTT:27.7 sec    EKG: NSr, , TWI V1-V2, AvL    Image: IMPRESSION:  CT head:  No acute intracranial hemorrhage or mass effect.    CT cervical spine:  No CT evidence of cervical spine fracture.  IMPRESSION:  No acute traumatic findings in the chest, abdomen and pelvis.    IMPRESSION:  Acute avulsion fracture of the proximal left tibia.

## 2023-03-19 NOTE — ED PROVIDER NOTE - LOWER EXTREMITY EXAM, RIGHT
+ wound with mild swelling to right knee, + TTP to knee, no TTP to hip/thigh/shin/ankle, FROM of hip/knee/ankle, sensation and strength intact/TENDERNESS

## 2023-03-19 NOTE — ED PROVIDER NOTE - SKIN LOCATION #1
superfifical skin tear to right knee, bleeding controlled, and superficial linear skin tear to right elbow, wound edges able to be approximated at elbow wound

## 2023-03-19 NOTE — H&P ADULT - PROBLEM SELECTOR PLAN 1
Currently not hypoxic but does have active wheezing bilaterally  -Will start prednisone and duonebs  -Resume home inhalers

## 2023-03-19 NOTE — ED PROVIDER NOTE - OBJECTIVE STATEMENT
78 y/o F PMH osteoporosis, asthma, htn, h/o takotsubo syndrome, h/o lt TKR c/o pain to right side of body and left knee pain/swelling. Pt states she has an unsteady gate at baseline and attributes the fall to that, denies feeling lightheaded prior to fall. Also c/o right rib pain and pain with inspiration in that area. Pt has wounds to right knee and right elbow with bleeding controlled. Admits to headache. Denies CP, change in vision, numbness/tingling/weakness, abdominal pain, n/v, a/c use. Unknown last tetanus.

## 2023-03-19 NOTE — ED PROVIDER NOTE - CLINICAL SUMMARY MEDICAL DECISION MAKING FREE TEXT BOX
pt s/p fall, multiple area of pain and swelling  ct head/neck/chest/ abdomen/pelvis, xray elbow, b/l knee, lt hip/pelvis/femur/tib/fib, adacel, wound care, pain control

## 2023-03-19 NOTE — ED PROVIDER NOTE - NS ED ATTENDING STATEMENT MOD
This was a shared visit with the TAMAR. I reviewed and verified the documentation and independently performed the documented:

## 2023-03-19 NOTE — ED PROVIDER NOTE - NS ED MD DISPO DIVISION
Reason for Disposition   Patient wants to be seen    Answer Assessment - Initial Assessment Questions  1. BLOOD PRESSURE: \"What is the blood pressure? \" \"Did you take at least two measurements 5 minutes apart?\"      180/98 then 144/94 then 152/86  2. ONSET: \"When did you take your blood pressure? \"      This morning   3. HOW: \"How did you obtain the blood pressure? \" (e.g., visiting nurse, automatic home BP monitor)      Wife   4. HISTORY: \"Do you have a history of high blood pressure? \"      Yes   5. MEDICATIONS: Omie Nikunjling you taking any medications for blood pressure? \" \"Have you missed any doses recently? \"      Lisinopril 5mg   6. OTHER SYMPTOMS: \"Do you have any symptoms? \" (e.g., headache, chest pain, blurred vision, difficulty breathing, weakness)      Anxious   7. PREGNANCY: \"Is there any chance you are pregnant? \" \"When was your last menstrual period? \"      no    Protocols used: HIGH BLOOD PRESSURE-ADULT-[place]    Patient called pre-service center [facility] with red flag complaint. Brief description of triage: pt wife reports him having high BP this morning but has since resolved - he did have a AAA surgery on 12/17 - no missed doses of BP meds - pt is very anxious and may be cause of anxiety    Triage indicates for patient to see PCP today    Care advice provided, patient verbalizes understanding; denies any other questions or concerns; instructed to call back for any new or worsening symptoms. Writer provided warm transfer to [facility] for appointment scheduling. Attention Provider: Thank you for allowing me to participate in the care of your patient. The patient was connected to triage in response to information provided to the [facility]. Please do not respond through this encounter as the response is not directed to a shared pool.
LIZBETH

## 2023-03-19 NOTE — ED PROCEDURE NOTE - CPROC ED TIME OUT STATEMENT1
“Patient's name, , procedure and correct site were confirmed during the [place] Timeout.”
“Patient's name, , procedure and correct site were confirmed during the [place] Timeout.”

## 2023-03-19 NOTE — H&P ADULT - HISTORY OF PRESENT ILLNESS
78 y/o F PMH osteoporosis, asthma, htn, h/o takotsubo syndrome, prior MI 2021, h/o left TKR c/o pain to right side of rib and left knee pain/swelling after a fall. Pt states she has an unsteady gate at baseline and while walking out of her car to the restaurant, she had a fall. She was recently in the hospital for a cath which did not reveal any coronary obstruction but was dx'd with anne-marie. She was otherwise in her usual state of health in the past few weeks prior to the fall.  Also c/o right rib pain and pain with inspiration in that area. she states that she has wheezing at baseline but today slightly worse than usual. She has bad asthma at baseline. Pt has wounds to right knee and right elbow with bleeding controlled. Denies CP, change in vision, numbness/tingling/weakness, abdominal pain, n/v, a/c use.  In ED, VSS, labs with mild leuk 12.2 otherwise unremarkable. Imaging remarkable for proximal left avulsion tibial fracture.

## 2023-03-19 NOTE — H&P ADULT - NSHPPHYSICALEXAM_GEN_ALL_CORE
PHYSICAL EXAM:  GENERAL: NAD, comfortable appearing  HEAD:  Atraumatic, Normocephalic  EYES: EOMI, PERRL, conjunctiva and sclera clear  NECK: Supple, No JVD  CHEST/LUNG: Bilateral wheezing  HEART: Regular rate and rhythm; No murmurs, rubs, or gallops, (+)S1, S2  ABDOMEN: Soft, Nontender, Nondistended; Normal Bowel sounds   EXTREMITIES:  2+ Peripheral Pulses, No clubbing, cyanosis, or edema  PSYCH: normal mood and affect  NEUROLOGY: AAOx3, non-focal, Left knee wrapped  SKIN: No rashes or lesions

## 2023-03-19 NOTE — ED ADULT TRIAGE NOTE - CHIEF COMPLAINT QUOTE
states gait " always off"  ,fell to ground. swelling to l knee- site of l knee replacement.  c/o pain l leg, injury  to r elbow,  r leg r rib area. . denies loc,dizziness prior to fall

## 2023-03-19 NOTE — ED PROVIDER NOTE - ATTENDING APP SHARED VISIT CONTRIBUTION OF CARE
76 yo femlae with PMH osteoporosis, asthma, htn, h/o takotsubo syndrome, h/o lt TKR for evaluation of right sided body pain and left knee pain s/p fall with +unsteady gait. PE: Well appearing, RRR, CTA BL lungs, abd soft NTND A/P Labs, imaging, medicate, reassess

## 2023-03-20 DIAGNOSIS — Z29.9 ENCOUNTER FOR PROPHYLACTIC MEASURES, UNSPECIFIED: ICD-10-CM

## 2023-03-20 DIAGNOSIS — S83.8X9A SPRAIN OF OTHER SPECIFIED PARTS OF UNSPECIFIED KNEE, INITIAL ENCOUNTER: ICD-10-CM
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
## 2023-03-20 DIAGNOSIS — I10 ESSENTIAL (PRIMARY) HYPERTENSION: ICD-10-CM

## 2023-03-20 DIAGNOSIS — J45.909 UNSPECIFIED ASTHMA, UNCOMPLICATED: ICD-10-CM

## 2023-03-20 DIAGNOSIS — J45.901 UNSPECIFIED ASTHMA WITH (ACUTE) EXACERBATION: ICD-10-CM

## 2023-03-20 LAB
ALBUMIN SERPL ELPH-MCNC: 4 G/DL — SIGNIFICANT CHANGE UP (ref 3.3–5)
ALP SERPL-CCNC: 87 U/L — SIGNIFICANT CHANGE UP (ref 40–120)
ALT FLD-CCNC: 14 U/L — SIGNIFICANT CHANGE UP (ref 4–33)
ANION GAP SERPL CALC-SCNC: 10 MMOL/L — SIGNIFICANT CHANGE UP (ref 7–14)
APTT BLD: 30.3 SEC — SIGNIFICANT CHANGE UP (ref 27–36.3)
AST SERPL-CCNC: 21 U/L — SIGNIFICANT CHANGE UP (ref 4–32)
B PERT DNA SPEC QL NAA+PROBE: SIGNIFICANT CHANGE UP
B PERT+PARAPERT DNA PNL SPEC NAA+PROBE: SIGNIFICANT CHANGE UP
BASE EXCESS BLDV CALC-SCNC: 4.5 MMOL/L — HIGH (ref -2–3)
BASOPHILS # BLD AUTO: 0.01 K/UL — SIGNIFICANT CHANGE UP (ref 0–0.2)
BASOPHILS NFR BLD AUTO: 0.1 % — SIGNIFICANT CHANGE UP (ref 0–2)
BILIRUB SERPL-MCNC: 0.6 MG/DL — SIGNIFICANT CHANGE UP (ref 0.2–1.2)
BLOOD GAS VENOUS COMPREHENSIVE RESULT: SIGNIFICANT CHANGE UP
BORDETELLA PARAPERTUSSIS (RAPRVP): SIGNIFICANT CHANGE UP
BUN SERPL-MCNC: 9 MG/DL — SIGNIFICANT CHANGE UP (ref 7–23)
C PNEUM DNA SPEC QL NAA+PROBE: SIGNIFICANT CHANGE UP
CALCIUM SERPL-MCNC: 9.5 MG/DL — SIGNIFICANT CHANGE UP (ref 8.4–10.5)
CHLORIDE BLDV-SCNC: 98 MMOL/L — SIGNIFICANT CHANGE UP (ref 96–108)
CHLORIDE SERPL-SCNC: 99 MMOL/L — SIGNIFICANT CHANGE UP (ref 98–107)
CO2 BLDV-SCNC: 32.9 MMOL/L — HIGH (ref 22–26)
CO2 SERPL-SCNC: 29 MMOL/L — SIGNIFICANT CHANGE UP (ref 22–31)
CREAT SERPL-MCNC: 0.49 MG/DL — LOW (ref 0.5–1.3)
EGFR: 97 ML/MIN/1.73M2 — SIGNIFICANT CHANGE UP
EOSINOPHIL # BLD AUTO: 0 K/UL — SIGNIFICANT CHANGE UP (ref 0–0.5)
EOSINOPHIL NFR BLD AUTO: 0 % — SIGNIFICANT CHANGE UP (ref 0–6)
FERRITIN SERPL-MCNC: 75 NG/ML
FLUAV SUBTYP SPEC NAA+PROBE: SIGNIFICANT CHANGE UP
FLUBV RNA SPEC QL NAA+PROBE: SIGNIFICANT CHANGE UP
GAS PNL BLDV: 132 MMOL/L — LOW (ref 136–145)
GLUCOSE BLDC GLUCOMTR-MCNC: 188 MG/DL — HIGH (ref 70–99)
GLUCOSE BLDV-MCNC: 169 MG/DL — HIGH (ref 70–99)
GLUCOSE SERPL-MCNC: 168 MG/DL — HIGH (ref 70–99)
HADV DNA SPEC QL NAA+PROBE: SIGNIFICANT CHANGE UP
HCO3 BLDV-SCNC: 31 MMOL/L — HIGH (ref 22–29)
HCOV 229E RNA SPEC QL NAA+PROBE: SIGNIFICANT CHANGE UP
HCOV HKU1 RNA SPEC QL NAA+PROBE: SIGNIFICANT CHANGE UP
HCOV NL63 RNA SPEC QL NAA+PROBE: SIGNIFICANT CHANGE UP
HCOV OC43 RNA SPEC QL NAA+PROBE: SIGNIFICANT CHANGE UP
HCT VFR BLD CALC: 40.1 % — SIGNIFICANT CHANGE UP (ref 34.5–45)
HCT VFR BLDA CALC: 40 % — SIGNIFICANT CHANGE UP (ref 34.5–46.5)
HGB BLD CALC-MCNC: 13.4 G/DL — SIGNIFICANT CHANGE UP (ref 11.7–16.1)
HGB BLD-MCNC: 12.8 G/DL — SIGNIFICANT CHANGE UP (ref 11.5–15.5)
HMPV RNA SPEC QL NAA+PROBE: SIGNIFICANT CHANGE UP
HPIV1 RNA SPEC QL NAA+PROBE: SIGNIFICANT CHANGE UP
HPIV2 RNA SPEC QL NAA+PROBE: SIGNIFICANT CHANGE UP
HPIV3 RNA SPEC QL NAA+PROBE: SIGNIFICANT CHANGE UP
HPIV4 RNA SPEC QL NAA+PROBE: SIGNIFICANT CHANGE UP
IANC: 6.7 K/UL — SIGNIFICANT CHANGE UP (ref 1.8–7.4)
IMM GRANULOCYTES NFR BLD AUTO: 1 % — HIGH (ref 0–0.9)
INR BLD: 1.01 RATIO — SIGNIFICANT CHANGE UP (ref 0.88–1.16)
IRON SATN MFR SERPL: 50 %
IRON SERPL-MCNC: 125 UG/DL
LACTATE BLDV-MCNC: 1.9 MMOL/L — SIGNIFICANT CHANGE UP (ref 0.5–2)
LYMPHOCYTES # BLD AUTO: 1.17 K/UL — SIGNIFICANT CHANGE UP (ref 1–3.3)
LYMPHOCYTES # BLD AUTO: 14.4 % — SIGNIFICANT CHANGE UP (ref 13–44)
M PNEUMO DNA SPEC QL NAA+PROBE: SIGNIFICANT CHANGE UP
MCHC RBC-ENTMCNC: 29.5 PG — SIGNIFICANT CHANGE UP (ref 27–34)
MCHC RBC-ENTMCNC: 31.9 GM/DL — LOW (ref 32–36)
MCV RBC AUTO: 92.4 FL — SIGNIFICANT CHANGE UP (ref 80–100)
MONOCYTES # BLD AUTO: 0.15 K/UL — SIGNIFICANT CHANGE UP (ref 0–0.9)
MONOCYTES NFR BLD AUTO: 1.8 % — LOW (ref 2–14)
NEUTROPHILS # BLD AUTO: 6.7 K/UL — SIGNIFICANT CHANGE UP (ref 1.8–7.4)
NEUTROPHILS NFR BLD AUTO: 82.7 % — HIGH (ref 43–77)
NRBC # BLD: 0 /100 WBCS — SIGNIFICANT CHANGE UP (ref 0–0)
NRBC # FLD: 0 K/UL — SIGNIFICANT CHANGE UP (ref 0–0)
PCO2 BLDV: 54 MMHG — HIGH (ref 39–52)
PH BLDV: 7.37 — SIGNIFICANT CHANGE UP (ref 7.32–7.43)
PLATELET # BLD AUTO: 204 K/UL — SIGNIFICANT CHANGE UP (ref 150–400)
PO2 BLDV: 45 MMHG — SIGNIFICANT CHANGE UP (ref 25–45)
POTASSIUM BLDV-SCNC: 4.9 MMOL/L — SIGNIFICANT CHANGE UP (ref 3.5–5.1)
POTASSIUM SERPL-MCNC: 5.1 MMOL/L — SIGNIFICANT CHANGE UP (ref 3.5–5.3)
POTASSIUM SERPL-SCNC: 5.1 MMOL/L — SIGNIFICANT CHANGE UP (ref 3.5–5.3)
PROT SERPL-MCNC: 6.5 G/DL — SIGNIFICANT CHANGE UP (ref 6–8.3)
PROTHROM AB SERPL-ACNC: 11.7 SEC — SIGNIFICANT CHANGE UP (ref 10.5–13.4)
RAPID RVP RESULT: SIGNIFICANT CHANGE UP
RBC # BLD: 4.34 M/UL — SIGNIFICANT CHANGE UP (ref 3.8–5.2)
RBC # FLD: 14 % — SIGNIFICANT CHANGE UP (ref 10.3–14.5)
RSV RNA SPEC QL NAA+PROBE: SIGNIFICANT CHANGE UP
RV+EV RNA SPEC QL NAA+PROBE: SIGNIFICANT CHANGE UP
SAO2 % BLDV: 78.4 % — SIGNIFICANT CHANGE UP (ref 67–88)
SARS-COV-2 RNA SPEC QL NAA+PROBE: SIGNIFICANT CHANGE UP
SODIUM SERPL-SCNC: 138 MMOL/L — SIGNIFICANT CHANGE UP (ref 135–145)
TIBC SERPL-MCNC: 251 UG/DL
UIBC SERPL-MCNC: 126 UG/DL
WBC # BLD: 8.11 K/UL — SIGNIFICANT CHANGE UP (ref 3.8–10.5)
WBC # FLD AUTO: 8.11 K/UL — SIGNIFICANT CHANGE UP (ref 3.8–10.5)

## 2023-03-20 PROCEDURE — 73700 CT LOWER EXTREMITY W/O DYE: CPT | Mod: 26,LT

## 2023-03-20 PROCEDURE — 93971 EXTREMITY STUDY: CPT | Mod: 26

## 2023-03-20 PROCEDURE — 99232 SBSQ HOSP IP/OBS MODERATE 35: CPT

## 2023-03-20 RX ORDER — METOPROLOL TARTRATE 50 MG
25 TABLET ORAL DAILY
Refills: 0 | Status: DISCONTINUED | OUTPATIENT
Start: 2023-03-20 | End: 2023-03-23

## 2023-03-20 RX ORDER — ATORVASTATIN CALCIUM 80 MG/1
40 TABLET, FILM COATED ORAL AT BEDTIME
Refills: 0 | Status: DISCONTINUED | OUTPATIENT
Start: 2023-03-20 | End: 2023-03-23

## 2023-03-20 RX ORDER — BUDESONIDE AND FORMOTEROL FUMARATE DIHYDRATE 160; 4.5 UG/1; UG/1
2 AEROSOL RESPIRATORY (INHALATION)
Refills: 0 | Status: DISCONTINUED | OUTPATIENT
Start: 2023-03-20 | End: 2023-03-23

## 2023-03-20 RX ORDER — ASPIRIN/CALCIUM CARB/MAGNESIUM 324 MG
81 TABLET ORAL DAILY
Refills: 0 | Status: DISCONTINUED | OUTPATIENT
Start: 2023-03-20 | End: 2023-03-23

## 2023-03-20 RX ORDER — FLUOXETINE HCL 10 MG
20 CAPSULE ORAL DAILY
Refills: 0 | Status: DISCONTINUED | OUTPATIENT
Start: 2023-03-20 | End: 2023-03-23

## 2023-03-20 RX ORDER — ONDANSETRON 8 MG/1
4 TABLET, FILM COATED ORAL EVERY 8 HOURS
Refills: 0 | Status: DISCONTINUED | OUTPATIENT
Start: 2023-03-20 | End: 2023-03-23

## 2023-03-20 RX ORDER — LISINOPRIL 2.5 MG/1
5 TABLET ORAL DAILY
Refills: 0 | Status: DISCONTINUED | OUTPATIENT
Start: 2023-03-20 | End: 2023-03-20

## 2023-03-20 RX ORDER — OXYCODONE HYDROCHLORIDE 5 MG/1
5 TABLET ORAL EVERY 6 HOURS
Refills: 0 | Status: DISCONTINUED | OUTPATIENT
Start: 2023-03-20 | End: 2023-03-22

## 2023-03-20 RX ORDER — TIOTROPIUM BROMIDE 18 UG/1
2 CAPSULE ORAL; RESPIRATORY (INHALATION) DAILY
Refills: 0 | Status: DISCONTINUED | OUTPATIENT
Start: 2023-03-20 | End: 2023-03-23

## 2023-03-20 RX ORDER — LANOLIN ALCOHOL/MO/W.PET/CERES
3 CREAM (GRAM) TOPICAL AT BEDTIME
Refills: 0 | Status: DISCONTINUED | OUTPATIENT
Start: 2023-03-20 | End: 2023-03-23

## 2023-03-20 RX ORDER — PANTOPRAZOLE SODIUM 20 MG/1
40 TABLET, DELAYED RELEASE ORAL
Refills: 0 | Status: DISCONTINUED | OUTPATIENT
Start: 2023-03-20 | End: 2023-03-23

## 2023-03-20 RX ORDER — GABAPENTIN 400 MG/1
1 CAPSULE ORAL
Qty: 0 | Refills: 0 | DISCHARGE

## 2023-03-20 RX ORDER — METHADONE HYDROCHLORIDE 40 MG/1
10 TABLET ORAL EVERY 12 HOURS
Refills: 0 | Status: DISCONTINUED | OUTPATIENT
Start: 2023-03-20 | End: 2023-03-23

## 2023-03-20 RX ORDER — SENNA PLUS 8.6 MG/1
2 TABLET ORAL AT BEDTIME
Refills: 0 | Status: DISCONTINUED | OUTPATIENT
Start: 2023-03-20 | End: 2023-03-23

## 2023-03-20 RX ORDER — OXYCODONE AND ACETAMINOPHEN 5; 325 MG/1; MG/1
2 TABLET ORAL EVERY 6 HOURS
Refills: 0 | Status: DISCONTINUED | OUTPATIENT
Start: 2023-03-20 | End: 2023-03-20

## 2023-03-20 RX ORDER — INFLUENZA VIRUS VACCINE 15; 15; 15; 15 UG/.5ML; UG/.5ML; UG/.5ML; UG/.5ML
0.7 SUSPENSION INTRAMUSCULAR ONCE
Refills: 0 | Status: DISCONTINUED | OUTPATIENT
Start: 2023-03-20 | End: 2023-03-23

## 2023-03-20 RX ORDER — ACETAMINOPHEN 500 MG
650 TABLET ORAL EVERY 6 HOURS
Refills: 0 | Status: DISCONTINUED | OUTPATIENT
Start: 2023-03-20 | End: 2023-03-23

## 2023-03-20 RX ORDER — GABAPENTIN 400 MG/1
300 CAPSULE ORAL AT BEDTIME
Refills: 0 | Status: DISCONTINUED | OUTPATIENT
Start: 2023-03-20 | End: 2023-03-23

## 2023-03-20 RX ORDER — FLUOXETINE HCL 10 MG
1 CAPSULE ORAL
Qty: 0 | Refills: 0 | DISCHARGE

## 2023-03-20 RX ORDER — ALBUTEROL 90 UG/1
2.5 AEROSOL, METERED ORAL EVERY 6 HOURS
Refills: 0 | Status: DISCONTINUED | OUTPATIENT
Start: 2023-03-20 | End: 2023-03-20

## 2023-03-20 RX ORDER — LISINOPRIL 2.5 MG/1
1 TABLET ORAL
Qty: 0 | Refills: 0 | DISCHARGE

## 2023-03-20 RX ORDER — ENOXAPARIN SODIUM 100 MG/ML
40 INJECTION SUBCUTANEOUS EVERY 24 HOURS
Refills: 0 | Status: DISCONTINUED | OUTPATIENT
Start: 2023-03-20 | End: 2023-03-23

## 2023-03-20 RX ORDER — MONTELUKAST 4 MG/1
10 TABLET, CHEWABLE ORAL DAILY
Refills: 0 | Status: DISCONTINUED | OUTPATIENT
Start: 2023-03-20 | End: 2023-03-23

## 2023-03-20 RX ORDER — OXYCODONE HYDROCHLORIDE 5 MG/1
10 TABLET ORAL EVERY 6 HOURS
Refills: 0 | Status: DISCONTINUED | OUTPATIENT
Start: 2023-03-20 | End: 2023-03-22

## 2023-03-20 RX ORDER — IPRATROPIUM/ALBUTEROL SULFATE 18-103MCG
3 AEROSOL WITH ADAPTER (GRAM) INHALATION EVERY 6 HOURS
Refills: 0 | Status: DISCONTINUED | OUTPATIENT
Start: 2023-03-20 | End: 2023-03-23

## 2023-03-20 RX ADMIN — Medication 81 MILLIGRAM(S): at 18:16

## 2023-03-20 RX ADMIN — GABAPENTIN 300 MILLIGRAM(S): 400 CAPSULE ORAL at 18:14

## 2023-03-20 RX ADMIN — TIOTROPIUM BROMIDE 2 PUFF(S): 18 CAPSULE ORAL; RESPIRATORY (INHALATION) at 18:15

## 2023-03-20 RX ADMIN — METHADONE HYDROCHLORIDE 10 MILLIGRAM(S): 40 TABLET ORAL at 05:36

## 2023-03-20 RX ADMIN — Medication 20 MILLIGRAM(S): at 18:15

## 2023-03-20 RX ADMIN — ATORVASTATIN CALCIUM 40 MILLIGRAM(S): 80 TABLET, FILM COATED ORAL at 21:40

## 2023-03-20 RX ADMIN — PANTOPRAZOLE SODIUM 40 MILLIGRAM(S): 20 TABLET, DELAYED RELEASE ORAL at 05:35

## 2023-03-20 RX ADMIN — MONTELUKAST 10 MILLIGRAM(S): 4 TABLET, CHEWABLE ORAL at 18:15

## 2023-03-20 RX ADMIN — BUDESONIDE AND FORMOTEROL FUMARATE DIHYDRATE 2 PUFF(S): 160; 4.5 AEROSOL RESPIRATORY (INHALATION) at 11:01

## 2023-03-20 RX ADMIN — ENOXAPARIN SODIUM 40 MILLIGRAM(S): 100 INJECTION SUBCUTANEOUS at 11:02

## 2023-03-20 RX ADMIN — Medication 3 MILLILITER(S): at 18:02

## 2023-03-20 RX ADMIN — BUDESONIDE AND FORMOTEROL FUMARATE DIHYDRATE 2 PUFF(S): 160; 4.5 AEROSOL RESPIRATORY (INHALATION) at 21:40

## 2023-03-20 RX ADMIN — METHADONE HYDROCHLORIDE 10 MILLIGRAM(S): 40 TABLET ORAL at 18:14

## 2023-03-20 RX ADMIN — SENNA PLUS 2 TABLET(S): 8.6 TABLET ORAL at 21:39

## 2023-03-20 RX ADMIN — Medication 25 MILLIGRAM(S): at 11:02

## 2023-03-20 RX ADMIN — OXYCODONE HYDROCHLORIDE 10 MILLIGRAM(S): 5 TABLET ORAL at 22:15

## 2023-03-20 RX ADMIN — OXYCODONE HYDROCHLORIDE 10 MILLIGRAM(S): 5 TABLET ORAL at 14:02

## 2023-03-20 RX ADMIN — Medication 3 MILLILITER(S): at 23:13

## 2023-03-20 RX ADMIN — OXYCODONE HYDROCHLORIDE 10 MILLIGRAM(S): 5 TABLET ORAL at 21:40

## 2023-03-20 RX ADMIN — Medication 40 MILLIGRAM(S): at 05:35

## 2023-03-20 NOTE — CONSULT NOTE ADULT - SUBJECTIVE AND OBJECTIVE BOX
Orthopedic Surgery Consult Note    77yFemale hx L TKA in 2014 with subsuquent revision L TKA in '14 2/2 alyson-prosthetic fx by Dr. Bui, now presents with L knee pain after trip and fall yesterday with her family. Denies headstrike/LOC. Denies numbness/tingling in the feet/toes. No other bone or joint complaints.                           12.8   8.11  )-----------( 204      ( 20 Mar 2023 12:35 )             40.1     20 Mar 2023 12:35    138    |  99     |  9      ----------------------------<  168    5.1     |  29     |  0.49     Ca    9.5        20 Mar 2023 12:35    TPro  6.5    /  Alb  4.0    /  TBili  0.6    /  DBili  x      /  AST  21     /  ALT  14     /  AlkPhos  87     20 Mar 2023 12:35    PT/INR - ( 20 Mar 2023 12:35 )   PT: 11.7 sec;   INR: 1.01 ratio         PTT - ( 20 Mar 2023 12:35 )  PTT:30.3 sec  Vital Signs Last 24 Hrs  T(C): 36.9 (03-20-23 @ 15:15), Max: 36.9 (03-20-23 @ 11:00)  T(F): 98.5 (03-20-23 @ 15:15), Max: 98.5 (03-20-23 @ 11:00)  HR: 68 (03-20-23 @ 18:06) (62 - 71)  BP: 143/63 (03-20-23 @ 15:15) (100/49 - 143/63)  BP(mean): --  RR: 18 (03-20-23 @ 15:15) (15 - 19)  SpO2: 95% (03-20-23 @ 15:15) (95% - 100%)    Physical Exam  Gen: Nad  LLE: Skin intact, ttp over tibial tubercle  extensor mech intact  +gastroc/ta/ehl/fhl  SILT s/s/sp/dp/t  2+ DP    Secondary  R knee and R elbow abrasions. Neurovascularly intact distally    Imaging:  XR showing L tibial tubercle avulsion fx with hinged cemented TKA      A/P: 77yFemale w L tibial tubercle avulsion fx s/p L hinged TKA (Dr. Bui in '14), extensor mech intact    - Pain control  - WBAT LLE in BJKI  - PT/OT  - please obtain XR L ankle for completion  - no acute orthopaedic intervention  - recommend outpatient follow up with Dr. Bui, may alternatively f/u with Dr. Arroyo or other arthroplasty surgeons  - orthopaedics to sign off    
** INCOMPLETE NOTE **     Orthopedics Consult Note:  77y year old Female with PMH of severe asthma and COPD, HTN, HLD, recent Takotsubo cardiomyopathy presents to the ED s/p mechanical fall onto L knee, experienced pain and difficulty ambulating afterward. Patient has a history of L TKA done 2014 at [facility] by Dr. [place] Bui, when 3 months later she fell, fracturing her femur and requiring a revision surgery also done by Dr. Bui.  Pt states she fell with direct impact onto her left knee and has been unable to ambulate since. At baseline patient ambulates with a cane in the community and unassisted at home. She is followed by a pain management doctor due to chronic arthritic pain. She lives at home with her . Daughter is available at bedside.    ROS:     Unspecified fracture of shaft of unspecified tibia, initial encounter for closed fracture    Acute systolic (congestive) heart failure    Age-related osteoporosis without current pathological fracture    Anemia, unspecified    Anxiety disorder, unspecified    Atherosclerotic heart disease of native coronary artery without angina pectoris    Chest pain, unspecified    ENCNTR FOR SURGICAL    Encounter for prophylactic measures, unspecified    Encounter for removal of sutures    ESSENTIAL (PRIMARY)    Hemochromatosis, unspecified    History of Lymphedema    Hyperlipidemia, unspecified    Impacted cerumen, right ear    Injury, unspecified, initial encounter    Localized edema    LONG TERM (CURRENT)    Long term (current) use of systemic steroids    Low back pain, unspecified    Myalgia, other site    Nausea with vomiting, unspecified    Non-pressure chronic ulcer of unspecified part of left lower leg with fat layer exposed    Non-pressure chronic ulcer of unspecified part of right lower leg with unspecified severity    Non-ST elevation (NSTEMI) myocardial infarction    OLD MYOCARDIAL INFAR    Other bacterial infections of unspecified site    Personal history of other diseases of the circulatory system    Personal history of other diseases of the respiratory system    Personal history of other endocrine, nutritional and metabolic disease    Pneumonia due to Pseudomonas    PSEUDOMONAS (MALLEI)    Radiculopathy, cervical region    Radiculopathy, lumbar region    Sensorineural hearing loss, bilateral    Spinal stenosis, lumbar region without neurogenic claudication    Takotsubo syndrome    Traumatic leg injury, left, initial encounter    UNSP FRACTURE OF SHAFT OF UNSP TIBIA, INIT FOR CLOS FX    Unspecified asthma, uncomplicated    Unspecified infectious disease    UNSPECIFIED OSTEOART    Unspecified systolic (congestive) heart failure    Varicose veins of bilateral lower extremities with other complications    Sex Assigned At Birth    History of Tobacco Use    Family history of asthma    Handoff    MEWS Score    Asthma    Osteoarthritis    Chronic back pain    HTN (hypertension)    Hemochromatosis    NSTEMI (non-ST elevation myocardial infarction)    Tibial fracture    Fall    Need for prophylactic measure    Acute asthma exacerbation    Avulsion of tibial attachment    HTN (hypertension)    S/P knee replacement, left    S/P hip replacement, right    H/O fracture of femur    H/O laminectomy    S/P rotator cuff repair    FALL    32    [email or web address]          Vital Signs Last 24 Hrs  T(C): 36.6 (20 Mar 2023 05:35), Max: 36.9 (19 Mar 2023 16:01)  T(F): 97.8 (20 Mar 2023 05:35), Max: 98.4 (19 Mar 2023 16:01)  HR: 62 (20 Mar 2023 05:35) (62 - 80)  BP: 100/49 (20 Mar 2023 05:35) (100/49 - 142/73)  BP(mean): --  RR: 19 (20 Mar 2023 05:35) (15 - 19)  SpO2: 97% (20 Mar 2023 05:35) (96% - 100%)    Parameters below as of 20 Mar 2023 05:35  Patient On (Oxygen Delivery Method): room air        Labs:                        13.3   12.12 )-----------( 185      ( 19 Mar 2023 19:05 )             42.5     03-19    140  |  101  |  10  ----------------------------<  96  4.9   |  29  |  0.54    Ca    9.8      19 Mar 2023 19:05    TPro  6.4  /  Alb  3.9  /  TBili  0.5  /  DBili  x   /  AST  25  /  ALT  17  /  AlkPhos  86  03-19        Physical Exam:  Gen: NAD  LLE:   Skin intact, surgical incision well healed, moderate effusion   Effusion and erythema Left lower leg,  vasculopathic changes noted to BL lower legs  +TTP about knee, nontender to palpation about femur/tibia/ankle/foot  Pt able to straight leg raise, extensor mechanism intact  Tender to palpation calf LLE   Pain with passive stretch to left ankle   Sensation intact to light touch distally, DP 1+      Imaging:  XR demonstrating L tibial tubercle fracture in setting of hinged revision total knee implants with distal femoral replacement.

## 2023-03-20 NOTE — PATIENT PROFILE ADULT - CAREGIVER NAME
Called # [phone]     Pt needs continuation pack. Pt has already been on chantix starter pack.  Sending in other medication      Fan Kwok RN   [facility]     Don Cedeno

## 2023-03-20 NOTE — PATIENT PROFILE ADULT - NSPRESCRUSEDDRG_GEN_A_NUR
Procedure:  EGD    Relevant Problems   ANESTHESIA (within normal limits)      CARDIO (within normal limits)      HEMATOLOGY   (+) Anemia      MUSCULOSKELETAL   (+) Ankylosing spondylitis (HCC)   (+) Left-sided low back pain without sciatica      NEURO/PSYCH   (+) CAR (generalized anxiety disorder)   (+) History of Clostridium difficile infection   (+) History of ulcerative colitis      PULMONARY  Covid positive        Physical Exam    Airway    Mallampati score: I  TM Distance: >3 FB  Neck ROM: full     Dental   No notable dental hx     Cardiovascular  Rhythm: regular, Rate: normal,     Pulmonary  Breath sounds clear to auscultation,     Other Findings        Anesthesia Plan  ASA Score- 2     Anesthesia Type- IV sedation with anesthesia with ASA Monitors  Additional Monitors:   Airway Plan:           Plan Factors-Exercise tolerance (METS): >4 METS  Chart reviewed  Existing labs reviewed  Patient summary reviewed  Patient is not a current smoker  Induction-     Postoperative Plan-     Informed Consent- Anesthetic plan and risks discussed with patient  I personally reviewed this patient with the CRNA  Discussed and agreed on the Anesthesia Plan with the CRNA  Ny Wheatley No

## 2023-03-20 NOTE — CONSULT NOTE ADULT - ASSESSMENT
A/P: 77y year old Female presenting with L periprosthetic tibial tubercle fracture  with intact extensor mechanism.   - Pain Control  - NWB LLE in [place], Knee Immobilizer  - Remain in knee immobilizer at all times  - Recommend CT LLE include all implants proximal femur to distal tibia without contrast   - Recommend DVT US BL LE  - Will discuss with previous operative surgeon per patient request  - Will discuss plan with on call attending Dr. Coe   - [facility] medicine admission   - Formal recommendations to follow discussions     ** INCOMPLETE NOTE **

## 2023-03-20 NOTE — PATIENT PROFILE ADULT - FALL HARM RISK - HARM RISK INTERVENTIONS

## 2023-03-20 NOTE — PATIENT PROFILE ADULT - IS THERE A SUSPICION OF ABUSE/NEGLIGENCE?
CONSULTATION NOTE - UROLOGY  11/5/2020      CHIEF COMPLAINT  Consultation (Consultation - ED (erectile dysfunction) +shimms +ROS + UA ) and Office Visit       Mr. Shaggy Potts is evaluated today at the request of Dr. Francis Lawton. HISTORY OF PRESENT ILLNESS  Mr. Shaggy Potts is a 62year old male, who presents for consultation      Hx:  1. No family hx prostate cancer, kidney cancer or bladder cancer  2. No significant  Hx  3. Chronic Kidney Disease       Pt here for: consult ED   NO sig voiding complaints. NO issues unless blood sugar is high. AGATA SCORE 3/25    The patient last had normal erections approximately 2 yrs ago. Hypertension: Yes  Diabetes: Yes type 2 x over 5 yrs  Obesity: Yes for about 5-10 yrs  Smoking history: + 35 yrs, > 1ppd, crack  Hypercholesterolemia: yes  Marijuana or steroid use: no  Previous trial Viagra or Cialis: ageless male supplement -     Notices foreskin is tight and hurts - with supplement got erection. Exercise: walks all night -  with lifting. Pt has some COPD - cant run. NO NTG pills for chest pain. PT has defibrillator due to CHF. MEDICATIONS  The patient's current medications were reviewed. Current Outpatient Medications   Medication Sig   â¢ blood glucose meter Test blood sugar 3 times daily as directed. â¢ blood glucose test strip Test blood sugar 3 times daily as directed. â¢ blood glucose lancets Test blood sugar 3 times daily as directed. â¢ metoPROLOL tartrate (LOPRESSOR) 25 MG tablet Take 25 mg by mouth every evening. â¢ dulaglutide (Trulicity) 1.5 SC/7.9JM pen-injector Inject 1.5 mg into the skin 1 day a week. â¢ macitentan (Opsumit) 10 MG tablet Take 1 tablet by mouth daily. â¢ furosemide (LASIX) 40 MG tablet Take 1 tablet by mouth daily. Do not start before September 10, 2020.  Take at night   â¢ allopurinol (ZYLOPRIM) 300 MG tablet Take 1 tablet by mouth once daily   â¢ colchicine (COLCRYS) 0.6 MG tablet TAKE 2 TABLETS BY MOUTH AT ONSET OF GOUT FLAIR-UP THEN TAKE 1 TABLET 1 HOUR LATER *MAX 3 TABS DAILY*   â¢ ondansetron (ZOFRAN ODT) 4 MG disintegrating tablet Place 1 tablet onto the tongue every 8 hours as needed for Nausea. â¢ dronedarone (MULTAQ) 400 MG Tab Take 1 tablet by mouth 2 times daily. â¢ atorvastatin (LIPITOR) 40 MG tablet Take 1 tablet by mouth nightly. â¢ pantoprazole (PROTONIX) 40 MG tablet Take 1 tablet by mouth nightly. (Patient taking differently: Take 40 mg by mouth nightly as needed. )   â¢ montelukast (SINGULAIR) 10 MG tablet Take 1 tablet by mouth every evening. â¢ loratadine (CLARITIN) 10 MG tablet Take 1 tablet by mouth daily (before breakfast). â¢ insulin glargine (LANTUS SOLOSTAR) 100 UNIT/ML pen-injector Inject 60 Units into the skin 2 times daily. Prime 2 units first before each administration. â¢ DISPENSE Test strips for DM   â¢ Insulin Lispro, 1 Unit Dial, (INSULIN LISPRO) 100 UNIT/ML pen-injector Inject into skin 40 units per meal   â¢ fluticasone (FLONASE) 50 MCG/ACT nasal spray Spray 2 sprays in each nostril daily. (Patient taking differently: Spray 2 sprays in each nostril daily as needed. )   â¢ albuterol (PROAIR HFA) 108 (90 Base) MCG/ACT inhaler Inhale 2 puffs into the lungs every 4 hours as needed for Shortness of Breath or Wheezing. â¢ albuterol (VENTOLIN) (2.5 MG/3ML) 0.083% nebulizer solution Take 3 mLs by nebulization every 6 hours as needed for Wheezing. No current facility-administered medications for this visit.         ALLERGIES  Allergies as of 11/05/2020 - Reviewed 11/05/2020   Allergen Reaction Noted   â¢ Bactrim SWELLING 10/07/2014   â¢ Penicillins RASH 08/05/2014        HISTORIES  Past Medical History:   Diagnosis Date   â¢ Allergy    â¢ Anxiety    â¢ Arthritis     bilateral knees   â¢ Cardiomyopathy, hypertensive (CMS/[facility])    â¢ CHF (congestive heart failure) (CMS/[facility])    â¢ Chronic kidney disease, stage III (moderate) (CMS/[facility]) 10/2/2014   â¢ COPD (chronic obstructive pulmonary disease) "(CMS/[facility])    â¢ Depression    â¢ Diabetes mellitus (CMS/HCC)    â¢ High cholesterol    â¢ Hypertension    â¢ MRSA (methicillin resistant Staphylococcus aureus)    â¢ Obese    â¢ Pneumonia    â¢ Polysubstance abuse (CMS/HCC) 2014    Cocaine and alcohol on hospital admission 2014    â¢ Sleep apnea        Past Surgical History:   Procedure Laterality Date   â¢ Colonoscopy     â¢ Foot surgery      right foot   â¢ Leg surgery      laceration left leg   â¢ Service to gastroenterology         Family History   Problem Relation Age of Onset   â¢ Diabetes Mother    â¢ Heart Mother    â¢ Psychiatry Mother    â¢ Heart Sister    â¢ Heart Brother    â¢ Heart Brother    â¢ Diabetes Sister    â¢ Diabetes Brother    â¢ Diabetes Brother    â¢ Psychiatry Sister    â¢ * Father    â¢ Psychiatry Brother    â¢ Psychiatry Brother        Social History     Socioeconomic History   â¢ Marital status: Single     Spouse name: Not on file   â¢ Number of children: Not on file   â¢ Years of education: Not on file   â¢ Highest education level: Not on file   Occupational History   â¢ Not on file   Social Needs   â¢ Financial resource strain: Not on file   â¢ Food insecurity     Worry: Not on file     Inability: Not on file   â¢ Transportation needs     Medical: Not on file     Non-medical: Not on file   Tobacco Use   â¢ Smoking status: Former Smoker     Packs/day: 0.00     Types: Cigarettes     Quit date: 2014     Years since quittin.2   â¢ Smokeless tobacco: Never Used   Substance and Sexual Activity   â¢ Alcohol use:  Yes     Alcohol/week: 6.0 standard drinks     Types: 6 Cans of beer per week     Frequency: 2-4 times a month     Drinks per session: 3 or 4     Binge frequency: Never     Comment: ""once or twice a month\""   â¢ Drug use: No   â¢ Sexual activity: Not Currently   Lifestyle   â¢ Physical activity     Days per week: Not on file     Minutes per session: Not on file   â¢ Stress: Not on file   Relationships   â¢ Social connections     Talks on phone: Not on file     " "Gets together: Not on file     Attends L[employer] day service: Not on file     Active member of club or organization: Not on file     Attends meetings of clubs or organizations: Not on file     Relationship status: Not on file   â¢ Intimate partner violence     Fear of current or ex partner: Not on file     Emotionally abused: Not on file     Physically abused: Not on file     Forced sexual activity: Not on file   Other Topics Concern   â¢ Not on file   Social History Narrative   â¢ Not on file          REVIEW OF SYSTEMS    I have reviewed my nurses note. I concur. PHYSICAL EXAM  Height 6' 1"" (1.854 m), weight 127 kg. Body mass index is 36.94 kg/mÂ². GENERAL:  is in no apparent distress, is well developed and well nourished, <<ABNORMAL FINDINGS>>  moderate obesity, alert and oriented x 3  HEAD: Normocephalic. EYES:  KANDICE (Pupils equal, reactive to light and accommodation), non-icteric. OROPHARYNX : Moist and without lesions. NECK:  neck is supple, no thyromegaly, no anterior cervical adenopathy and no supraclavicular adenopathy  LUNG[de-identified]  lungs are clear to auscultation with normal inspiratory/expiratory sounds, no rales, no rhonchi and no wheezes  HEART[de-identified] normal rate and rhythm, S1 and S2 normal and no murmurs  ABDOMEN: abdomen is soft, nontender, without masses and without guarding  GENITOURINARY: testes descended bilaterally, no testicular masses, cord, vasa and epididymides normal, no hydrocele, hernia or epididymal cysts and phallus is uncircumcised, no urethral meatal stenosis, no penile plaques and <<numerous small cuts on foreskin c/w fungal infection >>  RECTAL: no palpable internal or external hemorrhoids and normal sphincter control. Prostate gland is soft, smooth, non tender, no nodules and volume > 30 gms, cannot reach base  NEUROLOGIC:  The patient is oriented to person, place & time. Mood & affect is within normal limits.  The patient is pleasant & cooperative.  =      LABORATORY  I have reviewed " the pertinent laboratory tests. These are the pertinent findings:  No results found for: PSA, PPSAF  Creatinine (mg/dL)   Date Value   09/29/2020 1.35 (H)   09/28/2020 1.38 (H)   09/09/2020 2.20 (H)   09/09/2020 2.05 (H)   09/09/2020 2.05 (H)   09/03/2020 1.62 (H)   08/19/2020 1.24 (H)   03/31/2020 1.90 (H)   03/31/2020 1.83 (H)   03/09/2020 1.24 (H)   02/14/2020 1.44 (H)   12/12/2019 1.35 (H)   12/10/2019 1.27 (H)   12/10/2019 1.84 (H)   12/01/2019 1.25 (H)   11/29/2019 2.27 (H)   07/18/2019 1.38 (H)   07/17/2019 2.14 (H)   03/15/2019 1.44 (H)   03/14/2019 1.89 (H)   03/13/2019 3.79 (H)   03/13/2019 4.78 (H)   02/21/2019 1.66 (H)   02/07/2019 1.38 (H)   11/28/2018 1.60 (H)   11/27/2018 3.47 (H)   11/22/2018 2.39 (H)   08/09/2018 1.57 (H)   04/20/2018 1.84 (H)   04/19/2018 2.72 (H)   04/19/2018 2.51 (H)   01/18/2018 1.18 (H)   10/05/2017 1.55 (H)   02/23/2017 1.30 (H)   01/21/2016 1.26 (H)   11/15/2015 1.53 (H)   10/26/2015 1.67 (H)   10/19/2015 2.23 (H)   10/15/2015 2.72 (H)   07/06/2015 1.58 (H)   05/27/2015 3.36 (H)   04/20/2015 1.82 (H)   04/18/2015 2.18 (H)   04/18/2015 1.77 (H)   04/17/2015 2.10 (H)   04/16/2015 1.78 (H)   03/26/2015 1.85 (H)   02/26/2015 1.66 (H)   01/29/2015 2.00 (H)   01/15/2015 1.70 (H)   11/20/2014 1.52 (H)   11/11/2014 1.39 (H)   10/31/2014 1.60 (H)   09/06/2014 1.81 (H)   09/04/2014 1.96 (H)   08/13/2014 1.80 (H)   08/10/2014 1.60 (H)   08/08/2014 1.80 (H)   08/07/2014 1.90 (H)   08/07/2014 1.60 (H)   08/07/2014 1.70 (H)   08/06/2014 1.50 (H)   08/05/2014 1.50 (H)     Â·     IMAGING  I have reviewed the pertinent imaging study reports. These are the pertinent findings:  No results found for this or any previous visit. ASSESSMENT/PLAN  62year old male  Balanitis of foreskin. Pathophysiology was discussed. I recommended treatment with generic Lotrisone. I discussed appropriate use.     I also discussed possibility of elective circumcision if this does not take care of the problem. Erectile dysfunction:  Minor - patient does not want to try any acute intervention. I gave him a booklet reviewing all the different treatment options. He wishes to try his over-the-counter supplement once again    Plan  1. Topical generic Lotrisone  2. Follow-up p.r.n. A copy of this consultation has been sent electronically to Dr. Dina Lozada. no

## 2023-03-20 NOTE — PATIENT PROFILE ADULT - FUNCTIONAL ASSESSMENT - BASIC MOBILITY 6.
2-calculated by average/Not able to assess (calculate score using [facility] averaging method)

## 2023-03-21 ENCOUNTER — TRANSCRIPTION ENCOUNTER (OUTPATIENT)
Age: 78
End: 2023-03-21

## 2023-03-21 LAB
ANION GAP SERPL CALC-SCNC: 11 MMOL/L — SIGNIFICANT CHANGE UP (ref 7–14)
BUN SERPL-MCNC: 8 MG/DL — SIGNIFICANT CHANGE UP (ref 7–23)
CALCIUM SERPL-MCNC: 9 MG/DL — SIGNIFICANT CHANGE UP (ref 8.4–10.5)
CHLORIDE SERPL-SCNC: 99 MMOL/L — SIGNIFICANT CHANGE UP (ref 98–107)
CO2 SERPL-SCNC: 28 MMOL/L — SIGNIFICANT CHANGE UP (ref 22–31)
CREAT SERPL-MCNC: 0.5 MG/DL — SIGNIFICANT CHANGE UP (ref 0.5–1.3)
EGFR: 97 ML/MIN/1.73M2 — SIGNIFICANT CHANGE UP
GLUCOSE SERPL-MCNC: 98 MG/DL — SIGNIFICANT CHANGE UP (ref 70–99)
HCT VFR BLD CALC: 37.6 % — SIGNIFICANT CHANGE UP (ref 34.5–45)
HCV AB S/CO SERPL IA: 0.11 S/CO — SIGNIFICANT CHANGE UP (ref 0–0.99)
HCV AB SERPL-IMP: SIGNIFICANT CHANGE UP
HGB BLD-MCNC: 12.2 G/DL — SIGNIFICANT CHANGE UP (ref 11.5–15.5)
MAGNESIUM SERPL-MCNC: 2.3 MG/DL — SIGNIFICANT CHANGE UP (ref 1.6–2.6)
MCHC RBC-ENTMCNC: 29.6 PG — SIGNIFICANT CHANGE UP (ref 27–34)
MCHC RBC-ENTMCNC: 32.4 GM/DL — SIGNIFICANT CHANGE UP (ref 32–36)
MCV RBC AUTO: 91.3 FL — SIGNIFICANT CHANGE UP (ref 80–100)
NRBC # BLD: 0 /100 WBCS — SIGNIFICANT CHANGE UP (ref 0–0)
NRBC # FLD: 0 K/UL — SIGNIFICANT CHANGE UP (ref 0–0)
PHOSPHATE SERPL-MCNC: 3.6 MG/DL — SIGNIFICANT CHANGE UP (ref 2.5–4.5)
PLATELET # BLD AUTO: 197 K/UL — SIGNIFICANT CHANGE UP (ref 150–400)
POTASSIUM SERPL-MCNC: 4.1 MMOL/L — SIGNIFICANT CHANGE UP (ref 3.5–5.3)
POTASSIUM SERPL-SCNC: 4.1 MMOL/L — SIGNIFICANT CHANGE UP (ref 3.5–5.3)
RBC # BLD: 4.12 M/UL — SIGNIFICANT CHANGE UP (ref 3.8–5.2)
RBC # FLD: 13.9 % — SIGNIFICANT CHANGE UP (ref 10.3–14.5)
SODIUM SERPL-SCNC: 138 MMOL/L — SIGNIFICANT CHANGE UP (ref 135–145)
WBC # BLD: 8.79 K/UL — SIGNIFICANT CHANGE UP (ref 3.8–10.5)
WBC # FLD AUTO: 8.79 K/UL — SIGNIFICANT CHANGE UP (ref 3.8–10.5)

## 2023-03-21 PROCEDURE — 99232 SBSQ HOSP IP/OBS MODERATE 35: CPT

## 2023-03-21 PROCEDURE — 73610 X-RAY EXAM OF ANKLE: CPT | Mod: 26,LT

## 2023-03-21 RX ADMIN — BUDESONIDE AND FORMOTEROL FUMARATE DIHYDRATE 2 PUFF(S): 160; 4.5 AEROSOL RESPIRATORY (INHALATION) at 21:44

## 2023-03-21 RX ADMIN — OXYCODONE HYDROCHLORIDE 10 MILLIGRAM(S): 5 TABLET ORAL at 12:44

## 2023-03-21 RX ADMIN — ENOXAPARIN SODIUM 40 MILLIGRAM(S): 100 INJECTION SUBCUTANEOUS at 11:48

## 2023-03-21 RX ADMIN — OXYCODONE HYDROCHLORIDE 10 MILLIGRAM(S): 5 TABLET ORAL at 21:43

## 2023-03-21 RX ADMIN — GABAPENTIN 300 MILLIGRAM(S): 400 CAPSULE ORAL at 17:52

## 2023-03-21 RX ADMIN — MONTELUKAST 10 MILLIGRAM(S): 4 TABLET, CHEWABLE ORAL at 17:53

## 2023-03-21 RX ADMIN — OXYCODONE HYDROCHLORIDE 10 MILLIGRAM(S): 5 TABLET ORAL at 11:47

## 2023-03-21 RX ADMIN — Medication 81 MILLIGRAM(S): at 11:48

## 2023-03-21 RX ADMIN — Medication 3 MILLILITER(S): at 15:09

## 2023-03-21 RX ADMIN — SENNA PLUS 2 TABLET(S): 8.6 TABLET ORAL at 21:43

## 2023-03-21 RX ADMIN — Medication 3 MILLILITER(S): at 21:49

## 2023-03-21 RX ADMIN — METHADONE HYDROCHLORIDE 10 MILLIGRAM(S): 40 TABLET ORAL at 17:53

## 2023-03-21 RX ADMIN — Medication 20 MILLIGRAM(S): at 11:48

## 2023-03-21 RX ADMIN — BUDESONIDE AND FORMOTEROL FUMARATE DIHYDRATE 2 PUFF(S): 160; 4.5 AEROSOL RESPIRATORY (INHALATION) at 08:19

## 2023-03-21 RX ADMIN — Medication 40 MILLIGRAM(S): at 05:44

## 2023-03-21 RX ADMIN — TIOTROPIUM BROMIDE 2 PUFF(S): 18 CAPSULE ORAL; RESPIRATORY (INHALATION) at 08:20

## 2023-03-21 RX ADMIN — METHADONE HYDROCHLORIDE 10 MILLIGRAM(S): 40 TABLET ORAL at 05:45

## 2023-03-21 RX ADMIN — Medication 3 MILLILITER(S): at 09:04

## 2023-03-21 RX ADMIN — PANTOPRAZOLE SODIUM 40 MILLIGRAM(S): 20 TABLET, DELAYED RELEASE ORAL at 05:44

## 2023-03-21 RX ADMIN — ATORVASTATIN CALCIUM 40 MILLIGRAM(S): 80 TABLET, FILM COATED ORAL at 21:43

## 2023-03-21 RX ADMIN — Medication 25 MILLIGRAM(S): at 05:44

## 2023-03-21 RX ADMIN — ONDANSETRON 4 MILLIGRAM(S): 8 TABLET, FILM COATED ORAL at 17:56

## 2023-03-21 RX ADMIN — Medication 3 MILLIGRAM(S): at 21:43

## 2023-03-21 NOTE — PHYSICAL THERAPY INITIAL EVALUATION ADULT - LEVEL OF INDEPENDENCE: SIT/SUPINE, REHAB EVAL
Patient is a 63y old  Female who presents with a chief complaint of brothers brought in for confusion (09 Sep 2018 18:16)       INTERVAL HPI/OVERNIGHT EVENTS: Patient seen and examined at bedside. Denies any new symptoms, complaints. No new events     MEDICATIONS  (STANDING):  aspirin enteric coated 81 milliGRAM(s) Oral daily  ATENolol  Tablet 50 milliGRAM(s) Oral daily  atorvastatin 20 milliGRAM(s) Oral at bedtime  clopidogrel Tablet 75 milliGRAM(s) Oral daily  enoxaparin Injectable 40 milliGRAM(s) SubCutaneous every 24 hours  FLUoxetine 10 milliGRAM(s) Oral daily  influenza   Vaccine 0.5 milliLiter(s) IntraMuscular once  lisinopril 20 milliGRAM(s) Oral daily  pantoprazole    Tablet 40 milliGRAM(s) Oral before breakfast    MEDICATIONS  (PRN):      Allergies    No Known Allergies    Intolerances        REVIEW OF SYSTEMS:  All 10 systems reviewed and are negative except as above   Vital Signs Last 24 Hrs  T(C): 36.7 (10 Sep 2018 04:57), Max: 36.7 (10 Sep 2018 04:57)  T(F): 98 (10 Sep 2018 04:57), Max: 98 (10 Sep 2018 04:57)  HR: 69 (10 Sep 2018 04:57) (60 - 69)  BP: 120/74 (10 Sep 2018 04:57) (120/74 - 142/78)  BP(mean): --  RR: 16 (10 Sep 2018 04:57) (15 - 16)  SpO2: 94% (10 Sep 2018 04:57) (94% - 99%)    PHYSICAL EXAM:  GENERAL: NAD, well-groomed, well-developed  HEAD:  Atraumatic, Normocephalic  EYES: EOMI, PERRLA, conjunctiva and sclera clear  ENMT: No tonsillar erythema, exudates, or enlargement; Moist mucous membranes  NECK: Supple, No JVD, Normal thyroid  NERVOUS SYSTEM:  Alert & Awake,  Grossly non focal neuro exam   CHEST/LUNG: Clear to auscultation bilaterally; No rales, rhonchi, wheezing, or rubs  HEART: Regular rate and rhythm; No murmurs, rubs, or gallops  ABDOMEN: Soft, Nontender, Nondistended; Bowel sounds present  EXTREMITIES:  2+ Peripheral Pulses, No clubbing, cyanosis, or edema  LYMPH: No lymphadenopathy noted  SKIN: No rashes or lesions    LABS:                        11.1   6.03  )-----------( 278      ( 10 Sep 2018 07:15 )             32.7     10 Sep 2018 07:15    143    |  108    |  11     ----------------------------<  88     4.0     |  26     |  0.81     Ca    8.6        10 Sep 2018 07:15    TPro  5.9    /  Alb  2.8    /  TBili  0.8    /  DBili  x      /  AST  16     /  ALT  17     /  AlkPhos  76     10 Sep 2018 07:15    PT/INR - ( 09 Sep 2018 13:31 )   PT: 11.4 sec;   INR: 1.04 ratio           CAPILLARY BLOOD GLUCOSE        BLOOD CULTURE    RADIOLOGY & ADDITIONAL TESTS:    Imaging Personally Reviewed:  [ ] YES     Consultant(s) Notes Reviewed:      Care Discussed with Consultants/Other Providers: minimum assist (75% patients effort)

## 2023-03-21 NOTE — DISCHARGE NOTE PROVIDER - NSDCFUADDAPPT_GEN_ALL_CORE_FT
Orthopedic follow up with Dr. Bui, may alternatively follow up with Dr. Arroyo or other arthroplasty surgeons.    Follow up with your primary care physician regarding your hospitalization and for further monitoring/management.

## 2023-03-21 NOTE — PHYSICAL THERAPY INITIAL EVALUATION ADULT - IMPAIRMENTS CONTRIBUTING TO GAIT DEVIATIONS, PT EVAL
impaired balance/decreased ROM/decreased strength impaired balance/pain/decreased ROM/decreased strength

## 2023-03-21 NOTE — DISCHARGE NOTE PROVIDER - NSDCMRMEDTOKEN_GEN_ALL_CORE_FT
aspirin 81 mg oral delayed release tablet: 1 tab(s) orally once a day  atorvastatin 40 mg oral tablet: 1 tab(s) orally once a day (at bedtime)  calcium citrate:   calcium-vitamin D 500 mg-5 mcg (200 intl units) oral tablet: 1 tab(s) orally once a day  FLUoxetine 20 mg oral tablet: 1 tab(s) orally once a day  gabapentin 300 mg oral tablet: 1 tab(s) orally once a day (at bedtime)  lisinopril 5 mg oral tablet: 1 tab(s) orally once a day  methadone 10 mg oral tablet: 1 tab(s) orally every 6 hours    To note pt takes it only twice a day, even though it was prescribed every 6 hours.  metoprolol succinate 25 mg oral tablet, extended release: 1 tab(s) orally once a day  montelukast 10 mg oral tablet: 1 tab(s) orally once a day  pantoprazole 40 mg oral delayed release tablet: 1 tab(s) orally once a day (before a meal)  ProAir HFA 90 mcg/inh inhalation aerosol: 2 puff(s) inhaled every 6 hours, As Needed  senna leaf extract oral tablet: 2 tab(s) orally once a day (at bedtime)  simethicone 80 mg oral tablet, chewable: 1 tab(s) orally every 6 hours, As needed, Gas  tiotropium 2.5 mcg/inh inhalation aerosol: 2 puff(s) inhaled once a day  Trelegy Ellipta 200 mcg-62.5 mcg-25 mcg/inh inhalation powder: 1 puff(s) inhaled once a day   aspirin 81 mg oral delayed release tablet: 1 tab(s) orally once a day  atorvastatin 40 mg oral tablet: 1 tab(s) orally once a day (at bedtime)  bisacodyl 5 mg oral delayed release tablet: 1 tab(s) orally every 12 hours, As needed, Constipation  calcium-vitamin D 500 mg-5 mcg (200 intl units) oral tablet: 1 tab(s) orally once a day  FLUoxetine 20 mg oral tablet: 1 tab(s) orally once a day  gabapentin 300 mg oral tablet: 1 tab(s) orally once a day (at bedtime)  lisinopril 5 mg oral tablet: 1 tab(s) orally once a day  methadone 10 mg oral tablet: 1 tab(s) orally every 12 hours  metoprolol succinate 25 mg oral tablet, extended release: 1 tab(s) orally once a day  montelukast 10 mg oral tablet: 1 tab(s) orally once a day  oxyCODONE 10 mg oral tablet: 1 tab(s) orally every 6 hours, As needed, moderate and severe pain  pantoprazole 40 mg oral delayed release tablet: 1 tab(s) orally once a day (before a meal)  polyethylene glycol 3350 oral powder for reconstitution: 17 gram(s) orally once a day  ProAir HFA 90 mcg/inh inhalation aerosol: 2 puff(s) inhaled every 6 hours, As Needed  senna leaf extract oral tablet: 2 tab(s) orally once a day (at bedtime)  simethicone 80 mg oral tablet, chewable: 1 tab(s) orally every 6 hours, As needed, Gas  tiotropium 2.5 mcg/inh inhalation aerosol: 2 puff(s) inhaled once a day  Trelegy Ellipta 200 mcg-62.5 mcg-25 mcg/inh inhalation powder: 1 puff(s) inhaled once a day   aspirin 81 mg oral delayed release tablet: 1 tab(s) orally once a day  atorvastatin 40 mg oral tablet: 1 tab(s) orally once a day (at bedtime)  bisacodyl 5 mg oral delayed release tablet: 1 tab(s) orally every 12 hours, As needed, Constipation  calcium-vitamin D 500 mg-5 mcg (200 intl units) oral tablet: 1 tab(s) orally once a day  FLUoxetine 20 mg oral tablet: 1 tab(s) orally once a day  gabapentin 300 mg oral tablet: 1 tab(s) orally once a day (at bedtime)  lisinopril 5 mg oral tablet: 1 tab(s) orally once a day  methadone 10 mg oral tablet: 1 tab(s) orally every 12 hours  metoprolol succinate 25 mg oral tablet, extended release: 1 tab(s) orally once a day  montelukast 10 mg oral tablet: 1 tab(s) orally once a day  ondansetron 4 mg oral tablet, disintegratin tab(s) orally once  oxyCODONE 10 mg oral tablet: 1 tab(s) orally every 6 hours, As needed, moderate and severe pain  pantoprazole 40 mg oral delayed release tablet: 1 tab(s) orally once a day (before a meal)  polyethylene glycol 3350 oral powder for reconstitution: 17 gram(s) orally once a day  ProAir HFA 90 mcg/inh inhalation aerosol: 2 puff(s) inhaled every 6 hours, As Needed  senna leaf extract oral tablet: 2 tab(s) orally once a day (at bedtime)  simethicone 80 mg oral tablet, chewable: 1 tab(s) orally every 6 hours, As needed, Gas  tiotropium 2.5 mcg/inh inhalation aerosol: 2 puff(s) inhaled once a day  Trelegy Ellipta 200 mcg-62.5 mcg-25 mcg/inh inhalation powder: 1 puff(s) inhaled once a day

## 2023-03-21 NOTE — DISCHARGE NOTE PROVIDER - HOSPITAL COURSE
78 y/o F PMH osteoporosis, asthma, htn, h/o takotsubo syndrome, prior MI 2021, h/o left TKR c/o pain to right side of rib and left knee pain/swelling after a fall. Now admitted for PT eval and asthma exacerbation.    Avulsion of tibial attachment.   -tibial plateau fx  -immobilizer in place  -appears no intervention per ortho  PT rec rehab.    HTN   -c/w metoprolol.    Fall.   -chronic unsteady gait   -walks with cane outside the house    Asthma.   - stable, on treligy at home, symbicort here    Case discussed with Dr. Andre on ---- and patient cleared for discharge. Reviewed discharge medications with patient; All new medications requiring new prescription sent to pharmacy of patients choice. Reviewed need for prescription for previous home medications and new prescriptions sent if requested. Patient in agreement and understands.         78 y/o F PMH osteoporosis, asthma, htn, h/o takotsubo syndrome, prior MI 2021, h/o left TKR c/o pain to right side of rib and left knee pain/swelling after a fall. Now admitted for PT eval and asthma exacerbation.    Avulsion of tibial attachment.   -tibial plateau fx  -immobilizer in place  -appears no intervention per ortho  PT rec rehab.    HTN   -c/w metoprolol.    Fall.   -chronic unsteady gait   -walks with cane outside the house    Asthma.   - stable, on treligy at home, symbicort here    Case discussed with Dr. Andre on 3/23/23 and patient cleared for discharge. Reviewed discharge medications with patient; All new medications requiring new prescription sent to pharmacy of patients choice. Reviewed need for prescription for previous home medications and new prescriptions sent if requested. Patient in agreement and understands.

## 2023-03-21 NOTE — PHYSICAL THERAPY INITIAL EVALUATION ADULT - GENERAL OBSERVATIONS, REHAB EVAL
Patient received in bed in [facility]. Patient left on patient transport stretcher in [facility] with transporter present. Patient received in bed in [facility]. Patient left on patient transport stretcher in [facility] with transporter present. Patient seen for a second time in the afternoon, received in bed in [facility] with  present at bedside. Patient left in bed in [facility], call johnson in reach and AYAKA Sidhu made aware.

## 2023-03-21 NOTE — PHYSICAL THERAPY INITIAL EVALUATION ADULT - RANGE OF MOTION EXAMINATION, REHAB EVAL
L knee immobilized/bilateral upper extremity ROM was WFL (within functional limits)/Right LE ROM was WFL (within functional limits)/deficits as listed below L knee not tested due to being immobilized/bilateral upper extremity ROM was WFL (within functional limits)/Right LE ROM was WFL (within functional limits)/deficits as listed below

## 2023-03-21 NOTE — DISCHARGE NOTE PROVIDER - ATTENDING DISCHARGE PHYSICAL EXAMINATION:
pt seen and examined by me  mild nausea today, no BM in a few days  improved with zofran, suppository given  eager to get to rehab  VSS  CHEST b/l AE  ABD soft  a/w tib plateau fx post fall  medically stable for rehab

## 2023-03-21 NOTE — PHYSICAL THERAPY INITIAL EVALUATION ADULT - WEIGHT-BEARING RESTRICTIONS: SIT/STAND, REHAB EVAL
"I spoke to Rehana.  Her symptoms have progressed as follows:    Last week:  Onset of spasms involving the abdominal muscles.    10/4/2021:  Recurrence of numbness in the lower extremities.    10/5/2021:  Recurrence of numbness in the fingers.    10/6/2021:  Recurrence of numbness involving the arm.  Her  encouraged her to go to the ER, and by the time she reached [facility] she could barely walk due to lower extremity weakness and numbness.  There is also pain in the left eye.  The vision in the left eye is a \"little blurry.\"    The spasms in the abdomen and pain in the left eye are new symptoms for her.  There have been no signs or symptoms of infection.    Though Kesimpta (ofatumumab) is a very potent immunosuppressant and highly efficacious at preventing MS relapse, her symptoms sound suspicious for new disease activity.  Given her difficulty walking, I recommended she remain in the ED for workup.  I suggest the ED consider MRI of (at least) the brain and cervical spine w/o and w/ contrast (could also consider MRI thoracic spine w/o and w/ contrast, but I feel less strongly about this).    Rajeev Aggarwal M.D.  " L LE weight bear as tolerated/weight-bearing as tolerated

## 2023-03-21 NOTE — DISCHARGE NOTE PROVIDER - NSDCFUADDINST_GEN_ALL_CORE_FT
Right Knee: Cleanse with NS, pat dry. Apply Liquid barrier film to periwound skin (allow to dry). Place adaptic touch to base of wound and cover with silicone foam with border. Change every other day and PRN if soiled.   Right Elbow: Cleanse with NS, pat dry. Paint with Liquid barrier film and allow to dry. Apply daily.

## 2023-03-21 NOTE — PHYSICAL THERAPY INITIAL EVALUATION ADULT - ADDITIONAL COMMENTS
Patient lives with her  in a house with 5 steps to enter. Patient reports the bedroom and bathroom are on the second floor but has a chair lift on the stairs. Patient uses a cane to ambulate when in the community. Patient also has a walker at home. Patient was independent with ADLs, and reports no other falls in the past 6 months.

## 2023-03-21 NOTE — PHYSICAL THERAPY INITIAL EVALUATION ADULT - PERTINENT HX OF CURRENT PROBLEM, REHAB EVAL
Patient is a 77 year old female Patient is a 77 year old female with a past medical history of osteoporosis, asthma, HTN, takotsubo syndrome, prior MI 2021, left TKR presenting with pain to right side of rib and left tibia avulsion fx after a fall.

## 2023-03-21 NOTE — DISCHARGE NOTE PROVIDER - CARE PROVIDER_API CALL
STEFANIE PELAYO  Orthopedic Surgery  [address] [place] [address]  Phone: [phone]  Fax: [phone]  Follow Up Time: 1 week   STEFANIE PELAYO  Orthopedic Surgery  [address] [place] [address]  Phone: [phone]  Fax: [phone]  Follow Up Time: 1 week    Kristen Hermosillo)  [address]   Phone: [phone]  Fax: [phone]  Follow Up Time: 1 week

## 2023-03-21 NOTE — DISCHARGE NOTE PROVIDER - NSDCFUSCHEDAPPT_GEN_ALL_CORE_FT
[facility]  CARDIOLOGY [address]  Scheduled Appointment: 04/03/2023    Vivek Han  [facility]  CARDIOLOGY [address]  Scheduled Appointment: 04/03/2023

## 2023-03-21 NOTE — DISCHARGE NOTE PROVIDER - NSDCCPCAREPLAN_GEN_ALL_CORE_FT
PRINCIPAL DISCHARGE DIAGNOSIS  Diagnosis: Tibial fracture  Assessment and Plan of Treatment: You were admitted for an avulsion of tibial attachment. Your have an immobilizer in place. The recommendations at this time is no intervention per ortho. Physical therapy recommends rehab. Please follow up with your outpatient orthopedic MD Bui.       PRINCIPAL DISCHARGE DIAGNOSIS  Diagnosis: Tibial fracture  Assessment and Plan of Treatment: You were admitted for an avulsion of tibial attachment. Your have an immobilizer in place. The recommendations at this time is no intervention per ortho. Physical therapy recommends rehab. Please follow up with your outpatient orthopedic MD Hao.      SECONDARY DISCHARGE DIAGNOSES  Diagnosis: Acute asthma exacerbation  Assessment and Plan of Treatment: Continue current medications as prior Follow up with your primary care doctor/pulmonary doctor for routine checkups.    Diagnosis: Fall  Assessment and Plan of Treatment: It is recommended you go to rehabilitation to improve muscle strength and work on gait. Please follow up with your primary care physician regarding your hospitalization.

## 2023-03-21 NOTE — DISCHARGE NOTE PROVIDER - PROVIDER TOKENS
PROVIDER:[TOKEN:[[id number]],FOLLOWUP:[1 week]] PROVIDER:[TOKEN:[[id number]],FOLLOWUP:[1 week]],PROVIDER:[TOKEN:[[id number]],FOLLOWUP:[1 week]]

## 2023-03-22 LAB
ANION GAP SERPL CALC-SCNC: 11 MMOL/L — SIGNIFICANT CHANGE UP (ref 7–14)
BUN SERPL-MCNC: 10 MG/DL — SIGNIFICANT CHANGE UP (ref 7–23)
CALCIUM SERPL-MCNC: 9.2 MG/DL — SIGNIFICANT CHANGE UP (ref 8.4–10.5)
CHLORIDE SERPL-SCNC: 101 MMOL/L — SIGNIFICANT CHANGE UP (ref 98–107)
CO2 SERPL-SCNC: 28 MMOL/L — SIGNIFICANT CHANGE UP (ref 22–31)
CREAT SERPL-MCNC: 0.57 MG/DL — SIGNIFICANT CHANGE UP (ref 0.5–1.3)
EGFR: 94 ML/MIN/1.73M2 — SIGNIFICANT CHANGE UP
GLUCOSE SERPL-MCNC: 87 MG/DL — SIGNIFICANT CHANGE UP (ref 70–99)
HCT VFR BLD CALC: 37 % — SIGNIFICANT CHANGE UP (ref 34.5–45)
HGB BLD-MCNC: 11.9 G/DL — SIGNIFICANT CHANGE UP (ref 11.5–15.5)
MAGNESIUM SERPL-MCNC: 2.3 MG/DL — SIGNIFICANT CHANGE UP (ref 1.6–2.6)
MCHC RBC-ENTMCNC: 29.6 PG — SIGNIFICANT CHANGE UP (ref 27–34)
MCHC RBC-ENTMCNC: 32.2 GM/DL — SIGNIFICANT CHANGE UP (ref 32–36)
MCV RBC AUTO: 92 FL — SIGNIFICANT CHANGE UP (ref 80–100)
NRBC # BLD: 0 /100 WBCS — SIGNIFICANT CHANGE UP (ref 0–0)
NRBC # FLD: 0 K/UL — SIGNIFICANT CHANGE UP (ref 0–0)
PHOSPHATE SERPL-MCNC: 3.6 MG/DL — SIGNIFICANT CHANGE UP (ref 2.5–4.5)
PLATELET # BLD AUTO: 187 K/UL — SIGNIFICANT CHANGE UP (ref 150–400)
POTASSIUM SERPL-MCNC: 4.4 MMOL/L — SIGNIFICANT CHANGE UP (ref 3.5–5.3)
POTASSIUM SERPL-SCNC: 4.4 MMOL/L — SIGNIFICANT CHANGE UP (ref 3.5–5.3)
RBC # BLD: 4.02 M/UL — SIGNIFICANT CHANGE UP (ref 3.8–5.2)
RBC # FLD: 14.2 % — SIGNIFICANT CHANGE UP (ref 10.3–14.5)
SODIUM SERPL-SCNC: 140 MMOL/L — SIGNIFICANT CHANGE UP (ref 135–145)
WBC # BLD: 9.72 K/UL — SIGNIFICANT CHANGE UP (ref 3.8–10.5)
WBC # FLD AUTO: 9.72 K/UL — SIGNIFICANT CHANGE UP (ref 3.8–10.5)

## 2023-03-22 PROCEDURE — 99232 SBSQ HOSP IP/OBS MODERATE 35: CPT

## 2023-03-22 RX ORDER — OXYCODONE HYDROCHLORIDE 5 MG/1
10 TABLET ORAL EVERY 6 HOURS
Refills: 0 | Status: DISCONTINUED | OUTPATIENT
Start: 2023-03-22 | End: 2023-03-23

## 2023-03-22 RX ORDER — POLYETHYLENE GLYCOL 3350 17 G/17G
17 POWDER, FOR SOLUTION ORAL DAILY
Refills: 0 | Status: DISCONTINUED | OUTPATIENT
Start: 2023-03-22 | End: 2023-03-23

## 2023-03-22 RX ADMIN — METHADONE HYDROCHLORIDE 10 MILLIGRAM(S): 40 TABLET ORAL at 18:00

## 2023-03-22 RX ADMIN — BUDESONIDE AND FORMOTEROL FUMARATE DIHYDRATE 2 PUFF(S): 160; 4.5 AEROSOL RESPIRATORY (INHALATION) at 21:54

## 2023-03-22 RX ADMIN — PANTOPRAZOLE SODIUM 40 MILLIGRAM(S): 20 TABLET, DELAYED RELEASE ORAL at 08:07

## 2023-03-22 RX ADMIN — GABAPENTIN 300 MILLIGRAM(S): 400 CAPSULE ORAL at 21:50

## 2023-03-22 RX ADMIN — OXYCODONE HYDROCHLORIDE 10 MILLIGRAM(S): 5 TABLET ORAL at 13:29

## 2023-03-22 RX ADMIN — Medication 650 MILLIGRAM(S): at 14:20

## 2023-03-22 RX ADMIN — OXYCODONE HYDROCHLORIDE 10 MILLIGRAM(S): 5 TABLET ORAL at 14:20

## 2023-03-22 RX ADMIN — Medication 25 MILLIGRAM(S): at 05:22

## 2023-03-22 RX ADMIN — ENOXAPARIN SODIUM 40 MILLIGRAM(S): 100 INJECTION SUBCUTANEOUS at 11:21

## 2023-03-22 RX ADMIN — Medication 5 MILLIGRAM(S): at 09:55

## 2023-03-22 RX ADMIN — MONTELUKAST 10 MILLIGRAM(S): 4 TABLET, CHEWABLE ORAL at 11:21

## 2023-03-22 RX ADMIN — OXYCODONE HYDROCHLORIDE 5 MILLIGRAM(S): 5 TABLET ORAL at 09:55

## 2023-03-22 RX ADMIN — Medication 40 MILLIGRAM(S): at 05:21

## 2023-03-22 RX ADMIN — METHADONE HYDROCHLORIDE 10 MILLIGRAM(S): 40 TABLET ORAL at 05:21

## 2023-03-22 RX ADMIN — ONDANSETRON 4 MILLIGRAM(S): 8 TABLET, FILM COATED ORAL at 19:54

## 2023-03-22 RX ADMIN — Medication 650 MILLIGRAM(S): at 13:28

## 2023-03-22 RX ADMIN — BUDESONIDE AND FORMOTEROL FUMARATE DIHYDRATE 2 PUFF(S): 160; 4.5 AEROSOL RESPIRATORY (INHALATION) at 09:55

## 2023-03-22 RX ADMIN — Medication 20 MILLIGRAM(S): at 11:21

## 2023-03-22 RX ADMIN — OXYCODONE HYDROCHLORIDE 10 MILLIGRAM(S): 5 TABLET ORAL at 05:21

## 2023-03-22 RX ADMIN — OXYCODONE HYDROCHLORIDE 5 MILLIGRAM(S): 5 TABLET ORAL at 10:45

## 2023-03-22 RX ADMIN — Medication 3 MILLILITER(S): at 21:59

## 2023-03-22 RX ADMIN — POLYETHYLENE GLYCOL 3350 17 GRAM(S): 17 POWDER, FOR SOLUTION ORAL at 11:22

## 2023-03-22 RX ADMIN — TIOTROPIUM BROMIDE 2 PUFF(S): 18 CAPSULE ORAL; RESPIRATORY (INHALATION) at 09:56

## 2023-03-22 RX ADMIN — ATORVASTATIN CALCIUM 40 MILLIGRAM(S): 80 TABLET, FILM COATED ORAL at 21:51

## 2023-03-22 RX ADMIN — Medication 81 MILLIGRAM(S): at 11:21

## 2023-03-22 RX ADMIN — SENNA PLUS 2 TABLET(S): 8.6 TABLET ORAL at 21:51

## 2023-03-22 RX ADMIN — Medication 3 MILLILITER(S): at 12:03

## 2023-03-22 NOTE — PROGRESS NOTE ADULT - PROBLEM SELECTOR PLAN 2
BP soft but stable  c/w metoprolol

## 2023-03-22 NOTE — PROGRESS NOTE ADULT - PROBLEM SELECTOR PLAN 1
tibial plateau fx  immobilizer in place  appears no intervention per ortho  await ankle xray for complete eval of LLE  PT rec rehab
tibial plateau fx  immobilizer in place  appears no intervention per ortho  await final ortho input
tibial plateau fx  immobilizer in place  appears no intervention per ortho  await ankle xray for complete eval of LLE  PT eval P for dispo planning

## 2023-03-22 NOTE — PROGRESS NOTE ADULT - PROBLEM SELECTOR PLAN 5
stable  on treligy at home, symbicort here  cont nebs q6

## 2023-03-22 NOTE — PROGRESS NOTE ADULT - PROBLEM SELECTOR PLAN 3
DVT PPx:  - Heparin Sq  - OOB as tolerated

## 2023-03-22 NOTE — PROGRESS NOTE ADULT - ASSESSMENT
76 y/o F PMH osteoporosis, asthma, htn, h/o takotsubo syndrome, prior MI 2021, h/o left TKR c/o pain to right side of rib and left knee pain/swelling after a fall. Now admitted for PT eval and asthma exacerbation.
78 y/o F PMH osteoporosis, asthma, htn, h/o takotsubo syndrome, prior MI 2021, h/o left TKR c/o pain to right side of rib and left knee pain/swelling after a fall. Now admitted for PT eval and asthma exacerbation.
76 y/o F PMH osteoporosis, asthma, htn, h/o takotsubo syndrome, prior MI 2021, h/o left TKR c/o pain to right side of rib and left knee pain/swelling after a fall. Now admitted for PT eval and asthma exacerbation.

## 2023-03-22 NOTE — PROGRESS NOTE ADULT - PROBLEM SELECTOR PLAN 4
chronic unsteady gait   walks with cane outside the house  PT eval once ortho makes final recs, WBAT to LLE  d/w pt and  at bedside poss for rehab
chronic unsteady gait   walks with cane outside the house  PT eval once ortho makes final recs, NWB to LLE  d/w pt and  at bedside poss for rehab
chronic unsteady gait   walks with cane outside the house  PT eval once ortho makes final recs, WBAT to LLE  d/w pt and  at bedside poss for rehab

## 2023-03-23 ENCOUNTER — TRANSCRIPTION ENCOUNTER (OUTPATIENT)
Age: 78
End: 2023-03-23

## 2023-03-23 VITALS
RESPIRATION RATE: 18 BRPM | TEMPERATURE: 98 F | OXYGEN SATURATION: 95 % | SYSTOLIC BLOOD PRESSURE: 111 MMHG | HEART RATE: 76 BPM | DIASTOLIC BLOOD PRESSURE: 61 MMHG

## 2023-03-23 LAB
ANION GAP SERPL CALC-SCNC: 11 MMOL/L — SIGNIFICANT CHANGE UP (ref 7–14)
BUN SERPL-MCNC: 14 MG/DL — SIGNIFICANT CHANGE UP (ref 7–23)
CALCIUM SERPL-MCNC: 9.2 MG/DL — SIGNIFICANT CHANGE UP (ref 8.4–10.5)
CHLORIDE SERPL-SCNC: 99 MMOL/L — SIGNIFICANT CHANGE UP (ref 98–107)
CO2 SERPL-SCNC: 29 MMOL/L — SIGNIFICANT CHANGE UP (ref 22–31)
CREAT SERPL-MCNC: 0.54 MG/DL — SIGNIFICANT CHANGE UP (ref 0.5–1.3)
EGFR: 95 ML/MIN/1.73M2 — SIGNIFICANT CHANGE UP
GLUCOSE SERPL-MCNC: 76 MG/DL — SIGNIFICANT CHANGE UP (ref 70–99)
HCT VFR BLD CALC: 38.4 % — SIGNIFICANT CHANGE UP (ref 34.5–45)
HGB BLD-MCNC: 12.4 G/DL — SIGNIFICANT CHANGE UP (ref 11.5–15.5)
MAGNESIUM SERPL-MCNC: 2.3 MG/DL — SIGNIFICANT CHANGE UP (ref 1.6–2.6)
MCHC RBC-ENTMCNC: 29 PG — SIGNIFICANT CHANGE UP (ref 27–34)
MCHC RBC-ENTMCNC: 32.3 GM/DL — SIGNIFICANT CHANGE UP (ref 32–36)
MCV RBC AUTO: 89.9 FL — SIGNIFICANT CHANGE UP (ref 80–100)
NRBC # BLD: 0 /100 WBCS — SIGNIFICANT CHANGE UP (ref 0–0)
NRBC # FLD: 0 K/UL — SIGNIFICANT CHANGE UP (ref 0–0)
PHOSPHATE SERPL-MCNC: 3.3 MG/DL — SIGNIFICANT CHANGE UP (ref 2.5–4.5)
PLATELET # BLD AUTO: 235 K/UL — SIGNIFICANT CHANGE UP (ref 150–400)
POTASSIUM SERPL-MCNC: 3.6 MMOL/L — SIGNIFICANT CHANGE UP (ref 3.5–5.3)
POTASSIUM SERPL-SCNC: 3.6 MMOL/L — SIGNIFICANT CHANGE UP (ref 3.5–5.3)
RBC # BLD: 4.27 M/UL — SIGNIFICANT CHANGE UP (ref 3.8–5.2)
RBC # FLD: 14.1 % — SIGNIFICANT CHANGE UP (ref 10.3–14.5)
SARS-COV-2 RNA SPEC QL NAA+PROBE: SIGNIFICANT CHANGE UP
SODIUM SERPL-SCNC: 139 MMOL/L — SIGNIFICANT CHANGE UP (ref 135–145)
WBC # BLD: 10.64 K/UL — HIGH (ref 3.8–10.5)
WBC # FLD AUTO: 10.64 K/UL — HIGH (ref 3.8–10.5)

## 2023-03-23 PROCEDURE — 99239 HOSP IP/OBS DSCHRG MGMT >30: CPT

## 2023-03-23 RX ORDER — GLYCERIN ADULT
1 SUPPOSITORY, RECTAL RECTAL ONCE
Refills: 0 | Status: COMPLETED | OUTPATIENT
Start: 2023-03-23 | End: 2023-03-23

## 2023-03-23 RX ORDER — METHADONE HYDROCHLORIDE 40 MG/1
10 TABLET ORAL EVERY 12 HOURS
Refills: 0 | Status: DISCONTINUED | OUTPATIENT
Start: 2023-03-23 | End: 2023-03-23

## 2023-03-23 RX ORDER — METHADONE HYDROCHLORIDE 40 MG/1
1 TABLET ORAL
Qty: 0 | Refills: 0 | DISCHARGE

## 2023-03-23 RX ORDER — METOCLOPRAMIDE HCL 10 MG
5 TABLET ORAL ONCE
Refills: 0 | Status: COMPLETED | OUTPATIENT
Start: 2023-03-23 | End: 2023-03-23

## 2023-03-23 RX ORDER — OXYCODONE HYDROCHLORIDE 5 MG/1
10 TABLET ORAL EVERY 6 HOURS
Refills: 0 | Status: DISCONTINUED | OUTPATIENT
Start: 2023-03-23 | End: 2023-03-23

## 2023-03-23 RX ORDER — METOPROLOL TARTRATE 50 MG
1 TABLET ORAL
Qty: 0 | Refills: 0 | DISCHARGE
Start: 2023-03-23

## 2023-03-23 RX ORDER — ONDANSETRON 8 MG/1
4 TABLET, FILM COATED ORAL ONCE
Refills: 0 | Status: COMPLETED | OUTPATIENT
Start: 2023-03-23 | End: 2023-03-23

## 2023-03-23 RX ORDER — ONDANSETRON 8 MG/1
1 TABLET, FILM COATED ORAL
Qty: 0 | Refills: 0 | DISCHARGE
Start: 2023-03-23

## 2023-03-23 RX ORDER — POLYETHYLENE GLYCOL 3350 17 G/17G
17 POWDER, FOR SOLUTION ORAL
Qty: 0 | Refills: 0 | DISCHARGE
Start: 2023-03-23

## 2023-03-23 RX ORDER — METHADONE HYDROCHLORIDE 40 MG/1
1 TABLET ORAL
Qty: 0 | Refills: 0 | DISCHARGE
Start: 2023-03-23

## 2023-03-23 RX ORDER — OXYCODONE HYDROCHLORIDE 5 MG/1
1 TABLET ORAL
Qty: 0 | Refills: 0 | DISCHARGE
Start: 2023-03-23

## 2023-03-23 RX ADMIN — MONTELUKAST 10 MILLIGRAM(S): 4 TABLET, CHEWABLE ORAL at 11:19

## 2023-03-23 RX ADMIN — METHADONE HYDROCHLORIDE 10 MILLIGRAM(S): 40 TABLET ORAL at 05:38

## 2023-03-23 RX ADMIN — POLYETHYLENE GLYCOL 3350 17 GRAM(S): 17 POWDER, FOR SOLUTION ORAL at 11:18

## 2023-03-23 RX ADMIN — Medication 3 MILLIGRAM(S): at 00:45

## 2023-03-23 RX ADMIN — Medication 25 MILLIGRAM(S): at 06:41

## 2023-03-23 RX ADMIN — OXYCODONE HYDROCHLORIDE 10 MILLIGRAM(S): 5 TABLET ORAL at 10:05

## 2023-03-23 RX ADMIN — BUDESONIDE AND FORMOTEROL FUMARATE DIHYDRATE 2 PUFF(S): 160; 4.5 AEROSOL RESPIRATORY (INHALATION) at 11:19

## 2023-03-23 RX ADMIN — PANTOPRAZOLE SODIUM 40 MILLIGRAM(S): 20 TABLET, DELAYED RELEASE ORAL at 05:38

## 2023-03-23 RX ADMIN — OXYCODONE HYDROCHLORIDE 10 MILLIGRAM(S): 5 TABLET ORAL at 09:18

## 2023-03-23 RX ADMIN — Medication 81 MILLIGRAM(S): at 11:19

## 2023-03-23 RX ADMIN — Medication 40 MILLIGRAM(S): at 05:38

## 2023-03-23 RX ADMIN — TIOTROPIUM BROMIDE 2 PUFF(S): 18 CAPSULE ORAL; RESPIRATORY (INHALATION) at 11:19

## 2023-03-23 RX ADMIN — Medication 650 MILLIGRAM(S): at 08:44

## 2023-03-23 RX ADMIN — Medication 650 MILLIGRAM(S): at 09:15

## 2023-03-23 RX ADMIN — ONDANSETRON 4 MILLIGRAM(S): 8 TABLET, FILM COATED ORAL at 13:44

## 2023-03-23 RX ADMIN — Medication 3 MILLILITER(S): at 09:38

## 2023-03-23 RX ADMIN — ONDANSETRON 4 MILLIGRAM(S): 8 TABLET, FILM COATED ORAL at 06:11

## 2023-03-23 RX ADMIN — Medication 1 SUPPOSITORY(S): at 11:18

## 2023-03-23 RX ADMIN — ENOXAPARIN SODIUM 40 MILLIGRAM(S): 100 INJECTION SUBCUTANEOUS at 11:18

## 2023-03-23 RX ADMIN — Medication 20 MILLIGRAM(S): at 11:18

## 2023-03-23 RX ADMIN — Medication 5 MILLIGRAM(S): at 01:40

## 2023-03-23 RX ADMIN — Medication 5 MILLIGRAM(S): at 08:31

## 2023-03-23 NOTE — DISCHARGE NOTE NURSING/CASE MANAGEMENT/SOCIAL WORK - NSDCPEFALRISK_GEN_ALL_CORE
For information on Fall & Injury Prevention, visit: [email or web address] OR  [email or web address] OR  https://www.cdc.gov/steadi/patient.html

## 2023-03-23 NOTE — ADVANCED PRACTICE NURSE CONSULT - ASSESSMENT
A&Ox4, able to turn with independently, continent of urine and stool. Skin warm, dry with increased moisture in intertriginous folds, scattered areas of hyperpigmentation and hypopigmentation, scattered areas of ecchymosis without hematoma on bilateral upper and lower extremities.    Vascular of b/l lower extremities: No temperature changes noted. No Edema. Capillary refill less than 3 seconds. DP/PT palpable pulses. Patient currently has Immobilizer on LLE that was placed on Sunday as per patient. Unable to visualize skin underneath immobilizer. Patient denies any numbness or tingling.     Right elbow: S/P laceration repair on 3/19/23. Patient reports when she fell, she hit her elbow and right knee. Upon assessment, area of laceration measuring 5cmx0.1yzu3yk dry and intact with tissue adhesive. No opening noted.     Right Knee: Skin tear category type 3 Measuring 4.5cmx3.5cmx0.2cm. Presenting as 100% red dermis and dried serosanguinous crusting. Scant serosanguinous drainage, no odor. Periwound ecchymotic. No increased warmth, no erythema, no induration, no edema, no overt signs of infection noted at this time. Goals of care: monitor for tissue type changes and atraumatic dressing change.

## 2023-03-23 NOTE — ADVANCED PRACTICE NURSE CONSULT - RECOMMEDATIONS
Topical recommendations:     Right Knee: Cleanse with NS, pat dry. Apply Liquid barrier film to periwound skin (allow to dry). Place adaptic touch to base of wound and cover with silicone foam with border. Change every other day and PRN if soiled.     Right Elbow: Cleanse with NS, pat dry. Paint with Liquid barrier film and allow to dry. Apply daily.     Continue low air loss bed therapy, continue heel elevation, continue to turn & reposition as per protocol, soft pillow between bony prominences, continue moisture management with barrier creams & single breathable pad, continue measures to decrease friction/shear/pressure. Continue with nutritional support as per dietary/orders.     Plan of care discussed with Primary RN Alexa and NP Monie Hughes.     Please contact Wound/Ostomy Care Service Line if we can be of further assistance (ext [phone]).

## 2023-03-23 NOTE — ADVANCED PRACTICE NURSE CONSULT - REASON FOR CONSULT
Patient seen on skin care rounds after wound care referral received for assessment of skin impairment and recommendations of topical management of skin tear. As per H&P, patient is a 78 y/o F PMH osteoporosis, asthma, htn, h/o takotsubo syndrome, prior MI 2021, h/o left TKR c/o pain to right side of rib and left knee pain/swelling after a fall. Now admitted for PT eval and asthma exacerbation. Xray imaging "XR showing L tibial tubercle avulsion fx with hinged cemented TKA". Chart reviewed: WBC 10.64, H/H 12.4/38.4, Platelets 235, INR 1.01, Serum albumin 4.0, A1C 4.8, BMI 27.0, Wilian 18.

## 2023-03-23 NOTE — DISCHARGE NOTE NURSING/CASE MANAGEMENT/SOCIAL WORK - PATIENT PORTAL LINK FT
You can access the FollowMyHealth Patient Portal offered by [facility] by registering at the following website: [email or web address]. By joining [employer]’s FollowMyHealth portal, you will also be able to view your health information using other applications (apps) compatible with our system.

## 2023-03-23 NOTE — DISCHARGE NOTE NURSING/CASE MANAGEMENT/SOCIAL WORK - NSDCVIVACCINE_GEN_ALL_CORE_FT
influenza, high-dose, quadrivalent; 29-Sep-2021 15:16; Tammy Bardales (RN); [employer]; [id number] (Exp. Date: 30-Jun-2022); IntraMuscular; Deltoid Right.; 0.7 milliLiter(s); VIS (VIS Published: 15-Aug-2019, VIS Presented: 29-Sep-2021);   Tdap; 19-Jun-2014 09:25; Yaa Tijerina (RN); [id number]; IntraMuscular; Deltoid Left.; 0.5 milliLiter(s);   Tdap; 29-Oct-2021 16:37; Carolynn Encinas (RN); [employer]; [id number] (Exp. Date: 09-Sep-2023); IntraMuscular; Deltoid Right.; 0.5 milliLiter(s); VIS (VIS Published: 09-May-2013, VIS Presented: 29-Oct-2021);   Tdap; 19-Mar-2023 18:35; Daniel Oquendo (RN); [employer]; [id number] (Exp. Date: 15-Feb-2025); IntraMuscular; Deltoid Left.; 0.5 milliLiter(s); VIS (VIS Published: 09-May-2013, VIS Presented: 19-Mar-2023);

## 2023-04-03 ENCOUNTER — APPOINTMENT (OUTPATIENT)
Dept: CARDIOLOGY | Facility: CLINIC | Age: 78
End: 2023-04-03

## 2023-05-22 NOTE — PATIENT PROFILE ADULT - CONTRAINDICATIONS & PRECAUTIONS (SELECT ALL THAT APPLY)
Goal Outcome Evaluation:  Plan of Care Reviewed With: patient        Progress: no change  Outcome Evaluation: Wound care completed per orders. Pt had c/o pain and muscle spasms, medicated per MAR for pain-requested muscle relaxer from provider, waiting for med from pharmacy at this time. Fluid restrictions followed per orders. Pt in no apparent distress at this time, resting comfortably with call light in reach, denies any needs currently.          none...

## 2023-07-03 ENCOUNTER — APPOINTMENT (OUTPATIENT)
Dept: PHARMACY | Facility: CLINIC | Age: 78
End: 2023-07-03
Payer: SELF-PAY

## 2023-07-03 PROCEDURE — V5267T: CUSTOM

## 2023-07-11 NOTE — PROGRESS NOTE ADULT - PROBLEM SELECTOR PROBLEM 3
Operative Note      Patient: Toby Bailey  YOB: 1939  MRN: [id number]    Date of Procedure: 7/11/2023    Pre-Op Diagnosis Codes:     * Carotid stenosis, left [I65.22] - asymptomatic  Hypertension  Hyperlipidemia    Post-Op Diagnosis: Same       Procedure(s):  LEFT CAROTID ENDARTERECTOMY with bovine patch angioplasty  Intraoperative ultrasound guidance  Intraoperative neuro monitoring    Surgeon(s):  Irene Daley MD    Assistant:   Surgical Assistant: Anita Nolasco    Anesthesia: General    Estimated Blood Loss (mL): less than 064     Complications: None - the patient had normal EEG and SSEP's throughout the procedure. She was very hypertensive after completion of the endarterectomy and a nicardipine drip had to be started to control her blood pressure. Goal blood pressure range immediately after carotid endarterectomy is systolic blood pressure of 120 will meters of mercury. Specimens:   ID Type Source Tests Collected by Time Destination   A : CAROTID PLAQUE Specimen Neck SURGICAL PATHOLOGY Irene Daley MD 7/11/2023 1335        Implants:  Implant Name Type Inv. Item Serial No.  Lot No. LRB No. Used Action   PATCH VASC W0.8XL8CM PERIPH BOV PERICARD N PVC N DEHP CRSS - [id number] Vascular grafts PATCH VASC W0.8XL8CM PERIPH BOV PERICARD N PVC N DEHP CRSS  [facility]- [id number] Left 1 Implanted         Drains: * No LDAs found *    Findings: The patient had a very highly sternal click calcified ulcerated plaque with hemorrhagic transformation underneath the plaque. A nice endarterectomy could be performed. She had normal SSEPs and EEG throughout the procedure. Procedure was performed with selective shunting which was not necessary in her case. Detailed Description of Procedure:   After full informed consent was obtained patient was brought to the operating room.   There she was positioned supine position and after general esthesia and Exacerbation of asthma, unspecified asthma severity, unspecified whether persistent

## 2023-08-14 ENCOUNTER — RX RENEWAL (OUTPATIENT)
Age: 78
End: 2023-08-14

## 2023-08-15 ENCOUNTER — APPOINTMENT (OUTPATIENT)
Dept: RADIOLOGY | Facility: IMAGING CENTER | Age: 78
End: 2023-08-15
Payer: MEDICARE

## 2023-08-15 ENCOUNTER — APPOINTMENT (OUTPATIENT)
Dept: MAMMOGRAPHY | Facility: IMAGING CENTER | Age: 78
End: 2023-08-15
Payer: MEDICARE

## 2023-08-17 ENCOUNTER — OUTPATIENT (OUTPATIENT)
Dept: OUTPATIENT SERVICES | Facility: HOSPITAL | Age: 78
LOS: 1 days | End: 2023-08-17
Payer: MEDICARE

## 2023-08-17 DIAGNOSIS — Z00.8 ENCOUNTER FOR OTHER GENERAL EXAMINATION: ICD-10-CM

## 2023-08-17 DIAGNOSIS — Z98.890 OTHER SPECIFIED POSTPROCEDURAL STATES: Chronic | ICD-10-CM

## 2023-08-17 DIAGNOSIS — Z96.652 PRESENCE OF LEFT ARTIFICIAL KNEE JOINT: Chronic | ICD-10-CM

## 2023-08-17 DIAGNOSIS — Z87.81 PERSONAL HISTORY OF (HEALED) TRAUMATIC FRACTURE: Chronic | ICD-10-CM

## 2023-08-17 DIAGNOSIS — Z96.641 PRESENCE OF RIGHT ARTIFICIAL HIP JOINT: Chronic | ICD-10-CM

## 2023-08-17 PROCEDURE — 77067 SCR MAMMO BI INCL CAD: CPT | Mod: 26

## 2023-08-17 PROCEDURE — 77080 DXA BONE DENSITY AXIAL: CPT | Mod: 26

## 2023-08-17 PROCEDURE — 77063 BREAST TOMOSYNTHESIS BI: CPT | Mod: 26

## 2023-08-17 PROCEDURE — 77080 DXA BONE DENSITY AXIAL: CPT

## 2023-08-17 PROCEDURE — 77067 SCR MAMMO BI INCL CAD: CPT

## 2023-08-17 PROCEDURE — 77063 BREAST TOMOSYNTHESIS BI: CPT

## 2023-08-24 ENCOUNTER — OUTPATIENT (OUTPATIENT)
Dept: OUTPATIENT SERVICES | Facility: HOSPITAL | Age: 78
LOS: 1 days | Discharge: HOME | End: 2023-08-24

## 2023-08-24 ENCOUNTER — APPOINTMENT (OUTPATIENT)
Dept: PHARMACY | Facility: CLINIC | Age: 78
End: 2023-08-24
Payer: SELF-PAY

## 2023-08-24 ENCOUNTER — APPOINTMENT (OUTPATIENT)
Dept: SPEECH THERAPY | Facility: CLINIC | Age: 78
End: 2023-08-24

## 2023-08-24 DIAGNOSIS — Z98.890 OTHER SPECIFIED POSTPROCEDURAL STATES: Chronic | ICD-10-CM

## 2023-08-24 DIAGNOSIS — Z96.652 PRESENCE OF LEFT ARTIFICIAL KNEE JOINT: Chronic | ICD-10-CM

## 2023-08-24 DIAGNOSIS — Z87.81 PERSONAL HISTORY OF (HEALED) TRAUMATIC FRACTURE: Chronic | ICD-10-CM

## 2023-08-24 DIAGNOSIS — Z96.641 PRESENCE OF RIGHT ARTIFICIAL HIP JOINT: Chronic | ICD-10-CM

## 2023-08-24 PROCEDURE — V5299A: CUSTOM

## 2023-08-25 NOTE — PLAN
[FreeTextEntry2] : 1. HAC as scheduled following today's audiological evaluation.  2. Continued use of binaural amplification. 3. Annual AEA or as medically indicated.

## 2023-08-25 NOTE — PROCEDURE
[226 Hz] : 226 Hz [Normal Eardrum Mobility] : consistent with normal eardrum mobility [Type A Tympanogram] : Type A Normal [] : Audiogram: [] : Complete Audiological Evaluation [Good] : good [Insert Ear Phones] : insert ear phones [de-identified] : Left ear: Could not seal.  [de-identified] : Hearing -500 Hz sloping from a mild to moderately severe SNHL AU.  Asymmetry noted 3-4 kHz (left ear poorer than right). SRS scores good (88%) bilaterally.  Results essentially consistent with pervious evaluation.

## 2023-08-25 NOTE — ASSESSMENT
[FreeTextEntry1] : Discussed results and recommendations with patient. Patient being seen in the hearing aid dispensary for an HAC following today's audiological evaluation.

## 2023-08-25 NOTE — HISTORY OF PRESENT ILLNESS
[FreeTextEntry1] : 77-year-old F with known bilateral SNHL. Binaural amplification user. Fit with [employer] More 3 miniRITE R hearing aids.  [FreeTextEntry8] : Patient reports difficulty hearing with individual speakers, group settings, and on the telephone. Patient perceives a decline in hearing bilaterally. Hissing tinnitus reported bilaterally. Patient indicates that she only wears her hearing aids when she leaves the house. Aural fullness and otalgia denied.

## 2023-08-30 DIAGNOSIS — H90.3 SENSORINEURAL HEARING LOSS, BILATERAL: ICD-10-CM

## 2023-09-21 NOTE — DISCHARGE NOTE NURSING/CASE MANAGEMENT/SOCIAL WORK - HAVE YOU RECEIVED AT LEAST TWO PFIZER AND/OR MODERNA VACCINATIONS (IN ANY COMBINATION) AND/OR ONE JOHNSON & JOHNSON VACCINATION?
Airway    Performed by: Corby Muse CRNA  Authorized by: Arvind Santiago MD    Final Airway Type:  Supraglottic airway  Final Supraglottic Airway:  IGel  SGA Size*:  4  Attempts*:  1  Location:  OR  Urgency:  Elective  Difficult Airway: No    Indications for Airway Management:  Anesthesia  Mask Difficulty Assessment:  1 - vent by mask  Start Time: 9/21/2023 6:00 PM   Atraumatic LMA insertion       Yes

## 2023-10-03 ENCOUNTER — NON-APPOINTMENT (OUTPATIENT)
Age: 78
End: 2023-10-03

## 2023-10-03 ENCOUNTER — APPOINTMENT (OUTPATIENT)
Dept: CARDIOLOGY | Facility: CLINIC | Age: 78
End: 2023-10-03
Payer: MEDICARE

## 2023-10-03 VITALS
WEIGHT: 116 LBS | SYSTOLIC BLOOD PRESSURE: 135 MMHG | TEMPERATURE: 98.6 F | HEART RATE: 67 BPM | OXYGEN SATURATION: 90 % | DIASTOLIC BLOOD PRESSURE: 74 MMHG | HEIGHT: 56 IN | BODY MASS INDEX: 26.1 KG/M2

## 2023-10-03 DIAGNOSIS — I51.81 TAKOTSUBO SYNDROME: ICD-10-CM

## 2023-10-03 PROCEDURE — 99215 OFFICE O/P EST HI 40 MIN: CPT | Mod: 25

## 2023-10-03 PROCEDURE — 93000 ELECTROCARDIOGRAM COMPLETE: CPT

## 2023-10-17 ENCOUNTER — APPOINTMENT (OUTPATIENT)
Dept: CARDIOLOGY | Facility: CLINIC | Age: 78
End: 2023-10-17
Payer: MEDICARE

## 2023-10-17 PROCEDURE — 93306 TTE W/DOPPLER COMPLETE: CPT

## 2023-10-18 NOTE — PATIENT PROFILE ADULT - NSPROMEDSBROUGHTTOHOSP_GEN_A_NUR
No care due was identified.  [facility] Embedded Care Due Messages. Reference number: [id number].   10/17/2023 3:19:41 PM CDT  
Please see the attached refill request.  
no

## 2023-11-27 ENCOUNTER — RX RENEWAL (OUTPATIENT)
Age: 78
End: 2023-11-27

## 2024-01-21 NOTE — DISCHARGE NOTE PROVIDER - CARE PROVIDERS DIRECT ADDRESSES
69F Yakut-speaking with PMH of asthma, HTN, HFpEF, CVA with right-sided deficits p/w SOB and cough for 2 weeks. Per daughter at bedside, patient has been having increased SOB, orthopnea, PND, and LE edema for the past few months. However, over the past 2 weeks, she noticed her mom was more exhausted, dyspneic, and having a cough productive of yellow sputum. She did notice 3 episodes of blood-tinged sputum and subjective fevers over the past 3 days. Otherwise, denies CP, palpitations, n/v/d, abdominal pain, dysuria, melena/hematochezia. Pt went to her PCP's office yesterday and was noted to be hypoxic. She was placed on 4L NC and sent to ED.     In the ED, HR , /78, RR 36 (satting 97% on 4L NC). Labs notable for WBC 14.17K, Na 131, BUN/Cr 21/1.35, proBNP 2926, VBG 7.27/45/50/21. CTA PE shows multifocal PNA, remote PE, and dilated CBD with cholelithiasis, GB distension. Pt had a cordova placed for urinary retention in ED. S/p vanc/zosyn, 500cc IVF bolus, IV lasix 20mg, lovenox 40mg, duonebs, solu-medrol 125, and IV mag in ED. She briefly required BiPAP. MICU evaluated pt, not a candidate. Weaned to 4L NC. 
[email or web address]

## 2024-02-02 ENCOUNTER — RX RENEWAL (OUTPATIENT)
Age: 79
End: 2024-02-02

## 2024-02-02 RX ORDER — ATORVASTATIN CALCIUM 40 MG/1
40 TABLET, FILM COATED ORAL
Qty: 90 | Refills: 1 | Status: ACTIVE | COMMUNITY
Start: 2023-03-13 | End: 1900-01-01

## 2024-02-26 ENCOUNTER — RX RENEWAL (OUTPATIENT)
Age: 79
End: 2024-02-26

## 2024-02-26 RX ORDER — METOPROLOL SUCCINATE 25 MG/1
25 TABLET, EXTENDED RELEASE ORAL DAILY
Qty: 90 | Refills: 3 | Status: ACTIVE | COMMUNITY
Start: 2019-11-25 | End: 1900-01-01

## 2024-03-05 NOTE — H&P ADULT - NSHPSOURCEINFORD_GEN_ALL_CORE
Patient Quality 431: Preventive Care And Screening: Unhealthy Alcohol Use - Screening: Patient not identified as an unhealthy alcohol user when screened for unhealthy alcohol use using a systematic screening method Quality 47: Advance Care Plan: Advance care planning not documented, reason not otherwise specified. Detail Level: Detailed Quality 226: Preventive Care And Screening: Tobacco Use: Screening And Cessation Intervention: Patient screened for tobacco use and is an ex/non-smoker

## 2024-03-25 ENCOUNTER — APPOINTMENT (OUTPATIENT)
Dept: PHARMACY | Facility: CLINIC | Age: 79
End: 2024-03-25
Payer: SELF-PAY

## 2024-03-25 PROCEDURE — V5299A: CUSTOM

## 2024-03-25 NOTE — ASSESSMENT
[FreeTextEntry1] : [employer] audiology confirmed that battery needed to be changed. Attempted to change battery, however, rapid orange blinking continued on right hearing aid and hearing aid would not power on. Will send hearing aid in for repair. Patient agreeable to this plan. Changed battery on left device and reset it successfully. Patient happy with today's services.

## 2024-03-25 NOTE — HISTORY OF PRESENT ILLNESS
[FreeTextEntry1] : Hearing Aid Check and 78 year old female with bilateral sensorineural hearing loss. Currently uses binaural amplification.  Current Hearing Aid fitting: [employer] Model: More 3 miniRITE R RSN: [id number] LSN: [id number] Receivers: 3(81) AU Domes: 12mm double vent AU Chrger SN: [id number] Warranties: 1/1/2025.   [FreeTextEntry8] : Patient seen for HAC with complaint that right hearing aid not charging and blinking orange in the .

## 2024-04-01 ENCOUNTER — APPOINTMENT (OUTPATIENT)
Dept: CARDIOLOGY | Facility: CLINIC | Age: 79
End: 2024-04-01
Payer: MEDICARE

## 2024-04-01 ENCOUNTER — NON-APPOINTMENT (OUTPATIENT)
Age: 79
End: 2024-04-01

## 2024-04-01 VITALS
DIASTOLIC BLOOD PRESSURE: 64 MMHG | WEIGHT: 116 LBS | SYSTOLIC BLOOD PRESSURE: 137 MMHG | HEART RATE: 73 BPM | TEMPERATURE: 98 F | OXYGEN SATURATION: 90 % | HEIGHT: 56 IN | BODY MASS INDEX: 26.1 KG/M2

## 2024-04-01 DIAGNOSIS — I50.20 UNSPECIFIED SYSTOLIC (CONGESTIVE) HEART FAILURE: ICD-10-CM

## 2024-04-01 DIAGNOSIS — E78.5 HYPERLIPIDEMIA, UNSPECIFIED: ICD-10-CM

## 2024-04-01 DIAGNOSIS — I25.10 ATHEROSCLEROTIC HEART DISEASE OF NATIVE CORONARY ARTERY W/OUT ANGINA PECTORIS: ICD-10-CM

## 2024-04-01 PROCEDURE — G2211 COMPLEX E/M VISIT ADD ON: CPT

## 2024-04-01 PROCEDURE — 93000 ELECTROCARDIOGRAM COMPLETE: CPT

## 2024-04-01 PROCEDURE — 99215 OFFICE O/P EST HI 40 MIN: CPT

## 2024-04-01 NOTE — PHYSICAL EXAM
[Well Developed] : well developed [Well Nourished] : well nourished [No Acute Distress] : no acute distress [Normal Venous Pressure] : normal venous pressure [Normal S1, S2] : normal S1, S2 [No Murmur] : no murmur [Juanis ____] : juanis [unfilled] [Soft] : abdomen soft [Wheeze ____] : wheeze [unfilled] [Non Tender] : non-tender [No Edema] : no edema [Normal Gait] : normal gait [No Focal Deficits] : no focal deficits [Moves all extremities] : moves all extremities [Alert and Oriented] : alert and oriented

## 2024-04-03 NOTE — REVIEW OF SYSTEMS
[Wheezing] : wheezing [Fever] : no fever [Headache] : no headache [Weight Gain (___ Lbs)] : no recent weight gain [Chills] : no chills [Feeling Fatigued] : not feeling fatigued [Weight Loss (___ Lbs)] : no recent weight loss [Sore Throat] : no sore throat [SOB] : no shortness of breath [Dyspnea on exertion] : not dyspnea during exertion [Chest Discomfort] : no chest discomfort [Lower Ext Edema] : no extremity edema [Palpitations] : no palpitations [Orthopnea] : no orthopnea [Syncope] : no syncope [Cough] : no cough [Nausea] : no nausea [Vomiting] : no vomiting [Confusion] : no confusion was observed [Easy Bleeding] : no tendency for easy bleeding [Easy Bruising] : no tendency for easy bruising

## 2024-04-03 NOTE — DISCUSSION/SUMMARY
[EKG obtained to assist in diagnosis and management of assessed problem(s)] : EKG obtained to assist in diagnosis and management of assessed problem(s) [FreeTextEntry1] : 78F with HFrEF (25%) asthma, CAD, presents for f/u   Stress induced CM -last tte  10/23 showing improved LV EF to 59 -grossly euvolemic on exam -b/l wheezing on exam, no crackles. at baseline per pt -cont toprol, ace  2. plan for shoulder replacement -pt feeling well, denies cp or sob. at rest or on exertion.  -no syncope, falls, no hx of vt vf scd -no edema, or orthopnea.  -no severe valvular dz -NYHA class II -pt is intermediate risk for intermediate risk procedure and is optimized from a CV perspective and may proceed without further cardiac testing  40 min spent on complete encounter

## 2024-04-03 NOTE — HISTORY OF PRESENT ILLNESS
[FreeTextEntry1] : 78F with HFrEF (25%) asthma, CAD, presents for f/u  Previously, pt seen in cardiology for an initial evaluation in 11/2019 following a inpt course at [facility] for CP and TUCKER On that admission, pt with trop of 600. TTE with mild segmental dys, but cath revealing non-obstructive disease, suspect microvascular dysfunction. pt continued on toprol 25, asa 81 and lipitor. pt not seen since 6/2020, feeling well at that time. pt hospitalized 2/23 for burning CP, similar in quality to her CP in 2019, pt states the symptoms were not resolving. At [facility], cath showing no dz, pt treated for takotsubo cardiomyopathy. TTE in hospital showing LV EF 25% 2/23, feeling well. lisinopril added to toprol. plan was for pt to return in 6 weeks for repeat echo and plan for uptitration of GDMT for HFrEF. last seen here 10/23, pt with fatigue. euvolemic. planning an endoscopy/colonoscopy  pt now presents for follow up, today,   pt feeling well. no cp sob at rest or on exertion. no palpitations dizziness syncope  pt planning R shoulder replacement on 4/9/24 at [facility] Pt not able to ambulate 2/2 knee pain and asthma has had several prev angiograms showing non-obstructive dz (last 2023) Pt grossly euvolemic on exam No severe valvular lesions last tte 10/823 showing preserved LV EF, pt with hx of stress induced CM No known hx of VT/VF/SCD   med hx: asthma, hemochromatosis, HFrEF sx hx: knee x2, femur frx, R shoulder, R hip replacement. fam hx: B- CAD/MI social hx: quit tob 30 yrs (20 pk years), no etoh, lives with  in [place]. retired  of [employer] ([employer] connection) meds: methadone 10 gabapentin, asa fluoxetine toprol 25 lisinopril 5 protonix montelukast proair trelegy trezspire. allergies: nkda

## 2024-04-15 ENCOUNTER — APPOINTMENT (OUTPATIENT)
Dept: PHARMACY | Facility: CLINIC | Age: 79
End: 2024-04-15
Payer: SELF-PAY

## 2024-04-15 PROCEDURE — V5299A: CUSTOM

## 2024-05-28 ENCOUNTER — RX RENEWAL (OUTPATIENT)
Age: 79
End: 2024-05-28

## 2024-05-28 RX ORDER — LISINOPRIL 5 MG/1
5 TABLET ORAL DAILY
Qty: 90 | Refills: 1 | Status: ACTIVE | COMMUNITY
Start: 2023-02-20 | End: 1900-01-01

## 2024-08-05 ENCOUNTER — APPOINTMENT (OUTPATIENT)
Dept: CARDIOLOGY | Facility: CLINIC | Age: 79
End: 2024-08-05

## 2024-08-28 ENCOUNTER — APPOINTMENT (OUTPATIENT)
Dept: CARDIOLOGY | Facility: CLINIC | Age: 79
End: 2024-08-28
Payer: MEDICARE

## 2024-08-28 VITALS
BODY MASS INDEX: 25.64 KG/M2 | TEMPERATURE: 98 F | HEIGHT: 56 IN | WEIGHT: 114 LBS | OXYGEN SATURATION: 90 % | SYSTOLIC BLOOD PRESSURE: 100 MMHG | HEART RATE: 89 BPM | DIASTOLIC BLOOD PRESSURE: 56 MMHG

## 2024-08-28 DIAGNOSIS — I25.10 ATHEROSCLEROTIC HEART DISEASE OF NATIVE CORONARY ARTERY W/OUT ANGINA PECTORIS: ICD-10-CM

## 2024-08-28 DIAGNOSIS — I50.20 UNSPECIFIED SYSTOLIC (CONGESTIVE) HEART FAILURE: ICD-10-CM

## 2024-08-28 PROCEDURE — 99215 OFFICE O/P EST HI 40 MIN: CPT

## 2024-08-28 PROCEDURE — G2211 COMPLEX E/M VISIT ADD ON: CPT

## 2024-08-28 RX ORDER — TEZEPELUMAB-EKKO 210 MG/1.9ML
210 INJECTION, SOLUTION SUBCUTANEOUS
Refills: 0 | Status: ACTIVE | COMMUNITY

## 2024-08-28 RX ORDER — GABAPENTIN 100 MG
100 TABLET ORAL
Refills: 0 | Status: ACTIVE | COMMUNITY

## 2024-08-28 RX ORDER — MULTIVIT-MIN/FA/LYCOPEN/LUTEIN .4-300-25
TABLET ORAL
Refills: 0 | Status: ACTIVE | COMMUNITY

## 2024-08-28 RX ORDER — METHADONE HYDROCHLORIDE 10 MG/5ML
10 SOLUTION ORAL
Refills: 0 | Status: ACTIVE | COMMUNITY

## 2024-08-28 RX ORDER — TURMERIC/TURMERIC EXT/PEPR EXT 900-100 MG
CAPSULE ORAL
Refills: 0 | Status: ACTIVE | COMMUNITY

## 2024-08-28 RX ORDER — ASPIRIN 81 MG
81 TABLET,CHEWABLE ORAL
Refills: 0 | Status: ACTIVE | COMMUNITY

## 2024-08-28 NOTE — REVIEW OF SYSTEMS
[Headache] : no headache [Fever] : no fever [Weight Gain (___ Lbs)] : no recent weight gain [Chills] : no chills [Feeling Fatigued] : not feeling fatigued [Weight Loss (___ Lbs)] : no recent weight loss [Sore Throat] : no sore throat [SOB] : no shortness of breath [Dyspnea on exertion] : not dyspnea during exertion [Chest Discomfort] : no chest discomfort [Lower Ext Edema] : no extremity edema [Palpitations] : no palpitations [Orthopnea] : no orthopnea [Syncope] : no syncope [Cough] : no cough [Wheezing] : wheezing [Nausea] : no nausea [Vomiting] : no vomiting [Confusion] : no confusion was observed [Easy Bleeding] : no tendency for easy bleeding [Easy Bruising] : no tendency for easy bruising

## 2024-08-28 NOTE — HISTORY OF PRESENT ILLNESS
[FreeTextEntry1] : 78F with HFrEF (25%) asthma, CAD, presents for f/u  Previously, pt seen in cardiology for an initial evaluation in 11/2019 following a inpt course at [facility] for CP and TUCKER On that admission, pt with trop of 600. TTE with mild segmental dys, but cath revealing non-obstructive disease, suspect microvascular dysfunction. pt continued on toprol 25, asa 81 and lipitor. pt not seen since 6/2020, feeling well at that time. pt hospitalized 2/23 for burning CP, similar in quality to her CP in 2019, pt states the symptoms were not resolving. At [facility], cath showing no dz, pt treated for takotsubo cardiomyopathy. TTE in hospital showing LV EF 25% 2/23, feeling well. lisinopril added to toprol. plan was for pt to return in 6 weeks for repeat echo and plan for uptitration of GDMT for HFrEF. 10/23, pt with fatigue. euvolemic. planning an endoscopy/colonoscopy. repeat tte showing LV EF 59% last seen 4/24, feeling well, planning shoulder replacement  pt now presents for follow up, today,  pt feeling well. no cp sob at rest or on exertion. no palpitations dizziness syncope. pt endorsing significant fatigue.   planning on a colonoscopy Pt not able to ambulate 2/2 knee pain and asthma has had several prev angiograms showing non-obstructive dz (last 2023) Pt grossly euvolemic on exam No severe valvular lesions last tte 10/23 showing preserved LV EF, pt with hx of stress induced CM No known hx of VT/VF/SCD   med hx: asthma, hemochromatosis, HFrEF wit hrecovered LV EF sx hx: knee x2, femur frx, R shoulder, R hip replacement. fam hx: B- CAD/MI social hx: quit tob 30 yrs (20 pk years), no etoh, lives with  in [place]. retired  of [employer] ([place] connection) meds: methadone 10 gabapentin, asa toprol 25 lisinopril 5 protonix montelukast proair trelegy trezspire.venlafxine allergies: nkda

## 2024-08-28 NOTE — PHYSICAL EXAM
[Well Developed] : well developed [Well Nourished] : well nourished [No Acute Distress] : no acute distress [Normal Venous Pressure] : normal venous pressure [Normal S1, S2] : normal S1, S2 [No Murmur] : no murmur [Juanis ____] : juanis [unfilled] [Wheeze ____] : wheeze [unfilled] [Soft] : abdomen soft [Non Tender] : non-tender [Normal Gait] : normal gait [No Edema] : no edema [No Focal Deficits] : no focal deficits [Moves all extremities] : moves all extremities [Alert and Oriented] : alert and oriented

## 2024-08-28 NOTE — HISTORY OF PRESENT ILLNESS
[FreeTextEntry1] : 78F with HFrEF (25%) asthma, CAD, presents for f/u  Previously, pt seen in cardiology for an initial evaluation in 11/2019 following a inpt course at [facility] for CP and TUCKER On that admission, pt with trop of 600. TTE with mild segmental dys, but cath revealing non-obstructive disease, suspect microvascular dysfunction. pt continued on toprol 25, asa 81 and lipitor. pt not seen since 6/2020, feeling well at that time. pt hospitalized 2/23 for burning CP, similar in quality to her CP in 2019, pt states the symptoms were not resolving. At [facility], cath showing no dz, pt treated for takotsubo cardiomyopathy. TTE in hospital showing LV EF 25% 2/23, feeling well. lisinopril added to toprol. plan was for pt to return in 6 weeks for repeat echo and plan for uptitration of GDMT for HFrEF. 10/23, pt with fatigue. euvolemic. planning an endoscopy/colonoscopy. repeat tte showing LV EF 59% last seen 4/24, feeling well, planning shoulder replacement  pt now presents for follow up, today,  pt feeling well. no cp sob at rest or on exertion. no palpitations dizziness syncope. pt endorsing significant fatigue.   planning on a colonoscopy Pt not able to ambulate 2/2 knee pain and asthma has had several prev angiograms showing non-obstructive dz (last 2023) Pt grossly euvolemic on exam No severe valvular lesions last tte 10/23 showing preserved LV EF, pt with hx of stress induced CM No known hx of VT/VF/SCD   med hx: asthma, hemochromatosis, HFrEF wit hrecovered LV EF sx hx: knee x2, femur frx, R shoulder, R hip replacement. fam hx: B- CAD/MI social hx: quit tob 30 yrs (20 pk years), no etoh, lives with  in [place]. retired  of [employer] ([place] connection) meds: methadone 10 gabapentin, asa toprol 25 lisinopril 5 protonix montelukast proair trelegy trezspire.venlafxine allergies: nkda

## 2024-08-28 NOTE — PHYSICAL EXAM
[Well Developed] : well developed [Well Nourished] : well nourished [No Acute Distress] : no acute distress [Normal Venous Pressure] : normal venous pressure [Normal S1, S2] : normal S1, S2 [No Murmur] : no murmur [Juanis ____] : juanis [unfilled] [Wheeze ____] : wheeze [unfilled] [Soft] : abdomen soft [Non Tender] : non-tender [Normal Gait] : normal gait [No Edema] : no edema [Moves all extremities] : moves all extremities [No Focal Deficits] : no focal deficits [Alert and Oriented] : alert and oriented

## 2024-08-28 NOTE — DISCUSSION/SUMMARY
[FreeTextEntry1] : 78F with HFrEF (25%) asthma, CAD, presents for f/u   hx of Stress induced CM -last tte 10/23 showing improved LV EF to 59 -grossly euvolemic on exam -b/l wheezing on exam, no crackles. at baseline per pt -cont toprol, ace  2. plan for colonoscopy -pt feeling well, denies cp or sob. at rest or on exertion.  -no syncope, falls, no hx of vt vf scd -no edema, or orthopnea.  -no severe valvular dz -NYHA class II -pt is intermediate risk for intermediate risk procedure and is optimized from a CV perspective and may proceed without further cardiac testing  40 min spent on complete encounter

## 2024-10-31 ENCOUNTER — RX RENEWAL (OUTPATIENT)
Age: 79
End: 2024-10-31

## 2024-11-08 NOTE — DIETITIAN INITIAL EVALUATION ADULT. - NUTRITIONGOAL OUTCOME1
MD Notification    Notified Person:  MD    Notified Persons Name: Dr. Quintana    Notification Date/Time: 12/9/17 at 8am    Notification Interaction:  Talked with Physician    Purpose of Notification: Critical lab value of hep 10a 1.62.    Orders Received: No    Comments: pt on eliquis. Heparin gt stopped yesterday. Will continue to monitor.  
Pt here for nurse b/p check.  B/p elevated x 2.  Pt stopped amlodipine as instructed.  States swelling has resolved.  Pt taking metoprolol 50 mg daily and takes it around 3-4pm daily.  Pt reports she went out last night drinking for her boyfriends birthday.  Denies headache, dizziness, sob, palpitations, and cp.  Per Dr Dill pt to start losartan 50 mg daily and return in one week for b/p check.   
PO intake to meet >75% estimated needs.

## 2024-11-27 ENCOUNTER — APPOINTMENT (OUTPATIENT)
Dept: UROGYNECOLOGY | Facility: CLINIC | Age: 79
End: 2024-11-27
Payer: MEDICARE

## 2024-11-27 DIAGNOSIS — N39.41 URGE INCONTINENCE: ICD-10-CM

## 2024-11-27 DIAGNOSIS — R35.0 FREQUENCY OF MICTURITION: ICD-10-CM

## 2024-11-27 DIAGNOSIS — R39.15 URGENCY OF URINATION: ICD-10-CM

## 2024-11-27 DIAGNOSIS — N85.8 OTHER SPECIFIED NONINFLAMMATORY DISORDERS OF UTERUS: ICD-10-CM

## 2024-11-27 PROCEDURE — 99459 PELVIC EXAMINATION: CPT

## 2024-11-27 PROCEDURE — 51701 INSERT BLADDER CATHETER: CPT

## 2024-11-27 PROCEDURE — 99203 OFFICE O/P NEW LOW 30 MIN: CPT | Mod: 25

## 2024-11-27 PROCEDURE — 99204 OFFICE O/P NEW MOD 45 MIN: CPT | Mod: 25

## 2024-11-29 ENCOUNTER — NON-APPOINTMENT (OUTPATIENT)
Age: 79
End: 2024-11-29

## 2024-11-29 LAB
CANDIDA VAG CYTO: NOT DETECTED
G VAGINALIS+PREV SP MTYP VAG QL MICRO: NOT DETECTED
T VAGINALIS VAG QL WET PREP: NOT DETECTED

## 2024-12-03 ENCOUNTER — NON-APPOINTMENT (OUTPATIENT)
Age: 79
End: 2024-12-03

## 2024-12-05 DIAGNOSIS — I51.9 HEART DISEASE, UNSPECIFIED: ICD-10-CM

## 2024-12-05 DIAGNOSIS — Z87.09 PERSONAL HISTORY OF OTHER DISEASES OF THE RESPIRATORY SYSTEM: ICD-10-CM

## 2024-12-05 DIAGNOSIS — Z87.19 PERSONAL HISTORY OF OTHER DISEASES OF THE DIGESTIVE SYSTEM: ICD-10-CM

## 2024-12-05 DIAGNOSIS — Z82.49 FAMILY HISTORY OF ISCHEMIC HEART DISEASE AND OTHER DISEASES OF THE CIRCULATORY SYSTEM: ICD-10-CM

## 2024-12-05 DIAGNOSIS — Z87.39 PERSONAL HISTORY OF OTHER DISEASES OF THE MUSCULOSKELETAL SYSTEM AND CONNECTIVE TISSUE: ICD-10-CM

## 2024-12-05 DIAGNOSIS — Z83.49 FAMILY HISTORY OF OTHER ENDOCRINE, NUTRITIONAL AND METABOLIC DISEASES: ICD-10-CM

## 2024-12-05 DIAGNOSIS — Z87.01 PERSONAL HISTORY OF PNEUMONIA (RECURRENT): ICD-10-CM

## 2024-12-05 RX ORDER — FAMOTIDINE 40 MG/1
40 TABLET, FILM COATED ORAL
Refills: 0 | Status: ACTIVE | COMMUNITY

## 2024-12-05 RX ORDER — PANTOPRAZOLE 40 MG/1
40 TABLET, DELAYED RELEASE ORAL
Refills: 0 | Status: ACTIVE | COMMUNITY

## 2024-12-06 ENCOUNTER — APPOINTMENT (OUTPATIENT)
Dept: GYNECOLOGIC ONCOLOGY | Facility: CLINIC | Age: 79
End: 2024-12-06
Payer: MEDICARE

## 2024-12-06 ENCOUNTER — NON-APPOINTMENT (OUTPATIENT)
Age: 79
End: 2024-12-06

## 2024-12-06 VITALS
BODY MASS INDEX: 26.1 KG/M2 | HEIGHT: 56 IN | TEMPERATURE: 98.4 F | HEART RATE: 76 BPM | OXYGEN SATURATION: 95 % | WEIGHT: 116 LBS | DIASTOLIC BLOOD PRESSURE: 66 MMHG | SYSTOLIC BLOOD PRESSURE: 123 MMHG

## 2024-12-06 DIAGNOSIS — D49.59 NEOPLASM UNSPECIFIED BEHAVIOR OF OTHER GENITOURINARY ORGAN: ICD-10-CM

## 2024-12-06 PROBLEM — N89.8 VAGINAL DISCHARGE: Status: ACTIVE | Noted: 2024-11-27

## 2024-12-06 PROCEDURE — 99459 PELVIC EXAMINATION: CPT

## 2024-12-06 PROCEDURE — 99205 OFFICE O/P NEW HI 60 MIN: CPT

## 2024-12-10 ENCOUNTER — OUTPATIENT (OUTPATIENT)
Dept: OUTPATIENT SERVICES | Facility: HOSPITAL | Age: 79
LOS: 1 days | End: 2024-12-10

## 2024-12-10 VITALS
DIASTOLIC BLOOD PRESSURE: 56 MMHG | OXYGEN SATURATION: 93 % | HEART RATE: 68 BPM | HEIGHT: 56 IN | TEMPERATURE: 99 F | SYSTOLIC BLOOD PRESSURE: 92 MMHG | WEIGHT: 115.08 LBS | RESPIRATION RATE: 16 BRPM

## 2024-12-10 DIAGNOSIS — Z86.79 PERSONAL HISTORY OF OTHER DISEASES OF THE CIRCULATORY SYSTEM: Chronic | ICD-10-CM

## 2024-12-10 DIAGNOSIS — Z96.641 PRESENCE OF RIGHT ARTIFICIAL HIP JOINT: Chronic | ICD-10-CM

## 2024-12-10 DIAGNOSIS — Z96.652 PRESENCE OF LEFT ARTIFICIAL KNEE JOINT: Chronic | ICD-10-CM

## 2024-12-10 DIAGNOSIS — I10 ESSENTIAL (PRIMARY) HYPERTENSION: ICD-10-CM

## 2024-12-10 DIAGNOSIS — J45.909 UNSPECIFIED ASTHMA, UNCOMPLICATED: ICD-10-CM

## 2024-12-10 DIAGNOSIS — Z87.81 PERSONAL HISTORY OF (HEALED) TRAUMATIC FRACTURE: Chronic | ICD-10-CM

## 2024-12-10 DIAGNOSIS — R20.0 ANESTHESIA OF SKIN: ICD-10-CM

## 2024-12-10 DIAGNOSIS — I51.81 TAKOTSUBO SYNDROME: ICD-10-CM

## 2024-12-10 DIAGNOSIS — D49.59 NEOPLASM OF UNSPECIFIED BEHAVIOR OF OTHER GENITOURINARY ORGAN: ICD-10-CM

## 2024-12-10 DIAGNOSIS — Z98.890 OTHER SPECIFIED POSTPROCEDURAL STATES: Chronic | ICD-10-CM

## 2024-12-10 DIAGNOSIS — Z96.611 PRESENCE OF RIGHT ARTIFICIAL SHOULDER JOINT: Chronic | ICD-10-CM

## 2024-12-10 DIAGNOSIS — Z87.898 PERSONAL HISTORY OF OTHER SPECIFIED CONDITIONS: ICD-10-CM

## 2024-12-10 LAB
A1C WITH ESTIMATED AVERAGE GLUCOSE RESULT: 5.4 % — SIGNIFICANT CHANGE UP (ref 4–5.6)
ANION GAP SERPL CALC-SCNC: 9 MMOL/L — SIGNIFICANT CHANGE UP (ref 7–14)
BASOPHILS # BLD AUTO: 0.05 K/UL — SIGNIFICANT CHANGE UP (ref 0–0.2)
BASOPHILS NFR BLD AUTO: 0.6 % — SIGNIFICANT CHANGE UP (ref 0–2)
BLD GP AB SCN SERPL QL: NEGATIVE — SIGNIFICANT CHANGE UP
BUN SERPL-MCNC: 13 MG/DL — SIGNIFICANT CHANGE UP (ref 7–23)
CALCIUM SERPL-MCNC: 9.5 MG/DL — SIGNIFICANT CHANGE UP (ref 8.4–10.5)
CHLORIDE SERPL-SCNC: 100 MMOL/L — SIGNIFICANT CHANGE UP (ref 98–107)
CO2 SERPL-SCNC: 31 MMOL/L — SIGNIFICANT CHANGE UP (ref 22–31)
CREAT SERPL-MCNC: 0.59 MG/DL — SIGNIFICANT CHANGE UP (ref 0.5–1.3)
EGFR: 92 ML/MIN/1.73M2 — SIGNIFICANT CHANGE UP
EOSINOPHIL # BLD AUTO: 0.11 K/UL — SIGNIFICANT CHANGE UP (ref 0–0.5)
EOSINOPHIL NFR BLD AUTO: 1.3 % — SIGNIFICANT CHANGE UP (ref 0–6)
ESTIMATED AVERAGE GLUCOSE: 108 — SIGNIFICANT CHANGE UP
GLUCOSE SERPL-MCNC: 76 MG/DL — SIGNIFICANT CHANGE UP (ref 70–99)
HCT VFR BLD CALC: 36 % — SIGNIFICANT CHANGE UP (ref 34.5–45)
HGB BLD-MCNC: 11.4 G/DL — LOW (ref 11.5–15.5)
IMM GRANULOCYTES NFR BLD AUTO: 0.1 % — SIGNIFICANT CHANGE UP (ref 0–0.9)
LYMPHOCYTES # BLD AUTO: 2.14 K/UL — SIGNIFICANT CHANGE UP (ref 1–3.3)
LYMPHOCYTES # BLD AUTO: 24.3 % — SIGNIFICANT CHANGE UP (ref 13–44)
MCHC RBC-ENTMCNC: 28.7 PG — SIGNIFICANT CHANGE UP (ref 27–34)
MCHC RBC-ENTMCNC: 31.7 G/DL — LOW (ref 32–36)
MCV RBC AUTO: 90.7 FL — SIGNIFICANT CHANGE UP (ref 80–100)
MONOCYTES # BLD AUTO: 1.05 K/UL — HIGH (ref 0–0.9)
MONOCYTES NFR BLD AUTO: 11.9 % — SIGNIFICANT CHANGE UP (ref 2–14)
NEUTROPHILS # BLD AUTO: 5.44 K/UL — SIGNIFICANT CHANGE UP (ref 1.8–7.4)
NEUTROPHILS NFR BLD AUTO: 61.8 % — SIGNIFICANT CHANGE UP (ref 43–77)
PLATELET # BLD AUTO: 284 K/UL — SIGNIFICANT CHANGE UP (ref 150–400)
POTASSIUM SERPL-MCNC: 4.4 MMOL/L — SIGNIFICANT CHANGE UP (ref 3.5–5.3)
POTASSIUM SERPL-SCNC: 4.4 MMOL/L — SIGNIFICANT CHANGE UP (ref 3.5–5.3)
RBC # BLD: 3.97 M/UL — SIGNIFICANT CHANGE UP (ref 3.8–5.2)
RBC # FLD: 13.5 % — SIGNIFICANT CHANGE UP (ref 10.3–14.5)
RH IG SCN BLD-IMP: POSITIVE — SIGNIFICANT CHANGE UP
RH IG SCN BLD-IMP: POSITIVE — SIGNIFICANT CHANGE UP
SODIUM SERPL-SCNC: 140 MMOL/L — SIGNIFICANT CHANGE UP (ref 135–145)
WBC # BLD: 8.8 K/UL — SIGNIFICANT CHANGE UP (ref 3.8–10.5)
WBC # FLD AUTO: 8.8 K/UL — SIGNIFICANT CHANGE UP (ref 3.8–10.5)

## 2024-12-10 RX ORDER — SODIUM CHLORIDE 9 G/1000ML
1000 INJECTION, SOLUTION INTRAVENOUS
Refills: 0 | Status: DISCONTINUED | OUTPATIENT
Start: 2024-12-19 | End: 2024-12-19

## 2024-12-10 NOTE — H&P PST ADULT - PROBLEM SELECTOR PLAN 8
Pre-op Delirium Screening Questionnaire:    Patient eligible for kevin risk screen age>75? Yes (if >75 then done)  Health care proxy paperwork given to patient? Yes (all patients should be given the packet to fill out at home and return on day of surgery to pre-op RN)    Impaired mobility (ie: uses cane, walker, wheelchair, or assist device)? Yes  Known dementia diagnosis? no  Impaired functional status (METS<4)? no  Malnutrition BMI<20? no    Surgeon notified via email regarding screening results.

## 2024-12-10 NOTE — H&P PST ADULT - MUSCULOSKELETAL
details… uses a cane/normal/ROM intact/normal gait/strength 5/5 bilateral upper extremities/strength 5/5 bilateral lower extremities/abnormal gait

## 2024-12-10 NOTE — H&P PST ADULT - NS MD HP INPLANTS MED DEV
left femur fracture, Right shoulder replacement, Right hip replacement, Left  knee replacement x2, dental implants/Artificial joint/Hearing aid

## 2024-12-10 NOTE — H&P PST ADULT - PROBLEM SELECTOR PLAN 1
Problem: Pain  Goal: Verbalizes/displays adequate comfort level or baseline comfort level  2023 by Fermin Garcia RN  Outcome: Progressing  Flowsheets (Taken 2023 by Taylor Early RN)  Verbalizes/displays adequate comfort level or baseline comfort level: Encourage patient to monitor pain and request assistance  2023 by Taylor Early RN  Outcome: Progressing  Flowsheets (Taken 2023 1945)  Verbalizes/displays adequate comfort level or baseline comfort level: Encourage patient to monitor pain and request assistance     Problem: Vaginal Birth or  Section  Goal: Fetal and maternal status remain reassuring during the birth process  Description:  Birth OB-Pregnancy care plan goal which identifies if the fetal and maternal status remain reassuring during the birth process  2023 by Fermin Garcia RN  Outcome: Progressing  Flowsheets  Taken 2023 by Fermin Garcia RN  Fetal and Maternal Status Remain Reassuring During the Birth Process:   Monitor vital signs   Monitor fetal heart rate   Monitor uterine activity   Monitor labor progression (Vaginal delivery)  Taken 2023 by Taylor Early RN  Fetal and Maternal Status Remain Reassuring During the Birth Process:   Monitor vital signs   Monitor fetal heart rate   Monitor uterine activity   Monitor labor progression (Vaginal delivery)  2023 by Taylor Early RN  Outcome: Progressing     Problem: Safety - Adult  Goal: Free from fall injury  2023 by Fermin Garcia RN  Outcome: Progressing  Flowsheets  Taken 2023 by Fermin Garcia RN  Free From Fall Injury: Instruct family/caregiver on patient safety  Taken 2023 by Taylor Early RN  Free From Fall Injury: Instruct family/caregiver on patient safety  2023 by Taylor Early RN  Outcome: Progressing Patient tentatively scheduled for  robotic assisted total laparoscopic hysterectomy bilateral salpingo oophorectomy possible staging for 12/12/24. Pre-op instructions provided. Pt given verbal and written instructions with teach back on chlorhexidine shampoo. Pt will take own famotidine and pantoprazole on the morning of procedure for GI prophylaxis.  Pt verbalized understanding with return demonstration.     PST CBC T&S ABO BMP A1C done as per protocol

## 2024-12-10 NOTE — H&P PST ADULT - PROBLEM SELECTOR PLAN 3
Patient instructed to take methadone and gabapentin with a sip of water on the morning of procedure.     Email sent to pain management.

## 2024-12-10 NOTE — H&P PST ADULT - LAST CARDIAC ANGIOGRAM/IMAGING
2/2023 ([facility]) indication: Myocardial infarction without ST elevation. Severe segmental LV dysfunction with hyperdynamic base suggestive of stress induced cardiomyopathy

## 2024-12-10 NOTE — H&P PST ADULT - HISTORY OF PRESENT ILLNESS
80 y/o female PMH HTN HLD MI stress induced CM (Takutsobo) hospitalized in [facility] in 2/2023 (now preserved EF 59% on echo 10/2023) hemochromatosis (now stable, last phlebotomy prior to COVID pandemic) severe asthma, chronic back pain (on methadone) presents to presurgical testing with diagnosis of neoplasm of unspecified behavior of other  organ. Pt is scheduled for robotic assisted total laparoscopic hysterectomy bilateral salpingo oophorectomy possible staging.

## 2024-12-10 NOTE — H&P PST ADULT - RESPIRATORY RATE (BREATHS/MIN)
[16

## 2024-12-10 NOTE — H&P PST ADULT - NEUROLOGICAL
Assumed care of patient 1930 resting in bed. AO x4. NSR/ST on tele monitor. O2 sats adequate on room air. Denies chest pain, but complains of ongoing right flank pain. Endorses dyspnea on exertion. Heparin gtt infusing per MAR.  Ambulating in room without i details… cranial nerves II-XII intact/sensation intact

## 2024-12-10 NOTE — H&P PST ADULT - NSICDXPASTSURGICALHX_GEN_ALL_CORE_FT
PAST SURGICAL HISTORY:  H/O eye surgery     H/O fracture of femur Left    H/O laminectomy thoracolumbar    H/O total shoulder replacement, right     H/O varicose veins     S/P hip replacement, right     S/P knee replacement, left     S/P rotator cuff repair R shoulder

## 2024-12-10 NOTE — H&P PST ADULT - PRIMARY CARE PROVIDER
Dr Hermosillo  [phone]                                                                                                                                                                        Dr Byrne pulmonary [phone]

## 2024-12-10 NOTE — H&P PST ADULT - PROBLEM SELECTOR PLAN 7
Patient instructed to take metoprolol and lisinopril with a sip of water on the morning of procedure.

## 2024-12-10 NOTE — H&P PST ADULT - NSICDXPASTMEDICALHX_GEN_ALL_CORE_FT
PAST MEDICAL HISTORY:  Anxiety     Asthma     Cervical radiculopathy     Chronic back pain     H/O cardiomyopathy     Heart failure with reduced ejection fraction     Hemochromatosis     HTN (hypertension)     Lumbar stenosis     Myocardial infarction     NSTEMI (non-ST elevation myocardial infarction)     Osteoarthritis     Osteoporosis     Sensorineural hearing loss     Takotsubo syndrome

## 2024-12-10 NOTE — H&P PST ADULT - PROBLEM SELECTOR PLAN 2
Pt was evaluated by cardiology last on 8/28/24 prior to colonoscopy  "optimized from a CV perspective and may proceed without further cardiac testing"    Pt instructed to continue ASA for cardioprotection.

## 2024-12-10 NOTE — H&P PST ADULT - PROBLEM SELECTOR PLAN 5
chronic SOB, and wheezing, patient states h/o severe asthma, no recent exacerbations.  pt instructed to take her inhalers on DOS.      Pending pulmonary evaluation, has appt on 12/11/24 8am.

## 2024-12-10 NOTE — H&P PST ADULT - OTHER CARE PROVIDERS
Dr Guillory Pepe pain management  [phone]                                                                                                                      Dr Han Garnett cardiologist  [phone]

## 2024-12-10 NOTE — H&P PST ADULT - NSANTHOSAYNRD_GEN_A_CORE
No. MODESTA screening performed.  STOP BANG Legend: 0-2 = LOW Risk; 3-4 = INTERMEDIATE Risk; 5-8 = HIGH Risk

## 2024-12-11 ENCOUNTER — APPOINTMENT (OUTPATIENT)
Dept: CARDIOLOGY | Facility: CLINIC | Age: 79
End: 2024-12-11
Payer: MEDICARE

## 2024-12-11 ENCOUNTER — NON-APPOINTMENT (OUTPATIENT)
Age: 79
End: 2024-12-11

## 2024-12-11 VITALS
HEIGHT: 56 IN | TEMPERATURE: 97.9 F | HEART RATE: 93 BPM | DIASTOLIC BLOOD PRESSURE: 66 MMHG | BODY MASS INDEX: 26.32 KG/M2 | WEIGHT: 117 LBS | SYSTOLIC BLOOD PRESSURE: 148 MMHG

## 2024-12-11 VITALS — OXYGEN SATURATION: 95 %

## 2024-12-11 DIAGNOSIS — N89.8 OTHER SPECIFIED NONINFLAMMATORY DISORDERS OF VAGINA: ICD-10-CM

## 2024-12-11 DIAGNOSIS — I50.20 UNSPECIFIED SYSTOLIC (CONGESTIVE) HEART FAILURE: ICD-10-CM

## 2024-12-11 DIAGNOSIS — I51.81 TAKOTSUBO SYNDROME: ICD-10-CM

## 2024-12-11 DIAGNOSIS — I25.10 ATHEROSCLEROTIC HEART DISEASE OF NATIVE CORONARY ARTERY W/OUT ANGINA PECTORIS: ICD-10-CM

## 2024-12-11 PROCEDURE — 93000 ELECTROCARDIOGRAM COMPLETE: CPT

## 2024-12-11 PROCEDURE — 99214 OFFICE O/P EST MOD 30 MIN: CPT | Mod: 25

## 2024-12-11 NOTE — ASU PATIENT PROFILE, ADULT - SPIRITUAL CULTURAL, CURRENT SITUATION, PROFILE
H&P  Orthopaedics    SUBJECTIVE:   Chief Complaint:  Bilateral hand contractures    History of Present Illness:  Patient is a 46 y.o. male who presents with 2 year history of worsening bilateral hand contractures.  The patient endorses he was diagnosed with polymyositis 3 years ago that is caused him debility with his hands with worsening wrist and digit hand contractures.  Endorses his left is worse than his right.  Denies pain or decreased sensation.  Does have the ability to do basic tasks such as grab things like a pizza and type with the assistance of a device.  Does have difficulty with hygiene due to his contractures and affects his ADLs.  Primarily wheelchair-bound and sometimes walks with his walker.  Also has chronic cough for which he [facility] a pulmonologist [facility] He is on Cellcept for his polymyositis and endorses that his chronic cough has improved after starting a decreased dose, but his hand deformities have not.  Denies any lacking range of motion or strength of his bilateral shoulders or elbows.  Was seen by Dr. Perez initially for this issue and was previously scheduled for surgery, but due to approval, was not obtained.  Patient presents today for 2nd opinion.  Lives with his wife who is his primary caregiver.  Presents in wheelchair.     No hx of therapy, HEP, bracing, CSI, or Sx to [facility]. They are RHD.  [employer] .    Scheduled Meds:  No current outpatient medications on file.     No current facility-administered medications for this visit.     Continuous Infusions:  No current outpatient medications on file.     No current facility-administered medications for this visit.     PRN Meds:  No current outpatient medications on file.     No current facility-administered medications for this visit.       Review of patient's allergies indicates:  No Known Allergies    No past medical history on file.  No past surgical history on file.  No family history on file.     Patients mother was called and given positive covid results. CDC guidelines were reviewed. Mother denied questions/concerns.        Review of Systems:  Patient denies constitutional symptoms, cardiac symptoms, respiratory symptoms, GI symptoms.  The remainder of the musculoskeletal ROS is included in the HPI.      OBJECTIVE:     Physical Exam:  Gen:  No acute distress  CV:  Peripherally well-perfused.  Pulses 2+ bilaterally.  Lungs:  Normal respiratory effort.  Abdomen:  Soft, non-tender, non-distended  Head/Neck:  Normocephalic.  Atraumatic. No TTP, AROM and PROM intact without pain  Neuro:  CN intact without deficit, SILT throughout B/L Upper & Lower Extremities     Bilateral Hand/Wrist Examination:     No open wounds, abrasions, or surgical scars.  Nontender to palpation throughout.  Minimal range of motion of the wrist active or passively, but stuck in neutral position.  Ulnar drift of 2nd through 5th digits with subluxation of the extensor tendons.  MCPs contracted to 90° without contractures of the IP joints.  Left index finger PIPJ joint contracted at 90°.  He has no active extension to the 2nd through 5th digits bilaterally.  Has active extension of the thumb bilaterally.  Can make a complete fist.  Sensation intact to light touch throughout.  2+ radial pulse.    Stuck in pronation and can get to neutral. Otherwise ROM elbow full, painless     Abdomen not guarded  Respirations nonlabored  Perfusion intact                      Diagnostic Results:    Imaging - I independently viewed the patient's imaging as well as the radiology report.  Xrays of the patient's Bilateral hands demonstrate no evidence of any acute fractures.  Right 2nd through 5th MCP joints dislocated volarly stuck at 90° with ulnar deviation..  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      ASSESSMENT/PLAN:     A/P: Jethro FIGUEROA Kalani CARIN is a 46 y.o. 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  Endorses his left hand is more  symptomatic.      Plan:  - Had an extensive discussion with the patient regarding diagnoses, reviewing images, and discussing conservative versus nonconservative options.  Overall I think the patient would benefit from surgical intervention to improve his function, but given the complexity of his deformities we would like to obtain more advanced imaging to further evaluate this.  He was in agreement with this plan.  Bilateral hand CTs ordered.  Follow up after CT to discuss imaging and possible surgical intervention.    - I spent more than 30 minutes reviewing this patient's medical records, imaging studies, and taking a full history and physical, and discussing his treatment plan and expected prognosis.  More than 50% of this time was spent face to face with the patient.  30 minutes of face to face consultation and 30 minutes of chart review and coordination of care.      Wally Phillip MD  Orthopaedic Surgery Resident       none

## 2024-12-11 NOTE — ASU PATIENT PROFILE, ADULT - PREOP PAIN SCORE
Received faxed refill request for this medication from the pharmacy that is on file.     estradiol (ESTRACE) 1 mg tablet  Normal, Disp-180 Tab, R-1
0

## 2024-12-11 NOTE — ASU PATIENT PROFILE, ADULT - FALL HARM RISK - HARM RISK INTERVENTIONS

## 2024-12-13 LAB — CYTOLOGY CVX/VAG DOC THIN PREP: ABNORMAL

## 2024-12-19 ENCOUNTER — INPATIENT (INPATIENT)
Facility: HOSPITAL | Age: 79
LOS: 1 days | Discharge: HOME | End: 2024-12-21
Attending: OBSTETRICS & GYNECOLOGY | Admitting: OBSTETRICS & GYNECOLOGY
Payer: MEDICARE

## 2024-12-19 ENCOUNTER — APPOINTMENT (OUTPATIENT)
Dept: GYNECOLOGIC ONCOLOGY | Facility: HOSPITAL | Age: 79
End: 2024-12-19

## 2024-12-19 ENCOUNTER — TRANSCRIPTION ENCOUNTER (OUTPATIENT)
Age: 79
End: 2024-12-19

## 2024-12-19 VITALS
HEIGHT: 56 IN | OXYGEN SATURATION: 96 % | WEIGHT: 115.08 LBS | DIASTOLIC BLOOD PRESSURE: 58 MMHG | RESPIRATION RATE: 16 BRPM | TEMPERATURE: 99 F | SYSTOLIC BLOOD PRESSURE: 114 MMHG | HEART RATE: 79 BPM

## 2024-12-19 DIAGNOSIS — Z98.890 OTHER SPECIFIED POSTPROCEDURAL STATES: Chronic | ICD-10-CM

## 2024-12-19 DIAGNOSIS — Z96.641 PRESENCE OF RIGHT ARTIFICIAL HIP JOINT: Chronic | ICD-10-CM

## 2024-12-19 DIAGNOSIS — Z96.652 PRESENCE OF LEFT ARTIFICIAL KNEE JOINT: Chronic | ICD-10-CM

## 2024-12-19 DIAGNOSIS — Z96.611 PRESENCE OF RIGHT ARTIFICIAL SHOULDER JOINT: Chronic | ICD-10-CM

## 2024-12-19 DIAGNOSIS — Z87.81 PERSONAL HISTORY OF (HEALED) TRAUMATIC FRACTURE: Chronic | ICD-10-CM

## 2024-12-19 DIAGNOSIS — D49.59 NEOPLASM OF UNSPECIFIED BEHAVIOR OF OTHER GENITOURINARY ORGAN: ICD-10-CM

## 2024-12-19 DIAGNOSIS — Z86.79 PERSONAL HISTORY OF OTHER DISEASES OF THE CIRCULATORY SYSTEM: Chronic | ICD-10-CM

## 2024-12-19 LAB
GAS PNL BLDA: SIGNIFICANT CHANGE UP
GLUCOSE BLDC GLUCOMTR-MCNC: 79 MG/DL — SIGNIFICANT CHANGE UP (ref 70–99)

## 2024-12-19 DEVICE — VISTASEAL FIBRIN HUMAN 10ML: Type: IMPLANTABLE DEVICE | Status: FUNCTIONAL

## 2024-12-19 DEVICE — SURGICEL POWDER 3 GRAMS: Type: IMPLANTABLE DEVICE | Status: FUNCTIONAL

## 2024-12-19 RX ORDER — MONTELUKAST SODIUM 10 MG/1
1 TABLET ORAL
Refills: 0 | DISCHARGE

## 2024-12-19 RX ORDER — KETOROLAC TROMETHAMINE 30 MG/ML
15 INJECTION, SOLUTION INTRAMUSCULAR; INTRAVENOUS ONCE
Refills: 0 | Status: DISCONTINUED | OUTPATIENT
Start: 2024-12-19 | End: 2024-12-19

## 2024-12-19 RX ORDER — ESCITALOPRAM OXALATE 20 MG/1
5 TABLET ORAL DAILY
Refills: 0 | Status: DISCONTINUED | OUTPATIENT
Start: 2024-12-19 | End: 2024-12-21

## 2024-12-19 RX ORDER — HYDROMORPHONE/SOD CHLOR,ISO/PF 2 MG/10 ML
0.25 SYRINGE (ML) INJECTION
Refills: 0 | Status: DISCONTINUED | OUTPATIENT
Start: 2024-12-19 | End: 2024-12-19

## 2024-12-19 RX ORDER — TEZEPELUMAB-EKKO 210 MG/1.9ML
210 INJECTION, SOLUTION SUBCUTANEOUS
Refills: 0 | DISCHARGE

## 2024-12-19 RX ORDER — METHADONE HCL 10 MG
10 TABLET ORAL EVERY 12 HOURS
Refills: 0 | Status: DISCONTINUED | OUTPATIENT
Start: 2024-12-19 | End: 2024-12-21

## 2024-12-19 RX ORDER — HEPARIN SODIUM 1000 [USP'U]/ML
5000 INJECTION INTRAVENOUS; SUBCUTANEOUS EVERY 8 HOURS
Refills: 0 | Status: DISCONTINUED | OUTPATIENT
Start: 2024-12-19 | End: 2024-12-21

## 2024-12-19 RX ORDER — ALBUTEROL SULFATE 2.5 MG/3ML
2 VIAL, NEBULIZER (ML) INHALATION EVERY 6 HOURS
Refills: 0 | Status: DISCONTINUED | OUTPATIENT
Start: 2024-12-19 | End: 2024-12-21

## 2024-12-19 RX ORDER — FLUTICASONE FUROATE, UMECLIDINIUM BROMIDE AND VILANTEROL TRIFENATATE 100; 62.5; 25 UG/1; UG/1; UG/1
1 POWDER RESPIRATORY (INHALATION)
Refills: 0 | DISCHARGE

## 2024-12-19 RX ORDER — ACETAMINOPHEN 500 MG/5ML
1000 LIQUID (ML) ORAL EVERY 6 HOURS
Refills: 0 | Status: DISCONTINUED | OUTPATIENT
Start: 2024-12-19 | End: 2024-12-21

## 2024-12-19 RX ORDER — ACETAMINOPHEN 500 MG/5ML
1000 LIQUID (ML) ORAL ONCE
Refills: 0 | Status: COMPLETED | OUTPATIENT
Start: 2024-12-19 | End: 2024-12-19

## 2024-12-19 RX ORDER — METHADONE HYDROCHLORIDE 5 MG/1
1 TABLET ORAL
Refills: 0 | DISCHARGE

## 2024-12-19 RX ORDER — ESCITALOPRAM OXALATE 10 MG/1
1 TABLET, FILM COATED ORAL
Refills: 0 | DISCHARGE

## 2024-12-19 RX ORDER — METOPROLOL TARTRATE 100 MG/1
0.5 TABLET, FILM COATED ORAL
Refills: 0 | DISCHARGE

## 2024-12-19 RX ORDER — CYANOCOBALAMIN/FOLIC AC/VIT B6 1-2.2-25MG
1 TABLET ORAL
Refills: 0 | DISCHARGE

## 2024-12-19 RX ORDER — GABAPENTIN 400 MG/1
100 CAPSULE ORAL EVERY 12 HOURS
Refills: 0 | Status: DISCONTINUED | OUTPATIENT
Start: 2024-12-19 | End: 2024-12-21

## 2024-12-19 RX ORDER — SIMETHICONE 80 MG
80 TABLET,CHEWABLE ORAL EVERY 6 HOURS
Refills: 0 | Status: DISCONTINUED | OUTPATIENT
Start: 2024-12-19 | End: 2024-12-20

## 2024-12-19 RX ORDER — SODIUM CHLORIDE 9 G/1000ML
1000 INJECTION, SOLUTION INTRAVENOUS
Refills: 0 | Status: DISCONTINUED | OUTPATIENT
Start: 2024-12-19 | End: 2024-12-20

## 2024-12-19 RX ORDER — OXYCODONE HYDROCHLORIDE 30 MG/1
5 TABLET ORAL
Refills: 0 | Status: DISCONTINUED | OUTPATIENT
Start: 2024-12-19 | End: 2024-12-20

## 2024-12-19 RX ORDER — METOPROLOL SUCCINATE 50 MG/1
5 TABLET, EXTENDED RELEASE ORAL EVERY 6 HOURS
Refills: 0 | Status: DISCONTINUED | OUTPATIENT
Start: 2024-12-19 | End: 2024-12-21

## 2024-12-19 RX ORDER — TIOTROPIUM BROMIDE INHALATION SPRAY 3.12 UG/1
2 SPRAY, METERED RESPIRATORY (INHALATION) DAILY
Refills: 0 | Status: DISCONTINUED | OUTPATIENT
Start: 2024-12-19 | End: 2024-12-21

## 2024-12-19 RX ORDER — ONDANSETRON HCL/PF 4 MG/2 ML
8 VIAL (ML) INJECTION EVERY 8 HOURS
Refills: 0 | Status: DISCONTINUED | OUTPATIENT
Start: 2024-12-19 | End: 2024-12-21

## 2024-12-19 RX ORDER — FAMOTIDINE 20 MG/1
1 TABLET, FILM COATED ORAL
Refills: 0 | DISCHARGE

## 2024-12-19 RX ORDER — LISINOPRIL 20 MG/1
1 TABLET ORAL
Refills: 0 | DISCHARGE

## 2024-12-19 RX ORDER — OXYCODONE HYDROCHLORIDE 30 MG/1
1 TABLET ORAL
Qty: 0 | Refills: 0 | DISCHARGE
Start: 2024-12-19

## 2024-12-19 RX ORDER — GABAPENTIN 300 MG/1
1 CAPSULE ORAL
Refills: 0 | DISCHARGE

## 2024-12-19 RX ORDER — ALBUTEROL 90 MCG
2 AEROSOL (GRAM) INHALATION
Refills: 0 | DISCHARGE

## 2024-12-19 RX ORDER — HYDROMORPHONE/SOD CHLOR,ISO/PF 2 MG/10 ML
0.5 SYRINGE (ML) INJECTION
Refills: 0 | Status: DISCONTINUED | OUTPATIENT
Start: 2024-12-19 | End: 2024-12-19

## 2024-12-19 RX ORDER — ACETAMINOPHEN 500 MG/5ML
1000 LIQUID (ML) ORAL EVERY 6 HOURS
Refills: 0 | Status: DISCONTINUED | OUTPATIENT
Start: 2024-12-19 | End: 2024-12-20

## 2024-12-19 RX ORDER — METOPROLOL SUCCINATE 50 MG/1
5 TABLET, EXTENDED RELEASE ORAL EVERY 6 HOURS
Refills: 0 | Status: DISCONTINUED | OUTPATIENT
Start: 2024-12-19 | End: 2024-12-19

## 2024-12-19 RX ORDER — VITAMIN E (DL,TOCOPHERYL ACET) 180 MG
1 CAPSULE ORAL
Refills: 0 | DISCHARGE

## 2024-12-19 RX ORDER — SODIUM CHLORIDE 9 G/1000ML
1000 INJECTION, SOLUTION INTRAVENOUS
Refills: 0 | Status: DISCONTINUED | OUTPATIENT
Start: 2024-12-19 | End: 2024-12-19

## 2024-12-19 RX ORDER — PANTOPRAZOLE SODIUM 40 MG/1
1 TABLET, DELAYED RELEASE ORAL
Refills: 0 | DISCHARGE

## 2024-12-19 RX ORDER — ONDANSETRON HCL/PF 4 MG/2 ML
4 VIAL (ML) INJECTION ONCE
Refills: 0 | Status: DISCONTINUED | OUTPATIENT
Start: 2024-12-19 | End: 2024-12-19

## 2024-12-19 RX ADMIN — Medication 1 APPLICATION(S): at 07:05

## 2024-12-19 RX ADMIN — Medication 80 MILLIGRAM(S): at 14:15

## 2024-12-19 RX ADMIN — SODIUM CHLORIDE 75 MILLILITER(S): 9 INJECTION, SOLUTION INTRAVENOUS at 12:25

## 2024-12-19 RX ADMIN — KETOROLAC TROMETHAMINE 15 MILLIGRAM(S): 30 INJECTION, SOLUTION INTRAMUSCULAR; INTRAVENOUS at 14:15

## 2024-12-19 RX ADMIN — Medication 0.5 MILLIGRAM(S): at 12:55

## 2024-12-19 RX ADMIN — Medication 1000 MILLIGRAM(S): at 13:15

## 2024-12-19 RX ADMIN — Medication 0.5 MILLIGRAM(S): at 12:20

## 2024-12-19 RX ADMIN — SODIUM CHLORIDE 75 MILLILITER(S): 9 INJECTION, SOLUTION INTRAVENOUS at 18:30

## 2024-12-19 RX ADMIN — Medication 400 MILLIGRAM(S): at 13:00

## 2024-12-19 RX ADMIN — KETOROLAC TROMETHAMINE 15 MILLIGRAM(S): 30 INJECTION, SOLUTION INTRAMUSCULAR; INTRAVENOUS at 15:00

## 2024-12-19 RX ADMIN — Medication 3 MILLILITER(S): at 23:01

## 2024-12-19 RX ADMIN — Medication 3 MILLILITER(S): at 13:43

## 2024-12-19 RX ADMIN — Medication 10 MILLIGRAM(S): at 18:30

## 2024-12-19 RX ADMIN — HEPARIN SODIUM 5000 UNIT(S): 1000 INJECTION INTRAVENOUS; SUBCUTANEOUS at 22:42

## 2024-12-19 RX ADMIN — Medication 0.5 MILLIGRAM(S): at 13:10

## 2024-12-19 RX ADMIN — HEPARIN SODIUM 5000 UNIT(S): 1000 INJECTION INTRAVENOUS; SUBCUTANEOUS at 16:25

## 2024-12-19 RX ADMIN — Medication 1000 MILLIGRAM(S): at 19:30

## 2024-12-19 RX ADMIN — GABAPENTIN 100 MILLIGRAM(S): 400 CAPSULE ORAL at 18:30

## 2024-12-19 RX ADMIN — Medication 1000 MILLIGRAM(S): at 20:30

## 2024-12-19 RX ADMIN — METOPROLOL SUCCINATE 5 MILLIGRAM(S): 50 TABLET, EXTENDED RELEASE ORAL at 18:30

## 2024-12-20 LAB
ANION GAP SERPL CALC-SCNC: 11 MMOL/L — SIGNIFICANT CHANGE UP (ref 7–14)
BASOPHILS # BLD AUTO: 0.01 K/UL — SIGNIFICANT CHANGE UP (ref 0–0.2)
BASOPHILS # BLD AUTO: 0.02 K/UL — SIGNIFICANT CHANGE UP (ref 0–0.2)
BASOPHILS NFR BLD AUTO: 0.1 % — SIGNIFICANT CHANGE UP (ref 0–2)
BASOPHILS NFR BLD AUTO: 0.1 % — SIGNIFICANT CHANGE UP (ref 0–2)
BLD GP AB SCN SERPL QL: NEGATIVE — SIGNIFICANT CHANGE UP
BUN SERPL-MCNC: 15 MG/DL — SIGNIFICANT CHANGE UP (ref 7–23)
CALCIUM SERPL-MCNC: 8.4 MG/DL — SIGNIFICANT CHANGE UP (ref 8.4–10.5)
CHLORIDE SERPL-SCNC: 99 MMOL/L — SIGNIFICANT CHANGE UP (ref 98–107)
CO2 SERPL-SCNC: 24 MMOL/L — SIGNIFICANT CHANGE UP (ref 22–31)
CREAT SERPL-MCNC: 0.55 MG/DL — SIGNIFICANT CHANGE UP (ref 0.5–1.3)
EGFR: 93 ML/MIN/1.73M2 — SIGNIFICANT CHANGE UP
EGFR: 93 ML/MIN/1.73M2 — SIGNIFICANT CHANGE UP
EOSINOPHIL # BLD AUTO: 0 K/UL — SIGNIFICANT CHANGE UP (ref 0–0.5)
EOSINOPHIL # BLD AUTO: 0.01 K/UL — SIGNIFICANT CHANGE UP (ref 0–0.5)
EOSINOPHIL NFR BLD AUTO: 0 % — SIGNIFICANT CHANGE UP (ref 0–6)
EOSINOPHIL NFR BLD AUTO: 0.1 % — SIGNIFICANT CHANGE UP (ref 0–6)
GLUCOSE SERPL-MCNC: 138 MG/DL — HIGH (ref 70–99)
HCT VFR BLD CALC: 27.5 % — LOW (ref 34.5–45)
HCT VFR BLD CALC: 29.7 % — LOW (ref 34.5–45)
HGB BLD-MCNC: 8.9 G/DL — LOW (ref 11.5–15.5)
HGB BLD-MCNC: 9.3 G/DL — LOW (ref 11.5–15.5)
IANC: 11 K/UL — HIGH (ref 1.8–7.4)
IANC: 11.06 K/UL — HIGH (ref 1.8–7.4)
IMM GRANULOCYTES NFR BLD AUTO: 0.5 % — SIGNIFICANT CHANGE UP (ref 0–0.9)
IMM GRANULOCYTES NFR BLD AUTO: 0.6 % — SIGNIFICANT CHANGE UP (ref 0–0.9)
LYMPHOCYTES # BLD AUTO: 14.6 % — SIGNIFICANT CHANGE UP (ref 13–44)
LYMPHOCYTES # BLD AUTO: 17.3 % — SIGNIFICANT CHANGE UP (ref 13–44)
LYMPHOCYTES # BLD AUTO: 2.12 K/UL — SIGNIFICANT CHANGE UP (ref 1–3.3)
LYMPHOCYTES # BLD AUTO: 2.6 K/UL — SIGNIFICANT CHANGE UP (ref 1–3.3)
MAGNESIUM SERPL-MCNC: 2.1 MG/DL — SIGNIFICANT CHANGE UP (ref 1.6–2.6)
MCHC RBC-ENTMCNC: 28.2 PG — SIGNIFICANT CHANGE UP (ref 27–34)
MCHC RBC-ENTMCNC: 28.9 PG — SIGNIFICANT CHANGE UP (ref 27–34)
MCHC RBC-ENTMCNC: 31.3 G/DL — LOW (ref 32–36)
MCHC RBC-ENTMCNC: 32.4 G/DL — SIGNIFICANT CHANGE UP (ref 32–36)
MCV RBC AUTO: 89.3 FL — SIGNIFICANT CHANGE UP (ref 80–100)
MCV RBC AUTO: 90 FL — SIGNIFICANT CHANGE UP (ref 80–100)
MONOCYTES # BLD AUTO: 1.23 K/UL — HIGH (ref 0–0.9)
MONOCYTES # BLD AUTO: 1.27 K/UL — HIGH (ref 0–0.9)
MONOCYTES NFR BLD AUTO: 8.2 % — SIGNIFICANT CHANGE UP (ref 2–14)
MONOCYTES NFR BLD AUTO: 8.8 % — SIGNIFICANT CHANGE UP (ref 2–14)
NEUTROPHILS # BLD AUTO: 11 K/UL — HIGH (ref 1.8–7.4)
NEUTROPHILS # BLD AUTO: 11.06 K/UL — HIGH (ref 1.8–7.4)
NEUTROPHILS NFR BLD AUTO: 73.8 % — SIGNIFICANT CHANGE UP (ref 43–77)
NEUTROPHILS NFR BLD AUTO: 75.9 % — SIGNIFICANT CHANGE UP (ref 43–77)
NRBC # BLD AUTO: 0 K/UL — SIGNIFICANT CHANGE UP (ref 0–0)
NRBC # BLD AUTO: 0 K/UL — SIGNIFICANT CHANGE UP (ref 0–0)
NRBC # BLD: 0 /100 WBCS — SIGNIFICANT CHANGE UP (ref 0–0)
NRBC # BLD: 0 /100 WBCS — SIGNIFICANT CHANGE UP (ref 0–0)
NRBC # FLD: 0 K/UL — SIGNIFICANT CHANGE UP (ref 0–0)
NRBC # FLD: 0 K/UL — SIGNIFICANT CHANGE UP (ref 0–0)
NRBC BLD-RTO: 0 /100 WBCS — SIGNIFICANT CHANGE UP (ref 0–0)
NRBC BLD-RTO: 0 /100 WBCS — SIGNIFICANT CHANGE UP (ref 0–0)
PHOSPHATE SERPL-MCNC: 3.2 MG/DL — SIGNIFICANT CHANGE UP (ref 2.5–4.5)
PLATELET # BLD AUTO: 252 K/UL — SIGNIFICANT CHANGE UP (ref 150–400)
PLATELET # BLD AUTO: 286 K/UL — SIGNIFICANT CHANGE UP (ref 150–400)
POTASSIUM SERPL-MCNC: 5 MMOL/L — SIGNIFICANT CHANGE UP (ref 3.5–5.3)
POTASSIUM SERPL-SCNC: 5 MMOL/L — SIGNIFICANT CHANGE UP (ref 3.5–5.3)
RBC # BLD: 3.08 M/UL — LOW (ref 3.8–5.2)
RBC # BLD: 3.3 M/UL — LOW (ref 3.8–5.2)
RBC # FLD: 13.7 % — SIGNIFICANT CHANGE UP (ref 10.3–14.5)
RBC # FLD: 13.9 % — SIGNIFICANT CHANGE UP (ref 10.3–14.5)
RH IG SCN BLD-IMP: POSITIVE — SIGNIFICANT CHANGE UP
SODIUM SERPL-SCNC: 134 MMOL/L — LOW (ref 135–145)
WBC # BLD: 14.48 K/UL — HIGH (ref 3.8–10.5)
WBC # BLD: 14.99 K/UL — HIGH (ref 3.8–10.5)
WBC # FLD AUTO: 14.48 K/UL — HIGH (ref 3.8–10.5)
WBC # FLD AUTO: 14.99 K/UL — HIGH (ref 3.8–10.5)

## 2024-12-20 RX ORDER — HYDROMORPHONE/SOD CHLOR,ISO/PF 2 MG/10 ML
1 SYRINGE (ML) INJECTION EVERY 4 HOURS
Refills: 0 | Status: DISCONTINUED | OUTPATIENT
Start: 2024-12-20 | End: 2024-12-20

## 2024-12-20 RX ORDER — SIMETHICONE 80 MG
80 TABLET,CHEWABLE ORAL EVERY 6 HOURS
Refills: 0 | Status: DISCONTINUED | OUTPATIENT
Start: 2024-12-20 | End: 2024-12-21

## 2024-12-20 RX ORDER — OXYCODONE HYDROCHLORIDE 30 MG/1
1 TABLET ORAL
Qty: 8 | Refills: 0
Start: 2024-12-20 | End: 2024-12-21

## 2024-12-20 RX ORDER — HYDROMORPHONE/SOD CHLOR,ISO/PF 2 MG/10 ML
2 SYRINGE (ML) INJECTION EVERY 4 HOURS
Refills: 0 | Status: DISCONTINUED | OUTPATIENT
Start: 2024-12-20 | End: 2024-12-20

## 2024-12-20 RX ORDER — NALOXONE HYDROCHLORIDE 0.4 MG/ML
1 INJECTION, SOLUTION INTRAMUSCULAR; INTRAVENOUS; SUBCUTANEOUS
Qty: 1 | Refills: 0
Start: 2024-12-20

## 2024-12-20 RX ORDER — ACETAMINOPHEN 500MG 500 MG/1
2 TABLET, COATED ORAL
Qty: 0 | Refills: 0 | DISCHARGE

## 2024-12-20 RX ORDER — TRAMADOL HYDROCHLORIDE 50 MG/1
50 TABLET, FILM COATED ORAL EVERY 4 HOURS
Refills: 0 | Status: DISCONTINUED | OUTPATIENT
Start: 2024-12-20 | End: 2024-12-21

## 2024-12-20 RX ADMIN — Medication 1000 MILLIGRAM(S): at 18:33

## 2024-12-20 RX ADMIN — Medication 1 MILLIGRAM(S): at 12:23

## 2024-12-20 RX ADMIN — GABAPENTIN 100 MILLIGRAM(S): 400 CAPSULE ORAL at 18:32

## 2024-12-20 RX ADMIN — Medication 1000 MILLIGRAM(S): at 07:11

## 2024-12-20 RX ADMIN — Medication 1 DOSE(S): at 13:05

## 2024-12-20 RX ADMIN — Medication 1 DOSE(S): at 22:19

## 2024-12-20 RX ADMIN — Medication 3 MILLILITER(S): at 23:12

## 2024-12-20 RX ADMIN — Medication 10 MILLIGRAM(S): at 05:31

## 2024-12-20 RX ADMIN — HEPARIN SODIUM 5000 UNIT(S): 1000 INJECTION INTRAVENOUS; SUBCUTANEOUS at 22:20

## 2024-12-20 RX ADMIN — GABAPENTIN 100 MILLIGRAM(S): 400 CAPSULE ORAL at 05:30

## 2024-12-20 RX ADMIN — ESCITALOPRAM OXALATE 5 MILLIGRAM(S): 20 TABLET ORAL at 12:24

## 2024-12-20 RX ADMIN — HEPARIN SODIUM 5000 UNIT(S): 1000 INJECTION INTRAVENOUS; SUBCUTANEOUS at 15:03

## 2024-12-20 RX ADMIN — Medication 40 MILLIGRAM(S): at 05:30

## 2024-12-20 RX ADMIN — Medication 2 MILLIGRAM(S): at 17:21

## 2024-12-20 RX ADMIN — Medication 1000 MILLIGRAM(S): at 06:11

## 2024-12-20 RX ADMIN — Medication 2 MILLIGRAM(S): at 16:40

## 2024-12-20 RX ADMIN — Medication 1000 MILLIGRAM(S): at 00:49

## 2024-12-20 RX ADMIN — Medication 80 MILLIGRAM(S): at 22:25

## 2024-12-20 RX ADMIN — Medication 80 MILLIGRAM(S): at 19:08

## 2024-12-20 RX ADMIN — TRAMADOL HYDROCHLORIDE 50 MILLIGRAM(S): 50 TABLET, FILM COATED ORAL at 20:31

## 2024-12-20 RX ADMIN — Medication 1000 MILLIGRAM(S): at 00:00

## 2024-12-20 RX ADMIN — TIOTROPIUM BROMIDE INHALATION SPRAY 2 PUFF(S): 3.12 SPRAY, METERED RESPIRATORY (INHALATION) at 13:04

## 2024-12-20 RX ADMIN — Medication 10 MILLIGRAM(S): at 18:32

## 2024-12-20 RX ADMIN — Medication 3 MILLILITER(S): at 05:45

## 2024-12-20 RX ADMIN — Medication 1000 MILLIGRAM(S): at 12:26

## 2024-12-20 RX ADMIN — Medication 3 MILLILITER(S): at 16:02

## 2024-12-20 RX ADMIN — TRAMADOL HYDROCHLORIDE 50 MILLIGRAM(S): 50 TABLET, FILM COATED ORAL at 21:31

## 2024-12-20 RX ADMIN — HEPARIN SODIUM 5000 UNIT(S): 1000 INJECTION INTRAVENOUS; SUBCUTANEOUS at 05:31

## 2024-12-21 ENCOUNTER — TRANSCRIPTION ENCOUNTER (OUTPATIENT)
Age: 79
End: 2024-12-21

## 2024-12-21 VITALS
HEART RATE: 80 BPM | OXYGEN SATURATION: 96 % | RESPIRATION RATE: 17 BRPM | DIASTOLIC BLOOD PRESSURE: 60 MMHG | SYSTOLIC BLOOD PRESSURE: 112 MMHG | TEMPERATURE: 98 F

## 2024-12-21 DIAGNOSIS — Z98.890 OTHER SPECIFIED POSTPROCEDURAL STATES: ICD-10-CM

## 2024-12-21 RX ORDER — TRAMADOL HYDROCHLORIDE 50 MG/1
1 TABLET, FILM COATED ORAL
Qty: 8 | Refills: 0
Start: 2024-12-21

## 2024-12-21 RX ADMIN — TIOTROPIUM BROMIDE INHALATION SPRAY 2 PUFF(S): 3.12 SPRAY, METERED RESPIRATORY (INHALATION) at 09:31

## 2024-12-21 RX ADMIN — ESCITALOPRAM OXALATE 5 MILLIGRAM(S): 20 TABLET ORAL at 12:28

## 2024-12-21 RX ADMIN — Medication 1000 MILLIGRAM(S): at 02:53

## 2024-12-21 RX ADMIN — TRAMADOL HYDROCHLORIDE 50 MILLIGRAM(S): 50 TABLET, FILM COATED ORAL at 13:34

## 2024-12-21 RX ADMIN — TRAMADOL HYDROCHLORIDE 50 MILLIGRAM(S): 50 TABLET, FILM COATED ORAL at 07:30

## 2024-12-21 RX ADMIN — HEPARIN SODIUM 5000 UNIT(S): 1000 INJECTION INTRAVENOUS; SUBCUTANEOUS at 05:20

## 2024-12-21 RX ADMIN — Medication 1000 MILLIGRAM(S): at 12:28

## 2024-12-21 RX ADMIN — Medication 1 DOSE(S): at 09:30

## 2024-12-21 RX ADMIN — Medication 3 MILLILITER(S): at 06:19

## 2024-12-21 RX ADMIN — TRAMADOL HYDROCHLORIDE 50 MILLIGRAM(S): 50 TABLET, FILM COATED ORAL at 06:30

## 2024-12-21 RX ADMIN — Medication 10 MILLIGRAM(S): at 05:19

## 2024-12-21 RX ADMIN — Medication 1000 MILLIGRAM(S): at 01:53

## 2024-12-21 RX ADMIN — Medication 80 MILLIGRAM(S): at 05:20

## 2024-12-21 RX ADMIN — Medication 80 MILLIGRAM(S): at 12:28

## 2024-12-21 RX ADMIN — GABAPENTIN 100 MILLIGRAM(S): 400 CAPSULE ORAL at 05:20

## 2024-12-21 RX ADMIN — Medication 1000 MILLIGRAM(S): at 06:26

## 2024-12-21 RX ADMIN — TRAMADOL HYDROCHLORIDE 50 MILLIGRAM(S): 50 TABLET, FILM COATED ORAL at 13:04

## 2024-12-21 RX ADMIN — Medication 1000 MILLIGRAM(S): at 12:58

## 2024-12-21 RX ADMIN — Medication 40 MILLIGRAM(S): at 05:21

## 2024-12-26 ENCOUNTER — NON-APPOINTMENT (OUTPATIENT)
Age: 79
End: 2024-12-26

## 2025-01-03 ENCOUNTER — APPOINTMENT (OUTPATIENT)
Dept: GYNECOLOGIC ONCOLOGY | Facility: CLINIC | Age: 80
End: 2025-01-03
Payer: MEDICARE

## 2025-01-03 ENCOUNTER — NON-APPOINTMENT (OUTPATIENT)
Age: 80
End: 2025-01-03

## 2025-01-03 VITALS
WEIGHT: 119 LBS | DIASTOLIC BLOOD PRESSURE: 68 MMHG | BODY MASS INDEX: 26.77 KG/M2 | HEART RATE: 91 BPM | HEIGHT: 56 IN | TEMPERATURE: 97.6 F | SYSTOLIC BLOOD PRESSURE: 127 MMHG | OXYGEN SATURATION: 91 %

## 2025-01-03 DIAGNOSIS — D49.59 NEOPLASM UNSPECIFIED BEHAVIOR OF OTHER GENITOURINARY ORGAN: ICD-10-CM

## 2025-01-03 DIAGNOSIS — R60.0 LOCALIZED EDEMA: ICD-10-CM

## 2025-01-03 PROBLEM — I50.20 UNSPECIFIED SYSTOLIC (CONGESTIVE) HEART FAILURE: Chronic | Status: ACTIVE | Noted: 2024-12-10

## 2025-01-03 PROBLEM — I21.9 ACUTE MYOCARDIAL INFARCTION, UNSPECIFIED: Chronic | Status: ACTIVE | Noted: 2024-12-10

## 2025-01-03 PROBLEM — Z86.79 PERSONAL HISTORY OF OTHER DISEASES OF THE CIRCULATORY SYSTEM: Chronic | Status: ACTIVE | Noted: 2024-12-10

## 2025-01-03 PROBLEM — M81.0 AGE-RELATED OSTEOPOROSIS WITHOUT CURRENT PATHOLOGICAL FRACTURE: Chronic | Status: ACTIVE | Noted: 2024-12-10

## 2025-01-03 PROCEDURE — 99024 POSTOP FOLLOW-UP VISIT: CPT

## 2025-01-08 ENCOUNTER — NON-APPOINTMENT (OUTPATIENT)
Age: 80
End: 2025-01-08

## 2025-01-08 PROBLEM — H90.5 UNSPECIFIED SENSORINEURAL HEARING LOSS: Chronic | Status: ACTIVE | Noted: 2024-12-10

## 2025-01-08 PROBLEM — M48.061 SPINAL STENOSIS, LUMBAR REGION WITHOUT NEUROGENIC CLAUDICATION: Chronic | Status: ACTIVE | Noted: 2024-12-10

## 2025-01-08 PROBLEM — I51.81 TAKOTSUBO SYNDROME: Chronic | Status: ACTIVE | Noted: 2024-12-10

## 2025-01-13 LAB — SURGICAL PATHOLOGY STUDY: SIGNIFICANT CHANGE UP

## 2025-01-16 ENCOUNTER — APPOINTMENT (OUTPATIENT)
Dept: WOUND CARE | Facility: HOSPITAL | Age: 80
End: 2025-01-16

## 2025-01-16 ENCOUNTER — APPOINTMENT (OUTPATIENT)
Dept: WOUND CARE | Facility: CLINIC | Age: 80
End: 2025-01-16
Payer: MEDICARE

## 2025-01-16 VITALS
SYSTOLIC BLOOD PRESSURE: 135 MMHG | OXYGEN SATURATION: 93 % | TEMPERATURE: 98.3 F | HEART RATE: 75 BPM | DIASTOLIC BLOOD PRESSURE: 57 MMHG | RESPIRATION RATE: 16 BRPM

## 2025-01-16 DIAGNOSIS — Z86.79 PERSONAL HISTORY OF OTHER DISEASES OF THE CIRCULATORY SYSTEM: ICD-10-CM

## 2025-01-16 DIAGNOSIS — I89.0 LYMPHEDEMA, NOT ELSEWHERE CLASSIFIED: ICD-10-CM

## 2025-01-16 DIAGNOSIS — I50.20 UNSPECIFIED SYSTOLIC (CONGESTIVE) HEART FAILURE: ICD-10-CM

## 2025-01-16 DIAGNOSIS — R60.0 LOCALIZED EDEMA: ICD-10-CM

## 2025-01-16 DIAGNOSIS — Z86.39 PERSONAL HISTORY OF OTHER ENDOCRINE, NUTRITIONAL AND METABOLIC DISEASE: ICD-10-CM

## 2025-01-16 DIAGNOSIS — Z87.891 PERSONAL HISTORY OF NICOTINE DEPENDENCE: ICD-10-CM

## 2025-01-16 DIAGNOSIS — S81.801A UNSPECIFIED OPEN WOUND, RIGHT LOWER LEG, INITIAL ENCOUNTER: ICD-10-CM

## 2025-01-16 DIAGNOSIS — Z80.3 FAMILY HISTORY OF MALIGNANT NEOPLASM OF BREAST: ICD-10-CM

## 2025-01-16 PROCEDURE — 11042 DBRDMT SUBQ TIS 1ST 20SQCM/<: CPT

## 2025-01-16 PROCEDURE — 99204 OFFICE O/P NEW MOD 45 MIN: CPT | Mod: 25

## 2025-01-16 PROCEDURE — 93922 UPR/L XTREMITY ART 2 LEVELS: CPT | Mod: 26

## 2025-01-22 ENCOUNTER — NON-APPOINTMENT (OUTPATIENT)
Age: 80
End: 2025-01-22

## 2025-01-28 ENCOUNTER — NON-APPOINTMENT (OUTPATIENT)
Age: 80
End: 2025-01-28

## 2025-01-28 ENCOUNTER — APPOINTMENT (OUTPATIENT)
Dept: CARDIOLOGY | Facility: CLINIC | Age: 80
End: 2025-01-28

## 2025-01-31 ENCOUNTER — APPOINTMENT (OUTPATIENT)
Dept: GYNECOLOGIC ONCOLOGY | Facility: CLINIC | Age: 80
End: 2025-01-31
Payer: MEDICARE

## 2025-01-31 VITALS
BODY MASS INDEX: 26.77 KG/M2 | SYSTOLIC BLOOD PRESSURE: 119 MMHG | WEIGHT: 119 LBS | TEMPERATURE: 98.3 F | DIASTOLIC BLOOD PRESSURE: 52 MMHG | HEART RATE: 101 BPM | HEIGHT: 56 IN | OXYGEN SATURATION: 87 %

## 2025-01-31 DIAGNOSIS — D49.59 NEOPLASM UNSPECIFIED BEHAVIOR OF OTHER GENITOURINARY ORGAN: ICD-10-CM

## 2025-01-31 PROCEDURE — 99024 POSTOP FOLLOW-UP VISIT: CPT

## 2025-02-03 ENCOUNTER — APPOINTMENT (OUTPATIENT)
Dept: UROGYNECOLOGY | Facility: CLINIC | Age: 80
End: 2025-02-03
Payer: MEDICARE

## 2025-02-03 VITALS
BODY MASS INDEX: 25.19 KG/M2 | HEIGHT: 56 IN | HEART RATE: 90 BPM | SYSTOLIC BLOOD PRESSURE: 122 MMHG | DIASTOLIC BLOOD PRESSURE: 50 MMHG | WEIGHT: 112 LBS

## 2025-02-03 DIAGNOSIS — R39.15 URGENCY OF URINATION: ICD-10-CM

## 2025-02-03 DIAGNOSIS — N39.41 URGE INCONTINENCE: ICD-10-CM

## 2025-02-03 LAB
BILIRUB UR QL STRIP: NORMAL
CLARITY UR: CLEAR
COLLECTION METHOD: NORMAL
GLUCOSE UR-MCNC: NORMAL
HCG UR QL: 1 EU/DL
HGB UR QL STRIP.AUTO: NORMAL
KETONES UR-MCNC: NORMAL
LEUKOCYTE ESTERASE UR QL STRIP: NORMAL
NITRITE UR QL STRIP: NORMAL
PH UR STRIP: 6
PROT UR STRIP-MCNC: NORMAL
SP GR UR STRIP: 1.02

## 2025-02-03 PROCEDURE — 52000 CYSTOURETHROSCOPY: CPT

## 2025-02-03 PROCEDURE — 81003 URINALYSIS AUTO W/O SCOPE: CPT | Mod: QW

## 2025-02-06 ENCOUNTER — APPOINTMENT (OUTPATIENT)
Dept: WOUND CARE | Facility: HOSPITAL | Age: 80
End: 2025-02-06
Payer: MEDICARE

## 2025-02-06 ENCOUNTER — LABORATORY RESULT (OUTPATIENT)
Age: 80
End: 2025-02-06

## 2025-02-06 VITALS — HEART RATE: 87 BPM | DIASTOLIC BLOOD PRESSURE: 61 MMHG | SYSTOLIC BLOOD PRESSURE: 138 MMHG | TEMPERATURE: 100.9 F

## 2025-02-06 DIAGNOSIS — Z87.891 PERSONAL HISTORY OF NICOTINE DEPENDENCE: ICD-10-CM

## 2025-02-06 DIAGNOSIS — Z87.09 PERSONAL HISTORY OF OTHER DISEASES OF THE RESPIRATORY SYSTEM: ICD-10-CM

## 2025-02-06 DIAGNOSIS — R05.9 COUGH, UNSPECIFIED: ICD-10-CM

## 2025-02-06 PROCEDURE — 93970 EXTREMITY STUDY: CPT | Mod: 26

## 2025-02-06 PROCEDURE — 99213 OFFICE O/P EST LOW 20 MIN: CPT

## 2025-02-12 PROBLEM — Z87.09 HISTORY OF ASTHMA: Status: RESOLVED | Noted: 2024-12-05 | Resolved: 2025-02-12

## 2025-02-14 ENCOUNTER — NON-APPOINTMENT (OUTPATIENT)
Age: 80
End: 2025-02-14

## 2025-02-27 ENCOUNTER — APPOINTMENT (OUTPATIENT)
Dept: WOUND CARE | Facility: HOSPITAL | Age: 80
End: 2025-02-27
Payer: MEDICARE

## 2025-02-27 VITALS — SYSTOLIC BLOOD PRESSURE: 126 MMHG | DIASTOLIC BLOOD PRESSURE: 80 MMHG | TEMPERATURE: 98.1 F | HEART RATE: 70 BPM

## 2025-02-27 DIAGNOSIS — R60.0 LOCALIZED EDEMA: ICD-10-CM

## 2025-02-27 DIAGNOSIS — L97.919 NON-PRESSURE CHRONIC ULCER OF UNSPECIFIED PART OF RIGHT LOWER LEG WITH UNSPECIFIED SEVERITY: ICD-10-CM

## 2025-02-27 PROCEDURE — 99213 OFFICE O/P EST LOW 20 MIN: CPT

## 2025-03-10 NOTE — H&P ADULT - NSHPSOCIALHISTORY_GEN_ALL_CORE
, lives with family  Ambulates with cane  Former smoker, used to smoke ~1 PPD x 15 years, quit >10 years ago  Social EtOH, no substance use
Patient/Caregiver provided printed discharge information.

## 2025-03-25 NOTE — ED PROVIDER NOTE - HIV OFFER
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     Treatment/Oncogy history:  -5/24/2022 right upper lobectomy   -Local Recurrence 03/08/2023  -completed XRT on 5/30/23 and 5 cycles of weekly carbo/taxol on 5/19/23   Mediastinum: 5,000 cGy/200 cGy per fx (4/18/2023-5/23/2023)   Med Bst:        1,000 cGy/200 cGy per fx (5/24/2023-5/30/2023)  -Imfinzi every 4 weeks x 10 cycles (6/22/23-3/11/2024)--> progression      Imaging:  CT angiogram 1/17/2022: No pulmonary embolus. Right upper lobe 2.5 cm mass.  CT C 9/19/2022: Status post right partial pneumonectomy for resection of a right upper lobe lung mass with no residual recurrent mass seen. Small right-sided pleural effusion. Some lymphadenopathy in the right paratracheal region with a maximum short axis dimension of 1.6 cm.  Follow-up is recommended  CT C 1/25/2023: There is a paratracheal enlarged lymph node which shows interval mild size increase and now measures 2.2 x 2.0 cm and previously was 1.6 x 1.5 cm.  Aortopulmonary window mildly enlarged lymph node is stable.  Thoracic aorta is without aneurysmal dilatation or dissection.  Impression: 1. Operative changes of right upper lobectomy without bronchialstump soft tissue proliferation    2. No residual tumor load or metastatic nodule. 3. Paratracheal enlarged lymph node with interval size increase.  PET CT 2/9/2023:  Hypermetabolic right paratracheal lymph node image 81 series 3 measures 25 x 20 mm with max SUV 27.0.  Hypermetabolic anterior mediastinal lymph node anterior to the SVC measures 12 x 9 mm with max SUV 12.0. Impression: 1. Hypermetabolic metastatic  mediastinal adenopathy.   2. No PET evidence of metastatic disease outside of the mediastinum.  CT C/A/P 7/10/2023:  1. Several new subcentimeter nodules in the right lung.  Suspect these are infectious or inflammatory in nature, but 3 month follow-up chest CT recommended to ensure resolution.  2. 2 reference mediastinal lymph nodes are smaller since January.  3. L1 vertebral body compression deformity new since January, but appears subacute.  CT C/A/P 10/10/2023:    1. Slightly larger mediastinal lymph nodes, to be followed.  2. Increased irregular right perihilar consolidation which may be treatment related.  3. Small right pleural effusion.  4. No new suspicious findings in the abdomen or pelvis.  CT C/A/P 1/10/2024:    1. Interval enlargement of mediastinal lymph nodes  2. Similar right perihilar consolidative opacity and small right pleural effusion  PET CT 2/6/2024:  1. New, enlarged hypermetabolic superior paratracheal and para-aortic/subaortic lymph nodes compared to prior PET-CT  2. Persistent enlarged and hypermetabolic lower right paratracheal lymph node.  This lymph node is decreased in size and FDG avidity compared to prior PET-CT.  3. Previously seen hypermetabolic pre-vascular lymph node is decreased in size and is no longer hypermetabolic.  PET CT 5/23/2024:  1. Status post right upper lobe resection without evidence for local recurrence/residual disease.  2. Interval response to therapy with decreasing right paratracheal and left AP window adenopathy.  3. Nonspecific area of hypermetabolic activity within the left tongue base.  Direct visualization may be warranted.  4. No evidence for new focus of metastatic disease.  PET CT 9/9/2024:  1. Previously visualized mediastinal lymph nodes slightly more FDG avid today.  2. No new sites of metastatic disease.  PET CT 12/10/24:  1. Variable mediastinal severo changes, with reference lymph nodes larger but of decreased metabolic activity, and newly enlarged  and FDG-avid retrosternal nodes identified.  This is otherwise indeterminate and could represent mixed disease response versus reactive adenopathy.  Short interval follow-up recommended in order to assess stability/resolution.  2. Stable right lung post-treatment changes, with no evidence to suggest tracer-avid new/worsened metastatic disease through the neck, abdomen, or pelvis.  3. Slightly increased volume of loculated, otherwise simple appearing right pleural fluid.          Pathology:  03/22/2022 CT-guided biopsy of the right lung mass: invasive squamous cell carcinoma, moderately differentiated.  5/24/2022: Right upper lobectomy:  1- LYMPH NODE, LUMBAR RIGHT 10 LYMPHADENECTOMY: NEGATIVE FOR METASTATIC CARCINOMA (0/1).   2- LYMPH NODE, 11R, LYMPHADENECTOMY: NEGATIVE FOR METASTATIC CARCINOMA (0/1).  3- LYMPH NODE, 9R, LYMPHADENECTOMY: NEGATIVE FOR METASTATIC CARCINOMA (0/1).   4- LYMPH NODE, 7R, LYMPHADENECTOMY: NEGATIVE FOR METASTATIC CARCINOMA (0/1).   5- LYMPH NODE, 8R, LYMPHADENECTOMY: ONE LYMPH NODE NEGATIVE FOR METASTATIC CARCINOMA (0/1).    6- LYMPH NODE, 12R, LYMPHADENECTOMY: NEGATIVE FOR METASTATIC CARCINOMA (0/1).  7- LUNG, RIGHT UPPER AND MIDDLE LOBES, PNEUMONECTOMY:  POORLY DIFFERENTIATED, G3, SQUAMOUS CELL CARCINOMA, INVASIVE.    - TUMOR SIZE: 3 CM.    - LYMPHOVASCULAR INVASION IS PRESENT.    - MARGINS NEGATIVE FOR INVASIVE CARCINOMA:    - NEAREST MARGIN, VASCULAR / BRONCHIAL 2.5 CM.    - NO PLEURAL SURFACE INVOLVEMENT     - PROMINENT NECROSIS PRESENT.  Visceral Pleura Invasion: Not identified  Number of Lymph Nodes Examined: Exact number : [id number]  pT Category: pT1c: pN0: No regional lymph node metastasis    3/8/2023 LYMPH NODE BIOPSY:   LYMPH NODE 4R, LYMPHADENECTOMY:  METASTATIC SQUAMOUS CELL CARCINOMA, NONKERATINIZING, MULTIPLE FRAGMENTS.       IMMUNOHISTOCHEMICAL STAINS:   CK 5/6, P63 AND CK7:  POSITIVE IN MALIGNANT CELLS.   CK20 AND TTF-1:  NEGATIVE IN MALIGNANT CELLS.       CLINICAL HISTORY:        Patient: Leonila Rosales is a 78 y.o. male kindly referred by  Dr. Diaz for evaluation of lung cancer.    On 01/17/2022 patient presented to the emergency department after a fall.  He reports that he was getting to his truck and sleep and fell on his left side on the truck step rail.  He started having pain in his left hip and knee.  He underwent a CT angiogram that showed No pulmonary embolus. Right upper lobe 2.5 cm mass.      During that hospitalization he was treated for a close intertrochanteric fracture of the left femur and underwent open reduction intramedullary nailing left comminuted intertrochanteric hip fracture on 01/18/2022 with Dr. Fortino Palomares.  He then spent several weeks on rehab.    On 03/22/2022 he underwent CT-guided biopsy of the right lung mass.  Pathology showed invasive squamous cell carcinoma, moderately differentiated.    He is s/p right upper lobectomy with  on 5/24/2022.  Pathology report as above.  Patient is stage IA3 squamous cell carcinoma of the lung.  He has recovered well and has been doing good.     Patient was started on surveillance. CT 9/19/2022 with 1.6 right paratracheal LN and small Rt pleural effusion. Surveillance CT scan chest to eval stability 1/25/2023 with paratracheal enlarged lymph node which shows interval mild size increase and now measures 2.2 x 2.0 cm and previously was 1.6 x 1.5 cm.  Aortopulmonary window mildly enlarged lymph node is stable. PET CT 2/9/2023 with Hypermetabolic right paratracheal lymph node 25 x 20 mm with max SUV 27.0. Hypermetabolic anterior mediastinal lymph node anterior to the SVC measures 12 x 9 mm with max SUV 12.0.   Biopsy of R4 lymph node confirmed the metastatic squamous cell carcinoma. Completed  XRT on 5/30/23  and 5 cycles of Carbo/taxol on 5/19/23. C6 was held on 5/26/23 due to neutropenia, ANC was 0.7.    Patient was started on chemoradiation. -completed XRT on 5/30/23 and 5 cycles of weekly carbo/taxol on 5/19/23, His  final cycle was held due to neutropenia, ANC was 0.7.  He was then started on maintenance Imfinzi every 4 weeks on 6/22/23    Chief Complaint: 1 Week Follow Up (Patient sister stats he been weak lately. Want to know if possible if infusion can be on Fridays.)      Interval History:  He completed XRT to the mediastinum on 5/30/23 and 5 cycles of weekly carbo/taxol on 5/19/23. He was on maintenance durvalumab, started 6/22/2023 and tolerated well. Patient with progression of paratracheal and para-aortic/subaortic lymph nodes.  He was seen by Dr. Willis but not a good candidate for radiation at this time.  He was started on Gemzar on 4/13/24.       03/19/2025: Patient presents today for follow up and Gemzar C16D8. Pt in wheelchair today, he is complaining of ankle pain. Pt informed of diease progression, discussed options with pt. we discussed palliative and comfort care versus continuation of treatment.  Patient definitively wants to continue treatment.  Hospice is not an option for him at this time.  He stated that he still cook and do chores around the house and remained as after as he can.  He is here on a wheelchair just because his ankle is hurting but other than that he is doing okay.  The patient denies any back pain, bleeding, difficulty breathing, GI distress, fever or any signs of infection.Will discontinue Gemzar.     Interval history  3/25/2025:  Leonila Rosales presents for follow-up of cancer. Reports weakness.     Current health status: Cancer progressing, causing weakness. Labs improved since last visit with Dr. Napoles.     Positive symptoms: Weakness, fatigue.    Sister is here with the patient who request a medical alert button, a power wheelchair and meals on wheels to appropriately care for the patient.  Past Medical History:   Diagnosis Date    Anemia     Class 1 obesity due to excess calories with serious comorbidity and body mass index (BMI) of 33.0 to 33.9 in adult     Closed intertrochanteric  fracture     Deficiency of macronutrients     GERD (gastroesophageal reflux disease)     H. pylori infection     History of tobacco use     Hyperlipidemia     Hypertension     Lung mass     Malignant neoplasm of unspecified part of unspecified bronchus or lung     Non-small cell lung cancer       Past Surgical History:   Procedure Laterality Date    BIOPSY OF INTESTINE  04/04/2022    BRONCHOSCOPY      COLONOSCOPY      ESOPHAGOGASTRODUODENOSCOPY  04/04/2022    HIP FRACTURE SURGERY Left 2016    Intramedullary Nail Insertion Hip Left 01/18/2022    KIDNEY SURGERY Right 05/27/2022    MEDIASTINOSCOPY N/A 3/6/2023    Procedure: MEDIASTINOSCOPY;  Surgeon: Jose Diaz MD;  Location: [facility] OR;  Service: Cardiothoracic;  Laterality: N/A;  XX    PLACEMENT, MEDIPORT N/A 4/6/2023    Procedure: Placement, Mediport;  Surgeon: Jose Diaz MD;  Location: [facility] OR;  Service: Cardiology;  Laterality: N/A;    SHOULDER SURGERY       Family History   Family history unknown: Yes            Review of patient's allergies indicates:  No Known Allergies   Current Outpatient Medications on File Prior to Visit   Medication Sig Dispense Refill    amLODIPine (NORVASC) 5 MG tablet TAKE ONE TABLET BY MOUTH TWICE DAILY 180 tablet 1    aspirin 81 mg Cap Take 81 mg by mouth Daily.      atorvastatin (LIPITOR) 10 MG tablet TAKE ONE TABLET BY MOUTH DAILY 90 tablet 3    levoFLOXacin (LEVAQUIN) 500 MG tablet Take 1 tablet (500 mg total) by mouth once daily. 7 tablet 0    LIDOcaine-prilocaine (EMLA) cream Apply topically as needed. Apply to port site 30 mins prior to chemo 30 g 3    LINZESS 145 mcg Cap capsule Take 145 mcg by mouth daily as needed. New medication, not started yet      metoprolol succinate (TOPROL-XL) 25 MG 24 hr tablet Take 1 tablet (25 mg total) by mouth once daily. 90 tablet 3    pantoprazole (PROTONIX) 40 MG tablet Take 40 mg by mouth.       No current facility-administered medications on file prior to visit.      Review of  Systems   Constitutional:  Negative for activity change, appetite change, chills, diaphoresis, fatigue, fever, night sweats and unexpected weight change.   HENT:  Negative for nasal congestion, mouth sores, nosebleeds, sinus pressure/congestion, sore throat and trouble swallowing.    Eyes: Negative.    Respiratory:  Negative for cough and shortness of breath.    Cardiovascular:  Negative for chest pain and palpitations.   Gastrointestinal:  Negative for abdominal distention, abdominal pain, blood in stool, change in bowel habit, constipation, diarrhea, nausea and vomiting.   Endocrine: Negative.    Genitourinary:  Negative for bladder incontinence, decreased urine volume, difficulty urinating, dysuria, frequency, hematuria and urgency.   Musculoskeletal:  Negative for arthralgias, back pain, gait problem, joint swelling, leg pain and myalgias.   Integumentary:  Negative for rash.   Allergic/Immunologic: Negative.    Neurological:  Negative for dizziness, tremors, syncope, weakness, light-headedness, numbness, headaches and memory loss.   Hematological:  Negative for adenopathy. Does not bruise/bleed easily.   Psychiatric/Behavioral:  Negative for agitation, confusion, hallucinations, sleep disturbance and suicidal ideas. The patient is not nervous/anxious.           Vitals:    03/25/25 1334   Weight: 76.2 kg (167 lb 14.4 oz)         Wt Readings from Last 6 Encounters:   03/25/25 76.2 kg (167 lb 14.4 oz)   03/19/25 78.9 kg (174 lb)   03/12/25 79.4 kg (175 lb)   02/26/25 79.8 kg (175 lb 14.1 oz)   02/19/25 79.8 kg (175 lb 14.4 oz)   01/29/25 82.1 kg (180 lb 14.4 oz)     Body mass index is 26.35 kg/m².  Body surface area is 1.9 meters squared.       Physical Exam  Vitals and nursing note reviewed.   Constitutional:       General: He is not in acute distress.     Appearance: Normal appearance. He is obese.   HENT:      Head: Normocephalic and atraumatic.      Mouth/Throat:      Mouth: Mucous membranes are moist.   Eyes:       General: No scleral icterus.     Extraocular Movements: Extraocular movements intact.      Conjunctiva/sclera: Conjunctivae normal.   Neck:      Vascular: No JVD.   Cardiovascular:      Rate and Rhythm: Normal rate and regular rhythm.      Heart sounds: No murmur heard.  Pulmonary:      Effort: Pulmonary effort is normal.      Breath sounds: Decreased breath sounds present. No wheezing or rhonchi.   Chest:      Chest wall: No tenderness.   Abdominal:      General: Bowel sounds are normal. There is no distension.      Palpations: Abdomen is soft. There is no fluid wave.      Tenderness: There is no abdominal tenderness.   Musculoskeletal:         General: No swelling or deformity.      Cervical back: Neck supple.   Lymphadenopathy:      Head:      Right side of head: No submandibular adenopathy.      Left side of head: No submandibular adenopathy.      Cervical: No cervical adenopathy.      Upper Body:      Right upper body: No supraclavicular or axillary adenopathy.      Left upper body: No supraclavicular or axillary adenopathy.      Lower Body: No right inguinal adenopathy. No left inguinal adenopathy.   Skin:     General: Skin is warm.      Coloration: Skin is not jaundiced.      Findings: No rash.   Neurological:      Mental Status: He is alert and oriented to person, place, and time.      Cranial Nerves: Cranial nerves 2-12 are intact.      Motor: Weakness present.      Gait: Gait abnormal.      Comments: Uses cane for  mobility  Today on a wheelchair during the exam.   Psychiatric:         Attention and Perception: Attention normal.         Mood and Affect: Mood and affect normal.         Behavior: Behavior is cooperative.         Cognition and Memory: Cognition normal.         Judgment: Judgment normal.     ECOG SCORE             Laboratory:  CBC with Differential:  Lab Results   Component Value Date    WBC 5.71 03/25/2025    RBC 4.02 (L) 03/25/2025    HGB 12.0 (L) 03/25/2025    HCT 38.7 (L) 03/25/2025     MCV 96.3 (H) 03/25/2025    MCH 29.9 03/25/2025    MCHC 31.0 (L) 03/25/2025    RDW 19.8 (H) 03/25/2025     03/25/2025    MPV 9.3 03/25/2025        CMP:  Sodium   Date Value Ref Range Status   03/17/2025 141 136 - 145 mmol/L Final     Potassium   Date Value Ref Range Status   03/17/2025 4.3 3.5 - 5.1 mmol/L Final     Chloride   Date Value Ref Range Status   03/17/2025 108 (H) 98 - 107 mmol/L Final     CO2   Date Value Ref Range Status   03/17/2025 23 23 - 31 mmol/L Final     Blood Urea Nitrogen   Date Value Ref Range Status   03/17/2025 9.0 8.4 - 25.7 mg/dL Final     Creatinine   Date Value Ref Range Status   03/17/2025 0.76 0.72 - 1.25 mg/dL Final     Calcium   Date Value Ref Range Status   03/17/2025 8.6 (L) 8.8 - 10.0 mg/dL Final     Albumin   Date Value Ref Range Status   03/17/2025 2.5 (L) 3.4 - 4.8 g/dL Final     Bilirubin Total   Date Value Ref Range Status   03/17/2025 1.0 <=1.5 mg/dL Final     ALP   Date Value Ref Range Status   03/17/2025 122 40 - 150 unit/L Final     AST   Date Value Ref Range Status   03/17/2025 36 (H) 5 - 34 unit/L Final     ALT   Date Value Ref Range Status   03/17/2025 25 0 - 55 unit/L Final     Estimated GFR-Non    Date Value Ref Range Status   04/19/2022 >60           Assessment:       No diagnosis found.          1) [id number] Stage IA3 Squamous cell carcinoma of lung  -5/24/2022 right upper lobectomy   -Local Recurrence 03/08/2023--Biopsy proven R4 LN  -completed XRT on 5/30/23 and 5 cycles of weekly carbo/taxol on 5/19/23  -Imfinzi every 4 weeks started on 6/22/23  -PET CT with hypermetabolic activity in the right paratracheal LN. Discussed with Dr Alba ESCOBAR and he will be seen 4/21/2024.   -Recent PET CT with persistent stable  LN but increase activity    2) Chemotherapy induced anemia and leukopenia-stable       Plan:      Dx: Cancer   Status: Progressing - Causing weakness   - Treatment to start Friday as per Pt sister request who is the .  - Labs  before treatment to ensure appropriateness   - Counseled on potential fatigue from cancer and treatment   - Discussed treatment side effects and monitoring  - Explained hospice option available if desired   - Advised can stop treatment at any point if side effects outweigh benefits   - Goal: Prolong life as much as possible, cannot cure.  -  involved to help with sister requests    Visit today included increased complexity associated with the care of the episodic problem lung cancer, addressing and managing the longitudinal care of the patient's lung cancer.     Patient instructions and visit orders.       -Follow-up:  F/U with NP- 4/15/2025  -Labs:  CBC, CMP-4/15/2025  -Imaging:    -Other orders:  Treatment, cycle 1 carbo Taxol-03/28/2025  Treatment, cycle 2 carbo Taxol-4/18/2025    40 were spent on this encounter    Shaun Vang, MSN, FNP-BC, APRN, AOCNP   Department of Hematology/Oncology.                 Opt out

## 2025-04-02 ENCOUNTER — RX RENEWAL (OUTPATIENT)
Age: 80
End: 2025-04-02

## 2025-05-29 NOTE — PHYSICAL THERAPY INITIAL EVALUATION ADULT - [id number]_GEN_A_CORE
Vision screening done today (05/29/25) as part of a preventative care visit.  Results vision results: normal   [facility] [place] [facility] Vision Screener - Potential condition: none    Vision Screen     Right Eye -  OD           SE: +0.25  DS: +0.50  DC: -0.75  Axis: @ 178 degrees    Distance in between: 61 mm    Left Eye - OS  SE: -0.25  DS: 0.00  DC: -0.50  Axis: @ 172 degrees    Hearing Screen: Normal     No DME needs

## (undated) DEVICE — XI OBTURATOR OPTICAL BLADELESS 8MM

## (undated) DEVICE — Device

## (undated) DEVICE — VENODYNE/SCD SLEEVE CALF MEDIUM

## (undated) DEVICE — PROTECTOR HEEL / ELBOW FLUFFY

## (undated) DEVICE — XI SEAL UNIVERSIAL 5-12MM

## (undated) DEVICE — ELCTR GROUNDING PAD ADULT COVIDIEN

## (undated) DEVICE — LIGASURE BLUNT TIP 5MM X 37CM

## (undated) DEVICE — XI ARM FORCEP PROGRASP 8MM

## (undated) DEVICE — ELCTR BOVIE PENCIL SMOKE EVACUATION

## (undated) DEVICE — XI ARM NEEDLE DRIVER LARGE

## (undated) DEVICE — TUBING AIRSEAL TRI-LUMEN FILTERED

## (undated) DEVICE — SUT MONOCRYL 4-0 27" PS-2 UNDYED

## (undated) DEVICE — XI ARM PERMANENT CAUTERY HOOK

## (undated) DEVICE — APPLICATOR VISTASEAL LAP DUAL 45CM FLEX

## (undated) DEVICE — XI ARM GRASPER TIP UP FENESTRATED

## (undated) DEVICE — FOLEY TRAY 16FR 5CC LF UMETER CLOSED

## (undated) DEVICE — UTERINE MANIPULATOR MPM MEDICAL ZUMI 4.5MM

## (undated) DEVICE — GOWN XL

## (undated) DEVICE — UTERINE MANIPULATOR CONMED VCARE LG 37MM

## (undated) DEVICE — PACK ROBOTIC

## (undated) DEVICE — SUT VLOC 180 3-0 9" V-20 GREEN

## (undated) DEVICE — POSITIONER PURPLE ARM ONE STEP (LARGE)

## (undated) DEVICE — TROCAR SURGIQUEST AIRSEAL 8MM X 100MM

## (undated) DEVICE — POSITIONER FOAM HEAD CRADLE (PINK)

## (undated) DEVICE — XI ARM SCISSOR MONO CURVED

## (undated) DEVICE — XI ARM PERMANENT CAUTERY SPATULA

## (undated) DEVICE — SUT VICRYL 0 27" UR-6

## (undated) DEVICE — DRAPE WARMING SOLUTION 44 X 44"

## (undated) DEVICE — LUBRICATING JELLY ONESHOT 1.25OZ

## (undated) DEVICE — D HELP - CLEARVIEW CLEARIFY SYSTEM

## (undated) DEVICE — DRSG OPSITE 13.75 X 4"

## (undated) DEVICE — APPLICATOR SURGICEL LAP TROCAR POINT 2.5MM X 150MM

## (undated) DEVICE — PACK PERI GYN

## (undated) DEVICE — POSITIONER STRAP ARMBOARD VELCRO TS-30

## (undated) DEVICE — XI SCISSOR TIP COVER

## (undated) DEVICE — ENDOCATCH 10MM

## (undated) DEVICE — UTERINE MANIPULATOR CONMED VCARE MED 34MM

## (undated) DEVICE — TUBING STRYKEFLOW II SUCTION / IRRIGATOR

## (undated) DEVICE — ELCTR BOVIE TIP BLADE INSULATED 2.75" EDGE

## (undated) DEVICE — DRSG STERISTRIPS 0.5 X 4"

## (undated) DEVICE — SYR LUER LOK 10CC

## (undated) DEVICE — SUT VICRYL PLUS 0 27" CT-2 UNDYED

## (undated) DEVICE — XI ARM FORCEP MARYLAND BIPOLAR

## (undated) DEVICE — XI ARM FORCEP TENACULUM

## (undated) DEVICE — GOWN XXXL

## (undated) DEVICE — UTERINE MANIPULATOR CONMED VCARE SM 32MM

## (undated) DEVICE — XI ARM FORCEP FENESTRATED BIPOLAR 8MM